# Patient Record
Sex: FEMALE | Race: WHITE | Employment: OTHER | URBAN - METROPOLITAN AREA
[De-identification: names, ages, dates, MRNs, and addresses within clinical notes are randomized per-mention and may not be internally consistent; named-entity substitution may affect disease eponyms.]

---

## 2017-05-09 ENCOUNTER — HOSPITAL ENCOUNTER (OUTPATIENT)
Dept: GENERAL RADIOLOGY | Age: 77
Discharge: HOME | End: 2017-05-09
Attending: INTERNAL MEDICINE | Admitting: INTERNAL MEDICINE

## 2017-05-09 LAB
ANION GAP SERPL CALCULATED.3IONS-SCNC: 10 MMOL/L (ref 4–16)
BUN BLDV-MCNC: 44 MG/DL (ref 6–23)
CALCIUM SERPL-MCNC: 9.5 MG/DL (ref 8.3–10.6)
CHLORIDE BLD-SCNC: 103 MMOL/L (ref 99–110)
CO2: 27 MMOL/L (ref 21–32)
CREAT SERPL-MCNC: 3 MG/DL (ref 0.6–1.1)
GFR AFRICAN AMERICAN: 18 ML/MIN/1.73M2
GFR NON-AFRICAN AMERICAN: 15 ML/MIN/1.73M2
GLUCOSE BLD-MCNC: 106 MG/DL (ref 70–140)
POTASSIUM SERPL-SCNC: 4.7 MMOL/L (ref 3.5–5.1)
SODIUM BLD-SCNC: 140 MMOL/L (ref 135–145)

## 2017-05-16 PROBLEM — N17.9 ACUTE RENAL FAILURE (ARF) (HCC): Status: ACTIVE | Noted: 2017-05-16

## 2017-05-16 PROBLEM — K21.9 GERD (GASTROESOPHAGEAL REFLUX DISEASE): Status: ACTIVE | Noted: 2017-05-16

## 2017-05-16 PROBLEM — N18.4 CHRONIC KIDNEY DISEASE (CKD) STAGE G4/A1, SEVERELY DECREASED GLOMERULAR FILTRATION RATE (GFR) BETWEEN 15-29 ML/MIN/1.73 SQUARE METER AND ALBUMINURIA CREATININE RATIO LESS THAN 30 MG/G (HCC): Status: ACTIVE | Noted: 2017-05-16

## 2017-10-10 ENCOUNTER — HOSPITAL ENCOUNTER (OUTPATIENT)
Dept: GENERAL RADIOLOGY | Age: 77
Discharge: HOME | End: 2017-10-10
Attending: INTERNAL MEDICINE | Admitting: INTERNAL MEDICINE

## 2017-10-10 LAB
ALBUMIN SERPL-MCNC: 3.9 GM/DL (ref 3.4–5)
ANION GAP SERPL CALCULATED.3IONS-SCNC: 15 MMOL/L (ref 4–16)
BUN BLDV-MCNC: 36 MG/DL (ref 6–23)
CALCIUM SERPL-MCNC: 9.8 MG/DL (ref 8.3–10.6)
CHLORIDE BLD-SCNC: 102 MMOL/L (ref 99–110)
CO2: 24 MMOL/L (ref 21–32)
CREAT SERPL-MCNC: 3.3 MG/DL (ref 0.6–1.1)
GFR AFRICAN AMERICAN: 16 ML/MIN/1.73M2
GFR NON-AFRICAN AMERICAN: 14 ML/MIN/1.73M2
GLUCOSE BLD-MCNC: 101 MG/DL (ref 70–140)
PHOSPHORUS: 3 MG/DL (ref 2.5–4.9)
POTASSIUM SERPL-SCNC: 4.6 MMOL/L (ref 3.5–5.1)
SODIUM BLD-SCNC: 141 MMOL/L (ref 135–145)

## 2017-10-12 LAB — PARATHYROID HORMONE INTACT: 76

## 2018-03-05 ENCOUNTER — HOSPITAL ENCOUNTER (OUTPATIENT)
Dept: GENERAL RADIOLOGY | Age: 78
Discharge: HOME | End: 2018-03-05
Attending: INTERNAL MEDICINE | Admitting: INTERNAL MEDICINE

## 2018-03-05 LAB
ANION GAP SERPL CALCULATED.3IONS-SCNC: 14 MMOL/L (ref 4–16)
BACTERIA: ABNORMAL /HPF
BILIRUBIN URINE: NEGATIVE MG/DL
BLOOD, URINE: NEGATIVE
BUN BLDV-MCNC: 41 MG/DL (ref 6–23)
CALCIUM SERPL-MCNC: 9.7 MG/DL (ref 8.3–10.6)
CHLORIDE BLD-SCNC: 103 MMOL/L (ref 99–110)
CLARITY: CLEAR
CO2: 26 MMOL/L (ref 21–32)
COLOR: ABNORMAL
CREAT SERPL-MCNC: 3 MG/DL (ref 0.6–1.1)
GFR AFRICAN AMERICAN: 18 ML/MIN/1.73M2
GFR NON-AFRICAN AMERICAN: 15 ML/MIN/1.73M2
GLUCOSE BLD-MCNC: 108 MG/DL (ref 70–99)
GLUCOSE, URINE: NEGATIVE MG/DL
HYALINE CASTS: 5 /LPF
KETONES, URINE: NEGATIVE MG/DL
LEUKOCYTE ESTERASE, URINE: ABNORMAL
MUCUS: ABNORMAL HPF
NITRITE URINE, QUANTITATIVE: NEGATIVE
PH, URINE: 5 (ref 5–8)
POTASSIUM SERPL-SCNC: 4.3 MMOL/L (ref 3.5–5.1)
PROTEIN UA: NEGATIVE MG/DL
RBC URINE: 1 /HPF (ref 0–6)
SODIUM BLD-SCNC: 143 MMOL/L (ref 135–145)
SPECIFIC GRAVITY UA: 1.01 (ref 1–1.03)
SQUAMOUS EPITHELIAL: <1 /HPF
TRANSITIONAL EPITHELIAL: <1 /HPF
TRICHOMONAS: ABNORMAL /HPF
UROBILINOGEN, URINE: NORMAL MG/DL (ref 0.2–1)
WBC UA: 7 /HPF (ref 0–5)

## 2018-05-26 PROBLEM — K56.609 SBO (SMALL BOWEL OBSTRUCTION) (HCC): Status: ACTIVE | Noted: 2018-05-26

## 2018-05-26 PROBLEM — K43.6 VENTRAL HERNIA WITH OBSTRUCTION AND WITHOUT GANGRENE: Status: ACTIVE | Noted: 2018-05-26

## 2018-06-06 ENCOUNTER — OFFICE VISIT (OUTPATIENT)
Dept: BARIATRICS/WEIGHT MGMT | Age: 78
End: 2018-06-06

## 2018-06-06 VITALS
WEIGHT: 222.3 LBS | HEIGHT: 62 IN | HEART RATE: 80 BPM | DIASTOLIC BLOOD PRESSURE: 72 MMHG | SYSTOLIC BLOOD PRESSURE: 134 MMHG | BODY MASS INDEX: 40.91 KG/M2

## 2018-06-06 DIAGNOSIS — Z87.19 H/O VENTRAL HERNIA: Primary | ICD-10-CM

## 2018-06-06 PROCEDURE — 99024 POSTOP FOLLOW-UP VISIT: CPT | Performed by: SURGERY

## 2018-06-13 ENCOUNTER — OFFICE VISIT (OUTPATIENT)
Dept: BARIATRICS/WEIGHT MGMT | Age: 78
End: 2018-06-13

## 2018-06-13 VITALS
SYSTOLIC BLOOD PRESSURE: 126 MMHG | WEIGHT: 220.9 LBS | HEIGHT: 62 IN | RESPIRATION RATE: 16 BRPM | DIASTOLIC BLOOD PRESSURE: 59 MMHG | BODY MASS INDEX: 40.65 KG/M2 | HEART RATE: 66 BPM

## 2018-06-13 DIAGNOSIS — Z87.19 S/P VENTRAL HERNIORRHAPHY: Primary | ICD-10-CM

## 2018-06-13 DIAGNOSIS — Z98.890 S/P VENTRAL HERNIORRHAPHY: Primary | ICD-10-CM

## 2018-06-13 PROCEDURE — 99024 POSTOP FOLLOW-UP VISIT: CPT | Performed by: SURGERY

## 2018-06-19 ENCOUNTER — OFFICE VISIT (OUTPATIENT)
Dept: BARIATRICS/WEIGHT MGMT | Age: 78
End: 2018-06-19

## 2018-06-19 VITALS
SYSTOLIC BLOOD PRESSURE: 132 MMHG | WEIGHT: 218.4 LBS | RESPIRATION RATE: 16 BRPM | DIASTOLIC BLOOD PRESSURE: 63 MMHG | HEART RATE: 60 BPM | BODY MASS INDEX: 39.95 KG/M2

## 2018-06-19 DIAGNOSIS — Z87.19 S/P VENTRAL HERNIORRHAPHY: Primary | ICD-10-CM

## 2018-06-19 DIAGNOSIS — Z51.89 VISIT FOR WOUND CHECK: ICD-10-CM

## 2018-06-19 DIAGNOSIS — Z98.890 S/P VENTRAL HERNIORRHAPHY: Primary | ICD-10-CM

## 2018-06-19 PROCEDURE — 99024 POSTOP FOLLOW-UP VISIT: CPT | Performed by: NURSE PRACTITIONER

## 2018-06-19 RX ORDER — AMOXICILLIN AND CLAVULANATE POTASSIUM 875; 125 MG/1; MG/1
1 TABLET, FILM COATED ORAL 2 TIMES DAILY
Qty: 28 TABLET | Refills: 0 | Status: SHIPPED | OUTPATIENT
Start: 2018-06-19 | End: 2018-07-03

## 2018-06-19 ASSESSMENT — ENCOUNTER SYMPTOMS
TROUBLE SWALLOWING: 0
CHEST TIGHTNESS: 0
WHEEZING: 0
SHORTNESS OF BREATH: 0
ABDOMINAL PAIN: 0
DIARRHEA: 0
NAUSEA: 0
RHINORRHEA: 0
ABDOMINAL DISTENTION: 0
EYE PAIN: 0
CONSTIPATION: 0

## 2018-06-21 ENCOUNTER — HOSPITAL ENCOUNTER (OUTPATIENT)
Dept: OTHER | Age: 78
Discharge: HOME | End: 2018-06-21
Attending: SURGERY | Admitting: SURGERY

## 2018-06-21 ENCOUNTER — OFFICE VISIT (OUTPATIENT)
Dept: SURGERY | Age: 78
End: 2018-06-21

## 2018-06-21 VITALS
BODY MASS INDEX: 40.2 KG/M2 | HEIGHT: 62 IN | WEIGHT: 218.48 LBS | SYSTOLIC BLOOD PRESSURE: 118 MMHG | HEART RATE: 70 BPM | DIASTOLIC BLOOD PRESSURE: 70 MMHG

## 2018-06-21 DIAGNOSIS — K43.2 VENTRAL INCISIONAL HERNIA: Primary | ICD-10-CM

## 2018-06-21 DIAGNOSIS — L24.A9 WOUND DRAINAGE: ICD-10-CM

## 2018-06-21 PROCEDURE — G8427 DOCREV CUR MEDS BY ELIG CLIN: HCPCS | Performed by: SURGERY

## 2018-06-21 PROCEDURE — 4040F PNEUMOC VAC/ADMIN/RCVD: CPT | Performed by: SURGERY

## 2018-06-21 PROCEDURE — G8417 CALC BMI ABV UP PARAM F/U: HCPCS | Performed by: SURGERY

## 2018-06-21 PROCEDURE — 99024 POSTOP FOLLOW-UP VISIT: CPT | Performed by: SURGERY

## 2018-06-21 PROCEDURE — G8400 PT W/DXA NO RESULTS DOC: HCPCS | Performed by: SURGERY

## 2018-06-21 PROCEDURE — 1036F TOBACCO NON-USER: CPT | Performed by: SURGERY

## 2018-06-21 PROCEDURE — 1123F ACP DISCUSS/DSCN MKR DOCD: CPT | Performed by: SURGERY

## 2018-06-21 PROCEDURE — 1090F PRES/ABSN URINE INCON ASSESS: CPT | Performed by: SURGERY

## 2018-06-21 PROCEDURE — 1111F DSCHRG MED/CURRENT MED MERGE: CPT | Performed by: SURGERY

## 2018-06-24 ASSESSMENT — ENCOUNTER SYMPTOMS
SORE THROAT: 0
RECTAL PAIN: 0
EYE REDNESS: 0
COLOR CHANGE: 0
BACK PAIN: 0
APNEA: 0
EYE ITCHING: 0
STRIDOR: 0
CHOKING: 0
ANAL BLEEDING: 0
PHOTOPHOBIA: 0
ABDOMINAL PAIN: 1
CONSTIPATION: 0

## 2018-06-25 RX ORDER — FLUCONAZOLE 100 MG/1
100 TABLET ORAL DAILY
Status: ACTIVE | OUTPATIENT
Start: 2018-06-26 | End: 2018-07-03

## 2018-06-26 ENCOUNTER — TELEPHONE (OUTPATIENT)
Dept: BARIATRICS/WEIGHT MGMT | Age: 78
End: 2018-06-26

## 2018-06-26 RX ORDER — FLUCONAZOLE 100 MG/1
100 TABLET ORAL DAILY
Qty: 1 TABLET | Refills: 0 | Status: SHIPPED | OUTPATIENT
Start: 2018-06-26 | End: 2018-07-03

## 2018-06-27 ENCOUNTER — OFFICE VISIT (OUTPATIENT)
Dept: BARIATRICS/WEIGHT MGMT | Age: 78
End: 2018-06-27

## 2018-06-27 VITALS
RESPIRATION RATE: 20 BRPM | WEIGHT: 217.5 LBS | BODY MASS INDEX: 39.78 KG/M2 | DIASTOLIC BLOOD PRESSURE: 78 MMHG | SYSTOLIC BLOOD PRESSURE: 112 MMHG

## 2018-06-27 DIAGNOSIS — Z87.19 S/P VENTRAL HERNIORRHAPHY: Primary | ICD-10-CM

## 2018-06-27 DIAGNOSIS — Z98.890 S/P VENTRAL HERNIORRHAPHY: Primary | ICD-10-CM

## 2018-06-27 DIAGNOSIS — Z87.19 H/O VENTRAL HERNIA: ICD-10-CM

## 2018-06-27 PROCEDURE — 99024 POSTOP FOLLOW-UP VISIT: CPT | Performed by: SURGERY

## 2018-07-01 LAB
CULTURE: NORMAL
ORGANISM: NORMAL
REPORT STATUS: NORMAL
REQUEST PROBLEM: NORMAL
SPECIMEN: NORMAL

## 2018-07-11 ENCOUNTER — OFFICE VISIT (OUTPATIENT)
Dept: BARIATRICS/WEIGHT MGMT | Age: 78
End: 2018-07-11

## 2018-07-11 VITALS
SYSTOLIC BLOOD PRESSURE: 136 MMHG | BODY MASS INDEX: 39.43 KG/M2 | RESPIRATION RATE: 16 BRPM | WEIGHT: 215.6 LBS | DIASTOLIC BLOOD PRESSURE: 64 MMHG | HEART RATE: 62 BPM

## 2018-07-11 DIAGNOSIS — Z98.890 S/P HERNIORRHAPHY: Primary | ICD-10-CM

## 2018-07-11 DIAGNOSIS — Z87.19 S/P HERNIORRHAPHY: Primary | ICD-10-CM

## 2018-07-11 DIAGNOSIS — T81.30XA WOUND DEHISCENCE: ICD-10-CM

## 2018-07-11 PROCEDURE — 99024 POSTOP FOLLOW-UP VISIT: CPT | Performed by: SURGERY

## 2018-07-11 RX ORDER — ZINC SULFATE 50(220)MG
220 CAPSULE ORAL DAILY
Qty: 30 CAPSULE | Refills: 3 | COMMUNITY
Start: 2018-07-11 | End: 2018-12-18

## 2018-07-11 RX ORDER — ASCORBIC ACID 250 MG
500 TABLET ORAL 3 TIMES DAILY
Qty: 90 TABLET | Refills: 3 | Status: SHIPPED | OUTPATIENT
Start: 2018-07-11 | End: 2018-12-18

## 2018-07-11 NOTE — PROGRESS NOTES
GENERAL SURGERY OFFICE NOTE    SUBJECTIVE:    Patient presenting today referred from Kirstie Palomo MD and No ref. provider found, for   Chief Complaint   Patient presents with    Post-Op Check     OR 5/27/18 exploratory laparoscopy, wound check and suture removal.         HPI: Carolina Christina is a 69 y.o. female presenting in fifth, follow up 6 weeks post op 5/27/18. Procedure:   Exploratory laparoscopy, extensive lysis of adhesions  which consumed more than an hour of the total operative time, laparoscopic  Ventral incisional herniorrhaphies x 2 with underlay 10 x 15 cm [employer] mesh  secured to the fascia with 2-0 Ethibond. Bilateral injections of the inguinal canals with the local anesthetic. Pathology:   Final Pathologic Diagnosis:  Soft tissue, ventral, excision:  -  Benign fibromembranous adipose tissue consistent with  hernia sac. -  Focal chronic inflammation and hemosiderin-laden  macrophases are noted. Recent wound cx 6/21/18:      Wounds very nicely healing, no erythema, no induration, no purulent discharge. No constipation, BMs back to normal.    Thoroughly reviewed the patient's medical history, family history, social history and review of systems with the patient today in the office. Please see medical record for pertinent positives.       Past Medical History:   Diagnosis Date    Acid reflux     Arthritis     \"Left Index Finger\"    Chronic kidney disease     Sees Dr. Stefanie Carpenter History of blood transfusion 2011 Or 2012    No Reaction To Blood Transfusion Received    Hypertension     Shortness of breath on exertion     Teeth missing     Upper And Lower    Wears glasses       Past Surgical History:   Procedure Laterality Date    ABDOMEN SURGERY      DENTAL SURGERY      Teeth Extracted In Past    ENDOSCOPY, COLON, DIAGNOSTIC  2011 Or 2012    HERNIA REPAIR  2011    Abdominal Hernia Repair     OTHER SURGICAL HISTORY  05/27/2018    exp lap . converted to exp

## 2018-07-30 ENCOUNTER — OFFICE VISIT (OUTPATIENT)
Dept: SURGERY | Age: 78
End: 2018-07-30

## 2018-07-30 VITALS
HEIGHT: 62 IN | DIASTOLIC BLOOD PRESSURE: 60 MMHG | BODY MASS INDEX: 37.16 KG/M2 | SYSTOLIC BLOOD PRESSURE: 112 MMHG | WEIGHT: 201.94 LBS | HEART RATE: 75 BPM

## 2018-07-30 DIAGNOSIS — K43.2 VENTRAL INCISIONAL HERNIA: Primary | ICD-10-CM

## 2018-07-30 PROCEDURE — G8400 PT W/DXA NO RESULTS DOC: HCPCS | Performed by: SURGERY

## 2018-07-30 PROCEDURE — 4040F PNEUMOC VAC/ADMIN/RCVD: CPT | Performed by: SURGERY

## 2018-07-30 PROCEDURE — 99212 OFFICE O/P EST SF 10 MIN: CPT | Performed by: SURGERY

## 2018-07-30 PROCEDURE — 1036F TOBACCO NON-USER: CPT | Performed by: SURGERY

## 2018-07-30 PROCEDURE — 1090F PRES/ABSN URINE INCON ASSESS: CPT | Performed by: SURGERY

## 2018-07-30 PROCEDURE — G8427 DOCREV CUR MEDS BY ELIG CLIN: HCPCS | Performed by: SURGERY

## 2018-07-30 PROCEDURE — 1123F ACP DISCUSS/DSCN MKR DOCD: CPT | Performed by: SURGERY

## 2018-07-30 PROCEDURE — 1101F PT FALLS ASSESS-DOCD LE1/YR: CPT | Performed by: SURGERY

## 2018-07-30 PROCEDURE — G8417 CALC BMI ABV UP PARAM F/U: HCPCS | Performed by: SURGERY

## 2018-07-30 RX ORDER — DOXYCYCLINE 100 MG/1
CAPSULE ORAL
COMMUNITY
Start: 2018-07-27 | End: 2018-08-22 | Stop reason: ALTCHOICE

## 2018-07-30 RX ORDER — SULFAMETHOXAZOLE AND TRIMETHOPRIM 800; 160 MG/1; MG/1
TABLET ORAL
COMMUNITY
Start: 2018-07-24 | End: 2018-08-22 | Stop reason: ALTCHOICE

## 2018-07-30 ASSESSMENT — ENCOUNTER SYMPTOMS
APNEA: 0
RECTAL PAIN: 0
ABDOMINAL PAIN: 1
CHOKING: 0
BACK PAIN: 0
STRIDOR: 0
PHOTOPHOBIA: 0
SORE THROAT: 0
EYE ITCHING: 0
COLOR CHANGE: 0
ANAL BLEEDING: 0
EYE REDNESS: 0
CONSTIPATION: 0

## 2018-07-31 NOTE — PROGRESS NOTES
Chief Complaint   Patient presents with    Consultation     2nd Opinion         SUBJECTIVE:  HPI: Patient is here with complaints of Drainage from her umbilicus. She recently underwent lap converted to open ventral hernia repair. Postoperatively she had infected seroma and was managed nonoperatively. She is doing well overall. She is somewhat frustrated with her drainage and wanted to see me for 2nd opinion. Patient denies any fever or chills. .    I have reviewed the patient's(pertinent information to this visit) medical history, family history(scanned in  the Media tab under \"patient questioner\"), social history and review of systems with the patient today in the office. Past Surgical History:   Procedure Laterality Date    ABDOMEN SURGERY      DENTAL SURGERY      Teeth Extracted In Past    ENDOSCOPY, COLON, DIAGNOSTIC  2011 Or 2012    HERNIA REPAIR  2011    Abdominal Hernia Repair     OTHER SURGICAL HISTORY  05/27/2018    exp lap . converted to exp laporotomy with ventral hernia repair with mesh    VENTRAL HERNIA REPAIR  09/16/2016    Robotic laparoscopic     Past Medical History:   Diagnosis Date    Acid reflux     Arthritis     \"Left Index Finger\"    Chronic kidney disease     Sees Dr. Rubén Myers History of blood transfusion 2011 Or 2012    No Reaction To Blood Transfusion Received    Hypertension     Shortness of breath on exertion     Teeth missing     Upper And Lower    Wears glasses      Family History   Problem Relation Age of Onset    Cancer Father         Lung Cancer    Arthritis Mother     Cancer Brother         Skin Cancer    High Cholesterol Brother      Social History     Social History    Marital status:      Spouse name: N/A    Number of children: N/A    Years of education: N/A     Occupational History    Not on file.      Social History Main Topics    Smoking status: Never Smoker    Smokeless tobacco: Never Used    Alcohol use No    Drug use: No    Sexual enuresis, flank pain and hematuria. Musculoskeletal: Negative for back pain, joint swelling and myalgias. Skin: Negative for color change and pallor. Allergic/Immunologic: Negative for environmental allergies. Neurological: Negative for syncope and speech difficulty. Psychiatric/Behavioral: Negative for confusion and hallucinations. OBJECTIVE:  Physical Exam:    /60 (Site: Right Arm, Position: Sitting, Cuff Size: Medium Adult)   Pulse 75   Ht 5' 2\" (1.575 m)   Wt 201 lb 15.1 oz (91.6 kg)   BMI 36.94 kg/m²      Physical Exam   Constitutional: She is oriented to person, place, and time. She appears well-developed and well-nourished. HENT:   Head: Normocephalic. Eyes: Pupils are equal, round, and reactive to light. Neck: Normal range of motion. Neck supple. Cardiovascular: Normal rate. Pulmonary/Chest: Effort normal.   Abdominal: Soft. She exhibits no distension and no mass. There is tenderness. There is no rebound and no guarding. Serous fluid drainage from her belly button. No hernia recurrence noted   Musculoskeletal: Normal range of motion. Neurological: She is alert and oriented to person, place, and time. Skin: Skin is warm. Psychiatric: She has a normal mood and affect. ASSESSMENT:  1. Ventral incisional hernia          PLAN:  Treatment:  Patient advised and encouraged her to follow with Dr. Kelly Alex.  Patient counseled on risks, benefits, and alternatives of treatment plan at length today. Patient states an understanding and willingness to proceed with plan. No orders of the defined types were placed in this encounter. No orders of the defined types were placed in this encounter. Follow Up:  No Follow-up on file.       Ratna Mast MD

## 2018-08-01 ENCOUNTER — OFFICE VISIT (OUTPATIENT)
Dept: BARIATRICS/WEIGHT MGMT | Age: 78
End: 2018-08-01

## 2018-08-01 VITALS
WEIGHT: 210.5 LBS | DIASTOLIC BLOOD PRESSURE: 59 MMHG | BODY MASS INDEX: 38.5 KG/M2 | HEART RATE: 63 BPM | RESPIRATION RATE: 20 BRPM | SYSTOLIC BLOOD PRESSURE: 126 MMHG

## 2018-08-01 DIAGNOSIS — Z87.19 HISTORY OF VENTRAL HERNIA: Primary | ICD-10-CM

## 2018-08-01 PROCEDURE — 99024 POSTOP FOLLOW-UP VISIT: CPT | Performed by: SURGERY

## 2018-08-01 NOTE — PROGRESS NOTES
Current Outpatient Prescriptions   Medication Sig Dispense Refill    doxycycline monohydrate (MONODOX) 100 MG capsule       sulfamethoxazole-trimethoprim (BACTRIM DS;SEPTRA DS) 800-160 MG per tablet       mupirocin (BACTROBAN) 2 % ointment       ascorbic acid (V-R VITAMIN C) 250 MG tablet Take 2 tablets by mouth 3 times daily 90 tablet 3    zinc sulfate (ORAZINC) 220 (50 Zn) MG capsule Take 1 capsule by mouth daily 30 capsule 3    omeprazole (PRILOSEC) 20 MG delayed release capsule Take 40 mg by mouth daily      furosemide (LASIX) 20 MG tablet Take 1 tablet by mouth 2 times daily 60 tablet 3    levothyroxine (SYNTHROID) 50 MCG tablet Take 1 tablet by mouth Daily 30 tablet 3    magnesium oxide (MAG-OX) 400 (241.3 MG) MG TABS tablet Take 1 tablet by mouth 2 times daily 30 tablet 2    clotrimazole-betamethasone (LOTRISONE) 1-0.05 % cream       nystatin (MYCOSTATIN) 364153 UNIT/GM powder Apply 3 times daily. (Patient taking differently: as needed ) 1 Bottle 0    metoprolol tartrate (LOPRESSOR) 25 MG tablet Take 1 tablet by mouth 2 times daily 60 tablet 3    docusate sodium (COLACE) 100 MG capsule Take 1 capsule by mouth daily as needed for Constipation (while taking pain medicine) 30 capsule 3     No current facility-administered medications for this visit. No Known Allergies        Review of Systems      OBJECTIVE:    BP (!) 126/59 (Site: Right Arm, Position: Sitting, Cuff Size: Large Adult)   Pulse 63   Resp 20   Wt 210 lb 8 oz (95.5 kg)   BMI 38.50 kg/m²      Physical Exam      ASSESSMENT & PLAN:    1. History of ventral hernia           Less discharge, yellow, NOT foul smelling, will wait 3 more wks, then excise the umbilicus if not healed. Patient counseled on the risks, benefits, and alternatives of treatment plan at length while in the office today. Patient states an understanding and willingness to proceed with the plan.       Follow Up:  Return in about 3 weeks (around 8/22/2018) for Surgery, Follow up Symptoms. Nazario Bartlett MD, FACS, FICS.    8/1/18       Patient was seen with total face to face time of 25 minutes. More than 50% of this visit was counseling and education as above in my assessment and plan.

## 2018-08-10 PROBLEM — I10 HTN (HYPERTENSION): Status: ACTIVE | Noted: 2018-08-10

## 2018-08-10 PROBLEM — L08.9 SKIN INFECTION: Status: ACTIVE | Noted: 2018-08-10

## 2018-08-22 ENCOUNTER — OFFICE VISIT (OUTPATIENT)
Dept: BARIATRICS/WEIGHT MGMT | Age: 78
End: 2018-08-22

## 2018-08-22 ENCOUNTER — TELEPHONE (OUTPATIENT)
Dept: BARIATRICS/WEIGHT MGMT | Age: 78
End: 2018-08-22

## 2018-08-22 VITALS
RESPIRATION RATE: 16 BRPM | SYSTOLIC BLOOD PRESSURE: 108 MMHG | HEART RATE: 68 BPM | BODY MASS INDEX: 37.42 KG/M2 | DIASTOLIC BLOOD PRESSURE: 62 MMHG | WEIGHT: 204.6 LBS

## 2018-08-22 DIAGNOSIS — Q89.9 UMBILICAL ABNORMALITY: Primary | ICD-10-CM

## 2018-08-22 PROCEDURE — 99024 POSTOP FOLLOW-UP VISIT: CPT | Performed by: SURGERY

## 2018-08-22 ASSESSMENT — ENCOUNTER SYMPTOMS
COUGH: 0
DIARRHEA: 0
PHOTOPHOBIA: 0
NAUSEA: 0
CONSTIPATION: 0
ANAL BLEEDING: 0
TROUBLE SWALLOWING: 0
VOICE CHANGE: 0
SHORTNESS OF BREATH: 0
WHEEZING: 0
COLOR CHANGE: 0
BLOOD IN STOOL: 0
VOMITING: 0
SORE THROAT: 0
ABDOMINAL PAIN: 0

## 2018-08-22 NOTE — PROGRESS NOTES
umbilicus), anal bleeding, blood in stool, constipation, diarrhea, nausea and vomiting. Endocrine: Negative for cold intolerance, heat intolerance, polydipsia and polyuria. Genitourinary: Negative for dysuria, frequency and hematuria. Musculoskeletal: Negative for joint swelling, myalgias and neck stiffness. Skin: Negative for color change and rash. Neurological: Negative for seizures, speech difficulty, light-headedness and numbness. Hematological: Negative for adenopathy. Does not bruise/bleed easily. OBJECTIVE:    /62 (Site: Right Arm, Position: Sitting, Cuff Size: Large Adult)   Pulse 68   Resp 16   Wt 204 lb 9.6 oz (92.8 kg)   BMI 37.42 kg/m²      Physical Exam   Constitutional: She is oriented to person, place, and time. She appears well-developed and well-nourished. No distress. HENT:   Head: Normocephalic and atraumatic. Eyes: Pupils are equal, round, and reactive to light. Conjunctivae and EOM are normal. No scleral icterus. Neck: Normal range of motion. No JVD present. No tracheal deviation present. No thyromegaly present. Cardiovascular: Normal rate, regular rhythm, normal heart sounds and intact distal pulses. Exam reveals no gallop and no friction rub. No murmur heard. Pulmonary/Chest: Effort normal. No stridor. No respiratory distress. She has no wheezes. She has no rales. She exhibits no tenderness. Abdominal: Soft. Bowel sounds are normal. She exhibits no distension and no mass (Seeping umbilicus). There is no tenderness. There is no rebound and no guarding. Musculoskeletal: Normal range of motion. She exhibits no edema or tenderness. Lymphadenopathy:     She has no cervical adenopathy. Neurological: She is alert and oriented to person, place, and time. No cranial nerve deficit. Coordination normal.   Skin: Skin is warm and dry. No rash noted. She is not diaphoretic. No erythema. No pallor.    Psychiatric: Her behavior is normal. Judgment and thought content normal.   Vitals reviewed. ASSESSMENT & PLAN:    1. Umbilical abnormality         Will excise her umbilicus. Patient counseled on the risks, benefits, and alternatives of treatment plan at length while in the office today. Patient states an understanding and willingness to proceed with the plan. Follow Up:  Return in about 2 weeks (around 9/5/2018) for Surgery. Telly Sagastume MD, FACS, FICS.    8/22/18       Patient was seen with total face to face time of 25 minutes. More than 50% of this visit was counseling and education as above in my assessment and plan.

## 2018-09-05 ENCOUNTER — OFFICE VISIT (OUTPATIENT)
Dept: BARIATRICS/WEIGHT MGMT | Age: 78
End: 2018-09-05

## 2018-09-05 VITALS
HEIGHT: 62 IN | RESPIRATION RATE: 15 BRPM | DIASTOLIC BLOOD PRESSURE: 61 MMHG | HEART RATE: 68 BPM | BODY MASS INDEX: 38.79 KG/M2 | WEIGHT: 210.8 LBS | SYSTOLIC BLOOD PRESSURE: 130 MMHG

## 2018-09-05 DIAGNOSIS — T81.30XA WOUND DEHISCENCE: Primary | ICD-10-CM

## 2018-09-05 DIAGNOSIS — Q89.9 UMBILICAL ABNORMALITY: ICD-10-CM

## 2018-09-05 PROCEDURE — 99024 POSTOP FOLLOW-UP VISIT: CPT | Performed by: SURGERY

## 2018-09-05 RX ORDER — AMOXICILLIN AND CLAVULANATE POTASSIUM 875; 125 MG/1; MG/1
1 TABLET, FILM COATED ORAL 2 TIMES DAILY
Qty: 20 TABLET | Refills: 0 | Status: SHIPPED | OUTPATIENT
Start: 2018-09-05 | End: 2018-09-15

## 2018-09-05 NOTE — PROGRESS NOTES
presenting in first, follow up 9 days post op 8/27/18. Procedure:  Excision of the umbilicus, and of the necrotic wound, partial mesh excision, profuse irrigation of the wound with 6 L of diluted betadine and hydrogen peroxide, and closure of the wound.     Pathology:   8/30/2018 12:33 PM - [place]     Component Results     Component Collected Lab   Specimen 08/27/2018  2:04 PM [address]   UMBILICUS    Special Requests 08/27/2018  2:04 PM [address]   NONE    Gram Smear 08/27/2018  2:04 PM [address] [place] [address] Dr [place] NECROTIC POLYS    Gram Smear 08/27/2018  2:04 PM [address]   RARE RED BLOOD CELLS    Gram Smear 08/27/2018  2:04 PM [address]   RARE GRAM POSITIVE COCCI    Culture 08/27/2018  2:04 PM [address]   ENTEROCOCCUS SP. MODERATE GROWTH    Culture 08/27/2018  2:04 PM [address]   PSEUDOMONAS AERUGINOSA SCANTY GROWTH    Culture 08/27/2018  2:04 PM [address]   NO ANAEROBIC GROWTH AT 72 HOURS    Report Status 08/27/2018  2:04 PM [address]   FINAL 08/30/2018    Organism 08/27/2018  2:04 PM [address]    Organism 08/27/2018  2:04 PM [address] [place] [address] Dr COREY

## 2018-09-06 LAB — TSH SERPL DL<=0.05 MIU/L-ACNC: 3.26 UIU/ML

## 2018-09-19 ENCOUNTER — OFFICE VISIT (OUTPATIENT)
Dept: BARIATRICS/WEIGHT MGMT | Age: 78
End: 2018-09-19

## 2018-09-19 VITALS
DIASTOLIC BLOOD PRESSURE: 78 MMHG | HEART RATE: 65 BPM | RESPIRATION RATE: 12 BRPM | WEIGHT: 211.9 LBS | HEIGHT: 62 IN | OXYGEN SATURATION: 96 % | BODY MASS INDEX: 39 KG/M2 | SYSTOLIC BLOOD PRESSURE: 116 MMHG

## 2018-09-19 DIAGNOSIS — Q89.9 UMBILICAL ABNORMALITY: ICD-10-CM

## 2018-09-19 DIAGNOSIS — Z87.19 H/O VENTRAL HERNIA: Primary | ICD-10-CM

## 2018-09-19 DIAGNOSIS — L08.9 SKIN INFECTION: ICD-10-CM

## 2018-09-19 PROCEDURE — 99213 OFFICE O/P EST LOW 20 MIN: CPT | Performed by: SURGERY

## 2018-09-19 NOTE — PROGRESS NOTES
GENERAL SURGERY OFFICE NOTE    SUBJECTIVE:    Patient presenting today referred from Jose Ritter MD and No ref. provider found, for   Chief Complaint   Patient presents with    Follow Up After Procedure     PO #2 8/27/18 Excision of the Umbilicus       HPI: Disha Farmer is a 69 y.o. female Presenting in second, follow up 3 weeks post op 8/27/18.     Procedure:   1.  Excision of the umbilicus. 2.  Incision and drainage and excision of the necrotic wound. 3.  Partial mesh excision. 4.  Profuse irrigation of the wound with 6 liters of diluted Betadine  and diluted hydrogen peroxide. 5.  Closure of the wound in three layers.     Pathology: None. NOT infected wound VERY nicely healing, no discharge. Wounds very nicely healing, no erythema, no induration, no purulent discharge. No constipation, BMs back to normal.    Thoroughly reviewed the patient's medical history, family history, social history and review of systems with the patient today in the office. Please see medical record for pertinent positives.       Past Medical History:   Diagnosis Date    Acid reflux     'no recent issue\"    Arthritis     \"Left Index Finger\"    CHF (congestive heart failure) ([address]     per old chart hx of CHF with admission 12/2016 with pulmonary edema    Chronic kidney disease     Sees Dr. Frank Scot have one kidney, dr Pura Pina told me that in 2011 I think\"    History of blood transfusion 2011 Or 2012    No Reaction To Blood Transfusion Received    Hypertension     ( pt states she is not on any medication for blood pressure)    Shortness of breath on exertion     SVT (supraventricular tachycardia) ([address]     per old chart hx of SVT with admission 2016 tx with Adenosine and per notes in 5/2018 hx of SVT- no cardiologist    Teeth missing     Upper And Lower    Thyroid disease     hypothyroid    Wears glasses       Past Surgical History:   Procedure Laterality Date    ABDOMEN SURGERY      DENTAL SURGERY      Teeth Extracted In Past    ENDOSCOPY, COLON, DIAGNOSTIC  2011 Or 2012    EYE SURGERY  ? when    clyde cataract ext   [address]  2011    Abdominal Hernia Repair     OTHER SURGICAL HISTORY  05/27/2018    exp lap . converted to exp laporotomy with ventral hernia repair with mesh    OTHER SURGICAL HISTORY  51/98/4294    umbilical scar excision    VENTRAL HERNIA REPAIR  09/16/2016    Robotic laparoscopic     Current Outpatient Prescriptions   Medication Sig Dispense Refill    ascorbic acid (V-R VITAMIN C) 250 MG tablet Take 2 tablets by mouth 3 times daily 90 tablet 3    zinc sulfate (ORAZINC) 220 (50 Zn) MG capsule Take 1 capsule by mouth daily 30 capsule 3    omeprazole (PRILOSEC) 20 MG delayed release capsule Take 40 mg by mouth daily      furosemide (LASIX) 20 MG tablet Take 1 tablet by mouth 2 times daily 60 tablet 3    levothyroxine (SYNTHROID) 50 MCG tablet Take 1 tablet by mouth Daily 30 tablet 3    magnesium oxide (MAG-OX) 400 (241.3 MG) MG TABS tablet Take 1 tablet by mouth 2 times daily 30 tablet 2    nystatin (MYCOSTATIN) 983723 UNIT/GM powder Apply 3 times daily. (Patient taking differently: as needed ) 1 Bottle 0    metoprolol tartrate (LOPRESSOR) 25 MG tablet Take 1 tablet by mouth 2 times daily 60 tablet 3    docusate sodium (COLACE) 100 MG capsule Take 1 capsule by mouth daily as needed for Constipation (while taking pain medicine) 30 capsule 3     No current facility-administered medications for this visit. No Known Allergies        Review of Systems      OBJECTIVE:    /78 (Site: Left Upper Arm, Position: Sitting, Cuff Size: Medium Adult)   Pulse 65   Resp 12   Ht 5' 2\" (1.575 m)   Wt 211 lb 14.4 oz (96.1 kg)   LMP 01/23/1995   SpO2 96%   BMI 38.76 kg/m²      Physical Exam      ASSESSMENT & PLAN:    1. H/O ventral hernia    2. Umbilical abnormality    3. Skin infection         NOT infected wound VERY nicely healing, no discharge.   Mesh partially removed, but no recurrent hernia. Patient counseled on the risks, benefits, and alternatives of treatment plan at length while in the office today. Patient states an understanding and willingness to proceed with the plan. Follow Up:  Return in about 4 weeks (around 10/17/2018) for Follow up Symptoms. Teetee Schwarz MD, FACS, FICS. 9/19/18       Patient was seen with total face to face time of 25 minutes. More than 50% of this visit was counseling and education as above in my assessment and plan.

## 2018-10-19 ENCOUNTER — OFFICE VISIT (OUTPATIENT)
Dept: BARIATRICS/WEIGHT MGMT | Age: 78
End: 2018-10-19

## 2018-10-19 VITALS
HEART RATE: 74 BPM | WEIGHT: 213 LBS | RESPIRATION RATE: 18 BRPM | SYSTOLIC BLOOD PRESSURE: 126 MMHG | BODY MASS INDEX: 38.96 KG/M2 | DIASTOLIC BLOOD PRESSURE: 74 MMHG

## 2018-10-19 DIAGNOSIS — Z87.19 HISTORY OF VENTRAL HERNIA REPAIR: Primary | ICD-10-CM

## 2018-10-19 DIAGNOSIS — Z98.890 HISTORY OF VENTRAL HERNIA REPAIR: Primary | ICD-10-CM

## 2018-10-19 PROCEDURE — 99024 POSTOP FOLLOW-UP VISIT: CPT | Performed by: SURGERY

## 2018-12-06 ENCOUNTER — HOSPITAL ENCOUNTER (OUTPATIENT)
Age: 78
Discharge: HOME | End: 2018-12-06
Payer: MEDICARE

## 2018-12-06 LAB
ANION GAP SERPL CALCULATED.3IONS-SCNC: 13 MMOL/L (ref 4–16)
BUN BLDV-MCNC: 38 MG/DL (ref 6–23)
CALCIUM SERPL-MCNC: 9.5 MG/DL (ref 8.3–10.6)
CHLORIDE BLD-SCNC: 102 MMOL/L (ref 99–110)
CO2: 23 MMOL/L (ref 21–32)
CREAT SERPL-MCNC: 2.6 MG/DL (ref 0.6–1.1)
GFR AFRICAN AMERICAN: 22 ML/MIN/1.73M2
GFR NON-AFRICAN AMERICAN: 18 ML/MIN/1.73M2
GLUCOSE BLD-MCNC: 103 MG/DL (ref 70–99)
PHOSPHORUS: 3.4 MG/DL (ref 2.5–4.9)
POTASSIUM SERPL-SCNC: 4.9 MMOL/L (ref 3.5–5.1)
SODIUM BLD-SCNC: 138 MMOL/L (ref 135–145)

## 2018-12-06 PROCEDURE — 84100 ASSAY OF PHOSPHORUS: CPT

## 2018-12-06 PROCEDURE — 80048 BASIC METABOLIC PNL TOTAL CA: CPT

## 2018-12-06 PROCEDURE — 83970 ASSAY OF PARATHORMONE: CPT

## 2018-12-06 PROCEDURE — 36415 COLL VENOUS BLD VENIPUNCTURE: CPT

## 2018-12-06 PROCEDURE — 82330 ASSAY OF CALCIUM: CPT

## 2018-12-08 LAB
CALCIUM IONIZED: 4.76 MG/DL (ref 4.48–5.28)
CALCIUM SERPL-MCNC: 9.7 MG/DL (ref 8.3–10.6)
IONIZED CA: 1.19 MMOL/L (ref 1.12–1.32)
PARATHYROID HORMONE INTACT: 148

## 2018-12-26 ENCOUNTER — TELEPHONE (OUTPATIENT)
Dept: SURGERY | Age: 78
End: 2018-12-26

## 2019-06-03 ENCOUNTER — HOSPITAL ENCOUNTER (OUTPATIENT)
Age: 79
Discharge: HOME | End: 2019-06-03
Payer: MEDICARE

## 2019-06-03 LAB
ANION GAP SERPL CALCULATED.3IONS-SCNC: 10 MMOL/L (ref 4–16)
BACTERIA: ABNORMAL /HPF
BILIRUBIN URINE: NEGATIVE MG/DL
BLOOD, URINE: NEGATIVE
BUN BLDV-MCNC: 37 MG/DL (ref 6–23)
CALCIUM SERPL-MCNC: 9.2 MG/DL (ref 8.3–10.6)
CHLORIDE BLD-SCNC: 103 MMOL/L (ref 99–110)
CLARITY: CLEAR
CO2: 26 MMOL/L (ref 21–32)
COLOR: YELLOW
CREAT SERPL-MCNC: 3 MG/DL (ref 0.6–1.1)
GFR AFRICAN AMERICAN: 18 ML/MIN/1.73M2
GFR NON-AFRICAN AMERICAN: 15 ML/MIN/1.73M2
GLUCOSE BLD-MCNC: 100 MG/DL (ref 70–99)
GLUCOSE, URINE: NEGATIVE MG/DL
KETONES, URINE: NEGATIVE MG/DL
LEUKOCYTE ESTERASE, URINE: ABNORMAL
NITRITE URINE, QUANTITATIVE: NEGATIVE
PH, URINE: 7 (ref 5–8)
PHOSPHORUS: 3.3 MG/DL (ref 2.5–4.9)
POTASSIUM SERPL-SCNC: 4.5 MMOL/L (ref 3.5–5.1)
PROTEIN UA: 30 MG/DL
RBC URINE: <1 /HPF (ref 0–6)
SODIUM BLD-SCNC: 139 MMOL/L (ref 135–145)
SPECIFIC GRAVITY UA: 1.01 (ref 1–1.03)
SQUAMOUS EPITHELIAL: <1 /HPF
TRICHOMONAS: ABNORMAL /HPF
UROBILINOGEN, URINE: NORMAL MG/DL (ref 0.2–1)
WBC UA: 8 /HPF (ref 0–5)

## 2019-06-03 PROCEDURE — 81001 URINALYSIS AUTO W/SCOPE: CPT

## 2019-06-03 PROCEDURE — 84100 ASSAY OF PHOSPHORUS: CPT

## 2019-06-03 PROCEDURE — 36415 COLL VENOUS BLD VENIPUNCTURE: CPT

## 2019-06-03 PROCEDURE — 80048 BASIC METABOLIC PNL TOTAL CA: CPT

## 2019-06-11 PROBLEM — Q89.9 UMBILICAL ABNORMALITY: Status: RESOLVED | Noted: 2018-08-22 | Resolved: 2019-06-11

## 2019-07-28 RX ORDER — CLOTRIMAZOLE AND BETAMETHASONE DIPROPIONATE 10; .64 MG/G; MG/G
CREAM TOPICAL
COMMUNITY
End: 2019-09-05

## 2019-09-05 ENCOUNTER — OFFICE VISIT (OUTPATIENT)
Dept: FAMILY MEDICINE CLINIC | Age: 79
End: 2019-09-05
Payer: MEDICARE

## 2019-09-05 VITALS
DIASTOLIC BLOOD PRESSURE: 72 MMHG | HEART RATE: 60 BPM | HEIGHT: 60 IN | BODY MASS INDEX: 48.1 KG/M2 | WEIGHT: 245 LBS | SYSTOLIC BLOOD PRESSURE: 115 MMHG

## 2019-09-05 DIAGNOSIS — Z78.0 POST-MENOPAUSAL: Primary | ICD-10-CM

## 2019-09-05 DIAGNOSIS — I10 HYPERTENSION, UNSPECIFIED TYPE: ICD-10-CM

## 2019-09-05 DIAGNOSIS — Z23 NEED FOR PROPHYLACTIC VACCINATION AND INOCULATION AGAINST VARICELLA: ICD-10-CM

## 2019-09-05 DIAGNOSIS — E03.9 HYPOTHYROIDISM, UNSPECIFIED TYPE: ICD-10-CM

## 2019-09-05 DIAGNOSIS — N18.4 CHRONIC KIDNEY DISEASE (CKD) STAGE G4/A1, SEVERELY DECREASED GLOMERULAR FILTRATION RATE (GFR) BETWEEN 15-29 ML/MIN/1.73 SQUARE METER AND ALBUMINURIA CREATININE RATIO LESS THAN 30 MG/G (HCC): ICD-10-CM

## 2019-09-05 PROCEDURE — 1090F PRES/ABSN URINE INCON ASSESS: CPT | Performed by: FAMILY MEDICINE

## 2019-09-05 PROCEDURE — 4040F PNEUMOC VAC/ADMIN/RCVD: CPT | Performed by: FAMILY MEDICINE

## 2019-09-05 PROCEDURE — G8417 CALC BMI ABV UP PARAM F/U: HCPCS | Performed by: FAMILY MEDICINE

## 2019-09-05 PROCEDURE — G8400 PT W/DXA NO RESULTS DOC: HCPCS | Performed by: FAMILY MEDICINE

## 2019-09-05 PROCEDURE — 99213 OFFICE O/P EST LOW 20 MIN: CPT | Performed by: FAMILY MEDICINE

## 2019-09-05 PROCEDURE — G8427 DOCREV CUR MEDS BY ELIG CLIN: HCPCS | Performed by: FAMILY MEDICINE

## 2019-09-05 PROCEDURE — 1036F TOBACCO NON-USER: CPT | Performed by: FAMILY MEDICINE

## 2019-09-05 PROCEDURE — 1123F ACP DISCUSS/DSCN MKR DOCD: CPT | Performed by: FAMILY MEDICINE

## 2019-09-05 RX ORDER — OMEPRAZOLE 20 MG/1
40 CAPSULE, DELAYED RELEASE ORAL DAILY
Qty: 90 CAPSULE | Refills: 1 | Status: SHIPPED | OUTPATIENT
Start: 2019-09-05 | End: 2019-12-17 | Stop reason: SDUPTHER

## 2019-09-05 RX ORDER — FUROSEMIDE 20 MG/1
20 TABLET ORAL 2 TIMES DAILY
Qty: 180 TABLET | Refills: 1 | Status: SHIPPED | OUTPATIENT
Start: 2019-09-05 | End: 2020-02-14 | Stop reason: SDUPTHER

## 2019-09-05 RX ORDER — LEVOTHYROXINE SODIUM 0.05 MG/1
50 TABLET ORAL DAILY
Qty: 90 TABLET | Refills: 1 | Status: SHIPPED | OUTPATIENT
Start: 2019-09-05 | End: 2020-02-13

## 2019-09-05 ASSESSMENT — ENCOUNTER SYMPTOMS
CHEST TIGHTNESS: 0
SHORTNESS OF BREATH: 0
RESPIRATORY NEGATIVE: 1
ABDOMINAL PAIN: 0
COUGH: 0
EYES NEGATIVE: 1
WHEEZING: 0

## 2019-09-05 ASSESSMENT — PATIENT HEALTH QUESTIONNAIRE - PHQ9
SUM OF ALL RESPONSES TO PHQ QUESTIONS 1-9: 0
1. LITTLE INTEREST OR PLEASURE IN DOING THINGS: 0
SUM OF ALL RESPONSES TO PHQ9 QUESTIONS 1 & 2: 0
SUM OF ALL RESPONSES TO PHQ QUESTIONS 1-9: 0
2. FEELING DOWN, DEPRESSED OR HOPELESS: 0

## 2019-12-17 RX ORDER — OMEPRAZOLE 20 MG/1
CAPSULE, DELAYED RELEASE ORAL
Qty: 90 CAPSULE | Refills: 0 | Status: SHIPPED | OUTPATIENT
Start: 2019-12-17 | End: 2020-02-13

## 2020-02-13 RX ORDER — OMEPRAZOLE 20 MG/1
CAPSULE, DELAYED RELEASE ORAL
Qty: 30 CAPSULE | Refills: 0 | Status: CANCELLED | OUTPATIENT
Start: 2020-02-13 | End: 2020-03-14

## 2020-02-13 RX ORDER — OMEPRAZOLE 20 MG/1
20 CAPSULE, DELAYED RELEASE ORAL DAILY
Qty: 30 CAPSULE | Refills: 0 | Status: SHIPPED | OUTPATIENT
Start: 2020-02-13 | End: 2020-07-27

## 2020-02-13 RX ORDER — LEVOTHYROXINE SODIUM 0.05 MG/1
TABLET ORAL
Qty: 30 TABLET | Refills: 0 | Status: SHIPPED | OUTPATIENT
Start: 2020-02-13 | End: 2020-02-13 | Stop reason: SDUPTHER

## 2020-02-13 RX ORDER — LEVOTHYROXINE SODIUM 0.05 MG/1
TABLET ORAL
Qty: 30 TABLET | Refills: 0 | Status: SHIPPED | OUTPATIENT
Start: 2020-02-13 | End: 2020-07-27

## 2020-02-13 RX ORDER — OMEPRAZOLE 20 MG/1
CAPSULE, DELAYED RELEASE ORAL
Qty: 30 CAPSULE | Refills: 0 | Status: SHIPPED | OUTPATIENT
Start: 2020-02-13 | End: 2020-02-13 | Stop reason: SDUPTHER

## 2020-02-14 RX ORDER — OMEPRAZOLE 20 MG/1
CAPSULE, DELAYED RELEASE ORAL
Qty: 90 CAPSULE | Refills: 0 | Status: SHIPPED | OUTPATIENT
Start: 2020-02-14 | End: 2021-10-07

## 2020-02-14 RX ORDER — FUROSEMIDE 20 MG/1
20 TABLET ORAL 2 TIMES DAILY
Qty: 180 TABLET | Refills: 1 | Status: SHIPPED | OUTPATIENT
Start: 2020-02-14 | End: 2020-05-04 | Stop reason: SDUPTHER

## 2020-02-14 RX ORDER — LEVOTHYROXINE SODIUM 0.05 MG/1
50 TABLET ORAL DAILY
Qty: 90 TABLET | Refills: 1 | Status: SHIPPED | OUTPATIENT
Start: 2020-02-14 | End: 2020-07-23 | Stop reason: SDUPTHER

## 2020-02-24 ENCOUNTER — HOSPITAL ENCOUNTER (OUTPATIENT)
Age: 80
Discharge: HOME | End: 2020-02-24
Payer: MEDICARE

## 2020-02-24 LAB
ANION GAP SERPL CALCULATED.3IONS-SCNC: 16 MMOL/L (ref 4–16)
BACTERIA: NEGATIVE /HPF
BILIRUBIN URINE: NEGATIVE MG/DL
BLOOD, URINE: NEGATIVE
BUN BLDV-MCNC: 35 MG/DL (ref 6–23)
CALCIUM SERPL-MCNC: 9.4 MG/DL (ref 8.3–10.6)
CHLORIDE BLD-SCNC: 103 MMOL/L (ref 99–110)
CLARITY: CLEAR
CO2: 21 MMOL/L (ref 21–32)
COLOR: YELLOW
CREAT SERPL-MCNC: 3.3 MG/DL (ref 0.6–1.1)
CREATININE URINE: 150.1 MG/DL (ref 28–217)
GFR AFRICAN AMERICAN: 16 ML/MIN/1.73M2
GFR NON-AFRICAN AMERICAN: 13 ML/MIN/1.73M2
GLUCOSE BLD-MCNC: 118 MG/DL (ref 70–99)
GLUCOSE, URINE: NEGATIVE MG/DL
KETONES, URINE: NEGATIVE MG/DL
LEUKOCYTE ESTERASE, URINE: ABNORMAL
MAGNESIUM: 2.5 MG/DL (ref 1.8–2.4)
MUCUS: ABNORMAL HPF
NITRITE URINE, QUANTITATIVE: NEGATIVE
PH, URINE: 7 (ref 5–8)
PHOSPHORUS: 3.5 MG/DL (ref 2.5–4.9)
POTASSIUM SERPL-SCNC: 4.7 MMOL/L (ref 3.5–5.1)
PROT/CREAT RATIO, UR: ABNORMAL
PROTEIN UA: NEGATIVE MG/DL
RBC URINE: <1 /HPF (ref 0–6)
SODIUM BLD-SCNC: 140 MMOL/L (ref 135–145)
SPECIFIC GRAVITY UA: 1.02 (ref 1–1.03)
SQUAMOUS EPITHELIAL: <1 /HPF
TRICHOMONAS: ABNORMAL /HPF
URINE TOTAL PROTEIN: 25.3 MG/DL
UROBILINOGEN, URINE: NORMAL MG/DL (ref 0.2–1)
WBC UA: 5 /HPF (ref 0–5)

## 2020-02-24 PROCEDURE — 84100 ASSAY OF PHOSPHORUS: CPT

## 2020-02-24 PROCEDURE — 80048 BASIC METABOLIC PNL TOTAL CA: CPT

## 2020-02-24 PROCEDURE — 81001 URINALYSIS AUTO W/SCOPE: CPT

## 2020-02-24 PROCEDURE — 84156 ASSAY OF PROTEIN URINE: CPT

## 2020-02-24 PROCEDURE — 36415 COLL VENOUS BLD VENIPUNCTURE: CPT

## 2020-02-24 PROCEDURE — 82570 ASSAY OF URINE CREATININE: CPT

## 2020-02-24 PROCEDURE — 83970 ASSAY OF PARATHORMONE: CPT

## 2020-02-24 PROCEDURE — 83735 ASSAY OF MAGNESIUM: CPT

## 2020-02-26 LAB
PARATHYROID HORMONE INTACT: 210 PG/ML (ref 15–65)
PARATHYROID HORMONE INTACT: ABNORMAL PG/ML (ref 15–65)

## 2020-05-04 RX ORDER — FUROSEMIDE 20 MG/1
20 TABLET ORAL 2 TIMES DAILY
Qty: 180 TABLET | Refills: 0 | Status: SHIPPED | OUTPATIENT
Start: 2020-05-04 | End: 2020-07-23 | Stop reason: SDUPTHER

## 2020-07-23 ENCOUNTER — VIRTUAL VISIT (OUTPATIENT)
Dept: FAMILY MEDICINE CLINIC | Age: 80
End: 2020-07-23
Payer: MEDICARE

## 2020-07-23 PROBLEM — E66.01 MORBIDLY OBESE (HCC): Status: ACTIVE | Noted: 2020-07-23

## 2020-07-23 PROBLEM — K43.6 VENTRAL HERNIA WITH OBSTRUCTION AND WITHOUT GANGRENE: Status: RESOLVED | Noted: 2018-05-26 | Resolved: 2020-07-23

## 2020-07-23 PROBLEM — K56.609 SBO (SMALL BOWEL OBSTRUCTION) (HCC): Status: RESOLVED | Noted: 2018-05-26 | Resolved: 2020-07-23

## 2020-07-23 PROBLEM — N17.9 ACUTE RENAL FAILURE (ARF) (HCC): Status: RESOLVED | Noted: 2017-05-16 | Resolved: 2020-07-23

## 2020-07-23 PROBLEM — L08.9 SKIN INFECTION: Status: RESOLVED | Noted: 2018-08-10 | Resolved: 2020-07-23

## 2020-07-23 PROBLEM — T81.30XA WOUND DEHISCENCE: Status: RESOLVED | Noted: 2018-07-11 | Resolved: 2020-07-23

## 2020-07-23 PROCEDURE — G2012 BRIEF CHECK IN BY MD/QHP: HCPCS | Performed by: FAMILY MEDICINE

## 2020-07-23 PROCEDURE — G8400 PT W/DXA NO RESULTS DOC: HCPCS | Performed by: FAMILY MEDICINE

## 2020-07-23 PROCEDURE — 1123F ACP DISCUSS/DSCN MKR DOCD: CPT | Performed by: FAMILY MEDICINE

## 2020-07-23 PROCEDURE — 1090F PRES/ABSN URINE INCON ASSESS: CPT | Performed by: FAMILY MEDICINE

## 2020-07-23 PROCEDURE — G8427 DOCREV CUR MEDS BY ELIG CLIN: HCPCS | Performed by: FAMILY MEDICINE

## 2020-07-23 PROCEDURE — 4040F PNEUMOC VAC/ADMIN/RCVD: CPT | Performed by: FAMILY MEDICINE

## 2020-07-23 RX ORDER — LEVOTHYROXINE SODIUM 0.05 MG/1
TABLET ORAL
Qty: 90 TABLET | Refills: 1 | Status: CANCELLED | OUTPATIENT
Start: 2020-07-23 | End: 2020-08-21

## 2020-07-23 RX ORDER — LEVOTHYROXINE SODIUM 0.05 MG/1
50 TABLET ORAL DAILY
Qty: 90 TABLET | Refills: 1 | Status: SHIPPED | OUTPATIENT
Start: 2020-07-23 | End: 2021-02-15 | Stop reason: SDUPTHER

## 2020-07-23 RX ORDER — FUROSEMIDE 20 MG/1
20 TABLET ORAL 2 TIMES DAILY
Qty: 180 TABLET | Refills: 1 | Status: SHIPPED | OUTPATIENT
Start: 2020-07-23 | End: 2021-02-15 | Stop reason: SDUPTHER

## 2020-07-23 ASSESSMENT — PATIENT HEALTH QUESTIONNAIRE - PHQ9
2. FEELING DOWN, DEPRESSED OR HOPELESS: 0
SUM OF ALL RESPONSES TO PHQ QUESTIONS 1-9: 0
SUM OF ALL RESPONSES TO PHQ QUESTIONS 1-9: 0
1. LITTLE INTEREST OR PLEASURE IN DOING THINGS: 0
SUM OF ALL RESPONSES TO PHQ9 QUESTIONS 1 & 2: 0

## 2020-07-23 NOTE — PROGRESS NOTES
Arthur Leong is a 79 y.o. female evaluated via telephone on 7/23/2020. Consent:  She and/or health care decision maker is aware that that she may receive a bill for this telephone service, depending on her insurance coverage, and has provided verbal consent to proceed: Yes      Documentation:  I communicated with the patient and/or health care decision maker about 6-month follow-up  Details of this discussion including any medical advice provided: She is about the same  She lives by herself she has little contact with other people  She is following the guidelines for Covid 19 prevention  She is had no exposure or symptoms  Ongoing problems include chronic kidney disease stage IV  She has an appoint with a nephrologist next week and needs to get labs updated I put in the orders for that  She has a history of hypertension which is under control she has had SVT in the past but that is controlled on a beta-blocker she has no symptoms  She is stable as far as her weight  Immunizations are current  A- stable  p-continue same meds and diet  Keep appointment with nephrology  Get labs prior to that office visit  See me in 6 months  Recommend flu vaccine when it becomes available    I affirm this is a Patient Initiated Episode with a Patient who has not had a related appointment within my department in the past 7 days or scheduled within the next 24 hours.     Patient identification was verified at the start of the visit: Yes    Total Time: minutes: 5-10 minutes    Note: not billable if this call serves to triage the patient into an appointment for the relevant concern      David Pulido

## 2021-02-15 RX ORDER — LEVOTHYROXINE SODIUM 0.05 MG/1
50 TABLET ORAL DAILY
Qty: 30 TABLET | Refills: 0 | Status: SHIPPED | OUTPATIENT
Start: 2021-02-15 | End: 2021-02-16 | Stop reason: SDUPTHER

## 2021-02-15 RX ORDER — FUROSEMIDE 20 MG/1
20 TABLET ORAL 2 TIMES DAILY
Qty: 60 TABLET | Refills: 0 | Status: SHIPPED | OUTPATIENT
Start: 2021-02-15 | End: 2021-02-16 | Stop reason: SDUPTHER

## 2021-02-16 ENCOUNTER — VIRTUAL VISIT (OUTPATIENT)
Dept: FAMILY MEDICINE CLINIC | Age: 81
End: 2021-02-16
Payer: MEDICARE

## 2021-02-16 DIAGNOSIS — I10 HYPERTENSION, UNSPECIFIED TYPE: ICD-10-CM

## 2021-02-16 DIAGNOSIS — N18.4 CHRONIC RENAL FAILURE, STAGE 4 (SEVERE) (HCC): ICD-10-CM

## 2021-02-16 DIAGNOSIS — I47.1 SVT (SUPRAVENTRICULAR TACHYCARDIA) (HCC): ICD-10-CM

## 2021-02-16 DIAGNOSIS — N18.4 CHRONIC KIDNEY DISEASE (CKD) STAGE G4/A2, SEVERELY DECREASED GLOMERULAR FILTRATION RATE (GFR) BETWEEN 15-29 ML/MIN/1.73 SQUARE METER AND ALBUMINURIA CREATININE RATIO BETWEEN 30-299 MG/G (HCC): Primary | ICD-10-CM

## 2021-02-16 DIAGNOSIS — E03.9 HYPOTHYROIDISM, UNSPECIFIED TYPE: ICD-10-CM

## 2021-02-16 PROCEDURE — 1090F PRES/ABSN URINE INCON ASSESS: CPT | Performed by: FAMILY MEDICINE

## 2021-02-16 PROCEDURE — 4040F PNEUMOC VAC/ADMIN/RCVD: CPT | Performed by: FAMILY MEDICINE

## 2021-02-16 PROCEDURE — G8427 DOCREV CUR MEDS BY ELIG CLIN: HCPCS | Performed by: FAMILY MEDICINE

## 2021-02-16 PROCEDURE — G8400 PT W/DXA NO RESULTS DOC: HCPCS | Performed by: FAMILY MEDICINE

## 2021-02-16 PROCEDURE — 1123F ACP DISCUSS/DSCN MKR DOCD: CPT | Performed by: FAMILY MEDICINE

## 2021-02-16 PROCEDURE — 99441 PR PHYS/QHP TELEPHONE EVALUATION 5-10 MIN: CPT | Performed by: FAMILY MEDICINE

## 2021-02-16 RX ORDER — LEVOTHYROXINE SODIUM 0.05 MG/1
50 TABLET ORAL DAILY
Qty: 90 TABLET | Refills: 1 | Status: SHIPPED | OUTPATIENT
Start: 2021-02-16 | End: 2021-07-27 | Stop reason: SDUPTHER

## 2021-02-16 RX ORDER — FUROSEMIDE 20 MG/1
20 TABLET ORAL 2 TIMES DAILY
Qty: 180 TABLET | Refills: 1 | Status: ON HOLD | OUTPATIENT
Start: 2021-02-16 | End: 2021-05-26 | Stop reason: HOSPADM

## 2021-02-16 ASSESSMENT — PATIENT HEALTH QUESTIONNAIRE - PHQ9
SUM OF ALL RESPONSES TO PHQ9 QUESTIONS 1 & 2: 0
SUM OF ALL RESPONSES TO PHQ QUESTIONS 1-9: 0
2. FEELING DOWN, DEPRESSED OR HOPELESS: 0
SUM OF ALL RESPONSES TO PHQ QUESTIONS 1-9: 0

## 2021-02-16 NOTE — TELEPHONE ENCOUNTER
Pt states that meds that was sent yesterday was only for 30 days and she needs 90 day supply with 1 refill. I pended to you to send these meds to pharmacy .

## 2021-02-16 NOTE — PROGRESS NOTES
Roberto Ortega is a [de-identified] y.o. female evaluated via telephone on 2/16/2021. Consent:  She and/or health care decision maker is aware that that she may receive a bill for this telephone service, depending on her insurance coverage, and has provided verbal consent to proceed: Yes      Documentation:  I communicated with the patient and/or health care decision maker about a phone visit for 6-month follow-up  Details of this discussion including any medical advice provided: Patient is stable based on her symptoms  She has stage IV kidney disease and has not had lab for about a year she said she will try to get some when the weather got better but was not going to come out now  She really does not have any symptoms to complain about at this time  She lives alone  She is has someone bring her groceries  She is otherwise independent  She is not interested in Covid vaccination at this time  A- stable   p-recommend a Covid vaccine  Recommended labs within the next 6 months  I will see her in the next 6 months     I affirm this is a Patient Initiated Episode with a Patient who has not had a related appointment within my department in the past 7 days or scheduled within the next 24 hours.     Patient identification was verified at the start of the visit: Yes    Total Time: minutes: 5-10 minutes    Note: not billable if this call serves to triage the patient into an appointment for the relevant concern      Francisca Cavazos

## 2021-02-16 NOTE — TELEPHONE ENCOUNTER
Patient called and wanted to speak with Polo Hogan, did not discuss the issue with me Please call patient

## 2021-05-21 ENCOUNTER — APPOINTMENT (OUTPATIENT)
Dept: GENERAL RADIOLOGY | Age: 81
DRG: 956 | End: 2021-05-21
Payer: MEDICARE

## 2021-05-21 ENCOUNTER — HOSPITAL ENCOUNTER (INPATIENT)
Age: 81
LOS: 5 days | Discharge: NURSING, REHAB OR LONG-TERM CARE | DRG: 956 | End: 2021-05-26
Attending: INTERNAL MEDICINE | Admitting: INTERNAL MEDICINE
Payer: MEDICARE

## 2021-05-21 DIAGNOSIS — S72.92XA CLOSED FRACTURE OF LEFT FEMUR, UNSPECIFIED FRACTURE MORPHOLOGY, UNSPECIFIED PORTION OF FEMUR, INITIAL ENCOUNTER (HCC): Primary | ICD-10-CM

## 2021-05-21 DIAGNOSIS — S72.032A: ICD-10-CM

## 2021-05-21 LAB
ALBUMIN SERPL-MCNC: 3.7 GM/DL (ref 3.4–5)
ALP BLD-CCNC: 93 IU/L (ref 40–129)
ALT SERPL-CCNC: 12 U/L (ref 10–40)
ANION GAP SERPL CALCULATED.3IONS-SCNC: 13 MMOL/L (ref 4–16)
APTT: 28.9 SECONDS (ref 25.1–37.1)
AST SERPL-CCNC: 49 IU/L (ref 15–37)
BASOPHILS ABSOLUTE: 0.1 K/CU MM
BASOPHILS RELATIVE PERCENT: 0.5 % (ref 0–1)
BILIRUB SERPL-MCNC: 0.8 MG/DL (ref 0–1)
BUN BLDV-MCNC: 41 MG/DL (ref 6–23)
CALCIUM SERPL-MCNC: 9.4 MG/DL (ref 8.3–10.6)
CHLORIDE BLD-SCNC: 108 MMOL/L (ref 99–110)
CO2: 19 MMOL/L (ref 21–32)
CREAT SERPL-MCNC: 2.7 MG/DL (ref 0.6–1.1)
DIFFERENTIAL TYPE: ABNORMAL
EOSINOPHILS ABSOLUTE: 0 K/CU MM
EOSINOPHILS RELATIVE PERCENT: 0.3 % (ref 0–3)
GFR AFRICAN AMERICAN: 21 ML/MIN/1.73M2
GFR NON-AFRICAN AMERICAN: 17 ML/MIN/1.73M2
GLUCOSE BLD-MCNC: 101 MG/DL (ref 70–99)
HCT VFR BLD CALC: 38.8 % (ref 37–47)
HEMOGLOBIN: 12.3 GM/DL (ref 12.5–16)
IMMATURE NEUTROPHIL %: 0.3 % (ref 0–0.43)
INR BLD: 1.06 INDEX
LYMPHOCYTES ABSOLUTE: 0.8 K/CU MM
LYMPHOCYTES RELATIVE PERCENT: 8.1 % (ref 24–44)
MAGNESIUM: 1.8 MG/DL (ref 1.8–2.4)
MCH RBC QN AUTO: 30.2 PG (ref 27–31)
MCHC RBC AUTO-ENTMCNC: 31.7 % (ref 32–36)
MCV RBC AUTO: 95.3 FL (ref 78–100)
MONOCYTES ABSOLUTE: 1.1 K/CU MM
MONOCYTES RELATIVE PERCENT: 11 % (ref 0–4)
NUCLEATED RBC %: 0 %
PDW BLD-RTO: 15.4 % (ref 11.7–14.9)
PLATELET # BLD: 160 K/CU MM (ref 140–440)
PMV BLD AUTO: 9.9 FL (ref 7.5–11.1)
POTASSIUM SERPL-SCNC: 4.2 MMOL/L (ref 3.5–5.1)
PROTHROMBIN TIME: 12.8 SECONDS (ref 11.7–14.5)
RBC # BLD: 4.07 M/CU MM (ref 4.2–5.4)
SEGMENTED NEUTROPHILS ABSOLUTE COUNT: 8.2 K/CU MM
SEGMENTED NEUTROPHILS RELATIVE PERCENT: 79.8 % (ref 36–66)
SODIUM BLD-SCNC: 140 MMOL/L (ref 135–145)
T4 FREE: 1.41 NG/DL (ref 0.9–1.8)
TOTAL CK: 1404 IU/L (ref 26–140)
TOTAL IMMATURE NEUTOROPHIL: 0.03 K/CU MM
TOTAL NUCLEATED RBC: 0 K/CU MM
TOTAL PROTEIN: 7.2 GM/DL (ref 6.4–8.2)
TSH HIGH SENSITIVITY: 1.21 UIU/ML (ref 0.27–4.2)
WBC # BLD: 10.3 K/CU MM (ref 4–10.5)

## 2021-05-21 PROCEDURE — 99284 EMERGENCY DEPT VISIT MOD MDM: CPT

## 2021-05-21 PROCEDURE — 73552 X-RAY EXAM OF FEMUR 2/>: CPT

## 2021-05-21 PROCEDURE — 6370000000 HC RX 637 (ALT 250 FOR IP): Performed by: NURSE PRACTITIONER

## 2021-05-21 PROCEDURE — 85610 PROTHROMBIN TIME: CPT

## 2021-05-21 PROCEDURE — 82550 ASSAY OF CK (CPK): CPT

## 2021-05-21 PROCEDURE — 84439 ASSAY OF FREE THYROXINE: CPT

## 2021-05-21 PROCEDURE — 1200000000 HC SEMI PRIVATE

## 2021-05-21 PROCEDURE — 84443 ASSAY THYROID STIM HORMONE: CPT

## 2021-05-21 PROCEDURE — 85025 COMPLETE CBC W/AUTO DIFF WBC: CPT

## 2021-05-21 PROCEDURE — 94761 N-INVAS EAR/PLS OXIMETRY MLT: CPT

## 2021-05-21 PROCEDURE — 85730 THROMBOPLASTIN TIME PARTIAL: CPT

## 2021-05-21 PROCEDURE — 93005 ELECTROCARDIOGRAM TRACING: CPT | Performed by: INTERNAL MEDICINE

## 2021-05-21 PROCEDURE — 2580000003 HC RX 258: Performed by: INTERNAL MEDICINE

## 2021-05-21 PROCEDURE — 80053 COMPREHEN METABOLIC PANEL: CPT

## 2021-05-21 PROCEDURE — 73590 X-RAY EXAM OF LOWER LEG: CPT

## 2021-05-21 PROCEDURE — 83735 ASSAY OF MAGNESIUM: CPT

## 2021-05-21 PROCEDURE — 2580000003 HC RX 258: Performed by: NURSE PRACTITIONER

## 2021-05-21 PROCEDURE — 72170 X-RAY EXAM OF PELVIS: CPT

## 2021-05-21 PROCEDURE — 71045 X-RAY EXAM CHEST 1 VIEW: CPT

## 2021-05-21 RX ORDER — SODIUM CHLORIDE 9 MG/ML
25 INJECTION, SOLUTION INTRAVENOUS PRN
Status: DISCONTINUED | OUTPATIENT
Start: 2021-05-21 | End: 2021-05-22

## 2021-05-21 RX ORDER — SODIUM CHLORIDE, SODIUM LACTATE, POTASSIUM CHLORIDE, CALCIUM CHLORIDE 600; 310; 30; 20 MG/100ML; MG/100ML; MG/100ML; MG/100ML
INJECTION, SOLUTION INTRAVENOUS CONTINUOUS
Status: DISCONTINUED | OUTPATIENT
Start: 2021-05-21 | End: 2021-05-22

## 2021-05-21 RX ORDER — MORPHINE SULFATE 4 MG/ML
2 INJECTION, SOLUTION INTRAMUSCULAR; INTRAVENOUS EVERY 4 HOURS PRN
Status: DISCONTINUED | OUTPATIENT
Start: 2021-05-21 | End: 2021-05-22

## 2021-05-21 RX ORDER — MORPHINE SULFATE 4 MG/ML
4 INJECTION, SOLUTION INTRAMUSCULAR; INTRAVENOUS EVERY 4 HOURS PRN
Status: DISCONTINUED | OUTPATIENT
Start: 2021-05-21 | End: 2021-05-22

## 2021-05-21 RX ORDER — MAGNESIUM OXIDE 400 MG/1
400 TABLET ORAL 2 TIMES DAILY
Status: DISCONTINUED | OUTPATIENT
Start: 2021-05-21 | End: 2021-05-26 | Stop reason: HOSPADM

## 2021-05-21 RX ORDER — SODIUM CHLORIDE 0.9 % (FLUSH) 0.9 %
5-40 SYRINGE (ML) INJECTION EVERY 12 HOURS SCHEDULED
Status: DISCONTINUED | OUTPATIENT
Start: 2021-05-21 | End: 2021-05-26 | Stop reason: HOSPADM

## 2021-05-21 RX ORDER — LEVOTHYROXINE SODIUM 0.05 MG/1
50 TABLET ORAL DAILY
Status: DISCONTINUED | OUTPATIENT
Start: 2021-05-22 | End: 2021-05-26 | Stop reason: HOSPADM

## 2021-05-21 RX ORDER — SODIUM CHLORIDE 0.9 % (FLUSH) 0.9 %
10 SYRINGE (ML) INJECTION PRN
Status: DISCONTINUED | OUTPATIENT
Start: 2021-05-21 | End: 2021-05-26 | Stop reason: HOSPADM

## 2021-05-21 RX ORDER — POLYETHYLENE GLYCOL 3350 17 G/17G
17 POWDER, FOR SOLUTION ORAL DAILY PRN
Status: DISCONTINUED | OUTPATIENT
Start: 2021-05-21 | End: 2021-05-26 | Stop reason: HOSPADM

## 2021-05-21 RX ORDER — ACETAMINOPHEN 650 MG/1
650 SUPPOSITORY RECTAL EVERY 6 HOURS PRN
Status: DISCONTINUED | OUTPATIENT
Start: 2021-05-21 | End: 2021-05-26 | Stop reason: HOSPADM

## 2021-05-21 RX ORDER — ACETAMINOPHEN 325 MG/1
650 TABLET ORAL EVERY 6 HOURS PRN
Status: DISCONTINUED | OUTPATIENT
Start: 2021-05-21 | End: 2021-05-26 | Stop reason: HOSPADM

## 2021-05-21 RX ADMIN — MAGNESIUM OXIDE 400 MG (241.3 MG MAGNESIUM) TABLET 400 MG: TABLET at 23:20

## 2021-05-21 RX ADMIN — METOPROLOL TARTRATE 25 MG: 25 TABLET, FILM COATED ORAL at 23:23

## 2021-05-21 RX ADMIN — SODIUM CHLORIDE, POTASSIUM CHLORIDE, SODIUM LACTATE AND CALCIUM CHLORIDE: 600; 310; 30; 20 INJECTION, SOLUTION INTRAVENOUS at 20:01

## 2021-05-21 RX ADMIN — SODIUM CHLORIDE, PRESERVATIVE FREE 10 ML: 5 INJECTION INTRAVENOUS at 23:23

## 2021-05-21 ASSESSMENT — PAIN DESCRIPTION - PAIN TYPE: TYPE: ACUTE PAIN

## 2021-05-21 ASSESSMENT — PAIN DESCRIPTION - ORIENTATION: ORIENTATION: LEFT

## 2021-05-21 NOTE — ED PROVIDER NOTES
EMERGENCY DEPARTMENT ENCOUNTER      PCP: Gretchen Amezcua MD    CHIEF COMPLAINT    Chief Complaint   Patient presents with    Fall     down for 24 hrs    Hip Pain     outward rotation, left side         This patient was not evaluated by the attending physician. I have independently evaluated this patient. HPI    Ellie Thomson is a [de-identified] y.o. female who presents with left eye pain following a fall yesterday at approximately 1530. Context is patient states she tripped while walking up steps causing her to fall forward onto bent bilateral knees. She notes pain since that time. He states she laid on the floor until today and was transported to ED via EMS. Denies any other injuries. Denies head, neck, torso trauma. Denies loss of consciousness. Denies numbness, tingling, weakness. The fall was mechanical in nature without preceding symptoms. REVIEW OF SYSTEMS    General: No Fever  ENT:  No visual changes. No headache. Cardiac: No Chest Pain, No syncope  Respiratory: No cough or difficulty breathing  GI: No vomiting. No Bloody Stool or Diarrhea  : No Dysuria or Hematuria  MSKTL:  See HPI. No neck or back pain.   Skin:  Denies rash  Neurologic:  Denies headache, focal weakness or sensory changes   Endocrine:  Denies polyuria or polydypsia   Lymphatic:  Denies swollen glands   See HPI and nursing notes for additional information       PAST MEDICAL & SURGICAL HISTORY    Past Medical History:   Diagnosis Date    Acid reflux     'no recent issue\"    Arthritis     \"Left Index Finger\"    CHF (congestive heart failure) ([facility] [address]     per old chart hx of CHF with admission 12/2016 with pulmonary edema    Chronic kidney disease     Sees Dr. Tal Baldwin have one kidney, dr Amber Guerrero told me that in 2011 I think\"    GERD (gastroesophageal reflux disease)     History of blood transfusion 2011 Or 2012    No Reaction To Blood Transfusion Received    Hypertension     ( pt states she is not on any medication for blood pressure)    Hypomagnesemia     Hypothyroidism     Shortness of breath on exertion     SVT (supraventricular tachycardia) ([facility] [address]     per old chart hx of SVT with admission 2016 tx with Adenosine and per notes in 5/2018 hx of SVT- no cardiologist    Teeth missing     Upper And Lower    Wears glasses      Past Surgical History:   Procedure Laterality Date    ABDOMEN SURGERY      DENTAL SURGERY      Teeth Extracted In Past    ENDOSCOPY, COLON, DIAGNOSTIC  2011 Or 2012    EYE SURGERY  ? when    clyde cataract ext   [address]  2011    Abdominal Hernia Repair     OTHER SURGICAL HISTORY  05/27/2018    exp lap . converted to exp laporotomy with ventral hernia repair with mesh    OTHER SURGICAL HISTORY  35/34/1350    umbilical scar excision    VENTRAL HERNIA REPAIR  09/16/2016    Robotic laparoscopic       CURRENT MEDICATIONS    Current Outpatient Rx   Medication Sig Dispense Refill    magnesium oxide (MAG-OX) 400 (241.3 Mg) MG TABS tablet TAKE ONE TABLET BY MOUTH TWICE A  tablet 1    furosemide (LASIX) 20 MG tablet Take 1 tablet by mouth 2 times daily 180 tablet 1    metoprolol tartrate (LOPRESSOR) 25 MG tablet Take 1 tablet by mouth 2 times daily 180 tablet 1    levothyroxine (SYNTHROID) 50 MCG tablet Take 1 tablet by mouth Daily 90 tablet 1    amLODIPine-benazepril (LOTREL) 2.5-10 MG per capsule Take 1 capsule by mouth daily 30 capsule 3    omeprazole (PRILOSEC) 20 MG delayed release capsule TAKE TWO CAPSULES BY MOUTH DAILY (Patient taking differently: as needed TAKE TWO CAPSULES BY MOUTH DAILY) 90 capsule 0       ALLERGIES    No Known Allergies    SOCIAL & FAMILY HISTORY    Social History     Socioeconomic History    Marital status:       Spouse name: None    Number of children: None    Years of education: None    Highest education level: None   Occupational History    None   Tobacco Use    Smoking status: Never Smoker    Smokeless tobacco: Never Used   Vaping Use    Vaping Use: Never used   Substance and Sexual Activity    Alcohol use: No    Drug use: No    Sexual activity: Never   Other Topics Concern    None   Social History Narrative    None     Social Determinants of Health     Financial Resource Strain:     Difficulty of Paying Living Expenses:    Food Insecurity:     Worried About Running Out of Food in the Last Year:     [address] in the Last Year:    Transportation Needs:     Lack of Transportation (Medical):  Lack of Transportation (Non-Medical):    Physical Activity:     Days of Exercise per Week:     Minutes of Exercise per Session:    Stress:     Feeling of Stress :    Social Connections:     Frequency of Communication with Friends and Family:     Frequency of Social Gatherings with Friends and Family:     Attends Anabaptist Services:     Active Member of Clubs or Organizations:     Attends Club or Organization Meetings:     Marital Status:    Intimate Partner Violence:     Fear of Current or Ex-Partner:     Emotionally Abused:     Physically Abused:     Sexually Abused:      Family History   Problem Relation Age of Onset    Cancer Father         Lung Cancer    Arthritis Mother     Cancer Brother         Skin Cancer    High Cholesterol Brother        PHYSICAL EXAM    VITAL SIGNS: BP (!) 149/63   Pulse 98   Temp 98.2 °F (36.8 °C) (Oral)   Resp 18   Ht 5' 2\" (1.575 m)   Wt 250 lb (113.4 kg)   LMP 01/23/1995   SpO2 98%   BMI 45.73 kg/m²    Constitutional: Obese, no acute distress   Eyes: EOMI. PERRL, sclera nonicteric. Anterior chambers clear. Funduscopic exam without any gross abnormality or hemorrhages. HENT:  Atraumatic, no trismus. Ears canals and TMs free of blood or clear fluid. Nasal passages and oropharynx free of blood or clear fluid. No circumferential periorbital ecchymosis or mastoid ecchymosis noted. Neck/Lymphatics: supple, no JVD, no swollen nodes. No posterior neck tenderness.   Range of motion without obvious pain or deficit. Respiratory:  Lungs Clear, no retractions   Cardiovascular:   normal rate, no murmurs  GI:  Soft, nontender, normal bowel sounds  Musculoskeletal:    Right lower extremity exam: No swelling or discoloration. There is tenderness along the distal femur region without palpable defect. Leg roll reproduces pain over the distal femur. Otherwise no hip pain. No focal knee pain or effusion. No focal ankle or foot pain. Distal pulses, sensation, motor and strength intact. Left lower extremity exam: Patient holds left lower extremity in a partially internal rotated posture. Due to obese habitus, difficult to discern if this is from hip or knee or femur region. Patient does have tenderness along the distal femur without palpable defect or crepitus. Range of motion of left hip and left knee limited due to pain. Passive range of motion elicits pain over the distal femur region. Leg roll positive for pain over the hip and distal femur region. No lower leg tenderness or palpable defect. Ankle and foot exam without abnormality. Distal sensation, pulses, capillary refill, motor and strength intact. Integument:  Well hydrated, no petechiae     Neurologic:    - Alert & oriented person, place, time, and situation, no speech difficulties or slurring.  - No obvious gross motor deficits  - Cranial nerves 2-12 grossly intact  - Negative meningeal signs.  - Sensation intact to light touch  - Strength 5/5 in upper and lower extremities bilaterally  - Normal finger to nose test bilaterally  - Rapid alternating movements intact  - No pronator drift. - Light touch sensation intact throughout. - Upper and lower extremity DTRs 2+ bilaterally.  -No truncal ataxia. Psych: Pleasant affect, no hallucinations        LABS:  Pending at time of admission      RADIOLOGY   XR TIBIA FIBULA RIGHT (2 VIEWS)   Final Result   No acute osseous abnormality.          XR TIBIA FIBULA LEFT (2 VIEWS) unspecified portion of femur, initial encounter [facility])              Comment: Please note this report has been produced using speech recognition software and may contain errors related to that system including errors in grammar, punctuation, and spelling, as well as words and phrases that may be inappropriate. If there are any questions or concerns please feel free to contact the dictating provider for clarification.        Hai Garduno [facility]  05/21/21 7581

## 2021-05-21 NOTE — ED NOTES
1728 paged hospitalist     Oz Ray  05/21/21 1721  1746 Misha Mcgarry mid level with [employer] returned call      Oz Ray  05/21/21 174

## 2021-05-21 NOTE — ED NOTES
498  18[facility] page Dr Jose Miguel Neves  05/21/21 1713  1726 Dr Elena Carrillo returned call      [facility]  05/21/21 172

## 2021-05-21 NOTE — H&P
History and Physical      Name:  Alcon Orona /Age/Sex: 1940  ([de-identified] y.o. female)   MRN & CSN:  [id number] & [id number] Admission Date/Time: 2021  2:37 PM   Location:  ED34/ED-34 PCP: Dax Lee MD       Discussed patient with Dr. Karishma Fermin and Plan:     Alcon Orona is a [de-identified] y.o.  female  who presents with fall, left hip pain    - LEFT prox femur fx  Displaced fx  2/2 mechanical fall  Ortho c/s by ED, Dr Sho Barr  NPO MN, pain control  CARD, Dr Larry Addison, c/s by ED for clearance  Neurovascular checks- neurovascularly intact during exam  Admission labs pending from ED    - Elevated CK  1400  Was down for ~20 hrs after fall  LR   Recheck CK in AM    DVT ppx: SCD only    Chronic  - CKD IV-   - HTN- Cont metoprolol, amlodipine-benazepril  - HFpEF- last echo 2016- 50%; not acute; cont lasix  - Hypothyroidism- Cont synthroid    Discussed patient with ER physician     Diet Low Na, NPO MN   DVT Prophylaxis [] Lovenox, []  Heparin, [x] SCDs, [] Ambulation   GI Prophylaxis [] PPI,  [] H2 Blocker,  [] Carafate,  [] Diet/Tube Feeds   Code Status Full   Disposition Patient requires continued admission due to surgier   MDM [] Low, [x] Moderate,[]  High  Patient's risk as above due to      [] One or more chronic illnesses with exacerbation progression      [x] Two or more stable chronic illnesses      [] Undiagnosed new problem with uncertain prognosis      [] Elective major surgery      []Prescription drug management       History of Present Illness:     Chief Complaint: fall, left hip pain    Alcon Orona is a [de-identified] y.o.  female  who presents with the above complaints, onset yesterday. Context is, patient tripped walking on the threshhold entering her home yesterday, falling forward and landing on her knees. Denies hitting her head or LOC. She lives alone and was unable to get up; she was down for ~ 20 hrs.  States this was a mechanical fall-- denies light-headedness, palpitations, dizziness. Denies hx of PCI, CABG, PPM, COPD. Denies chest pain/pressure, sob, abdominal pain, new back pain, n/v, diarrhea. Otherwise in baseline state of health. Confirms FULL code status. Ten point ROS reviewed negative, unless as noted above    Objective:     No intake or output data in the 24 hours ending 05/21/21 1734     Vitals:   Vitals:    05/21/21 1444   BP: (!) 149/63   Pulse: 98   Resp: 18   Temp: 98.2 °F (36.8 °C)   SpO2: 98%       Physical Exam:     GEN Awake female, sitting upright in bed in no apparent distress. Appears given age. EYES Pupils are equally round. No scleral erythema, discharge, or conjunctivitis. HENT Mucous membranes are moist. Oral pharynx without exudates, no evidence of thrush. NECK Supple, no apparent thyromegaly or masses. RESP Clear to auscultation, no wheezes, rales or rhonchi. Symmetric chest movement while on room air. CARDIO/VASC S1/S2 auscultated. Regular rate without appreciable murmurs, rubs, or gallops. No JVD or carotid bruits. Peripheral pulses equal bilaterally and palpable. No peripheral edema. GI Abdomen is soft without significant tenderness, masses, or guarding. Bowel sounds are normoactive. Rectal exam deferred.  No costovertebral angle tenderness. Simpson catheter is not present. HEME/LYMPH No palpable cervical lymphadenopathy and no hepatosplenomegaly. No petechiae or ecchymoses. MSK LLE internally rotated; + CMS distally to LLE. Otherwise, no gross joint deformities. SKIN Normal coloration, warm, dry. NEURO Cranial nerves appear grossly intact, normal speech, no lateralizing weakness. PSYCH Awake, alert, oriented x 3  Affect appropriate.     Past Medical History:        Past Medical History:   Diagnosis Date    Acid reflux     'no recent issue\"    Arthritis     \"Left Index Finger\"    CHF (congestive heart failure) ([facility] [address]     per old chart hx of CHF with admission 12/2016 with pulmonary edema    Chronic kidney disease     Sees  Roseannh\"only have one kidney, dr Rea Grimaldo told me that in 2011 I think\"    GERD (gastroesophageal reflux disease)     History of blood transfusion 2011 Or 2012    No Reaction To Blood Transfusion Received    Hypertension     ( pt states she is not on any medication for blood pressure)    Hypomagnesemia     Hypothyroidism     Shortness of breath on exertion     SVT (supraventricular tachycardia) ([facility])     per old chart hx of SVT with admission 2016 tx with Adenosine and per notes in 5/2018 hx of SVT- no cardiologist    Teeth missing     Upper And Lower    Wears glasses        PSHX:  has a past surgical history that includes Dental surgery; ventral hernia repair (09/16/2016); Abdomen surgery; other surgical history (05/27/2018); hernia repair (2011); Endoscopy, colon, diagnostic (2011 Or 2012); eye surgery (?when); and other surgical history (08/27/2018). Allergies: No Known Allergies    FAM HX: family history includes Arthritis in her mother; Cancer in her brother and father; High Cholesterol in her brother. Soc HX:   Social History     Socioeconomic History    Marital status:      Spouse name: None    Number of children: None    Years of education: None    Highest education level: None   Occupational History    None   Tobacco Use    Smoking status: Never Smoker    Smokeless tobacco: Never Used   Vaping Use    Vaping Use: Never used   Substance and Sexual Activity    Alcohol use: No    Drug use: No    Sexual activity: Never   Other Topics Concern    None   Social History Narrative    None     Social Determinants of Health     Financial Resource Strain:     Difficulty of Paying Living Expenses:    Food Insecurity:     Worried About Running Out of Food in the Last Year:     Ran Out of Food in the Last Year:    Transportation Needs:     Lack of Transportation (Medical):      Lack of Transportation (Non-Medical):    Physical Activity:     Days of Exercise per Week:     Minutes of Exercise per Session:    Stress:     Feeling of Stress :    Social Connections:     Frequency of Communication with Friends and Family:     Frequency of Social Gatherings with Friends and Family:     Attends Holiness Services:     Active Member of Clubs or Organizations:     Attends Club or Organization Meetings:     Marital Status:    Intimate Partner Violence:     Fear of Current or Ex-Partner:     Emotionally Abused:     Physically Abused:     Sexually Abused:        Medications:     Medications:       magnesium oxide (MAG-OX) 400 (241.3 Mg) MG TABS tablet TAKE ONE TABLET BY MOUTH TWICE A DAY Gretchen Amezcua MD Needs Review   furosemide (LASIX) 20 MG tablet Take 1 tablet by mouth 2 times daily Gretchen Amezcua MD Needs Review   metoprolol tartrate (LOPRESSOR) 25 MG tablet Take 1 tablet by mouth 2 times daily Gretchen Amezcua MD Needs Review   levothyroxine (SYNTHROID) 50 MCG tablet Take 1 tablet by mouth Daily Gretchen Amezcua MD Needs Review   amLODIPine-benazepril (LOTREL) 2.5-10 MG per capsule Take 1 capsule by mouth daily Elham Clark MD Needs Review   omeprazole (PRILOSEC) 20 MG delayed release capsule TAKE TWO CAPSULES BY MOUTH DAILY Karen Thomas MD Needs Review     magnesium oxide (MAG-OX) 400 (241.3 Mg) MG TABS tablet TAKE ONE TABLET BY MOUTH TWICE A DAY Gretchen Amezcua MD Needs Review   furosemide (LASIX) 20 MG tablet Take 1 tablet by mouth 2 times daily Gretchen Amezcua MD Needs Review   metoprolol tartrate (LOPRESSOR) 25 MG tablet Take 1 tablet by mouth 2 times daily Gretchen Amezcua MD Needs Review   levothyroxine (SYNTHROID) 50 MCG tablet Take 1 tablet by mouth Daily Gretchen Amezcua MD Needs Review   amLODIPine-benazepril (LOTREL) 2.5-10 MG per capsule Take 1 capsule by mouth daily Elham Clark MD Needs Review   omeprazole (PRILOSEC) 20 MG delayed release capsule TAKE TWO CAPSULES BY MOUTH DAILY Karen Thomas MD Needs Review     Data: XR CHEST PORTABLE [[id number]] Collected: 05/21/21 1631      Order Status: Completed Updated: 05/21/21 1651     Narrative:       EXAMINATION:   ONE XRAY VIEW OF THE CHEST     5/21/2021 4:23 pm     COMPARISON:   05/26/2018     HISTORY:   ORDERING SYSTEM PROVIDED HISTORY: fall   TECHNOLOGIST PROVIDED HISTORY:   Reason for exam:->fall   Reason for Exam: fall   Acuity: Acute   Type of Exam: Initial   Mechanism of Injury: fall   Relevant Medical/Surgical History: CHF, SVT     FINDINGS:   No focal consolidations.  No pleural effusion or pneumothorax.  Heart size is   borderline enlarged.      Impression:       Borderline cardiomegaly.  No acute process.      XR PELVIS (1-2 VIEWS) [[id number]] Collected: 05/21/21 1637     Order Status: Completed Updated: 05/21/21 1643     Narrative:       EXAMINATION:   4 XRAY VIEWS OF THE RIGHT FEMUR; 5 XRAY VIEWS OF THE LEFT FEMUR; 2 XRAY VIEWS   OF THE LEFT TIBIA AND FIBULA; ONE XRAY VIEW OF THE PELVIS     5/21/2021 4:25 pm     COMPARISON:   None. HISTORY:   ORDERING SYSTEM PROVIDED HISTORY: injury   TECHNOLOGIST PROVIDED HISTORY:   Reason for exam:->injury   Reason for Exam: pain   Acuity: Acute   Type of Exam: Initial   Mechanism of Injury: fall   Relevant Medical/Surgical History: none     FINDINGS:   Limited visualization of the left hemipelvis shows no evidence of fracture. There is an oblique fracture extending through the intertrochanteric portion   of the proximal left femur into the proximal diaphysis.  The fracture is   moderately displaced.  Femoral head remains located within the acetabulum   with degenerative change at the hip joint.  The distal shaft of the left   femur is intact. Mild degenerative change in the right hip joint.  The right femur is intact. There is chronic degenerative change in the bilateral knees. Tricompartmental joint space narrowing and osteophyte formation is noted. The left tibia and fibula are intact with no acute abnormality.   Impression:       1. Displaced fracture through the proximal left femur. 2. No additional fractures are identified.  Degenerative changes in the   bilateral hips and knees.      XR TIBIA FIBULA LEFT (2 VIEWS) [[id number]] Collected: 05/21/21 1637     Order Status: Completed Updated: 05/21/21 1643     Narrative:       EXAMINATION:   4 XRAY VIEWS OF THE RIGHT FEMUR; 5 XRAY VIEWS OF THE LEFT FEMUR; 2 XRAY VIEWS   OF THE LEFT TIBIA AND FIBULA; ONE XRAY VIEW OF THE PELVIS     5/21/2021 4:25 pm     COMPARISON:   None. HISTORY:   ORDERING SYSTEM PROVIDED HISTORY: injury   TECHNOLOGIST PROVIDED HISTORY:   Reason for exam:->injury   Reason for Exam: pain   Acuity: Acute   Type of Exam: Initial   Mechanism of Injury: fall   Relevant Medical/Surgical History: none     FINDINGS:   Limited visualization of the left hemipelvis shows no evidence of fracture. There is an oblique fracture extending through the intertrochanteric portion   of the proximal left femur into the proximal diaphysis.  The fracture is   moderately displaced.  Femoral head remains located within the acetabulum   with degenerative change at the hip joint.  The distal shaft of the left   femur is intact. Mild degenerative change in the right hip joint.  The right femur is intact. There is chronic degenerative change in the bilateral knees. Tricompartmental joint space narrowing and osteophyte formation is noted. The left tibia and fibula are intact with no acute abnormality.      Impression:       1. Displaced fracture through the proximal left femur.    2. No additional fractures are identified.  Degenerative changes in the   bilateral hips and knees.      XR FEMUR LEFT (MIN 2 VIEWS) [[id number]] Collected: 05/21/21 1637     Order Status: Completed Updated: 05/21/21 1643     Narrative:       EXAMINATION:   4 XRAY VIEWS OF THE RIGHT FEMUR; 5 XRAY VIEWS OF THE LEFT FEMUR; 2 XRAY VIEWS   OF THE LEFT TIBIA AND FIBULA; ONE XRAY VIEW OF THE PELVIS proximal left femur into the proximal diaphysis.  The fracture is   moderately displaced.  Femoral head remains located within the acetabulum   with degenerative change at the hip joint.  The distal shaft of the left   femur is intact. Mild degenerative change in the right hip joint.  The right femur is intact. There is chronic degenerative change in the bilateral knees. Tricompartmental joint space narrowing and osteophyte formation is noted. The left tibia and fibula are intact with no acute abnormality.      Impression:       1. Displaced fracture through the proximal left femur. 2. No additional fractures are identified.  Degenerative changes in the   bilateral hips and knees.      XR TIBIA FIBULA RIGHT (2 VIEWS) [[id number]] Collected: 05/21/21 1632     Order Status: Completed Updated: 05/21/21 1635     Narrative:       EXAMINATION:   3 XRAY VIEWS OF THE RIGHT TIBIA AND FIBULA     5/21/2021 4:26 pm     COMPARISON:   None. HISTORY:   ORDERING SYSTEM PROVIDED HISTORY: fall pain   TECHNOLOGIST PROVIDED HISTORY:   Reason for exam:->fall pain   Reason for Exam: pain   Acuity: Acute   Type of Exam: Initial   Mechanism of Injury: fall   Relevant Medical/Surgical History: none     FINDINGS:   There is no evidence of acute fracture.  There is normal alignment.  No acute   joint abnormality.  No focal osseous lesion.  No focal soft tissue   abnormality.  Tricompartmental osteoarthritic changes seen in the knee      Impression:       No acute osseous abnormality.      XR CHEST (2 VW) [[id number]]      Order Status: Canceled Specimen: Chest            Electronically signed by SHERYL Marie NP on 5/21/2021 at 5:34 PM

## 2021-05-21 NOTE — CONSULTS
Dictated --[id number]  Stable to proceed with surgery  No chest pain  Moderate risk for CV events  Chronic renal insuffiencey  Place on IVF    Thanks

## 2021-05-21 NOTE — ED PROVIDER NOTES
Twelve-lead EKG obtained at [facility] on 21 May 2021 and interpreted by me in the absence of a cardiologist.  There is no criteria ST elevation or reciprocal change. There are no hyperacute T wave changes. There is no sign of acute ischemia or infarction. This tracing shows a sinus rhythm with premature atrial complexes and low voltage QRS and nonspecific T wave and ST changes. Rate and intervals are 95 beats per minute, PA interval 186 milliseconds, QRS duration 54 milliseconds, QTc interval 459 milliseconds, and R axis normal at 25 degrees. There is no acute change compared with the most recent EKG dated May 26, 2018.         Tanika Aguilera MD  05/21/21 6110

## 2021-05-22 ENCOUNTER — ANESTHESIA EVENT (OUTPATIENT)
Dept: OPERATING ROOM | Age: 81
DRG: 956 | End: 2021-05-22
Payer: MEDICARE

## 2021-05-22 ENCOUNTER — APPOINTMENT (OUTPATIENT)
Dept: GENERAL RADIOLOGY | Age: 81
DRG: 956 | End: 2021-05-22
Payer: MEDICARE

## 2021-05-22 ENCOUNTER — ANESTHESIA (OUTPATIENT)
Dept: OPERATING ROOM | Age: 81
DRG: 956 | End: 2021-05-22
Payer: MEDICARE

## 2021-05-22 VITALS
RESPIRATION RATE: 12 BRPM | OXYGEN SATURATION: 100 % | TEMPERATURE: 97.7 F | DIASTOLIC BLOOD PRESSURE: 61 MMHG | SYSTOLIC BLOOD PRESSURE: 115 MMHG

## 2021-05-22 LAB
ANION GAP SERPL CALCULATED.3IONS-SCNC: 9 MMOL/L (ref 4–16)
BASOPHILS ABSOLUTE: 0 K/CU MM
BASOPHILS RELATIVE PERCENT: 0.5 % (ref 0–1)
BUN BLDV-MCNC: 37 MG/DL (ref 6–23)
CALCIUM SERPL-MCNC: 8.8 MG/DL (ref 8.3–10.6)
CHLORIDE BLD-SCNC: 113 MMOL/L (ref 99–110)
CO2: 19 MMOL/L (ref 21–32)
CREAT SERPL-MCNC: 2.5 MG/DL (ref 0.6–1.1)
DIFFERENTIAL TYPE: ABNORMAL
EKG ATRIAL RATE: 115 BPM
EKG ATRIAL RATE: 95 BPM
EKG DIAGNOSIS: NORMAL
EKG DIAGNOSIS: NORMAL
EKG P AXIS: 53 DEGREES
EKG P-R INTERVAL: 186 MS
EKG Q-T INTERVAL: 278 MS
EKG Q-T INTERVAL: 366 MS
EKG QRS DURATION: 54 MS
EKG QRS DURATION: 70 MS
EKG QTC CALCULATION (BAZETT): 399 MS
EKG QTC CALCULATION (BAZETT): 459 MS
EKG R AXIS: 25 DEGREES
EKG R AXIS: 36 DEGREES
EKG T AXIS: 107 DEGREES
EKG T AXIS: 59 DEGREES
EKG VENTRICULAR RATE: 124 BPM
EKG VENTRICULAR RATE: 95 BPM
EOSINOPHILS ABSOLUTE: 0.2 K/CU MM
EOSINOPHILS RELATIVE PERCENT: 2.1 % (ref 0–3)
GFR AFRICAN AMERICAN: 22 ML/MIN/1.73M2
GFR NON-AFRICAN AMERICAN: 19 ML/MIN/1.73M2
GLUCOSE BLD-MCNC: 88 MG/DL (ref 70–99)
HCT VFR BLD CALC: 33.7 % (ref 37–47)
HEMOGLOBIN: 10.6 GM/DL (ref 12.5–16)
IMMATURE NEUTROPHIL %: 0.4 % (ref 0–0.43)
LV EF: 53 %
LVEF MODALITY: NORMAL
LYMPHOCYTES ABSOLUTE: 1.6 K/CU MM
LYMPHOCYTES RELATIVE PERCENT: 20 % (ref 24–44)
MCH RBC QN AUTO: 30.9 PG (ref 27–31)
MCHC RBC AUTO-ENTMCNC: 31.5 % (ref 32–36)
MCV RBC AUTO: 98.3 FL (ref 78–100)
MONOCYTES ABSOLUTE: 0.9 K/CU MM
MONOCYTES RELATIVE PERCENT: 10.9 % (ref 0–4)
NUCLEATED RBC %: 0 %
PDW BLD-RTO: 15.8 % (ref 11.7–14.9)
PLATELET # BLD: 126 K/CU MM (ref 140–440)
PMV BLD AUTO: 9.5 FL (ref 7.5–11.1)
POTASSIUM SERPL-SCNC: 4.4 MMOL/L (ref 3.5–5.1)
RBC # BLD: 3.43 M/CU MM (ref 4.2–5.4)
SARS-COV-2, NAAT: NOT DETECTED
SEGMENTED NEUTROPHILS ABSOLUTE COUNT: 5.4 K/CU MM
SEGMENTED NEUTROPHILS RELATIVE PERCENT: 66.1 % (ref 36–66)
SODIUM BLD-SCNC: 141 MMOL/L (ref 135–145)
SOURCE: NORMAL
TOTAL CK: 1842 IU/L (ref 26–140)
TOTAL IMMATURE NEUTOROPHIL: 0.03 K/CU MM
TOTAL NUCLEATED RBC: 0 K/CU MM
WBC # BLD: 8.1 K/CU MM (ref 4–10.5)

## 2021-05-22 PROCEDURE — 3600000004 HC SURGERY LEVEL 4 BASE: Performed by: ORTHOPAEDIC SURGERY

## 2021-05-22 PROCEDURE — 6360000002 HC RX W HCPCS: Performed by: NURSE PRACTITIONER

## 2021-05-22 PROCEDURE — 6360000002 HC RX W HCPCS: Performed by: NURSE ANESTHETIST, CERTIFIED REGISTERED

## 2021-05-22 PROCEDURE — 6370000000 HC RX 637 (ALT 250 FOR IP): Performed by: ORTHOPAEDIC SURGERY

## 2021-05-22 PROCEDURE — 6360000002 HC RX W HCPCS: Performed by: ORTHOPAEDIC SURGERY

## 2021-05-22 PROCEDURE — P9045 ALBUMIN (HUMAN), 5%, 250 ML: HCPCS | Performed by: NURSE ANESTHETIST, CERTIFIED REGISTERED

## 2021-05-22 PROCEDURE — 3700000000 HC ANESTHESIA ATTENDED CARE: Performed by: ORTHOPAEDIC SURGERY

## 2021-05-22 PROCEDURE — 80048 BASIC METABOLIC PNL TOTAL CA: CPT

## 2021-05-22 PROCEDURE — 0QS706Z REPOSITION LEFT UPPER FEMUR WITH INTRAMEDULLARY INTERNAL FIXATION DEVICE, OPEN APPROACH: ICD-10-PCS | Performed by: UROLOGY

## 2021-05-22 PROCEDURE — 6360000002 HC RX W HCPCS: Performed by: ANESTHESIOLOGY

## 2021-05-22 PROCEDURE — 6370000000 HC RX 637 (ALT 250 FOR IP): Performed by: INTERNAL MEDICINE

## 2021-05-22 PROCEDURE — 7100000000 HC PACU RECOVERY - FIRST 15 MIN: Performed by: ORTHOPAEDIC SURGERY

## 2021-05-22 PROCEDURE — C1713 ANCHOR/SCREW BN/BN,TIS/BN: HCPCS | Performed by: ORTHOPAEDIC SURGERY

## 2021-05-22 PROCEDURE — P9045 ALBUMIN (HUMAN), 5%, 250 ML: HCPCS | Performed by: INTERNAL MEDICINE

## 2021-05-22 PROCEDURE — 93010 ELECTROCARDIOGRAM REPORT: CPT | Performed by: INTERNAL MEDICINE

## 2021-05-22 PROCEDURE — 2580000003 HC RX 258: Performed by: INTERNAL MEDICINE

## 2021-05-22 PROCEDURE — 6360000002 HC RX W HCPCS: Performed by: INTERNAL MEDICINE

## 2021-05-22 PROCEDURE — 3700000001 HC ADD 15 MINUTES (ANESTHESIA): Performed by: ORTHOPAEDIC SURGERY

## 2021-05-22 PROCEDURE — 2720000010 HC SURG SUPPLY STERILE: Performed by: ORTHOPAEDIC SURGERY

## 2021-05-22 PROCEDURE — 2709999900 HC NON-CHARGEABLE SUPPLY: Performed by: ORTHOPAEDIC SURGERY

## 2021-05-22 PROCEDURE — 93306 TTE W/DOPPLER COMPLETE: CPT

## 2021-05-22 PROCEDURE — 87635 SARS-COV-2 COVID-19 AMP PRB: CPT

## 2021-05-22 PROCEDURE — 36415 COLL VENOUS BLD VENIPUNCTURE: CPT

## 2021-05-22 PROCEDURE — 85025 COMPLETE CBC W/AUTO DIFF WBC: CPT

## 2021-05-22 PROCEDURE — 2580000003 HC RX 258: Performed by: NURSE ANESTHETIST, CERTIFIED REGISTERED

## 2021-05-22 PROCEDURE — 93005 ELECTROCARDIOGRAM TRACING: CPT | Performed by: ANESTHESIOLOGY

## 2021-05-22 PROCEDURE — C1776 JOINT DEVICE (IMPLANTABLE): HCPCS | Performed by: ORTHOPAEDIC SURGERY

## 2021-05-22 PROCEDURE — 6370000000 HC RX 637 (ALT 250 FOR IP): Performed by: NURSE PRACTITIONER

## 2021-05-22 PROCEDURE — 76000 FLUOROSCOPY <1 HR PHYS/QHP: CPT

## 2021-05-22 PROCEDURE — 82550 ASSAY OF CK (CPK): CPT

## 2021-05-22 PROCEDURE — 7100000001 HC PACU RECOVERY - ADDTL 15 MIN: Performed by: ORTHOPAEDIC SURGERY

## 2021-05-22 PROCEDURE — 2500000003 HC RX 250 WO HCPCS: Performed by: NURSE ANESTHETIST, CERTIFIED REGISTERED

## 2021-05-22 PROCEDURE — 2000000000 HC ICU R&B

## 2021-05-22 PROCEDURE — 3600000014 HC SURGERY LEVEL 4 ADDTL 15MIN: Performed by: ORTHOPAEDIC SURGERY

## 2021-05-22 PROCEDURE — 2580000003 HC RX 258: Performed by: ORTHOPAEDIC SURGERY

## 2021-05-22 DEVICE — IMPLANTABLE DEVICE: Type: IMPLANTABLE DEVICE | Site: HIP | Status: FUNCTIONAL

## 2021-05-22 DEVICE — SCREW BNE L44MM DIA5MM COR DIA4.3MM TIB LT GRN TI ALLY ST: Type: IMPLANTABLE DEVICE | Site: HIP | Status: FUNCTIONAL

## 2021-05-22 DEVICE — CABLE SURG L750MM DIA1MM S STL SMOOTH W/ CRMP: Type: IMPLANTABLE DEVICE | Site: HIP | Status: FUNCTIONAL

## 2021-05-22 DEVICE — CABLE ORTH L91CM DIA1.8MM CO CHROM SMOOTH CBL-READY 00223200228] ZIMMER BIOMET TRAUMA]: Type: IMPLANTABLE DEVICE | Site: HIP | Status: FUNCTIONAL

## 2021-05-22 RX ORDER — PROMETHAZINE HYDROCHLORIDE 25 MG/1
12.5 TABLET ORAL EVERY 6 HOURS PRN
Status: DISCONTINUED | OUTPATIENT
Start: 2021-05-22 | End: 2021-05-26 | Stop reason: HOSPADM

## 2021-05-22 RX ORDER — HYDRALAZINE HYDROCHLORIDE 20 MG/ML
5 INJECTION INTRAMUSCULAR; INTRAVENOUS EVERY 10 MIN PRN
Status: DISCONTINUED | OUTPATIENT
Start: 2021-05-22 | End: 2021-05-22 | Stop reason: HOSPADM

## 2021-05-22 RX ORDER — MIDODRINE HYDROCHLORIDE 5 MG/1
10 TABLET ORAL
Status: DISCONTINUED | OUTPATIENT
Start: 2021-05-22 | End: 2021-05-26 | Stop reason: HOSPADM

## 2021-05-22 RX ORDER — METOPROLOL TARTRATE 50 MG/1
50 TABLET, FILM COATED ORAL 2 TIMES DAILY
Status: DISCONTINUED | OUTPATIENT
Start: 2021-05-22 | End: 2021-05-26 | Stop reason: HOSPADM

## 2021-05-22 RX ORDER — SODIUM CHLORIDE 9 MG/ML
25 INJECTION, SOLUTION INTRAVENOUS PRN
Status: DISCONTINUED | OUTPATIENT
Start: 2021-05-22 | End: 2021-05-26 | Stop reason: HOSPADM

## 2021-05-22 RX ORDER — ONDANSETRON 2 MG/ML
4 INJECTION INTRAMUSCULAR; INTRAVENOUS EVERY 6 HOURS PRN
Status: DISCONTINUED | OUTPATIENT
Start: 2021-05-22 | End: 2021-05-26 | Stop reason: HOSPADM

## 2021-05-22 RX ORDER — METOCLOPRAMIDE HYDROCHLORIDE 5 MG/ML
10 INJECTION INTRAMUSCULAR; INTRAVENOUS
Status: DISCONTINUED | OUTPATIENT
Start: 2021-05-22 | End: 2021-05-22 | Stop reason: HOSPADM

## 2021-05-22 RX ORDER — MEPERIDINE HYDROCHLORIDE 25 MG/ML
12.5 INJECTION INTRAMUSCULAR; INTRAVENOUS; SUBCUTANEOUS EVERY 5 MIN PRN
Status: DISCONTINUED | OUTPATIENT
Start: 2021-05-22 | End: 2021-05-22 | Stop reason: HOSPADM

## 2021-05-22 RX ORDER — PROMETHAZINE HYDROCHLORIDE 25 MG/ML
6.25 INJECTION, SOLUTION INTRAMUSCULAR; INTRAVENOUS
Status: DISCONTINUED | OUTPATIENT
Start: 2021-05-22 | End: 2021-05-22 | Stop reason: HOSPADM

## 2021-05-22 RX ORDER — ONDANSETRON 2 MG/ML
INJECTION INTRAMUSCULAR; INTRAVENOUS PRN
Status: DISCONTINUED | OUTPATIENT
Start: 2021-05-22 | End: 2021-05-22 | Stop reason: SDUPTHER

## 2021-05-22 RX ORDER — ALBUMIN, HUMAN INJ 5% 5 %
25 SOLUTION INTRAVENOUS ONCE
Status: COMPLETED | OUTPATIENT
Start: 2021-05-22 | End: 2021-05-22

## 2021-05-22 RX ORDER — EPHEDRINE SULFATE 50 MG/ML
INJECTION INTRAVENOUS PRN
Status: DISCONTINUED | OUTPATIENT
Start: 2021-05-22 | End: 2021-05-22 | Stop reason: SDUPTHER

## 2021-05-22 RX ORDER — PROPOFOL 10 MG/ML
INJECTION, EMULSION INTRAVENOUS PRN
Status: DISCONTINUED | OUTPATIENT
Start: 2021-05-22 | End: 2021-05-22 | Stop reason: SDUPTHER

## 2021-05-22 RX ORDER — OXYCODONE HYDROCHLORIDE 10 MG/1
10 TABLET ORAL EVERY 4 HOURS PRN
Status: DISCONTINUED | OUTPATIENT
Start: 2021-05-22 | End: 2021-05-26 | Stop reason: HOSPADM

## 2021-05-22 RX ORDER — SODIUM CHLORIDE, SODIUM LACTATE, POTASSIUM CHLORIDE, CALCIUM CHLORIDE 600; 310; 30; 20 MG/100ML; MG/100ML; MG/100ML; MG/100ML
INJECTION, SOLUTION INTRAVENOUS CONTINUOUS
Status: DISCONTINUED | OUTPATIENT
Start: 2021-05-22 | End: 2021-05-22

## 2021-05-22 RX ORDER — PHENYLEPHRINE HCL IN 0.9% NACL 1 MG/10 ML
SYRINGE (ML) INTRAVENOUS PRN
Status: DISCONTINUED | OUTPATIENT
Start: 2021-05-22 | End: 2021-05-22 | Stop reason: SDUPTHER

## 2021-05-22 RX ORDER — CEFAZOLIN SODIUM 2 G/50ML
2000 SOLUTION INTRAVENOUS EVERY 8 HOURS
Status: DISCONTINUED | OUTPATIENT
Start: 2021-05-22 | End: 2021-05-22 | Stop reason: CLARIF

## 2021-05-22 RX ORDER — DEXAMETHASONE SODIUM PHOSPHATE 4 MG/ML
INJECTION, SOLUTION INTRA-ARTICULAR; INTRALESIONAL; INTRAMUSCULAR; INTRAVENOUS; SOFT TISSUE PRN
Status: DISCONTINUED | OUTPATIENT
Start: 2021-05-22 | End: 2021-05-22 | Stop reason: SDUPTHER

## 2021-05-22 RX ORDER — SENNA AND DOCUSATE SODIUM 50; 8.6 MG/1; MG/1
1 TABLET, FILM COATED ORAL 2 TIMES DAILY
Status: DISCONTINUED | OUTPATIENT
Start: 2021-05-22 | End: 2021-05-26 | Stop reason: HOSPADM

## 2021-05-22 RX ORDER — HYDROMORPHONE HCL 110MG/55ML
0.5 PATIENT CONTROLLED ANALGESIA SYRINGE INTRAVENOUS EVERY 5 MIN PRN
Status: DISCONTINUED | OUTPATIENT
Start: 2021-05-22 | End: 2021-05-22 | Stop reason: HOSPADM

## 2021-05-22 RX ORDER — ROCURONIUM BROMIDE 10 MG/ML
INJECTION, SOLUTION INTRAVENOUS PRN
Status: DISCONTINUED | OUTPATIENT
Start: 2021-05-22 | End: 2021-05-22 | Stop reason: SDUPTHER

## 2021-05-22 RX ORDER — LIDOCAINE HYDROCHLORIDE 20 MG/ML
INJECTION, SOLUTION INTRAVENOUS PRN
Status: DISCONTINUED | OUTPATIENT
Start: 2021-05-22 | End: 2021-05-22 | Stop reason: SDUPTHER

## 2021-05-22 RX ORDER — HYDROCODONE BITARTRATE AND ACETAMINOPHEN 5; 325 MG/1; MG/1
1 TABLET ORAL PRN
Status: DISCONTINUED | OUTPATIENT
Start: 2021-05-22 | End: 2021-05-22 | Stop reason: HOSPADM

## 2021-05-22 RX ORDER — SODIUM CHLORIDE 0.9 % (FLUSH) 0.9 %
10 SYRINGE (ML) INJECTION EVERY 12 HOURS SCHEDULED
Status: DISCONTINUED | OUTPATIENT
Start: 2021-05-22 | End: 2021-05-26 | Stop reason: HOSPADM

## 2021-05-22 RX ORDER — DIPHENHYDRAMINE HYDROCHLORIDE 50 MG/ML
12.5 INJECTION INTRAMUSCULAR; INTRAVENOUS
Status: DISCONTINUED | OUTPATIENT
Start: 2021-05-22 | End: 2021-05-22 | Stop reason: HOSPADM

## 2021-05-22 RX ORDER — SODIUM CHLORIDE 0.9 % (FLUSH) 0.9 %
10 SYRINGE (ML) INJECTION PRN
Status: DISCONTINUED | OUTPATIENT
Start: 2021-05-22 | End: 2021-05-26 | Stop reason: HOSPADM

## 2021-05-22 RX ORDER — FENTANYL CITRATE 50 UG/ML
INJECTION, SOLUTION INTRAMUSCULAR; INTRAVENOUS PRN
Status: DISCONTINUED | OUTPATIENT
Start: 2021-05-22 | End: 2021-05-22 | Stop reason: SDUPTHER

## 2021-05-22 RX ORDER — FENTANYL CITRATE 50 UG/ML
50 INJECTION, SOLUTION INTRAMUSCULAR; INTRAVENOUS EVERY 5 MIN PRN
Status: DISCONTINUED | OUTPATIENT
Start: 2021-05-22 | End: 2021-05-22 | Stop reason: HOSPADM

## 2021-05-22 RX ORDER — SODIUM CHLORIDE 9 MG/ML
INJECTION, SOLUTION INTRAVENOUS CONTINUOUS PRN
Status: DISCONTINUED | OUTPATIENT
Start: 2021-05-22 | End: 2021-05-22 | Stop reason: SDUPTHER

## 2021-05-22 RX ORDER — ALBUMIN, HUMAN INJ 5% 5 %
SOLUTION INTRAVENOUS PRN
Status: DISCONTINUED | OUTPATIENT
Start: 2021-05-22 | End: 2021-05-22 | Stop reason: SDUPTHER

## 2021-05-22 RX ORDER — LABETALOL HYDROCHLORIDE 5 MG/ML
5 INJECTION, SOLUTION INTRAVENOUS EVERY 10 MIN PRN
Status: DISCONTINUED | OUTPATIENT
Start: 2021-05-22 | End: 2021-05-22 | Stop reason: HOSPADM

## 2021-05-22 RX ORDER — HYDROCODONE BITARTRATE AND ACETAMINOPHEN 5; 325 MG/1; MG/1
2 TABLET ORAL PRN
Status: DISCONTINUED | OUTPATIENT
Start: 2021-05-22 | End: 2021-05-22 | Stop reason: HOSPADM

## 2021-05-22 RX ORDER — OXYCODONE HYDROCHLORIDE 5 MG/1
5 TABLET ORAL EVERY 4 HOURS PRN
Status: DISCONTINUED | OUTPATIENT
Start: 2021-05-22 | End: 2021-05-26 | Stop reason: HOSPADM

## 2021-05-22 RX ADMIN — Medication 200 MCG: at 11:06

## 2021-05-22 RX ADMIN — SODIUM CHLORIDE, PRESERVATIVE FREE 10 ML: 5 INJECTION INTRAVENOUS at 22:25

## 2021-05-22 RX ADMIN — FENTANYL CITRATE 25 MCG: 50 INJECTION, SOLUTION INTRAMUSCULAR; INTRAVENOUS at 14:14

## 2021-05-22 RX ADMIN — ROCURONIUM BROMIDE 5 MG: 10 INJECTION INTRAVENOUS at 13:17

## 2021-05-22 RX ADMIN — CEFAZOLIN SODIUM 2000 MG: 10 INJECTION, POWDER, FOR SOLUTION INTRAVENOUS at 19:00

## 2021-05-22 RX ADMIN — ALBUMIN (HUMAN) 25 G: 12.5 INJECTION, SOLUTION INTRAVENOUS at 18:55

## 2021-05-22 RX ADMIN — FENTANYL CITRATE 25 MCG: 50 INJECTION, SOLUTION INTRAMUSCULAR; INTRAVENOUS at 13:16

## 2021-05-22 RX ADMIN — HYDROMORPHONE HYDROCHLORIDE 0.25 MG: 1 INJECTION, SOLUTION INTRAMUSCULAR; INTRAVENOUS; SUBCUTANEOUS at 16:28

## 2021-05-22 RX ADMIN — Medication 100 MCG: at 13:28

## 2021-05-22 RX ADMIN — ROCURONIUM BROMIDE 10 MG: 10 INJECTION INTRAVENOUS at 12:14

## 2021-05-22 RX ADMIN — ROCURONIUM BROMIDE 10 MG: 10 INJECTION INTRAVENOUS at 11:17

## 2021-05-22 RX ADMIN — Medication 200 MCG: at 11:30

## 2021-05-22 RX ADMIN — CEFAZOLIN 2000 MG: 10 INJECTION, POWDER, FOR SOLUTION INTRAVENOUS at 11:00

## 2021-05-22 RX ADMIN — DEXAMETHASONE SODIUM PHOSPHATE 4 MG: 4 INJECTION, SOLUTION INTRAMUSCULAR; INTRAVENOUS at 11:55

## 2021-05-22 RX ADMIN — AMIODARONE HYDROCHLORIDE 1 MG/MIN: 50 INJECTION, SOLUTION INTRAVENOUS at 15:33

## 2021-05-22 RX ADMIN — FENTANYL CITRATE 50 MCG: 50 INJECTION, SOLUTION INTRAMUSCULAR; INTRAVENOUS at 10:51

## 2021-05-22 RX ADMIN — Medication 200 MCG: at 11:21

## 2021-05-22 RX ADMIN — PROPOFOL 100 MG: 10 INJECTION, EMULSION INTRAVENOUS at 10:53

## 2021-05-22 RX ADMIN — MORPHINE SULFATE 2 MG: 4 INJECTION, SOLUTION INTRAMUSCULAR; INTRAVENOUS at 10:00

## 2021-05-22 RX ADMIN — ALBUMIN (HUMAN) 250 ML: 12.5 INJECTION, SOLUTION INTRAVENOUS at 11:33

## 2021-05-22 RX ADMIN — Medication 200 MCG: at 12:20

## 2021-05-22 RX ADMIN — Medication 200 MCG: at 12:04

## 2021-05-22 RX ADMIN — EPHEDRINE SULFATE 10 MG: 50 INJECTION INTRAVENOUS at 13:10

## 2021-05-22 RX ADMIN — Medication 200 MCG: at 11:14

## 2021-05-22 RX ADMIN — METOPROLOL TARTRATE 25 MG: 25 TABLET, FILM COATED ORAL at 08:13

## 2021-05-22 RX ADMIN — SODIUM CHLORIDE: 900 INJECTION INTRAVENOUS at 10:49

## 2021-05-22 RX ADMIN — MAGNESIUM OXIDE 400 MG (241.3 MG MAGNESIUM) TABLET 400 MG: TABLET at 22:25

## 2021-05-22 RX ADMIN — MIDODRINE HYDROCHLORIDE 10 MG: 5 TABLET ORAL at 16:36

## 2021-05-22 RX ADMIN — ALBUMIN (HUMAN) 250 ML: 12.5 INJECTION, SOLUTION INTRAVENOUS at 13:26

## 2021-05-22 RX ADMIN — Medication 200 MCG: at 11:02

## 2021-05-22 RX ADMIN — AMIODARONE HYDROCHLORIDE 0.5 MG/MIN: 50 INJECTION, SOLUTION INTRAVENOUS at 18:54

## 2021-05-22 RX ADMIN — LIDOCAINE HYDROCHLORIDE 60 MG: 20 INJECTION, SOLUTION INTRAVENOUS at 10:53

## 2021-05-22 RX ADMIN — ROCURONIUM BROMIDE 50 MG: 10 INJECTION INTRAVENOUS at 10:53

## 2021-05-22 RX ADMIN — AMIODARONE HYDROCHLORIDE 150 MG: 50 INJECTION, SOLUTION INTRAVENOUS at 15:23

## 2021-05-22 RX ADMIN — Medication 200 MCG: at 10:53

## 2021-05-22 RX ADMIN — ONDANSETRON 4 MG: 2 INJECTION INTRAMUSCULAR; INTRAVENOUS at 11:55

## 2021-05-22 RX ADMIN — SUGAMMADEX 200 MG: 100 INJECTION, SOLUTION INTRAVENOUS at 14:14

## 2021-05-22 RX ADMIN — FENTANYL CITRATE 50 MCG: 50 INJECTION, SOLUTION INTRAMUSCULAR; INTRAVENOUS at 14:39

## 2021-05-22 RX ADMIN — MAGNESIUM OXIDE 400 MG (241.3 MG MAGNESIUM) TABLET 400 MG: TABLET at 08:13

## 2021-05-22 RX ADMIN — DOCUSATE SODIUM 50 MG AND SENNOSIDES 8.6 MG 1 TABLET: 8.6; 5 TABLET, FILM COATED ORAL at 22:25

## 2021-05-22 RX ADMIN — FENTANYL CITRATE 50 MCG: 50 INJECTION, SOLUTION INTRAMUSCULAR; INTRAVENOUS at 15:02

## 2021-05-22 RX ADMIN — METOPROLOL TARTRATE 25 MG: 25 TABLET, FILM COATED ORAL at 08:50

## 2021-05-22 ASSESSMENT — PULMONARY FUNCTION TESTS
PIF_VALUE: 24
PIF_VALUE: 25
PIF_VALUE: 25
PIF_VALUE: 23
PIF_VALUE: 25
PIF_VALUE: 26
PIF_VALUE: 24
PIF_VALUE: 25
PIF_VALUE: 24
PIF_VALUE: 25
PIF_VALUE: 25
PIF_VALUE: 24
PIF_VALUE: 25
PIF_VALUE: 24
PIF_VALUE: 24
PIF_VALUE: 0
PIF_VALUE: 25
PIF_VALUE: 1
PIF_VALUE: 25
PIF_VALUE: 25
PIF_VALUE: 24
PIF_VALUE: 25
PIF_VALUE: 27
PIF_VALUE: 25
PIF_VALUE: 24
PIF_VALUE: 25
PIF_VALUE: 24
PIF_VALUE: 24
PIF_VALUE: 26
PIF_VALUE: 24
PIF_VALUE: 25
PIF_VALUE: 24
PIF_VALUE: 25
PIF_VALUE: 24
PIF_VALUE: 22
PIF_VALUE: 25
PIF_VALUE: 25
PIF_VALUE: 11
PIF_VALUE: 14
PIF_VALUE: 25
PIF_VALUE: 24
PIF_VALUE: 23
PIF_VALUE: 25
PIF_VALUE: 24
PIF_VALUE: 25
PIF_VALUE: 25
PIF_VALUE: 24
PIF_VALUE: 24
PIF_VALUE: 25
PIF_VALUE: 25
PIF_VALUE: 24
PIF_VALUE: 25
PIF_VALUE: 26
PIF_VALUE: 24
PIF_VALUE: 25
PIF_VALUE: 26
PIF_VALUE: 2
PIF_VALUE: 24
PIF_VALUE: 25
PIF_VALUE: 25
PIF_VALUE: 26
PIF_VALUE: 24
PIF_VALUE: 24
PIF_VALUE: 25
PIF_VALUE: 25
PIF_VALUE: 24
PIF_VALUE: 27
PIF_VALUE: 26
PIF_VALUE: 25
PIF_VALUE: 24
PIF_VALUE: 25
PIF_VALUE: 26
PIF_VALUE: 24
PIF_VALUE: 25
PIF_VALUE: 24
PIF_VALUE: 24
PIF_VALUE: 26
PIF_VALUE: 25
PIF_VALUE: 35
PIF_VALUE: 26
PIF_VALUE: 25
PIF_VALUE: 24
PIF_VALUE: 1
PIF_VALUE: 25
PIF_VALUE: 24
PIF_VALUE: 24
PIF_VALUE: 25
PIF_VALUE: 25
PIF_VALUE: 24
PIF_VALUE: 27
PIF_VALUE: 25
PIF_VALUE: 25
PIF_VALUE: 26
PIF_VALUE: 26
PIF_VALUE: 1
PIF_VALUE: 25
PIF_VALUE: 28
PIF_VALUE: 24
PIF_VALUE: 25
PIF_VALUE: 27
PIF_VALUE: 25

## 2021-05-22 ASSESSMENT — PAIN DESCRIPTION - LOCATION: LOCATION: LEG

## 2021-05-22 ASSESSMENT — PAIN DESCRIPTION - ORIENTATION: ORIENTATION: LEFT

## 2021-05-22 ASSESSMENT — PAIN SCALES - GENERAL
PAINLEVEL_OUTOF10: 8
PAINLEVEL_OUTOF10: 6
PAINLEVEL_OUTOF10: 7

## 2021-05-22 ASSESSMENT — PAIN DESCRIPTION - DESCRIPTORS: DESCRIPTORS: ACHING;DISCOMFORT

## 2021-05-22 ASSESSMENT — PAIN DESCRIPTION - PROGRESSION: CLINICAL_PROGRESSION: GRADUALLY IMPROVING

## 2021-05-22 ASSESSMENT — PAIN DESCRIPTION - FREQUENCY: FREQUENCY: CONTINUOUS

## 2021-05-22 NOTE — CONSULTS
CONSULT    Patient Name: Berna Carter   (1940)  MRN      [id number]   Today's date:  5/22/2021    CHIEF COMPLAINT:  Left leg pain    History Obtained From:  patient    HISTORY OF PRESENT ILLNESS:      The patient is a [de-identified] y.o. female  who presents to the ER after a fall. She was at home and tripped over the threshold injuring her left hip and is unable to ambulate. She was down for approximately 48 hours. She was seen and evaluated in the emergency room where X-rays were done in the ER showing a displaced Left proximal femur fracture    The patient denied any chest pain, shortness of breath or syncopal episode prior to the fall. Past Medical History         Diagnosis Date    Acid reflux     'no recent issue\"    Arthritis     \"Left Index Finger\"    CHF (congestive heart failure) ([facility]     per old chart hx of CHF with admission 12/2016 with pulmonary edema    Chronic kidney disease     Sees Dr. Vazquez Prey have one kidney, dr Juan Espinoza told me that in 2011 I think\"    GERD (gastroesophageal reflux disease)     History of blood transfusion 2011 Or 2012    No Reaction To Blood Transfusion Received    Hypertension     ( pt states she is not on any medication for blood pressure)    Hypomagnesemia     Hypothyroidism     Shortness of breath on exertion     SVT (supraventricular tachycardia) ([facility]     per old chart hx of SVT with admission 2016 tx with Adenosine and per notes in 5/2018 hx of SVT- no cardiologist    Teeth missing     Upper And Lower    Wears glasses        Past Surgical History         Procedure Laterality Date    ABDOMEN SURGERY      DENTAL SURGERY      Teeth Extracted In Past    ENDOSCOPY, COLON, DIAGNOSTIC  2011 Or 2012    EYE SURGERY  ? when    clyde cataract ext    HERNIA REPAIR  2011    Abdominal Hernia Repair     OTHER SURGICAL HISTORY  05/27/2018    exp lap . converted to exp laporotomy with ventral hernia repair with mesh  OTHER SURGICAL HISTORY  92/50/2365    umbilical scar excision    VENTRAL HERNIA REPAIR  09/16/2016    Robotic laparoscopic       Medications Prior to Admission:     Prior to Admission medications    Medication Sig Start Date End Date Taking? Authorizing Provider   magnesium oxide (MAG-OX) 400 (241.3 Mg) MG TABS tablet TAKE ONE TABLET BY MOUTH TWICE A DAY 2/16/21 3/16/21  Dawson Medrano MD   furosemide (LASIX) 20 MG tablet Take 1 tablet by mouth 2 times daily 2/16/21 3/18/21  Dawson Medrano MD   metoprolol tartrate (LOPRESSOR) 25 MG tablet Take 1 tablet by mouth 2 times daily 2/16/21 3/18/21  Dawson Medrano MD   levothyroxine (SYNTHROID) 50 MCG tablet Take 1 tablet by mouth Daily 2/16/21 3/18/21  Dawson Medrano MD   amLODIPine-benazepril (LOTREL) 2.5-10 MG per capsule Take 1 capsule by mouth daily 7/27/20   Fernando Hernandez MD   omeprazole (PRILOSEC) 20 MG delayed release capsule TAKE TWO CAPSULES BY MOUTH DAILY  Patient taking differently: as needed TAKE TWO CAPSULES BY MOUTH DAILY 2/14/20   Jeanette Mendoza MD       Allergies     Patient has no known allergies. Social History   TOBACCO:   reports that she has never smoked. She has never used smokeless tobacco.  ETOH:   reports no history of alcohol use. Patient currently lives alone    Family History         Problem Relation Age of Onset    Cancer Father         Lung Cancer    Arthritis Mother     Cancer Brother         Skin Cancer    High Cholesterol Brother        Review of Systems   Constitutional:  No weight loss, no night sweats  Eyes:  No visual changes  ENT:  No nasal drainage or ear pain  CV:  No chest pain  PULM:  No cough, no shortness of breath  GI:  No nausea, vomiting, diarrhea  :   Hx chronic renal insufficiency no dysuria  Musc:   See HPI  Neuro: No numbness, tingling or parethesias  Skin:  No rashes  Psych: No depression symptoms    Physical Exam:    Vitals: BP 97/63   Pulse 126   Temp 97.9 °F (36.6 °C) (Temporal)   Resp 20   Ht 5' 2\" (1.575 m)   Wt 245 lb 12.8 oz (111.5 kg)   LMP 01/23/1995   SpO2 97%   BMI 44.96 kg/m² ,  Body mass index is 44.96 kg/m². General appearance: alert, appears stated age and cooperative  Skin: Skin color, texture, turgor normal. No rashes or lesions  HEENT: Head: Normocephalic, no lesions, without obvious abnormality. Neck: no adenopathy, no carotid bruit, no JVD, supple, symmetrical, trachea midline and thyroid not enlarged, symmetric, no tenderness/mass/nodules    Extremities:    Left knee is tender to palpation. Obvious deformity is present around the knee   Toes with some swelling, sensation intact to light touch   No ankle deformity or tenderness   Good capillary refill toes   Difficult to asses motor function due to pain and apprehension due to fracture   Can wiggle toes   No tenderness over bilateral wrist, elbow or shoulders. .  Neurologic: Mental status: Alert, oriented, thought content appropriate    Labs     CBC:   Recent Labs     05/21/21  1730 05/22/21  0735   WBC 10.3 8.1   HGB 12.3* 10.6*    126*     BMP:    Recent Labs     05/21/21  1730 05/22/21  0735    141   K 4.2 4.4    113*   CO2 19* 19*   BUN 41* 37*   CREATININE 2.7* 2.5*   GLUCOSE 101* 88     INR:   Lab Results   Component Value Date    INR 1.06 05/21/2021    INR 1.03 12/17/2016    INR 1.03 12/17/2016    PROTIME 12.8 05/21/2021    PROTIME 11.8 12/17/2016    PROTIME 11.8 12/17/2016         X-ray view   Imaging Review.   X-rays were personally reviewed by myself    Narrative   EXAMINATION:   4 XRAY VIEWS OF THE RIGHT FEMUR; 5 XRAY VIEWS OF THE LEFT FEMUR; 2 XRAY VIEWS   OF THE LEFT TIBIA AND FIBULA; ONE XRAY VIEW OF THE PELVIS       5/21/2021 4:25 pm       COMPARISON:   None.       HISTORY:   ORDERING SYSTEM PROVIDED HISTORY: injury   TECHNOLOGIST PROVIDED HISTORY:   Reason for exam:->injury   Reason for Exam: pain   Acuity: Acute   Type of Exam: Initial   Mechanism of Injury: fall Relevant Medical/Surgical History: none       FINDINGS:   Limited visualization of the left hemipelvis shows no evidence of fracture. There is an oblique fracture extending through the intertrochanteric portion   of the proximal left femur into the proximal diaphysis.  The fracture is   moderately displaced.  Femoral head remains located within the acetabulum   with degenerative change at the hip joint.  The distal shaft of the left   femur is intact.       Mild degenerative change in the right hip joint.  The right femur is intact. There is chronic degenerative change in the bilateral knees. Tricompartmental joint space narrowing and osteophyte formation is noted. The left tibia and fibula are intact with no acute abnormality.           Impression   1. Displaced fracture through the proximal left femur.    2. No additional fractures are identified.  Degenerative changes in the   bilateral hips and knees         Problem list  Patient Active Problem List   Diagnosis Code    Chronic kidney disease (CKD) stage G4/A2, severely decreased glomerular filtration rate (GFR) between 15-29 mL/min/1.73 square meter and albuminuria creatinine ratio between  mg/g ([facility]) N18.4    Ventral hernia without obstruction or gangrene K43.9    SVT (supraventricular tachycardia) ([facility]) I47.1    CRF (chronic renal failure) N18.9    Hypothyroid E03.9    CHF (congestive heart failure) ([facility]) I50.9    Chronic kidney disease (CKD) stage G4/A1, severely decreased glomerular filtration rate (GFR) between 15-29 mL/min/1.73 square meter and albuminuria creatinine ratio less than 30 mg/g ([facility]) N18.4    GERD (gastroesophageal reflux disease) K21.9    H/O ventral hernia Z87.19    S/P ventral herniorrhaphy Z98.890, Z87.19    S/P herniorrhaphy Z98.890, Z87.19    HTN (hypertension) I10    History of ventral hernia repair Z98.890, Z87.19    Morbidly obese ([facility] E66.01    Closed transcervical fracture of proximal femur, left, initial encounter [facility]) S72.032A       Assessment and Plan     1. Left Displaced Proximal Femur Fracture      We reviewed the X-rays with the patient and thoughtfully discussed operative vs nonoperative treatment for the femur fracture. The X-Rays demonstrate displacement and would benefit from ORIF. Planned procedure to be open reduction with placement of intramedullary nail    We thoughtfully considered closed management with the outcome possibly being deformity about the femur, loss of motion, continued pain and likely posttraumatic arthritis. We discussed non-surgical treatment which would include immobilization and non weightbearing. Based on the fracture pattern I am recommending operative fixation to improve alignment and fracture position to help restore anatomy and stability. The goal of surgical management is to promote and maintaining limb length and alignment, and preserving the soft-tissue envelope with a durable fixation that allows functional recovery during bone healing. Risks of the procedure include but not limited to nonunion, infection, residual instability, anesthetic risks, deep venous clot, pulmonary embolism, bleeding, persistent pain, persistent weakness, loss of reduction, residual stiffness, postoperative stoke and heart attack and possibly death. The patient will receive post-operative antibiotics will be given both chemo (Lovenox) and mechanical (CITLALLI hoes and SCD's) DVT prophylaxis post-operatively. The need for revision surgery is always a distinct possibility. The patient will be given detailed information of the postoperative recovery and restrictions. All questions were answered and the patient understands the risks and wished to proceed with surgery.       Caroline Han MD, MD

## 2021-05-22 NOTE — PROGRESS NOTES
afib -rate stable  Keep on lopressor and load with amiodarone  Added proamatine for BP  Going to ICU

## 2021-05-22 NOTE — BRIEF OP NOTE
OPERATIVE NOTE    Patient name  Lorenza Hennessy  MRN    [id number]    Date of Procedure  5/22/2021      Preoperative Diagnosis: Left Proximal Femur Fracture    Postoperative Diagnosis: Same    Procedure Performed:    Open Reduction Intramedullary Nail Left Femur                                   Surgeon:    Leslye Ramirez MD    Anesthesia:    General endotrachial anesthesia    Estimated blood loss:  300 ml    Specimens:   None    Complications:     None      Lety Truong.  Mick Ramirez MD

## 2021-05-22 NOTE — PROGRESS NOTES
Patient arrived to PACU for pre procedure. Patient A+O x4. Dr. Mick Ramirez at bedside, consent signed.

## 2021-05-22 NOTE — PROGRESS NOTES
Alert and oriented. Skin assessment completed with Jalen Alex. Skin is warm dry and intact. No skin issues noted at this time.  Will continue to monitor

## 2021-05-22 NOTE — ANESTHESIA POSTPROCEDURE EVALUATION
Department of Anesthesiology  Postprocedure Note    Patient: Riki Kuo  MRN: [id number]  YOB: 1940  Date of evaluation: 5/22/2021  Time:  2:33 PM     Procedure Summary     Date: 05/22/21 Room / Location: [address]    Anesthesia Start: 2506 Anesthesia Stop: 6404    Procedure: FEMUR IM NAIL REGINALDO INSERTION (Left Hip) Diagnosis: (Right Hip Fracture)    Surgeons: Gael Castillo MD Responsible Provider: Sharee Verdugo DO    Anesthesia Type: general ASA Status: 3 - Emergent          Anesthesia Type: general    Carolina Phase I:      Carolina Phase II:      Last vitals: Reviewed and per EMR flowsheets.        Anesthesia Post Evaluation    Patient location during evaluation: PACU  Patient participation: complete - patient participated  Level of consciousness: awake and alert  Pain score: 4  Airway patency: patent  Nausea & Vomiting: no vomiting and no nausea  Complications: no  Cardiovascular status: blood pressure returned to baseline and hemodynamically stable  Respiratory status: acceptable, spontaneous ventilation, nonlabored ventilation and nasal cannula  Hydration status: stable

## 2021-05-22 NOTE — ANESTHESIA PRE PROCEDURE
650 mg Rectal Q6H PRN Sherre Barbara, APRN - NP        morphine sulfate (PF) injection 2 mg  2 mg Intravenous Q4H PRN Sherre Barbaar, APRN - NP        Or   Anthony Legerer morphine sulfate (PF) injection 4 mg  4 mg Intravenous Q4H PRN Sherre Barbara, APRN - NP        levothyroxine (SYNTHROID) tablet 50 mcg  50 mcg Oral Daily Sherre Barbara, APRN - NP        metoprolol tartrate (LOPRESSOR) tablet 25 mg  25 mg Oral BID Sherre Barbara, APRN - NP   25 mg at 05/21/21 2323    lactated ringers infusion   Intravenous Continuous Joe Guerrero MD 75 mL/hr at 05/21/21 2001 New Bag at 05/21/21 2001    magnesium oxide (MAG-OX) tablet 400 mg  400 mg Oral BID Sherre Barbara, APRN - NP   400 mg at 05/21/21 2320       Allergies:  No Known Allergies    Problem List:    Patient Active Problem List   Diagnosis Code    Chronic kidney disease (CKD) stage G4/A2, severely decreased glomerular filtration rate (GFR) between 15-29 mL/min/1.73 square meter and albuminuria creatinine ratio between  mg/g ([facility]) N18.4    Ventral hernia without obstruction or gangrene K43.9    SVT (supraventricular tachycardia) ([facility]) I47.1    CRF (chronic renal failure) N18.9    Hypothyroid E03.9    CHF (congestive heart failure) ([facility]) I50.9    Chronic kidney disease (CKD) stage G4/A1, severely decreased glomerular filtration rate (GFR) between 15-29 mL/min/1.73 square meter and albuminuria creatinine ratio less than 30 mg/g ([facility]) N18.4    GERD (gastroesophageal reflux disease) K21.9    H/O ventral hernia Z87.19    S/P ventral herniorrhaphy Z98.890, Z87.19    S/P herniorrhaphy Z98.890, Z87.19    HTN (hypertension) I10    History of ventral hernia repair Z98.890, Z87.19    Morbid[facility] obese ([email or web address] E66.01    Closed transcervical fracture of proximal femur, left, initial encounter ([facility] I88.190R       Past Medical History:        Diagnosis Date    Acid reflux     'no recent issue\"    Arthritis     \"Left Index Finger\"    CHF (congestive heart failure) ([facility] [address]     per old chart hx of CHF with admission 12/2016 with pulmonary edema    Chronic kidney disease     Sees Dr. Jacky Suarez have one kidney, dr Siddharth Green told me that in 2011 I think\"    GERD (gastroesophageal reflux disease)     History of blood transfusion 2011 Or 2012    No Reaction To Blood Transfusion Received    Hypertension     ( pt states she is not on any medication for blood pressure)    Hypomagnesemia     Hypothyroidism     Shortness of breath on exertion     SVT (supraventricular tachycardia) ([facility]     per old chart hx of SVT with admission 2016 tx with Adenosine and per notes in 5/2018 hx of SVT- no cardiologist    Teeth missing     Upper And Lower    Wears glasses        Past Surgical History:        Procedure Laterality Date    ABDOMEN SURGERY      DENTAL SURGERY      Teeth Extracted In Past    ENDOSCOPY, COLON, DIAGNOSTIC  2011 Or 2012    EYE SURGERY  ? when    clyde cataract ext   [address]  2011    Abdominal Hernia Repair     OTHER SURGICAL HISTORY  05/27/2018    exp lap . converted to exp laporotomy with ventral hernia repair with mesh    OTHER SURGICAL HISTORY  57/72/4082    umbilical scar excision    VENTRAL HERNIA REPAIR  09/16/2016    Robotic laparoscopic       Social History:    Social History     Tobacco Use    Smoking status: Never Smoker    Smokeless tobacco: Never Used   Substance Use Topics    Alcohol use:  No                                Counseling given: Not Answered      Vital Signs (Current):   Vitals:    05/21/21 1900 05/21/21 2000 05/21/21 2100 05/21/21 2123   BP:  (!) 130/59 115/61    Pulse: 99 105 92    Resp: 16 17 20    Temp:   98.3 °F (36.8 °C)    TempSrc:   Oral    SpO2:  98% 99%    Weight:   250 lb (113.4 kg) 245 lb 12.8 oz (111.5 kg)   Height:   5' 2\" (1.575 m)                                               BP Readings from Last 3 Encounters:   05/21/21 115/61   07/27/20 122/70   09/05/19 115/72       NPO Status: BMI:   Wt Readings from Last 3 Encounters:   05/21/21 245 lb 12.8 oz (111.5 kg)   07/27/20 240 lb 9.6 oz (109.1 kg)   09/05/19 245 lb (111.1 kg)     Body mass index is 44.96 kg/m². CBC:   Lab Results   Component Value Date    WBC 10.3 05/21/2021    RBC 4.07 05/21/2021    HGB 12.3 05/21/2021    HCT 38.8 05/21/2021    MCV 95.3 05/21/2021    RDW 15.4 05/21/2021     05/21/2021       CMP:   Lab Results   Component Value Date     05/21/2021    K 4.2 05/21/2021     05/21/2021    CO2 19 05/21/2021    BUN 41 05/21/2021    CREATININE 2.7 05/21/2021    GFRAA 21 05/21/2021    LABGLOM 17 05/21/2021    GLUCOSE 101 05/21/2021    PROT 7.2 05/21/2021    PROT 5.5 03/24/2012    CALCIUM 9.4 05/21/2021    BILITOT 0.8 05/21/2021    ALKPHOS 93 05/21/2021    AST 49 05/21/2021    ALT 12 05/21/2021       POC Tests: No results for input(s): POCGLU, POCNA, POCK, POCCL, POCBUN, POCHEMO, POCHCT in the last 72 hours.     Coags:   Lab Results   Component Value Date    PROTIME 12.8 05/21/2021    PROTIME 14.5 03/24/2012    INR 1.06 05/21/2021    APTT 28.9 05/21/2021       HCG (If Applicable): No results found for: PREGTESTUR, PREGSERUM, HCG, HCGQUANT     ABGs:   Lab Results   Component Value Date    PO2ART 75 08/14/2011    BEART 1 08/14/2011        Type & Screen (If Applicable):  No results found for: LABABO, LABRH    Drug/Infectious Status (If Applicable):  No results found for: HIV, HEPCAB    COVID-19 Screening (If Applicable): No results found for: COVID19        Anesthesia Evaluation  Patient summary reviewed and Nursing notes reviewed no history of anesthetic complications:   Airway: Mallampati: III  TM distance: >3 FB   Neck ROM: full  Mouth opening: > = 3 FB Dental:      Comment: Poor dentition    Pulmonary:Negative Pulmonary ROS breath sounds clear to auscultation                             Cardiovascular:  Exercise tolerance: poor (<4 METS),   (+) hypertension:,

## 2021-05-22 NOTE — PROGRESS NOTES
Hospitalist Progress Note      Name:  Clay Guillaume /Age/Sex: 1940  ([de-identified] y.o. female)   MRN & CSN:  [id number] & [id number] Admission Date/Time: 2021  2:37 PM   [facility] PCP: Nena Ferrell [employer] [address] Day: 2    Assessment and Plan:   Clay Guillaume is a [de-identified] y.o.  female  who presents with fall, left hip pain     LEFT prox femur fx  - OR today, NPO  - Pain and nausea control  - Cardiology following for pre-op clearance     Rhabdomyolysis  - States was on ground for about 24 hours before being found  - Slight increase today, repeat CK in AM  - Continue IVF    Hx HFpEF  - Echo in  with EF 50%, grade I DD  - Is on Lasix at home  - Will monitor closely with IVF given hx     Atrial Fibrillation  - Lopressor  - Amiodarone drip  - Transferring to ICU post-operatively to monitor closely  - Cardiology following, appreciate recs     Chronic  - CKD IV- Cr stable at baseline  - HTN- Cont metoprolol, amlodipine-benazepril  - Hypothyroidism- Cont synthroid    Diet Diet NPO, After Midnight   DVT Prophylaxis [] Lovenox, []  Heparin, [] SCDs, [] Ambulation   GI Prophylaxis [] PPI,  [] H2 Blocker,  [] Carafate,  [] Diet/Tube Feeds   Code Status Full Code   Disposition Patient requires continued admission due to    MDM [] Low, [] Moderate,[]  High  Patient's risk as above due to      History of Present Illness:     Doing well this AM; pain in hip; no chest pain or fevers. Objective:   No intake or output data in the 24 hours ending 21 0959   Vitals:   Vitals:    21 0800   BP: (!) 132/51   Pulse: 141   Resp: 20   Temp: 98.3 °F (36.8 °C)   SpO2: 98%     Physical Exam:   GEN Awake female, sitting upright in bed in no apparent distress. Appears given age. EYES Pupils are equally round. No scleral erythema, discharge, or conjunctivitis. HENT Mucous membranes are moist.   NECK Supple, no apparent thyromegaly or masses.   RESP Clear to auscultation, no wheezes, rales or

## 2021-05-22 NOTE — PROGRESS NOTES
Daily Progress Note    Awake and alert this AM  S/p  Left femur fx  Will get echo   She is in SVT will keep her on betablockers and use cardizem to help control rate  Renal insuffiencey baseline   rhabdo-keep on fluids     Surgery today  Moderate risk for cardiovascular complication but stable for surgery  Denies CP or SOB  ST on monitor  Chronic renal insufficiency Cr. 1.8 today    Left Femur Fx    D/t Fall at home. No syncope with fall    On floor for 24 hours    Surgery today with Dr. Jonnie Hay    Will cont' to follow      PAST SURGICAL HISTORY:  Hernia repair.     SOCIAL HISTORY:  Does not smoke, does not drink.     ALLERGIES:  NKDA.     FAMILY HISTORY:  Significant for hypertension, skin cancer, lung cancer  present.     MEDICATIONS AT HOME:  She is on Lasix 20 mg b.i.d., Lopressor 25 b.i.d.,  Synthroid and Lotrel 2.5/10 once a day. Objective:   /61   Pulse 92   Temp 98.3 °F (36.8 °C) (Oral)   Resp 20   Ht 5' 2\" (1.575 m)   Wt 245 lb 12.8 oz (111.5 kg)   LMP 01/23/1995   SpO2 99%   BMI 44.96 kg/m²   No intake or output data in the 24 hours ending 05/22/21 0730    Medications:   Scheduled Meds:   sodium chloride flush  5-40 mL Intravenous 2 times per day    levothyroxine  50 mcg Oral Daily    metoprolol tartrate  25 mg Oral BID    magnesium oxide  400 mg Oral BID      Infusions:   sodium chloride      lactated ringers 75 mL/hr at 05/21/21 2001      PRN Meds:  sodium chloride flush, sodium chloride, polyethylene glycol, acetaminophen **OR** acetaminophen, morphine **OR** morphine       Physical Exam:  Vitals:    05/21/21 2100   BP: 115/61   Pulse: 92   Resp: 20   Temp: 98.3 °F (36.8 °C)   SpO2: 99%        General: AAO, NAD  Chest: Nontender  Cardiac: First and Second Heart Sounds are Normal, No Murmurs or Gallops noted  Lungs:Clear to auscultation and percussion. Abdomen: Soft, NT, ND, +BS  Extremities: No clubbing, no edema  Vascular:  Equal 2+ peripheral pulses.         Lab Data:  CBC:   Recent Labs     05/21/21  1730   WBC 10.3   HGB 12.3*   HCT 38.8   MCV 95.3        BMP:   Recent Labs     05/21/21  1730      K 4.2      CO2 19*   BUN 41*   CREATININE 2.7*     LIVER PROFILE:   Recent Labs     05/21/21  1730   AST 49*   ALT 12   BILITOT 0.8   ALKPHOS 93     PT/INR:   Recent Labs     05/21/21  1730   PROTIME 12.8   INR 1.06     APTT:   Recent Labs     05/21/21  1730   APTT 28.9     BNP:  No results for input(s): BNP in the last 72 hours.       Assessment:  Patient Active Problem List    Diagnosis Date Noted    Closed transcervical fracture of proximal femur, left, initial encounter ([facility] 05/21/2021    Morbidly obese ([facility] 07/23/2020    History of ventral hernia repair 10/19/2018    HTN (hypertension) 08/10/2018    S/P herniorrhaphy 07/11/2018    S/P ventral herniorrhaphy 06/13/2018    H/O ventral hernia 06/06/2018    Chronic kidney disease (CKD) stage G4/A1, severely decreased glomerular filtration rate (GFR) between 15-29 mL/min/1.73 square meter and albuminuria creatinine ratio less than 30 mg/g ([facility]) 05/16/2017    GERD (gastroesophageal reflux disease) 05/16/2017    CHF (congestive heart failure) ([facility] 12/17/2016    CRF (chronic renal failure) 09/20/2016    Hypothyroid 09/20/2016    Ventral hernia without obstruction or gangrene 09/18/2016    SVT (supraventricular tachycardia) ([facility] 09/18/2016    Chronic kidney disease (CKD) stage G4/A2, severely decreased glomerular filtration rate (GFR) between 15-29 mL/min/1.73 square meter and albuminuria creatinine ratio between  mg/g ([facility] 06/21/2016       Electronically signed by SHERYL Garibay CNP on 5/22/2021 at 7:30 AM    Electronically signed by Minerva Sánchez MD on 5/22/21 at 2:17 PM EDT

## 2021-05-22 NOTE — PROGRESS NOTES
Dr. Karyle Fuss ordered stat EKG for patient. Dr. Christopher Saucedo called about EKG findings, new orders for cardizem drip to be started. 1043: Cardizem drip started, verified.

## 2021-05-22 NOTE — PROGRESS NOTES
1430 - transferred from OR on bed, monitor applied, alarms on and verified, bedside handoff provided by Serafin RENTERIA and [address]  7121 - medicated for complaint of surgical pain/discomfort  1500 - Dr Ricardo Forbes at bedside orders received and processed  [phone] - Echocardiogram done bedside  1523 - patient started on amiodarone gtt  1530 - phase one care complete; report called to Brian 45  [phone] - transferred to room 2105 on telemetry and oxygen accompanied by RN

## 2021-05-22 NOTE — CONSULTS
[address] [place] [address], [address] [place] [address]                                  CONSULTATION    PATIENT NAME: Adalberto Navarrete                 :        1940  MED REC NO:   [id number]                          ROOM:       1143  ACCOUNT NO:   [de-identified]                           ADMIT DATE: 2021  PROVIDER:     Nohemy Henry MD    CONSULT DATE:  2021    INDICATIONS:  Cardiac clearance. HISTORY OF PRESENT ILLNESS:  This is an 61-year-old female patient I  have seen in the past.  She says she fell at home. She mechanically  fell. No syncope was noted at home. She was on the floor for almost 24  hours. Her laundry group came by today. They found her on the floor,  therefore called the EMS and brought her to the hospital.    She said she was walking her step, her slippers got caught into a door  and she fell to her knees. She had a displaced fracture to the proximal  left femur present. The patient was seen by Dr. Hoda Walls and planned  for surgery tomorrow. The patient has no chest pain, no shortness of  breath present. The patient was relatively active. I did an echo on her back in 2016. Her LV function was preserved at  that time. She had a Holter done in 2016, found her in sinus rhythm,  sinus tachycardia present. The patient has a history of having diastolic dysfunction present. The  patient has seen Dr. Noam Harris in the past for renal insufficiency and  hyperkalemia. History of SVT present. Sinus tachycardia present. History of GERD and hypothyroidism present. PAST SURGICAL HISTORY:  Hernia repair. SOCIAL HISTORY:  Does not smoke, does not drink. ALLERGIES:  NKDA. FAMILY HISTORY:  Significant for hypertension, skin cancer, lung cancer  present. MEDICATIONS AT HOME:  She is on Lasix 20 mg b.i.d., Lopressor 25 b.i.d.,  Synthroid and Lotrel 2.5/10 once a day. PRIMARY PHYSICIAN:   _____. PHYSICAL EXAMINATION:  GENERAL:  The patient is awake, alert, answering questions, not in acute  distress. VITAL SIGNS:  Temperature afebrile. Pulse is 90 to 100. Blood pressure  is 139/59. HEENT:  Head is normocephalic, atraumatic. Pupils are equal and  reactive to light. CHEST:  Equal expansion. LUNGS:  Clear to auscultation. No wheezing or rhonchi. HEART:  Regular rate and rhythm. ABDOMEN:  Soft, nontender. Bowel sounds are present. No  hepatosplenomegaly or guarding appreciated. EXTREMITIES:  No cyanosis or clubbing noted. NEUROLOGIC:  Cranial nerves II through XII are grossly intact. LABORATORY DATA:  Creatinine is 2.7. Her baseline creatinine is around  3. CPK is 1400. LFTs are normal.  CBC within normal range. Her chest  x-ray, there is no acute process noted. She has a displaced fracture to  the proximal left femur present. There is no EKG noted from today. IMPRESSION:  An 16-year-old female patient who comes to the hospital  after a fall. No syncope present. She has chronic renal insufficiency  present. Hypertension present. At this time, I think, from a cardiac  stand, she is stable to proceed with surgery. I will put her on some IV  fluids and will be placed on telemetry. We will obtain an EKG. Further  recommendation based on hospital course. The patient is at moderate  risk for cardiovascular complication, but stable to proceed.         Pb Johnson MD    D: 05/21/2021 18:48:29       T: 05/21/2021 18:57:42     [email or web address]  Job#: [id number]     Doc#: [id number]    CC:

## 2021-05-23 LAB
ANION GAP SERPL CALCULATED.3IONS-SCNC: 10 MMOL/L (ref 4–16)
BASOPHILS ABSOLUTE: 0 K/CU MM
BASOPHILS RELATIVE PERCENT: 0.1 % (ref 0–1)
BUN BLDV-MCNC: 37 MG/DL (ref 6–23)
CALCIUM SERPL-MCNC: 8.3 MG/DL (ref 8.3–10.6)
CHLORIDE BLD-SCNC: 111 MMOL/L (ref 99–110)
CO2: 19 MMOL/L (ref 21–32)
CREAT SERPL-MCNC: 2.5 MG/DL (ref 0.6–1.1)
DIFFERENTIAL TYPE: ABNORMAL
EOSINOPHILS ABSOLUTE: 0 K/CU MM
EOSINOPHILS RELATIVE PERCENT: 0 % (ref 0–3)
GFR AFRICAN AMERICAN: 22 ML/MIN/1.73M2
GFR NON-AFRICAN AMERICAN: 19 ML/MIN/1.73M2
GLUCOSE BLD-MCNC: 120 MG/DL (ref 70–99)
HCT VFR BLD CALC: 23.2 % (ref 37–47)
HEMOGLOBIN: 7.3 GM/DL (ref 12.5–16)
IMMATURE NEUTROPHIL %: 0.5 % (ref 0–0.43)
LYMPHOCYTES ABSOLUTE: 1.1 K/CU MM
LYMPHOCYTES RELATIVE PERCENT: 13.7 % (ref 24–44)
MCH RBC QN AUTO: 30.8 PG (ref 27–31)
MCHC RBC AUTO-ENTMCNC: 31.5 % (ref 32–36)
MCV RBC AUTO: 97.9 FL (ref 78–100)
MONOCYTES ABSOLUTE: 0.7 K/CU MM
MONOCYTES RELATIVE PERCENT: 9.1 % (ref 0–4)
NUCLEATED RBC %: 0 %
PDW BLD-RTO: 16 % (ref 11.7–14.9)
PLATELET # BLD: 104 K/CU MM (ref 140–440)
PMV BLD AUTO: 9.8 FL (ref 7.5–11.1)
POTASSIUM SERPL-SCNC: 4.9 MMOL/L (ref 3.5–5.1)
PRO-BNP: 4850 PG/ML
RBC # BLD: 2.37 M/CU MM (ref 4.2–5.4)
SEGMENTED NEUTROPHILS ABSOLUTE COUNT: 6.1 K/CU MM
SEGMENTED NEUTROPHILS RELATIVE PERCENT: 76.6 % (ref 36–66)
SODIUM BLD-SCNC: 140 MMOL/L (ref 135–145)
TOTAL CK: 2326 IU/L (ref 26–140)
TOTAL IMMATURE NEUTOROPHIL: 0.04 K/CU MM
TOTAL NUCLEATED RBC: 0 K/CU MM
WBC # BLD: 8 K/CU MM (ref 4–10.5)

## 2021-05-23 PROCEDURE — 97167 OT EVAL HIGH COMPLEX 60 MIN: CPT

## 2021-05-23 PROCEDURE — 80048 BASIC METABOLIC PNL TOTAL CA: CPT

## 2021-05-23 PROCEDURE — 97530 THERAPEUTIC ACTIVITIES: CPT

## 2021-05-23 PROCEDURE — 83880 ASSAY OF NATRIURETIC PEPTIDE: CPT

## 2021-05-23 PROCEDURE — 85025 COMPLETE CBC W/AUTO DIFF WBC: CPT

## 2021-05-23 PROCEDURE — 6370000000 HC RX 637 (ALT 250 FOR IP): Performed by: ORTHOPAEDIC SURGERY

## 2021-05-23 PROCEDURE — 82550 ASSAY OF CK (CPK): CPT

## 2021-05-23 PROCEDURE — 2580000003 HC RX 258: Performed by: INTERNAL MEDICINE

## 2021-05-23 PROCEDURE — 36415 COLL VENOUS BLD VENIPUNCTURE: CPT

## 2021-05-23 PROCEDURE — 6370000000 HC RX 637 (ALT 250 FOR IP): Performed by: NURSE PRACTITIONER

## 2021-05-23 PROCEDURE — 2000000000 HC ICU R&B

## 2021-05-23 PROCEDURE — 2580000003 HC RX 258: Performed by: ORTHOPAEDIC SURGERY

## 2021-05-23 PROCEDURE — 6370000000 HC RX 637 (ALT 250 FOR IP): Performed by: INTERNAL MEDICINE

## 2021-05-23 PROCEDURE — 6360000002 HC RX W HCPCS: Performed by: ORTHOPAEDIC SURGERY

## 2021-05-23 PROCEDURE — 6360000002 HC RX W HCPCS: Performed by: INTERNAL MEDICINE

## 2021-05-23 PROCEDURE — 97163 PT EVAL HIGH COMPLEX 45 MIN: CPT

## 2021-05-23 RX ORDER — SODIUM CHLORIDE, SODIUM LACTATE, POTASSIUM CHLORIDE, CALCIUM CHLORIDE 600; 310; 30; 20 MG/100ML; MG/100ML; MG/100ML; MG/100ML
INJECTION, SOLUTION INTRAVENOUS CONTINUOUS
Status: DISCONTINUED | OUTPATIENT
Start: 2021-05-23 | End: 2021-05-23

## 2021-05-23 RX ORDER — AMIODARONE HYDROCHLORIDE 200 MG/1
200 TABLET ORAL DAILY
Status: DISCONTINUED | OUTPATIENT
Start: 2021-05-23 | End: 2021-05-23

## 2021-05-23 RX ORDER — SODIUM CHLORIDE 9 MG/ML
INJECTION, SOLUTION INTRAVENOUS CONTINUOUS
Status: DISCONTINUED | OUTPATIENT
Start: 2021-05-23 | End: 2021-05-25

## 2021-05-23 RX ORDER — AMIODARONE HYDROCHLORIDE 200 MG/1
100 TABLET ORAL DAILY
Status: DISCONTINUED | OUTPATIENT
Start: 2021-05-24 | End: 2021-05-26 | Stop reason: HOSPADM

## 2021-05-23 RX ADMIN — OXYCODONE HYDROCHLORIDE 5 MG: 5 TABLET ORAL at 14:27

## 2021-05-23 RX ADMIN — SODIUM CHLORIDE, POTASSIUM CHLORIDE, SODIUM LACTATE AND CALCIUM CHLORIDE: 600; 310; 30; 20 INJECTION, SOLUTION INTRAVENOUS at 09:11

## 2021-05-23 RX ADMIN — OXYCODONE HYDROCHLORIDE 5 MG: 5 TABLET ORAL at 05:23

## 2021-05-23 RX ADMIN — LEVOTHYROXINE SODIUM 50 MCG: 50 TABLET ORAL at 05:23

## 2021-05-23 RX ADMIN — AMIODARONE HYDROCHLORIDE 200 MG: 200 TABLET ORAL at 09:11

## 2021-05-23 RX ADMIN — MIDODRINE HYDROCHLORIDE 10 MG: 5 TABLET ORAL at 09:11

## 2021-05-23 RX ADMIN — CEFAZOLIN SODIUM 2000 MG: 10 INJECTION, POWDER, FOR SOLUTION INTRAVENOUS at 04:30

## 2021-05-23 RX ADMIN — SODIUM CHLORIDE: 9 INJECTION, SOLUTION INTRAVENOUS at 11:38

## 2021-05-23 RX ADMIN — DOCUSATE SODIUM 50 MG AND SENNOSIDES 8.6 MG 1 TABLET: 8.6; 5 TABLET, FILM COATED ORAL at 21:41

## 2021-05-23 RX ADMIN — MIDODRINE HYDROCHLORIDE 10 MG: 5 TABLET ORAL at 13:54

## 2021-05-23 RX ADMIN — DOCUSATE SODIUM 50 MG AND SENNOSIDES 8.6 MG 1 TABLET: 8.6; 5 TABLET, FILM COATED ORAL at 09:11

## 2021-05-23 RX ADMIN — MIDODRINE HYDROCHLORIDE 10 MG: 5 TABLET ORAL at 18:20

## 2021-05-23 RX ADMIN — MAGNESIUM OXIDE 400 MG (241.3 MG MAGNESIUM) TABLET 400 MG: TABLET at 21:41

## 2021-05-23 RX ADMIN — IRON SUCROSE 300 MG: 20 INJECTION, SOLUTION INTRAVENOUS at 13:54

## 2021-05-23 RX ADMIN — SODIUM CHLORIDE, PRESERVATIVE FREE 10 ML: 5 INJECTION INTRAVENOUS at 09:12

## 2021-05-23 RX ADMIN — HYDROMORPHONE HYDROCHLORIDE 0.5 MG: 1 INJECTION, SOLUTION INTRAMUSCULAR; INTRAVENOUS; SUBCUTANEOUS at 12:37

## 2021-05-23 RX ADMIN — ENOXAPARIN SODIUM 30 MG: 30 INJECTION SUBCUTANEOUS at 09:11

## 2021-05-23 RX ADMIN — MAGNESIUM OXIDE 400 MG (241.3 MG MAGNESIUM) TABLET 400 MG: TABLET at 09:11

## 2021-05-23 RX ADMIN — SODIUM CHLORIDE, PRESERVATIVE FREE 10 ML: 5 INJECTION INTRAVENOUS at 21:41

## 2021-05-23 ASSESSMENT — PAIN SCALES - GENERAL
PAINLEVEL_OUTOF10: 4
PAINLEVEL_OUTOF10: 0
PAINLEVEL_OUTOF10: 0
PAINLEVEL_OUTOF10: 7
PAINLEVEL_OUTOF10: 6

## 2021-05-23 ASSESSMENT — PAIN DESCRIPTION - PAIN TYPE: TYPE: SURGICAL PAIN

## 2021-05-23 NOTE — CONSULTS
[address] [facility] ACUTE CARE OCCUPATIONAL THERAPY EVALUATION    Mike Vigil, 1940, 2105/2105-A, 5/23/2021    Discharge Recommendation: Thai Kelly      History:  [place]:  The encounter diagnosis was Closed fracture of left femur, unspecified fracture morphology, unspecified portion of femur, initial encounter ([facility] Subjective:  Patient states: Agreeable to therapy. Pain: Pt denied pain this date  Communication with other providers: PT, RN, LSW  Restrictions: General Precautions, Fall Risk, WBAT LLE    Home Setup/Prior level of function:  Social/Functional History  Lives With: Alone  Type of Home: House  Home Layout: Two level, Able to Live on Main level with bedroom/bathroom  Home Access: Stairs to enter with rails  Entrance Stairs - Number of Steps: 3  Bathroom Shower/Tub: Walk-in shower  Bathroom Toilet: Handicap height  Bathroom Equipment: Shower chair, Grab bars in [address] [place]: Cane, Rolling walker  ADL Assistance: Independent  Homemaking Assistance: Independent  Ambulation Assistance: Independent (mod I with spc as needed)  Transfer Assistance: Independent  Active : Yes    Examination:  · Observation: Supine in bed upon arrival  · Vision: Wears glasses. · [facility]  · Vitals: Stable vitals throughout session    Body Systems and functions:  · ROM: WFL   · Strength: WFL  · Sensation: WFL  · Tone: Normal  · Coordination: WFL  · Perception: WNL    Activities of Daily Living (ADLs):  · Feeding: [place]  setup  · Grooming: Kaila in seated with assist for dynamic sitting balance, setup, increased time, VC.   · UB bathing: Kaila in seated with assist for dynamic sitting balance, setup, increased time, VC.   · LB bathing: MaxA in seated with setup, increased time, VC, and assist for distal reaching.    · UB dressing: Kaila in seated with assist for dynamic sitting balance, setup, increased time, VC.   · LB dressing: DEP in seated with setup, increased time, VC, and assist for distal reaching. Socks were DEP donned for pt this date. At baseline pt utilizes a sock aid. · Toileting: DEP at this time pt is unable to complete SPT to [facility]. Anticipate pt is DEP for toileting in supine utilizing bed pan at this time. Cognitive and Psychosocial Functioning:  · Overall cognitive status: Oriented to time, date, person, place, city  · Affect: Normal     Balance:   · Sitting: ModA with intermittent episodes of SBA (pt sat EOB demo fair- to fair+ dynamic sitting balance with intermittent episodes of posterior and R lateral lean requiring assist to recover. Heavy reliance on UE for upright support). · Standing:  NT this date. Pt declined reporting significant pain. RN notified and recently administered pain medications. Functional Mobility:   · Bed Mobility: ModAx2 (pt performed supine to seated bed mobility with assist for BLE positioning, trunk support, and VC for sequencing throughout. Pt was DEP for repositioning toward HOB while in supine). · Transfers: NT this date. Pt declined reporting significant pain. RN notified and recently administered pain medications. · Ambulation: NT this date (see transfers). AM-PAC 6 click short form for inpatient daily activity:   How much help from another person does the patient currently need. .. Unable  Dep A Lot  Max A A Lot   Mod A A Little  Min A A Little   CGA  SBA None   Mod I  Indep  Sup   1. Putting on and taking off regular lower body clothing? [x] 1    [] 2   [] 2   [] 3   [] 3   [] 4      2. Bathing (including washing, rinsing, drying)? [] 1   [] 2   [x] 2 [] 3 [] 3 [] 4   3. Toileting, which includes using toilet, bedpan, or urinal? [x] 1    [] 2   [] 2   [] 3   [] 3   [] 4     4. Putting on and taking off regular upper body clothing? [] 1   [] 2   [] 2   [x] 3   [] 3    [] 4      5. Taking care of personal grooming such as brushing teeth? [] 1   [] 2    [] 2 [x] 3    [] 3   [] 4      6. Eating meals?    [] 1   [] 2   [] 2   [] 3   [] 3   [x] 4      Raw Score:  14     [24=0% impaired(CH), 23=1-19%(CI), 20-22=20-39%(CJ), 15-19=40-59%(CK), 10-14=60-79%(CL), 7-9=80-99%(CM), 6=100%(CN)]    Treatment:  Therapeutic Activity Training:   Therapeutic activity training was instructed today. Cues were given for safety, sequence, UE/LE placement, awareness, and balance. Activities performed today included bed mobility training, sup-sit, sit-sup. Safety Measures: Gait belt used, Left in bed, Alarm in place    Assessment:  Assessment  Performance deficits / Impairments: Decreased functional mobility , Decreased balance, Decreased high-level IADLs, Decreased ADL status, Decreased strength, Decreased posture  Treatment Diagnosis: closed transcervical fx of proximal femur (L). Prognosis: Good  Decision Making: High Complexity  REQUIRES OT FOLLOW UP: Yes  Discharge Recommendations: [address]    Pt is an [de-identified]year old F admitted for closed transcervical fx of proximal femur (L). Pt underwent surgery (ORIF) on 5/22/2021 and is WBAT. Pt reports that prior to admission she was IND in ADLs, IADLs, and functional mobility. This date, pt demo decreased strength, balance, and activity tolerance impacting ADL status. Pt is currently functioning below baseline and would benefit from skilled OT services at SNF. Plan:  Plan  Times per week: 2+  Times per day: Daily      Goals:  1. Pt will complete all aspects of bed mobility for EOB/OOB ADLs ModA. 2. Pt will complete LB bathing with Kaila and AE as needed. 3. Pt will complete all aspects of LB dressing with ModA and AE as needed. 4. Pt will complete all functional transfers to and from bed, chair, toilet, shower chair with Kaila and AD. 5. Pt will ambulate HH distance to bathroom for toileting with Kaila and AD. 6. Pt will complete all aspects of toileting task with ModA.   7. Pt will complete oral hygiene/grooming routine in standing at sink with [address] [facility] [place] demo good dynamic standing

## 2021-05-23 NOTE — PROGRESS NOTES
Brandonpaulina Santizo (1940)    Daily Progress Note-  Clarence Kang MD, MD                     Today's Date:     5/23/2021          Subjective: Went to ICU after surgery for cardiac concerns  Awake and alert this morning  Pain rated pain is perceived as moderate (4-6 pain scale)  Denies shortness of breath or chest pain. Objective:     Patient Vitals for the past 4 hrs:   BP Temp Temp src Pulse Resp   05/23/21 0900 (!) 104/42 98.1 °F (36.7 °C) Oral 74 15   05/23/21 0800 (!) 98/54 -- -- 68 13   05/23/21 0700 (!) 111/50 -- -- 65 15   05/23/21 0648 (!) 112/54 -- -- 71 20   05/23/21 0602 (!) 101/44 -- -- 67 13     I/O last 3 completed shifts: In: 0700 [I.V.:4149; IV Piggyback:200]  Out: 2445 [Urine:1600; Drains:545; Blood:300]  DRAIN/TUBE OUTPUT:  Closed/Suction Drain Left;Proximal;Anterior Thigh Accordion 10 Turkish-Output (ml): 80 ml    Physical Exam:   Orientation:  alert and oriented to person, place and time    Left Lower Extremity    Incision:  dressing in place, clean, dry and intact    Lower Extremity Motor :    Moving lower extremities without difficulty today. Able to dorsiflex and   plantar flex foot/ankle. Lower Extremity Sensory:   Neurovascularly intact to gross sensation and touch in lower extremities. Pulses:    present 2+ bilaterally lower extremities.       LABS   CBC:   Recent Labs     05/21/21  1730 05/22/21  0735 05/23/21  0633   WBC 10.3 8.1 8.0   HGB 12.3* 10.6* 7.3*    126* 104*     BMP:    Recent Labs     05/21/21  1730 05/22/21  0735 05/23/21  0633    141 140   K 4.2 4.4 4.9    113* 111*   CO2 19* 19* 19*   BUN 41* 37* 37*   CREATININE 2.7* 2.5* 2.5*   GLUCOSE 101* 88 120*         Medications   Meds:    amiodarone  200 mg Oral Daily    metoprolol tartrate  50 mg Oral BID    sodium chloride flush  10 mL Intravenous 2 times per day    sennosides-docusate sodium  1 tablet Oral BID    enoxaparin  30 mg Subcutaneous Daily    midodrine  10 mg Oral TID WC    sodium chloride flush  5-40 mL Intravenous 2 times per day    levothyroxine  50 mcg Oral Daily    magnesium oxide  400 mg Oral BID       Assessment and Plan     1. Post operative day # 1 Left Femur ORIF (5/23/21)  1:  Mobilize with Physical therapy   -WBAT  2. Acute blood loss anemia, not a complication   -Hb 7.3   -Repeat in am  3:  Continue Deep venous thrombosis prophylaxis    -Chemoprophylaxis Lovenox   -Mechanical-CITLALLI hose, -SCD's, ambulation  4:  Continue Pain Control   -wean to PO meds  5.   DC hemovac when output < 30cc  6:  D/C Planning:     -Irvin Mclaughlin MD, MD

## 2021-05-23 NOTE — PROGRESS NOTES
Daily Progress Note      Doing better  Remain in sinus   She had afib yesterday with RVR  She may need a pacer in future  Watch for now  May need transfusion   Will give iron, keep on proamatine and lopressor and amiodarone for now  S/p left femur fx]  Keep on low fluids for now ,due to elevated CPK and stage IV renal dx      Awake and alert today  Transferred to ICU yesterday  HR looking better this AM  NSR on monitor HR in 70's  BP still soft. Continue midodrine  Open reduction with intramedullary nail fixation of left femur completed yesterday  Denies CP or SOB    Afib     Went into Afib after surgery    Was started on amiodarone drip    On metoprolol at home    Episode of bradycardia last night. Amiodarone drip d/c    HR today NSR. Oral amiodarone. Continue BB    Monitor HR     Left Femur Fx    D/t Fall at home. No syncope with fall    On floor for 24 hours    Surgery yesterday with Dr. Mitchell Led' to follow    Echo 5/23/21 summary   Technically difficult study due to patient immobility s/p surgery. Left ventricular systolic function is normal.   Ejection fraction is visually estimated at 50-55%. Sclerotic, but non-stenotic aortic valve. Moderate mitral regurgitation. Moderate tricuspid regurgitation; RVSP: 40 mmHg consistent with mild PHTN. No evidence of pericardial effusion.       PAST SURGICAL HISTORY:  Hernia repair.     SOCIAL HISTORY:  Does not smoke, does not drink.     ALLERGIES:  NKDA.     FAMILY HISTORY:  Significant for hypertension, skin cancer, lung cancer  present.     MEDICATIONS AT HOME:  She is on Lasix 20 mg b.i.d., Lopressor 25 b.i.d.,  Synthroid and Lotrel 2.5/10 once a day.         Objective:   BP (!) 112/54   Pulse 71   Temp 98 °F (36.7 °C) (Oral)   Resp 20   Ht 5' 2\" (1.575 m)   Wt 247 lb 5.7 oz (112.2 kg)   LMP 01/23/1995   SpO2 96%   BMI 45.24 kg/m²       Intake/Output Summary (Last 24 hours) at 5/23/2021 0817  Last data filed at 5/23/2021 0647  Gross per 24 hour   Intake 4349 ml   Output 2445 ml   Net 1904 ml       Medications:   Scheduled Meds:   metoprolol tartrate  50 mg Oral BID    sodium chloride flush  10 mL Intravenous 2 times per day    sennosides-docusate sodium  1 tablet Oral BID    enoxaparin  30 mg Subcutaneous Daily    midodrine  10 mg Oral TID WC    sodium chloride flush  5-40 mL Intravenous 2 times per day    levothyroxine  50 mcg Oral Daily    magnesium oxide  400 mg Oral BID      Infusions:   lactated ringers      sodium chloride        PRN Meds:  sodium chloride flush, sodium chloride, HYDROmorphone **OR** HYDROmorphone, magnesium hydroxide, oxyCODONE **OR** oxyCODONE, promethazine **OR** ondansetron, sodium chloride flush, polyethylene glycol, acetaminophen **OR** acetaminophen       Physical Exam:  Vitals:    05/23/21 0648   BP: (!) 112/54   Pulse: 71   Resp: 20   Temp:    SpO2:         General: AAO, NAD  Chest: Nontender  Cardiac: First and Second Heart Sounds are Normal, No Murmurs or Gallops noted  Lungs:Clear to auscultation and percussion. Abdomen: Soft, NT, ND, +BS  Extremities: No clubbing, no edema  Vascular:  Equal 2+ peripheral pulses. Lab Data:  CBC:   Recent Labs     05/21/21  1730 05/22/21  0735 05/23/21  0633   WBC 10.3 8.1 8.0   HGB 12.3* 10.6* 7.3*   HCT 38.8 33.7* 23.2*   MCV 95.3 98.3 97.9    126* 104*     BMP:   Recent Labs     05/21/21  1730 05/22/21  0735 05/23/21  0633    141 140   K 4.2 4.4 4.9    113* 111*   CO2 19* 19* 19*   BUN 41* 37* 37*   CREATININE 2.7* 2.5* 2.5*     LIVER PROFILE:   Recent Labs     05/21/21 1730   AST 49*   ALT 12   BILITOT 0.8   ALKPHOS 93     PT/INR:   Recent Labs     05/21/21 1730   PROTIME 12.8   INR 1.06     APTT:   Recent Labs     05/21/21  1730   APTT 28.9     BNP:  No results for input(s): BNP in the last 72 hours.       Assessment:  Patient Active Problem List    Diagnosis Date Noted    Closed transcervical fracture of proximal femur, left, initial encounter ([facility] 05/21/2021    Morbidly obese ([facility] 07/23/2020    History of ventral hernia repair 10/19/2018    HTN (hypertension) 08/10/2018    S/P herniorrhaphy 07/11/2018    S/P ventral herniorrhaphy 06/13/2018    H/O ventral hernia 06/06/2018    Chronic kidney disease (CKD) stage G4/A1, severely decreased glomerular filtration rate (GFR) between 15-29 mL/min/1.73 square meter and albuminuria creatinine ratio less than 30 mg/g ([facility]) 05/16/2017    GERD (gastroesophageal reflux disease) 05/16/2017    CHF (congestive heart failure) ([facility] 12/17/2016    CRF (chronic renal failure) 09/20/2016    Hypothyroid 09/20/2016    Ventral hernia without obstruction or gangrene 09/18/2016    SVT (supraventricular tachycardia) ([facility] 09/18/2016    Chronic kidney disease (CKD) stage G4/A2, severely decreased glomerular filtration rate (GFR) between 15-29 mL/min/1.73 square meter and albuminuria creatinine ratio between  mg/g ([facility] 06/21/2016       Electronically signed by SHERYL Gibbs CNP on 5/23/2021 at 8:17 AM    Electronically signed by Nohemy Henry MD on 5/23/21 at 11:02 AM EDT

## 2021-05-23 NOTE — PROGRESS NOTES
Notified Dr. Moises Pizano via [employer] regarding urine output 100mL over 4 hours and accordion drainage 175mL out over 4 hours as well. Notified that cardiology ordered Albumin that is infusing. Asked if H&H wanted to be checked this evening.  Response to continue orders as placed with recheck planned in AM.

## 2021-05-23 NOTE — PROGRESS NOTES
Hospitalist Progress Note      Name:  Oksana Luque /Age/Sex: 1940  ([de-identified] y.o. female)   MRN & CSN:  [id number] & [id number] Admission Date/Time: 2021  2:37 PM   Location:  -A PCP: Alida Guadarrama MD         Hospital Day: 3    Assessment and Plan:   Oksana Luque is a [de-identified] y.o.  female  who presents with fall, left hip pain     LEFT prox femur fx  - s/p ORIF left femur   - Pain and nausea control  - On Lovenox for DVT prophyalxis  - Appreciate Ortho recommendations     Rhabdomyolysis  - CK continues to increase; ?also component from surgery yesterday  - Continue IVF today, increased rate; fluids off overnight  - Repeat CK in AM    Hyperchloremia  - IVF were off this AM  - Change fluids to LR  - AM BMP    Acute Anemia  - This Am Hgb 7.3  - Iron given this AM  - Iron studies in morning  - Monitor daily CBC    Hx HFpEF  - Echo in  with EF 50%, grade I DD  - Is on Lasix at home  - Will monitor closely with IVF given hx     Atrial Fibrillation  - Bradycardic overnight so Amio drip stopped  - Continue PO Metoprolol and Amiodarone     Chronic  - CKD IV- Cr stable at baseline  - HTN- Cont metoprolol, amlodipine-benazepril  - Hypothyroidism- Cont synthroid    Diet Dietary Nutrition Supplements: Standard High Calorie Oral Supplement  DIET GENERAL;   DVT Prophylaxis [] Lovenox, []  Heparin, [] SCDs, [] Ambulation   GI Prophylaxis [] PPI,  [] H2 Blocker,  [] Carafate,  [] Diet/Tube Feeds   Code Status Full Code   Disposition Patient requires continued admission due to    MDM [] Low, [] Moderate,[]  High  Patient's risk as above due to      History of Present Illness:     Improved this AM and doing well; no nausea or vomiting; no fevers or chills    Objective:        Intake/Output Summary (Last 24 hours) at 2021 1130  Last data filed at 2021 0647  Gross per 24 hour   Intake 4349 ml   Output 1495 ml   Net 2854 ml      Vitals:   Vitals:    21 0900   BP: (!) 104/42   Pulse: 74   Resp: 15   Temp: 98.1 °F (36.7 °C)   SpO2:      Physical Exam:   GEN Awake female, sitting upright in bed in no apparent distress. Appears given age. EYES Pupils are equally round. No scleral erythema, discharge, or conjunctivitis. HENT Mucous membranes are moist.   NECK Supple, no apparent thyromegaly or masses. RESP Clear to auscultation, no wheezes, rales or rhonchi. Symmetric chest movement while on room air. CARDIO/VASC S1/S2 auscultated. Regular rate without appreciable murmurs, rubs, or gallops. GI Abdomen is soft without significant tenderness, masses, or guarding   No costovertebral angle tenderness. HEME/LYMPH No petechiae or ecchymoses. MSK No gross joint deformities. SKIN Normal coloration, warm, dry; surgical site c/d/i  NEURO Cranial nerves appear grossly intact, normal speech  PSYCH Awake, alert, oriented x 4. Affect appropriate.     Medications:   Medications:    iron sucrose  300 mg Intravenous Once    [START ON 5/24/2021] amiodarone  100 mg Oral Daily    metoprolol tartrate  50 mg Oral BID    sodium chloride flush  10 mL Intravenous 2 times per day    sennosides-docusate sodium  1 tablet Oral BID    enoxaparin  30 mg Subcutaneous Daily    midodrine  10 mg Oral TID WC    sodium chloride flush  5-40 mL Intravenous 2 times per day    levothyroxine  50 mcg Oral Daily    magnesium oxide  400 mg Oral BID      Infusions:    sodium chloride      sodium chloride       PRN Meds: sodium chloride flush, 10 mL, PRN  sodium chloride, 25 mL, PRN  HYDROmorphone, 0.25 mg, Q3H PRN   Or  HYDROmorphone, 0.5 mg, Q3H PRN  magnesium hydroxide, 30 mL, Daily PRN  oxyCODONE, 5 mg, Q4H PRN   Or  oxyCODONE, 10 mg, Q4H PRN  promethazine, 12.5 mg, Q6H PRN   Or  ondansetron, 4 mg, Q6H PRN  sodium chloride flush, 10 mL, PRN  polyethylene glycol, 17 g, Daily PRN  acetaminophen, 650 mg, Q6H PRN   Or  acetaminophen, 650 mg, Q6H PRN          Electronically signed by Naheed Salcedo MD on 5/23/2021 at 11:30 AM

## 2021-05-23 NOTE — OP NOTE
[address] [place] [address], [address] [place] [address]                                OPERATIVE REPORT    PATIENT NAME: Dee Holt                 :        1940  MED REC NO:   [id number]                          ROOM:       2105  ACCOUNT NO:   [de-identified]                           ADMIT DATE: 2021  PROVIDER:     Magaly Muir MD    DATE OF PROCEDURE:  2021    PREOPERATIVE DIAGNOSIS:  Left displaced proximal femur fracture. POSTOPERATIVE DIAGNOSIS:  Left displaced proximal femur fracture. PROCEDURE PERFORMED:  Open reduction with intramedullary nail fixation,  left femur. SURGEON:  Magaly Muir MD    ASSISTANT:  None. ANESTHESIA:  General endotracheal.    ESTIMATED BLOOD LOSS:  300 mL. IMPLANTS USED:  Synthes TFNA nail, 125 degree x 340 mm x 10 mm nail with  a 95 mm helical blade and size 44 mm interlocking screw. DRAINS:  One medium Hemovac. BRIEF HISTORY AND INDICATIONS FOR PROCEDURE:  The patient is an  72-year-old female who slipped and fell suffering a displaced proximal  femur fracture. We discussed treatment options. Based on the amount of  displacement, I recommended operative fixation. She was explained the  risks and benefits of surgical intervention as outlined in the  consultation. OPERATIVE PROCEDURE:  The patient was brought back to the operating room  and placed supine on the operating room table. Once under general  endotracheal anesthesia, the patient was then placed in the fracture  table. Traction was then placed on the proximal femur and fluoroscopic  view showed displaced femur fracture, specifically with a significant  external rotation of the proximal fragment. Satisfied with this, we  then prepped and draped the left lower extremity.   Time-out was then  performed confirming the patient's name, operative site, proposed  procedure, known allergies, and administration of antibiotics. Once all  in agreement, the procedure was started. First, after failure of closed  reduction, we could not achieve adequate reduction. We then made a  lateral-based incision along the lateral femur. Sharp dissection was  carried down through the skin and subcutaneous tissue. We then  identified the iliotibial band. This was then excised exposing the deep  lateral muscle compartment. This was then carefully lifted exposing the  proximal femur fracture. With a combination of multiple clamps, the  fracture was then reduced. Once this was performed, we then in a  standard manner did identify the starting point on the proximal femur. A pin was then placed, proximally reamed and a guidepin was then placed  distally. We then measured this to be approximately 340 mm. We then  reamed up to a size 11.5. We ultimately placed a size 340 mm x 10 mm  125-degree TFNA nail. This was then delivered in a standard manner. Once it was in place, there was some displacement at the fracture site. This was then ultimately two cerclage wires were placed, however, one  did break. We left the remaining one in place and we then put a  12-AIFZDV helical blade in a standard fashion using guidepin with AP on  fluoro showing position of the tip. Once this was in place, we then  reamed up to 95. We placed a 95 mm helical blade. We then placed a  size 44 interlocking screw in the dynamic hole. Once this was  performed, we then obtained final fluoroscopic views, AP and lateral  views, of the hip and of the fracture site. We then copiously irrigated  with pulse lavage. We closed the deep IT band with 0 Vicryl stitch. There was a deep subcutaneous layer we closed with 0 Vicryl stitch due  to generous tissue envelope. A medium Hemovac drain was placed deep  prior to this closure, and we used 2-0 Vicryl for the subcuticular and  ultimately used staples.   As we mentioned, we did make an incision over  the proximal hip for delivering of the intramedullary nail. This was  also pulse lavaged. The deep layer was closed with 0 Vicryl stitch, and  the subcutaneous layer was closed with 2-0 stitch, and the skin was only  closed with staples. We then secured the Hemovac, and the Aquacel was  placed on the wounds, and the patient was then brought back to recovery  room in stable condition.         Morena Samuel MD    D: 05/22/2021 14:51:57       T: 05/22/2021 19:33:17     [id number]  Job#: [id number]     Doc#: [id number]    CC:

## 2021-05-23 NOTE — PROGRESS NOTES
Physical Therapy    Facility/Department: [facility] ICU  Initial Assessment    NAME: Jalyn Santizo  : 1940  MRN: [id number]    Date of Service: 2021    Discharge Recommendations:  Subacute/Skilled Nursing Facility        Assessment   Assessment: Pt is an [de-identified] y.o. female with medical history, surgical history, co-morbidities, and personal factors including Acid reflux, Arthritis, CHF (congestive heart failure) ([facility] [address] Chronic kidney disease, GERD (gastroesophageal reflux disease), History of blood transfusion, Hypertension, Hypomagnesemia, Hypothyroidism, Shortness of breath on exertion, SVT (supraventricular tachycardia) ([address] Dental surgery; ventral hernia repair (2016); Abdomen surgery; other surgical history (2018); hernia repair (); Endoscopy, colon, diagnostic ( Or ); eye surgery (?when); and other surgical history (2018) with admission for closed fracture of left femur and s/p Open reduction with intramedullary nail fixation. Prior to admission, pt was independent with functional mobility and ADLs. Examination of body systems reveals decreased strength, decreased balance, decreased aerobic capacity, and decreased independence with functional mobility.         Prognosis: Good  Decision Making: High Complexity  Clinical Presentation: unpredictable characteristics  PT Education: Goals;PT Role;General Safety;Weight-bearing Education;Gait Training;Plan of Care;Equipment;Transfer Training;Functional Mobility Training  REQUIRES PT FOLLOW UP: Yes  Activity Tolerance  Activity Tolerance: Patient Tolerated treatment well       Restrictions  Restrictions/Precautions  Restrictions/Precautions: General Precautions, Fall Risk, Weight Bearing  Lower Extremity Weight Bearing Restrictions  Left Lower Extremity Weight Bearing: Weight Bearing As Tolerated  Vision/Hearing  Vision: Within Functional Limits  Hearing: Within functional limits     Subjective  General  Chart Reviewed: Yes  Patient assessed for rehabilitation services?: Yes  Family / Caregiver Present: Yes  Follows Commands: Within Functional Limits  Pain Screening  Patient Currently in Pain: Yes  Pain Assessment  Pain Assessment: 0-10  Pain Level: 2  Pain Type: Surgical pain  Pain Location: Leg  Pain Orientation: Left  Vital Signs  Patient Currently in Pain: Yes       Orientation  Orientation  Overall Orientation Status: Within Normal Limits  Social/Functional History  Social/Functional History  Lives With: Alone  Type of Home: House  Home Layout: Two level, Able to Live on Main level with bedroom/bathroom  Home Access: Stairs to enter with rails  Entrance Stairs - Number of Steps: 3  Bathroom Shower/Tub: Walk-in shower  Bathroom Toilet: Handicap height  Bathroom Equipment: Shower chair, Grab bars in shower  Home Equipment: Cane, Rolling walker  ADL Assistance: Independent  Homemaking Assistance: Independent  Ambulation Assistance: Independent (mod I with spc as needed)  Transfer Assistance: Independent  Active : Yes  Cognition   Cognition  Overall Cognitive Status: Exceptions  Arousal/Alertness: Appropriate responses to stimuli  Following Commands:  Follows all commands without difficulty  Attention Span: Appears intact  Safety Judgement: Decreased awareness of need for assistance  Problem Solving: Decreased awareness of errors  Insights: Decreased awareness of deficits  Initiation: Requires cues for some  Sequencing: Requires cues for some    Objective             Strength RLE  Comment: knee extension: 4-/5  Strength LLE  Comment: knee extension: at least 3/5 observed functionally     Sensation  Overall Sensation Status: WNL  Bed mobility  Supine to Sit: Maximum assistance;2 Person assistance;Dependent/Total (with verbal and tactile cues for BUE and BLE placement throughout transfer; with HOB elevated; with increased time for task completion)  Sit to Supine: Dependent/Total;Maximum assistance;2 Person assistance Balance  Posture: Fair  Sitting - Static: Fair  Sitting - Dynamic: Poor;+  Comments: patient required assist varying from modAx1 to CGAx1 in order to maintain upright sitting balance; patient provided with verbal and tactile cues to maintain upright posture in order to avoid COM shifting outside of ASHLEY; pt provided with verbal and tactile cues for BUE placement in order to assist herself in maintaining upright sitting balance      Therapeutic Activity Training:   Therapeutic activity training was instructed today. Cues were given for safety, sequence, UE/LE placement, awareness, and balance. Activities performed today included bed mobility training, sup-sit. Increased time required for all task completion. Plan   Plan  Times per week: 6+  Current Treatment Recommendations: Strengthening, ROM, Balance Training, Functional Mobility Training, Transfer Training, ADL/Self-care Training, Gait Training, IADL Training, Stair training, Equipment Evaluation, Education, & procurement, Patient/Caregiver Education & Training, Neuromuscular Re-education, Endurance Training, Home Exercise Program, Positioning, Wheelchair Mobility Training, Safety Education & Training, Pain Management  Safety Devices  Type of devices: All fall risk precautions in place, Left in bed, Bed alarm in place, Call light within reach, Nurse notified, Gait belt, Patient at risk for falls      AM-PAC Score  AM-PAC Inpatient Mobility Raw Score : 8 (05/23/21 1640)  AM-PAC Inpatient T-Scale Score : 28.52 (05/23/21 1640)  Mobility Inpatient CMS 0-100% Score: 86.62 (05/23/21 1640)  Mobility Inpatient CMS G-Code Modifier : CM (05/23/21 1640)          Goals  Long term goals  Time Frame for Long term goals :  In one week:  Long term goal 1: Pt will complete all bed mobility with modAx2  Long term goal 2: Pt will complete sit <> stand transfers with modAx2  Long term goal 3: Pt will complete bed <> chair transfers with modAx2  Long term goal 4: Pt will ambulate 5 feet with modAx2 with LRAD  Long term goal 5: Pt will independently complete 3 sets of 10 reps of BLE AROM exercises in available and allowed ROM       Time In: 1220  Time Out: 1300  Total Treatment Time: 40  Timed Code Treatment Minutes: [address] Daria Chaney PT, DPT  License #: [id number]

## 2021-05-23 NOTE — PROGRESS NOTES
Dr. Fauzia Curry notified that heart rate continues to decrease to 30-50's w/ pauses noted. Order to stop Amiodarone gtt.

## 2021-05-24 LAB
ALBUMIN SERPL-MCNC: 3.2 GM/DL (ref 3.4–5)
ALP BLD-CCNC: 61 IU/L (ref 40–129)
ALT SERPL-CCNC: 8 U/L (ref 10–40)
ANION GAP SERPL CALCULATED.3IONS-SCNC: 10 MMOL/L (ref 4–16)
AST SERPL-CCNC: 70 IU/L (ref 15–37)
BASOPHILS ABSOLUTE: 0 K/CU MM
BASOPHILS RELATIVE PERCENT: 0.3 % (ref 0–1)
BILIRUB SERPL-MCNC: 0.5 MG/DL (ref 0–1)
BUN BLDV-MCNC: 43 MG/DL (ref 6–23)
CALCIUM SERPL-MCNC: 8.2 MG/DL (ref 8.3–10.6)
CHLORIDE BLD-SCNC: 110 MMOL/L (ref 99–110)
CO2: 18 MMOL/L (ref 21–32)
CREAT SERPL-MCNC: 2.6 MG/DL (ref 0.6–1.1)
DIFFERENTIAL TYPE: ABNORMAL
EOSINOPHILS ABSOLUTE: 0.2 K/CU MM
EOSINOPHILS RELATIVE PERCENT: 2.8 % (ref 0–3)
GFR AFRICAN AMERICAN: 21 ML/MIN/1.73M2
GFR NON-AFRICAN AMERICAN: 18 ML/MIN/1.73M2
GLUCOSE BLD-MCNC: 78 MG/DL (ref 70–99)
HCT VFR BLD CALC: 21.7 % (ref 37–47)
HCT VFR BLD CALC: 30.1 % (ref 37–47)
HEMOGLOBIN: 10 GM/DL (ref 12.5–16)
HEMOGLOBIN: 6.8 GM/DL (ref 12.5–16)
IMMATURE NEUTROPHIL %: 0.7 % (ref 0–0.43)
IRON: 204 UG/DL (ref 37–145)
LYMPHOCYTES ABSOLUTE: 1.7 K/CU MM
LYMPHOCYTES RELATIVE PERCENT: 20 % (ref 24–44)
MCH RBC QN AUTO: 31.1 PG (ref 27–31)
MCHC RBC AUTO-ENTMCNC: 31.3 % (ref 32–36)
MCV RBC AUTO: 99.1 FL (ref 78–100)
MONOCYTES ABSOLUTE: 1.1 K/CU MM
MONOCYTES RELATIVE PERCENT: 12.2 % (ref 0–4)
NUCLEATED RBC %: 0 %
PCT TRANSFERRIN: 99 % (ref 10–44)
PDW BLD-RTO: 16.5 % (ref 11.7–14.9)
PLATELET # BLD: 135 K/CU MM (ref 140–440)
PMV BLD AUTO: 10.3 FL (ref 7.5–11.1)
POTASSIUM SERPL-SCNC: 4.4 MMOL/L (ref 3.5–5.1)
RBC # BLD: 2.19 M/CU MM (ref 4.2–5.4)
SEGMENTED NEUTROPHILS ABSOLUTE COUNT: 5.5 K/CU MM
SEGMENTED NEUTROPHILS RELATIVE PERCENT: 64 % (ref 36–66)
SODIUM BLD-SCNC: 138 MMOL/L (ref 135–145)
TOTAL CK: 1186 IU/L (ref 26–140)
TOTAL IMMATURE NEUTOROPHIL: 0.06 K/CU MM
TOTAL IRON BINDING CAPACITY: 206 UG/DL (ref 250–450)
TOTAL NUCLEATED RBC: 0 K/CU MM
TOTAL PROTEIN: 4.8 GM/DL (ref 6.4–8.2)
UNSATURATED IRON BINDING CAPACITY: 2 UG/DL (ref 110–370)
WBC # BLD: 8.6 K/CU MM (ref 4–10.5)

## 2021-05-24 PROCEDURE — 82550 ASSAY OF CK (CPK): CPT

## 2021-05-24 PROCEDURE — 86901 BLOOD TYPING SEROLOGIC RH(D): CPT

## 2021-05-24 PROCEDURE — 83540 ASSAY OF IRON: CPT

## 2021-05-24 PROCEDURE — 2000000000 HC ICU R&B

## 2021-05-24 PROCEDURE — 36415 COLL VENOUS BLD VENIPUNCTURE: CPT

## 2021-05-24 PROCEDURE — 6360000002 HC RX W HCPCS: Performed by: ORTHOPAEDIC SURGERY

## 2021-05-24 PROCEDURE — 86900 BLOOD TYPING SEROLOGIC ABO: CPT

## 2021-05-24 PROCEDURE — 85014 HEMATOCRIT: CPT

## 2021-05-24 PROCEDURE — 6370000000 HC RX 637 (ALT 250 FOR IP): Performed by: ORTHOPAEDIC SURGERY

## 2021-05-24 PROCEDURE — 94761 N-INVAS EAR/PLS OXIMETRY MLT: CPT

## 2021-05-24 PROCEDURE — 2580000003 HC RX 258: Performed by: ORTHOPAEDIC SURGERY

## 2021-05-24 PROCEDURE — 6370000000 HC RX 637 (ALT 250 FOR IP): Performed by: INTERNAL MEDICINE

## 2021-05-24 PROCEDURE — 6360000002 HC RX W HCPCS: Performed by: INTERNAL MEDICINE

## 2021-05-24 PROCEDURE — 86922 COMPATIBILITY TEST ANTIGLOB: CPT

## 2021-05-24 PROCEDURE — 83550 IRON BINDING TEST: CPT

## 2021-05-24 PROCEDURE — 2580000003 HC RX 258: Performed by: INTERNAL MEDICINE

## 2021-05-24 PROCEDURE — 94150 VITAL CAPACITY TEST: CPT

## 2021-05-24 PROCEDURE — 86850 RBC ANTIBODY SCREEN: CPT

## 2021-05-24 PROCEDURE — 36430 TRANSFUSION BLD/BLD COMPNT: CPT

## 2021-05-24 PROCEDURE — P9016 RBC LEUKOCYTES REDUCED: HCPCS

## 2021-05-24 PROCEDURE — 85018 HEMOGLOBIN: CPT

## 2021-05-24 PROCEDURE — 80053 COMPREHEN METABOLIC PANEL: CPT

## 2021-05-24 PROCEDURE — 85025 COMPLETE CBC W/AUTO DIFF WBC: CPT

## 2021-05-24 RX ORDER — FUROSEMIDE 20 MG/1
20 TABLET ORAL 2 TIMES DAILY
Status: DISCONTINUED | OUTPATIENT
Start: 2021-05-24 | End: 2021-05-26

## 2021-05-24 RX ORDER — SODIUM CHLORIDE 9 MG/ML
INJECTION, SOLUTION INTRAVENOUS PRN
Status: DISCONTINUED | OUTPATIENT
Start: 2021-05-24 | End: 2021-05-26 | Stop reason: HOSPADM

## 2021-05-24 RX ORDER — FUROSEMIDE 10 MG/ML
40 INJECTION INTRAMUSCULAR; INTRAVENOUS ONCE
Status: COMPLETED | OUTPATIENT
Start: 2021-05-24 | End: 2021-05-24

## 2021-05-24 RX ADMIN — MIDODRINE HYDROCHLORIDE 10 MG: 5 TABLET ORAL at 13:19

## 2021-05-24 RX ADMIN — OXYCODONE HYDROCHLORIDE 5 MG: 5 TABLET ORAL at 06:50

## 2021-05-24 RX ADMIN — AMIODARONE HYDROCHLORIDE 100 MG: 200 TABLET ORAL at 08:14

## 2021-05-24 RX ADMIN — METOPROLOL TARTRATE 50 MG: 50 TABLET, FILM COATED ORAL at 09:15

## 2021-05-24 RX ADMIN — MAGNESIUM OXIDE 400 MG (241.3 MG MAGNESIUM) TABLET 400 MG: TABLET at 08:14

## 2021-05-24 RX ADMIN — MAGNESIUM OXIDE 400 MG (241.3 MG MAGNESIUM) TABLET 400 MG: TABLET at 21:18

## 2021-05-24 RX ADMIN — IRON SUCROSE 300 MG: 20 INJECTION, SOLUTION INTRAVENOUS at 11:49

## 2021-05-24 RX ADMIN — FUROSEMIDE 20 MG: 20 TABLET ORAL at 16:31

## 2021-05-24 RX ADMIN — SODIUM CHLORIDE, PRESERVATIVE FREE 10 ML: 5 INJECTION INTRAVENOUS at 08:15

## 2021-05-24 RX ADMIN — FUROSEMIDE 40 MG: 10 INJECTION, SOLUTION INTRAVENOUS at 17:30

## 2021-05-24 RX ADMIN — LEVOTHYROXINE SODIUM 50 MCG: 50 TABLET ORAL at 06:50

## 2021-05-24 RX ADMIN — SODIUM CHLORIDE, PRESERVATIVE FREE 10 ML: 5 INJECTION INTRAVENOUS at 21:00

## 2021-05-24 RX ADMIN — DOCUSATE SODIUM 50 MG AND SENNOSIDES 8.6 MG 1 TABLET: 8.6; 5 TABLET, FILM COATED ORAL at 21:18

## 2021-05-24 RX ADMIN — MIDODRINE HYDROCHLORIDE 10 MG: 5 TABLET ORAL at 08:14

## 2021-05-24 RX ADMIN — ENOXAPARIN SODIUM 30 MG: 30 INJECTION SUBCUTANEOUS at 08:15

## 2021-05-24 RX ADMIN — DOCUSATE SODIUM 50 MG AND SENNOSIDES 8.6 MG 1 TABLET: 8.6; 5 TABLET, FILM COATED ORAL at 08:14

## 2021-05-24 RX ADMIN — OXYCODONE HYDROCHLORIDE 5 MG: 5 TABLET ORAL at 22:40

## 2021-05-24 RX ADMIN — SODIUM CHLORIDE: 9 INJECTION, SOLUTION INTRAVENOUS at 08:14

## 2021-05-24 RX ADMIN — MIDODRINE HYDROCHLORIDE 10 MG: 5 TABLET ORAL at 16:31

## 2021-05-24 ASSESSMENT — PAIN DESCRIPTION - ORIENTATION
ORIENTATION: LEFT
ORIENTATION: LEFT

## 2021-05-24 ASSESSMENT — PAIN DESCRIPTION - LOCATION: LOCATION: LEG

## 2021-05-24 ASSESSMENT — PAIN SCALES - GENERAL
PAINLEVEL_OUTOF10: 0
PAINLEVEL_OUTOF10: 4
PAINLEVEL_OUTOF10: 2
PAINLEVEL_OUTOF10: 2

## 2021-05-24 ASSESSMENT — PAIN DESCRIPTION - DESCRIPTORS: DESCRIPTORS: ACHING;DISCOMFORT

## 2021-05-24 ASSESSMENT — PAIN DESCRIPTION - PROGRESSION: CLINICAL_PROGRESSION: GRADUALLY IMPROVING

## 2021-05-24 NOTE — PROGRESS NOTES
Called Lab and talked with a Phlebotomist and informed her that Pt has a type and screen ordered since she needs to receive blood today and she is a very hard stick and is a lab collect. The Phlebotomist stated they will be up to draw Pt's blood for the ordered type and screen.

## 2021-05-24 NOTE — PLAN OF CARE
Problem: Falls - Risk of:  Goal: Will remain free from falls  Description: Will remain free from falls  5/24/2021 0926 by Lexi Key RN  Outcome: Ongoing  5/24/2021 0435 by Danielle Hernandez RN  Outcome: Ongoing  Goal: Absence of physical injury  Description: Absence of physical injury  5/24/2021 0926 by Lexi Key RN  Outcome: Ongoing  5/24/2021 0435 by Danielle Hernandez RN  Outcome: Ongoing     Problem: Pain:  Description: Pain management should include both nonpharmacologic and pharmacologic interventions. Goal: Pain level will decrease  Description: Pain level will decrease  5/24/2021 0926 by Lexi Key RN  Outcome: Ongoing  5/24/2021 0435 by Danielle Hernandez RN  Outcome: Ongoing  Goal: Control of acute pain  Description: Control of acute pain  5/24/2021 0926 by Lexi Key RN  Outcome: Ongoing  5/24/2021 0435 by Danielle Hernandez RN  Outcome: Ongoing  Goal: Control of chronic pain  Description: Control of chronic pain  5/24/2021 0926 by Lexi Key RN  Outcome: Ongoing  5/24/2021 0435 by Danielle Hernandez RN  Outcome: Ongoing     Problem: Skin Integrity:  Goal: Will show no infection signs and symptoms  Description: Will show no infection signs and symptoms  5/24/2021 0926 by Lexi Key RN  Outcome: Ongoing  5/24/2021 0435 by Danielle Hernandez RN  Outcome: Ongoing  Goal: Absence of new skin breakdown  Description: Absence of new skin breakdown  5/24/2021 0926 by Lexi Key RN  Outcome: Ongoing  5/24/2021 0435 by Danielle Hernandez RN  Outcome: Ongoing     Problem: Discharge Planning:  Goal: Participates in care planning  Description: Participates in care planning  Outcome: Ongoing  Goal: Discharged to appropriate level of care  Description: Discharged to appropriate level of care  Outcome: Ongoing     Problem:  Bowel Function - Altered:  Goal: Bowel elimination is within specified parameters  Description: Bowel elimination is within specified parameters  Outcome: Ongoing     Problem: Cardiac Output - Decreased:  Goal: Hemodynamic stability will improve  Description: Hemodynamic stability will improve  Outcome: Ongoing     Problem: Tissue Perfusion, Altered:  Goal: Circulatory function within specified parameters  Description: Circulatory function within specified parameters  Outcome: Ongoing

## 2021-05-24 NOTE — PROGRESS NOTES
Hospitalist Progress Note      Name:  Johanne Gay /Age/Sex: 1940  ([de-identified] y.o. female)   MRN & CSN:  [id number] & [id number] Admission Date/Time: 2021  2:37 PM   Location:  -A PCP: Neha Suero, [address] Day: 4    Assessment and Plan:   Johanne Gay is a [de-identified] y.o.  female  who presents with fall, left hip pain     LEFT prox femur fx  - s/p ORIF left femur   - Pain and nausea control  - On Lovenox for DVT prophyalxis  - Appreciate Ortho recommendations     Rhabdomyolysis  - CK continues to increase; ?also component from surgery yesterday  - Continue IVF today, increased rate; fluids off overnight  - Repeat CK in AM    Hyperchloremia-resolved     Acute Anemia blood loss anemia secondary to surgery  - This Am Hgb 7.3>6.8  - Transfuse 2 units PRBC  -  Monitor H&H      Hx HFpEF  - Echo in  with EF 50%, grade I DD  - resume Lasix at home dose     Atrial Fibrillation  - Continue PO Metoprolol and Amiodarone    #Hypotension.  -Midodrine     Chronic  - CKD IV- Cr stable at baseline. History of nephrectomy.  - HTN-  Hold amlodipine-benazepril  - Hypothyroidism- Cont synthroid    Diet Dietary Nutrition Supplements: Standard High Calorie Oral Supplement  DIET GENERAL;   DVT Prophylaxis [] Lovenox, []  Heparin, [] SCDs, [] Ambulation   GI Prophylaxis [] PPI,  [] H2 Blocker,  [] Carafate,  [] Diet/Tube Feeds   Code Status Full Code   Disposition Patient requires continued admission due to    MDM [] Low, [] Moderate,[]  High  Patient's risk as above due to      History of Present Illness:     Improved this AM and doing well; no nausea or vomiting; no fevers or chills    Objective:        Intake/Output Summary (Last 24 hours) at 2021 1343  Last data filed at 2021 1343  Gross per 24 hour   Intake 1407.7 ml   Output 1540 ml   Net -132.3 ml      Vitals:   Vitals:    21 1300   BP: (!) 93/40   Pulse: 67   Resp: 20   Temp:    SpO2:      Physical Exam:   GEN Awake female, sitting upright in bed in no apparent distress. Appears given age. EYES Pupils are equally round. No scleral erythema, discharge, or conjunctivitis. HENT Mucous membranes are moist.   NECK Supple, no apparent thyromegaly or masses. RESP Clear to auscultation, no wheezes, rales or rhonchi. Symmetric chest movement while on room air. CARDIO/VASC S1/S2 auscultated. Regular rate without appreciable murmurs, rubs, or gallops. GI Abdomen is soft without significant tenderness, masses, or guarding   No costovertebral angle tenderness. HEME/LYMPH No petechiae or ecchymoses. MSK No gross joint deformities. Left hip in clean dressing. Drain attached with mild to moderate amount of blood. SKIN Normal coloration, warm, dry; surgical site c/d/i  NEURO Cranial nerves appear grossly intact, normal speech  PSYCH Awake, alert, oriented x 4. Affect appropriate.     Medications:   Medications:    furosemide  40 mg Intravenous Once    amiodarone  100 mg Oral Daily    metoprolol tartrate  50 mg Oral BID    sodium chloride flush  10 mL Intravenous 2 times per day    sennosides-docusate sodium  1 tablet Oral BID    enoxaparin  30 mg Subcutaneous Daily    midodrine  10 mg Oral TID WC    sodium chloride flush  5-40 mL Intravenous 2 times per day    levothyroxine  50 mcg Oral Daily    magnesium oxide  400 mg Oral BID      Infusions:    sodium chloride      sodium chloride 50 mL/hr at 05/24/21 0814    sodium chloride       PRN Meds: sodium chloride, , PRN  sodium chloride flush, 10 mL, PRN  sodium chloride, 25 mL, PRN  HYDROmorphone, 0.25 mg, Q3H PRN   Or  HYDROmorphone, 0.5 mg, Q3H PRN  magnesium hydroxide, 30 mL, Daily PRN  oxyCODONE, 5 mg, Q4H PRN   Or  oxyCODONE, 10 mg, Q4H PRN  promethazine, 12.5 mg, Q6H PRN   Or  ondansetron, 4 mg, Q6H PRN  sodium chloride flush, 10 mL, PRN  polyethylene glycol, 17 g, Daily PRN  acetaminophen, 650 mg, Q6H PRN   Or  acetaminophen, 650 mg, Q6H PRN          Electronically signed by Rayshawn Ahumada MD on 5/24/2021 at 1:43 PM

## 2021-05-24 NOTE — CARE COORDINATION
CMs, Shaq Tsang RN and MAURI SHAFERW, met with pt to discuss discharge planning. Pt is S/P ORIF L Femur on 5/22. PT eval 5/23 rec SNF. Pt lives alone in a 2 story home with bed/bath on 1st floor. Pt has a rolling walker and shower chair. Pt's goal is to return home. She has been to 3 SNFs in the past and does not want to return to a SNF. CM offered possible swing bed and pt does not want to go to [facility]. Pt has several people who she indicates will help her after discharge even with getting up and down off commode if needed. Pt given list of SNFs and SNF ratings to review and if she changes her mind re: SNF to please let CM know. If pt returns home at discharge will offer [facility] [place], [facility]. follow up.

## 2021-05-24 NOTE — PROGRESS NOTES
Daily Progress Note     patient is awake alert -doing ok  Remain in sinus rate is stable  Anemia going to give 2 units of blood today  PAFIB now sinus   Renal insuffiencey     Echo 5/23/21 summary   Technically difficult study due to patient immobility s/p surgery.   Left ventricular systolic function is normal.   Ejection fraction is visually estimated at 50-55%.   Sclerotic, but non-stenotic aortic valve.   Moderate mitral regurgitation.   Moderate tricuspid regurgitation; RVSP: 40 mmHg consistent with mild PHTN.   No evidence of pericardial effusion.      PAST SURGICAL HISTORY:  Hernia repair.     SOCIAL HISTORY:  Does not smoke, does not drink.     ALLERGIES:  NKDA.     FAMILY HISTORY:  Significant for hypertension, skin cancer, lung cancer  present.     MEDICATIONS AT HOME:  She is on Lasix 20 mg b.i.d., Lopressor 25 b.i.d.,  Synthroid and Lotrel 2.5/10 once a day.     Objective:   /83   Pulse 85   Temp 98.2 °F (36.8 °C) (Oral)   Resp 23   Ht 5' 2\" (1.575 m)   Wt 246 lb 0.5 oz (111.6 kg)   LMP 01/23/1995   SpO2 99%   BMI 45.00 kg/m²       Intake/Output Summary (Last 24 hours) at 5/24/2021 1046  Last data filed at 5/24/2021 0943  Gross per 24 hour   Intake 1365.7 ml   Output 1540 ml   Net -174.3 ml       Medications:   Scheduled Meds:   iron sucrose  300 mg Intravenous Once    amiodarone  100 mg Oral Daily    metoprolol tartrate  50 mg Oral BID    sodium chloride flush  10 mL Intravenous 2 times per day    sennosides-docusate sodium  1 tablet Oral BID    enoxaparin  30 mg Subcutaneous Daily    midodrine  10 mg Oral TID WC    sodium chloride flush  5-40 mL Intravenous 2 times per day    levothyroxine  50 mcg Oral Daily    magnesium oxide  400 mg Oral BID      Infusions:   sodium chloride      sodium chloride 50 mL/hr at 05/24/21 0814    sodium chloride        PRN Meds:  sodium chloride, sodium chloride flush, sodium chloride, HYDROmorphone **OR** HYDROmorphone, magnesium hydroxide, gangrene 09/18/2016    SVT (supraventricular tachycardia) (HCC) 09/18/2016    Chronic kidney disease (CKD) stage G4/A2, severely decreased glomerular filtration rate (GFR) between 15-29 mL/min/1.73 square meter and albuminuria creatinine ratio between  mg/g ([facility] 06/21/2016       Brandon Davila MD, MD 5/24/2021 10:46 AM

## 2021-05-24 NOTE — PROGRESS NOTES
Dr. Moise Coy is currently at the bedside seeing Pt. Informed him that right before shift change this morning, Dr. Ann Stevenson was in to see Pt and she stated she became a little emotional when she was talking with him and her heart rate went up into the 140's. Her heart rate now is currently in the 80's and she is back in sinus rhythm. Informed him that this RN held Pt's 0900 dose of Lopressor since her blood pressure at 0800 was 96/50 in her right lower arm. Pt is currently alert & oriented x 4 and she states she is pain free. Dr. Moise Coy stated to go ahead and give the ordered 0900 dose of Lopressor. Her current blood pressure is 108/83 in her right lower arm per automatic blood pressure cuff. Will administer the Lopressor next to Pt per Dr. Umm Chavis verbal order to do so.

## 2021-05-24 NOTE — CARE COORDINATION
[address] Transitions Interview     2021    Patient: Clarisse Rivero Patient : 1940   MRN: [id number]  Reason for Admission: LEFT prox femur fx  - s/p ORIF left femur 21  RARS: Readmission Risk Score: 17  .  NH PREDICT TOOL uploaded to chart. NH Predict Tool Recommendation:  SNF for greater functional gain w/ anticipated los of 16-17 days vs [address] Prior to Admission: Per notes-- Pt lives alone in a 2 story home with bed/bath on 1st floor. DME- rolling walker, shower chair. PT Recommendation: Subacute/SNF  OT Recommendation: SNF  Current Discharge Plans: Per Case Mgt note- Patient goal to return home. She has been to 3 SNFs in the past and does not want to return to SNF. Patient declined Swing Bed as not wanting transfer to [facility]. Reports she has several people who she indicates will help her after discharge even with getting up and down off commode if needed. Collaboration w/ Case Mgt: No as Patient has spoken to JS Staley Mgr and TESS SHAFERW re: d/c planning and continues to decline SNF. Per notes Patient was given list of SNF and ratings to review should she change her mind. CTN will follow if criteria met upon discharge; will reassess care/resource needs at that time and assist Patient as needed. Of note, Patient receiving blood product infusion today as Hgb 6.8 (down from Hgb 12.3 on 21).   Kate Zavala RN

## 2021-05-24 NOTE — PROGRESS NOTES
Clarisse Rivero (1940)    Daily Progress Note-  Ramona Leal MD, MD                     Today's Date:     5/24/2021          Subjective:      Awake and alert this morning  Tearful this morning  Pain rated pain is perceived as moderate (4-6 pain scale)  Denies shortness of breath or chest pain. Objective:     Patient Vitals for the past 4 hrs:   BP Temp Temp src Pulse Resp SpO2 Weight   05/24/21 0702 (!) 98/43 -- -- 76 15 -- --   05/24/21 0602 (!) 105/46 -- -- 65 17 -- --   05/24/21 0502 (!) 98/49 -- -- 66 18 -- --   05/24/21 0432 (!) 100/48 98.5 °F (36.9 °C) Oral 68 17 99 % 246 lb 0.5 oz (111.6 kg)   05/24/21 0402 (!) 114/43 -- -- 65 19 -- --     I/O last 3 completed shifts: In: 1115.7 [I.V.:1115.7]  Out: 1540 [Urine:1000; Drains:540]  DRAIN/TUBE OUTPUT:  Closed/Suction Drain Left;Proximal;Anterior Thigh Accordion 10 [place]-Output (ml): 110 ml    Physical Exam:   Orientation:  alert and oriented to person, place and time    Left Lower Extremity    Incision:  dressing in place, clean, dry and intact    Lower Extremity Motor :    Moving lower extremities without difficulty today. Able to dorsiflex and   plantar flex foot/ankle. Lower Extremity Sensory:   Neurovascularly intact to gross sensation and touch in lower extremities. Pulses:    present 2+ bilaterally lower extremities.       LABS   CBC:   Recent Labs     05/21/21 1730 05/22/21  0735 05/23/21  0633   WBC 10.3 8.1 8.0   HGB 12.3* 10.6* 7.3*    126* 104*     BMP:    Recent Labs     05/21/21 1730 05/22/21  0735 05/23/21  0633    141 140   K 4.2 4.4 4.9    113* 111*   CO2 19* 19* 19*   BUN 41* 37* 37*   CREATININE 2.7* 2.5* 2.5*   GLUCOSE 101* 88 120*         Medications   Meds:    amiodarone  100 mg Oral Daily    metoprolol tartrate  50 mg Oral BID    sodium chloride flush  10 mL Intravenous 2 times per day    sennosides-docusate sodium  1 tablet Oral BID    enoxaparin  30 mg Subcutaneous Daily    midodrine  10 mg Oral TID WC    sodium chloride flush  5-40 mL Intravenous 2 times per day    levothyroxine  50 mcg Oral Daily    magnesium oxide  400 mg Oral BID       Assessment and Plan     1. Post operative day # 2 Left Femur ORIF (5/23/21)  1:  Mobilize with Physical therapy   -WBAT  2. Acute blood loss anemia, not a complication   -Hb 7.3 pending this am   -Repeat in am  3:  Continue Deep venous thrombosis prophylaxis    -Chemoprophylaxis Lovenox   -Mechanical-CITLALLI hose, -SCD's, ambulation  4:  Continue Pain Control   -wean to PO meds  5.   DC hemovac when output < 30cc  6:  D/C Planning:     -Irvin Mclaughlin MD, MD

## 2021-05-25 LAB
ABO/RH: NORMAL
ALBUMIN SERPL-MCNC: 3 GM/DL (ref 3.4–5)
ALP BLD-CCNC: 72 IU/L (ref 40–128)
ALT SERPL-CCNC: 6 U/L (ref 10–40)
ANION GAP SERPL CALCULATED.3IONS-SCNC: 10 MMOL/L (ref 4–16)
ANTIBODY SCREEN: NEGATIVE
AST SERPL-CCNC: 63 IU/L (ref 15–37)
BASOPHILS ABSOLUTE: 0 K/CU MM
BASOPHILS RELATIVE PERCENT: 0.4 % (ref 0–1)
BILIRUB SERPL-MCNC: 0.9 MG/DL (ref 0–1)
BUN BLDV-MCNC: 43 MG/DL (ref 6–23)
CALCIUM SERPL-MCNC: 8.4 MG/DL (ref 8.3–10.6)
CHLORIDE BLD-SCNC: 107 MMOL/L (ref 99–110)
CO2: 21 MMOL/L (ref 21–32)
COMPONENT: NORMAL
COMPONENT: NORMAL
CREAT SERPL-MCNC: 2.6 MG/DL (ref 0.6–1.1)
CROSSMATCH RESULT: NORMAL
CROSSMATCH RESULT: NORMAL
DIFFERENTIAL TYPE: ABNORMAL
EOSINOPHILS ABSOLUTE: 0.3 K/CU MM
EOSINOPHILS RELATIVE PERCENT: 3.9 % (ref 0–3)
GFR AFRICAN AMERICAN: 21 ML/MIN/1.73M2
GFR NON-AFRICAN AMERICAN: 18 ML/MIN/1.73M2
GLUCOSE BLD-MCNC: 89 MG/DL (ref 70–99)
HCT VFR BLD CALC: 31.3 % (ref 37–47)
HEMOGLOBIN: 10 GM/DL (ref 12.5–16)
IMMATURE NEUTROPHIL %: 1.1 % (ref 0–0.43)
LYMPHOCYTES ABSOLUTE: 1.7 K/CU MM
LYMPHOCYTES RELATIVE PERCENT: 20.7 % (ref 24–44)
MCH RBC QN AUTO: 29.8 PG (ref 27–31)
MCHC RBC AUTO-ENTMCNC: 31.9 % (ref 32–36)
MCV RBC AUTO: 93.2 FL (ref 78–100)
MONOCYTES ABSOLUTE: 1 K/CU MM
MONOCYTES RELATIVE PERCENT: 12 % (ref 0–4)
NUCLEATED RBC %: 0.6 %
PDW BLD-RTO: 16.4 % (ref 11.7–14.9)
PLATELET # BLD: 143 K/CU MM (ref 140–440)
PMV BLD AUTO: 9.8 FL (ref 7.5–11.1)
POTASSIUM SERPL-SCNC: 4.4 MMOL/L (ref 3.5–5.1)
RBC # BLD: 3.36 M/CU MM (ref 4.2–5.4)
SEGMENTED NEUTROPHILS ABSOLUTE COUNT: 5.1 K/CU MM
SEGMENTED NEUTROPHILS RELATIVE PERCENT: 61.9 % (ref 36–66)
SODIUM BLD-SCNC: 138 MMOL/L (ref 135–145)
STATUS: NORMAL
STATUS: NORMAL
TOTAL CK: 836 IU/L (ref 26–140)
TOTAL IMMATURE NEUTOROPHIL: 0.09 K/CU MM
TOTAL NUCLEATED RBC: 0.1 K/CU MM
TOTAL PROTEIN: 5.2 GM/DL (ref 6.4–8.2)
TRANSFUSION STATUS: NORMAL
TRANSFUSION STATUS: NORMAL
UNIT DIVISION: 0
UNIT DIVISION: 0
UNIT NUMBER: NORMAL
UNIT NUMBER: NORMAL
WBC # BLD: 8.3 K/CU MM (ref 4–10.5)

## 2021-05-25 PROCEDURE — 2580000003 HC RX 258: Performed by: ORTHOPAEDIC SURGERY

## 2021-05-25 PROCEDURE — 82550 ASSAY OF CK (CPK): CPT

## 2021-05-25 PROCEDURE — 85025 COMPLETE CBC W/AUTO DIFF WBC: CPT

## 2021-05-25 PROCEDURE — 2000000000 HC ICU R&B

## 2021-05-25 PROCEDURE — 94761 N-INVAS EAR/PLS OXIMETRY MLT: CPT

## 2021-05-25 PROCEDURE — 80053 COMPREHEN METABOLIC PANEL: CPT

## 2021-05-25 PROCEDURE — 6360000002 HC RX W HCPCS: Performed by: ORTHOPAEDIC SURGERY

## 2021-05-25 PROCEDURE — 6370000000 HC RX 637 (ALT 250 FOR IP): Performed by: ORTHOPAEDIC SURGERY

## 2021-05-25 PROCEDURE — 94150 VITAL CAPACITY TEST: CPT

## 2021-05-25 PROCEDURE — 97530 THERAPEUTIC ACTIVITIES: CPT

## 2021-05-25 PROCEDURE — 6370000000 HC RX 637 (ALT 250 FOR IP): Performed by: INTERNAL MEDICINE

## 2021-05-25 PROCEDURE — 97116 GAIT TRAINING THERAPY: CPT

## 2021-05-25 RX ADMIN — SODIUM CHLORIDE, PRESERVATIVE FREE 10 ML: 5 INJECTION INTRAVENOUS at 08:39

## 2021-05-25 RX ADMIN — LEVOTHYROXINE SODIUM 50 MCG: 50 TABLET ORAL at 06:29

## 2021-05-25 RX ADMIN — SODIUM CHLORIDE, PRESERVATIVE FREE 10 ML: 5 INJECTION INTRAVENOUS at 08:40

## 2021-05-25 RX ADMIN — FUROSEMIDE 20 MG: 20 TABLET ORAL at 08:38

## 2021-05-25 RX ADMIN — METOPROLOL TARTRATE 50 MG: 50 TABLET, FILM COATED ORAL at 08:38

## 2021-05-25 RX ADMIN — POLYETHYLENE GLYCOL (3350) 17 G: 17 POWDER, FOR SOLUTION ORAL at 06:29

## 2021-05-25 RX ADMIN — AMIODARONE HYDROCHLORIDE 100 MG: 200 TABLET ORAL at 08:37

## 2021-05-25 RX ADMIN — MIDODRINE HYDROCHLORIDE 10 MG: 5 TABLET ORAL at 13:14

## 2021-05-25 RX ADMIN — ACETAMINOPHEN 650 MG: 325 TABLET ORAL at 18:36

## 2021-05-25 RX ADMIN — MIDODRINE HYDROCHLORIDE 10 MG: 5 TABLET ORAL at 06:29

## 2021-05-25 RX ADMIN — MAGNESIUM OXIDE 400 MG (241.3 MG MAGNESIUM) TABLET 400 MG: TABLET at 22:46

## 2021-05-25 RX ADMIN — FUROSEMIDE 20 MG: 20 TABLET ORAL at 18:27

## 2021-05-25 RX ADMIN — SODIUM CHLORIDE, PRESERVATIVE FREE 10 ML: 5 INJECTION INTRAVENOUS at 22:44

## 2021-05-25 RX ADMIN — PROMETHAZINE HYDROCHLORIDE 12.5 MG: 25 TABLET ORAL at 18:36

## 2021-05-25 RX ADMIN — ENOXAPARIN SODIUM 30 MG: 30 INJECTION SUBCUTANEOUS at 08:40

## 2021-05-25 RX ADMIN — DOCUSATE SODIUM 50 MG AND SENNOSIDES 8.6 MG 1 TABLET: 8.6; 5 TABLET, FILM COATED ORAL at 08:37

## 2021-05-25 RX ADMIN — OXYCODONE HYDROCHLORIDE 5 MG: 5 TABLET ORAL at 08:37

## 2021-05-25 RX ADMIN — OXYCODONE HYDROCHLORIDE 10 MG: 10 TABLET ORAL at 18:37

## 2021-05-25 RX ADMIN — OXYCODONE HYDROCHLORIDE 10 MG: 10 TABLET ORAL at 13:39

## 2021-05-25 RX ADMIN — SODIUM CHLORIDE, PRESERVATIVE FREE 10 ML: 5 INJECTION INTRAVENOUS at 22:45

## 2021-05-25 RX ADMIN — MAGNESIUM OXIDE 400 MG (241.3 MG MAGNESIUM) TABLET 400 MG: TABLET at 08:37

## 2021-05-25 RX ADMIN — MIDODRINE HYDROCHLORIDE 10 MG: 5 TABLET ORAL at 18:30

## 2021-05-25 RX ADMIN — DOCUSATE SODIUM 50 MG AND SENNOSIDES 8.6 MG 1 TABLET: 8.6; 5 TABLET, FILM COATED ORAL at 22:46

## 2021-05-25 ASSESSMENT — PAIN DESCRIPTION - LOCATION
LOCATION: LEG
LOCATION: HIP

## 2021-05-25 ASSESSMENT — PAIN DESCRIPTION - ONSET: ONSET: ON-GOING

## 2021-05-25 ASSESSMENT — PAIN DESCRIPTION - DESCRIPTORS
DESCRIPTORS: ACHING;DISCOMFORT
DESCRIPTORS: ACHING;DISCOMFORT

## 2021-05-25 ASSESSMENT — PAIN SCALES - GENERAL
PAINLEVEL_OUTOF10: 4
PAINLEVEL_OUTOF10: 1
PAINLEVEL_OUTOF10: 4

## 2021-05-25 ASSESSMENT — PAIN DESCRIPTION - PAIN TYPE: TYPE: CHRONIC PAIN

## 2021-05-25 ASSESSMENT — PAIN DESCRIPTION - ORIENTATION: ORIENTATION: LEFT

## 2021-05-25 NOTE — PROGRESS NOTES
Hospitalist Progress Note      Name:  Kaden Condon /Age/Sex: 1940  ([de-identified] y.o. female)   MRN & CSN:  [id number] & [id number] Admission Date/Time: 2021  2:37 PM   Location:  -A PCP: Kasi Booth, [address] Day: 5    Assessment and Plan:   Kaden Condon is a [de-identified] y.o.  female  who presents with <principal problem not specified>    > LEFT prox femur fx  - s/p ORIF left femur   - Pain and nausea control  - On Lovenox for DVT prophyalxis  - Appreciate Ortho recommendations  - PT/OT/DC planning, Home with HH     >Rhabdomyolysis  - CK continues to increase; ?also component from surgery yesterday  - Continue IVF today, increased rate; fluids off overnight  - Repeat CK in AM     >Hyperchloremia-resolved      >Acute Anemia blood loss anemia secondary to surgery  - This Am Hgb 7.3>6.8  - Transfuse 2 units PRBC  -  Monitor H&H  -  HGB 10.0      >Hx HFpEF  - Echo in 2016 with EF 50%, grade I DD  - resume Lasix at home dose     >Atrial Fibrillation  - Continue PO Metoprolol and Amiodarone     >Hypotension.  -Midodrine      >Chronic  - CKD IV- Cr stable at baseline. History of nephrectomy.  - HTN-  Hold amlodipine-benazepril  - Hypothyroidism- Cont synthroid    Diet Dietary Nutrition Supplements: Standard High Calorie Oral Supplement  DIET GENERAL;   DVT Prophylaxis [] Lovenox   Code Status Full Code   Disposition  Home with [facility] anticipate tomorrow     History of Present Illness:     Pt S&E. No chest pain, no dyspnea, no abd pain, no N/V, no F/C, D/W pt plan of care, refusing placement for rehab, state has plenty of support at home. 10-14 point ROS reviewed negative, unless as noted above    Objective:        Intake/Output Summary (Last 24 hours) at 2021 0919  Last data filed at 2021 0826  Gross per 24 hour   Intake 2030 ml   Output 4125 ml   Net -2095 ml      Vitals:   Vitals:    21 0837   BP: (!) 118/57   Pulse:    Resp:    Temp:    SpO2:      Physical Exam:      GEN Awake female, cooperative, no apparent distress. RESP Clear  Symmetric chest movement . CARDIO/VASC S1/S2 auscultated. Regular rate. GI Abdomen is soft without significant tenderness, Bowel sounds are normoactive. MSK Spontaneous movement of all extremities  SKIN warm, dry. NEURO normal speech, no lateralizing weakness. PSYCH Awake, alert, oriented x 4. Affect appropriate.     Medications:   Medications:    furosemide  20 mg Oral BID    amiodarone  100 mg Oral Daily    metoprolol tartrate  50 mg Oral BID    sodium chloride flush  10 mL Intravenous 2 times per day    sennosides-docusate sodium  1 tablet Oral BID    enoxaparin  30 mg Subcutaneous Daily    midodrine  10 mg Oral TID WC    sodium chloride flush  5-40 mL Intravenous 2 times per day    levothyroxine  50 mcg Oral Daily    magnesium oxide  400 mg Oral BID      Infusions:    sodium chloride      sodium chloride Stopped (05/24/21 1435)    sodium chloride       PRN Meds: sodium chloride, , PRN  sodium chloride flush, 10 mL, PRN  sodium chloride, 25 mL, PRN  HYDROmorphone, 0.25 mg, Q3H PRN   Or  HYDROmorphone, 0.5 mg, Q3H PRN  magnesium hydroxide, 30 mL, Daily PRN  oxyCODONE, 5 mg, Q4H PRN   Or  oxyCODONE, 10 mg, Q4H PRN  promethazine, 12.5 mg, Q6H PRN   Or  ondansetron, 4 mg, Q6H PRN  sodium chloride flush, 10 mL, PRN  polyethylene glycol, 17 g, Daily PRN  acetaminophen, 650 mg, Q6H PRN   Or  acetaminophen, 650 mg, Q6H PRN      Electronically signed by Deacon Ortiz MD on 5/25/2021 at 9:19 AM

## 2021-05-25 NOTE — PROGRESS NOTES
sennosides-docusate sodium  1 tablet Oral BID    enoxaparin  30 mg Subcutaneous Daily    midodrine  10 mg Oral TID WC    sodium chloride flush  5-40 mL Intravenous 2 times per day    levothyroxine  50 mcg Oral Daily    magnesium oxide  400 mg Oral BID      Infusions:   sodium chloride      sodium chloride Stopped (05/24/21 1435)    sodium chloride        PRN Meds:  sodium chloride, sodium chloride flush, sodium chloride, HYDROmorphone **OR** HYDROmorphone, magnesium hydroxide, oxyCODONE **OR** oxyCODONE, promethazine **OR** ondansetron, sodium chloride flush, polyethylene glycol, acetaminophen **OR** acetaminophen       Physical Exam:  Vitals:    05/25/21 0903   BP: (!) 100/90   Pulse: 66   Resp: 18   Temp:    SpO2:         General: AAO, NAD  Chest: Nontender  Cardiac: First and Second Heart Sounds are Normal, No Murmurs or Gallops noted  Lungs:Clear to auscultation and percussion. Abdomen: Soft, NT, ND, +BS  Extremities: No clubbing, no edema  Vascular:  Equal 2+ peripheral pulses. Lab Data:  CBC:   Recent Labs     05/23/21  0633 05/24/21  0640 05/24/21  2155 05/25/21  0545   WBC 8.0 8.6  --  8.3   HGB 7.3* 6.8* 10.0* 10.0*   HCT 23.2* 21.7* 30.1* 31.3*   MCV 97.9 99.1  --  93.2   * 135*  --  143     BMP:   Recent Labs     05/23/21  0633 05/24/21  0640 05/25/21  0545    138 138   K 4.9 4.4 4.4   * 110 107   CO2 19* 18* 21   BUN 37* 43* 43*   CREATININE 2.5* 2.6* 2.6*     LIVER PROFILE:   Recent Labs     05/24/21  0640 05/25/21  0545   AST 70* 63*   ALT 8* 6*   BILITOT 0.5 0.9   ALKPHOS 61 72     PT/INR: No results for input(s): PROTIME, INR in the last 72 hours. APTT: No results for input(s): APTT in the last 72 hours. BNP:  No results for input(s): BNP in the last 72 hours.       Assessment:  Patient Active Problem List    Diagnosis Date Noted    Closed transcervical fracture of proximal femur, left, initial encounter ([facility] 05/21/2021    Morbidly obese ([facility] 07/23/2020    History of ventral hernia repair 10/19/2018    HTN (hypertension) 08/10/2018    S/P herniorrhaphy 07/11/2018    S/P ventral herniorrhaphy 06/13/2018    H/O ventral hernia 06/06/2018    Chronic kidney disease (CKD) stage G4/A1, severely decreased glomerular filtration rate (GFR) between 15-29 mL/min/1.73 square meter and albuminuria creatinine ratio less than 30 mg/g ([facility]) 05/16/2017    GERD (gastroesophageal reflux disease) 05/16/2017    CHF (congestive heart failure) ([facility] 12/17/2016    CRF (chronic renal failure) 09/20/2016    Hypothyroid 09/20/2016    Ventral hernia without obstruction or gangrene 09/18/2016    SVT (supraventricular tachycardia) ([facility] 09/18/2016    Chronic kidney disease (CKD) stage G4/A2, severely decreased glomerular filtration rate (GFR) between 15-29 mL/min/1.73 square meter and albuminuria creatinine ratio between  mg/g ([facility] 06/21/2016       Electronically signed by Mike Jean-Baptiste PA-C on 5/25/2021 at 10:47 AM  Electronically signed by Carmencita Birmingham MD on 5/25/21 at 12:12 PM EDT

## 2021-05-25 NOTE — PROGRESS NOTES
Physical Therapy    Physical Therapy Treatment Note  Name: Ryan Aguilar MRN: [id number] :   1940   Date:  2021   Admission Date: 2021 Room:  [address] [place], [place] [address]A   Restrictions/Precautions:  Restrictions/Precautions  Restrictions/Precautions: General Precautions, Fall Risk, Weight Bearing Lower Extremity Weight Bearing Restrictions  Left Lower Extremity Weight Bearing: Weight Bearing As Tolerated    Communication with other providers:  Ector Pete RN states pt is ok to see for therapy  Subjective:  Patient states:  I can't do this, I'm afraid of falling and Pain  Pain:   Location, Type, Intensity (0/10 to 10/10):  L hip  Objective:    Observation:  Pt was sitting up in the chair  Treatment, including education/measures:  Transfers with line management of Hemovac, mobley, tele,  Nursing assisted for trans  Scooting :mod a of 1  Sit to stand :mod A of 2  Pt stood with the RW for 10 min with CGA pt was able to advance the L foot and place it in front of her but not able to take a step. Pt was very emotional stating \"she cannot do it\" and insisted on sitting back in the chair  Stand to sit :min A of 2  Safety  Patient left safely in the chair, with call light/phone in reach with alarm applied. Gait belt was used for transfers.   Assessment / Impression:     Patient's tolerance of treatment:  Good, pt is afraid of falling and going to a SNF  Adverse Reaction: none  Significant change in status and impact:  none  Barriers to improvement:  pain  Plan for Next Session:    Will cont to work towards pt's goals per her tolerance  Time in:  1440  Time out:  1530  Timed treatment minutes:  45  Total treatment time:  39  Previously filed items:  Social/Functional History  Lives With: Alone  Type of Home: House  Home Layout: Two level, Able to Live on Main level with bedroom/bathroom  Home Access: Stairs to enter with rails  Entrance Stairs - Number of Steps: 3  Bathroom Shower/Tub: Walk-in shower  Bathroom Toilet: Handicap height  Bathroom Equipment: Shower chair, Grab bars in shower  Home Equipment: Cane, Rolling walker  ADL Assistance: Independent  Homemaking Assistance: Independent  Ambulation Assistance: Independent (mod I with spc as needed)  Transfer Assistance: Independent  Active : Yes     Long term goals  Time Frame for Long term goals : In one week:  Long term goal 1: Pt will complete all bed mobility with modAx2  Long term goal 2: Pt will complete sit <> stand transfers with modAx2  Long term goal 3: Pt will complete bed <> chair transfers with modAx2  Long term goal 4: Pt will ambulate 5 feet with modAx2 with LRAD  Long term goal 5: Pt will independently complete 3 sets of 10 reps of BLE AROM exercises in available and allowed ROM    Electronically signed by:     Jeani Litten PTA  5/25/2021, 3:17 PM

## 2021-05-26 VITALS
OXYGEN SATURATION: 95 % | HEIGHT: 62 IN | WEIGHT: 250.44 LBS | BODY MASS INDEX: 46.09 KG/M2 | DIASTOLIC BLOOD PRESSURE: 105 MMHG | RESPIRATION RATE: 21 BRPM | SYSTOLIC BLOOD PRESSURE: 126 MMHG | TEMPERATURE: 97.8 F | HEART RATE: 88 BPM

## 2021-05-26 LAB
ALBUMIN SERPL-MCNC: 3 GM/DL (ref 3.4–5)
ALP BLD-CCNC: 72 IU/L (ref 40–129)
ALT SERPL-CCNC: <5 U/L (ref 10–40)
ANION GAP SERPL CALCULATED.3IONS-SCNC: 10 MMOL/L (ref 4–16)
AST SERPL-CCNC: 49 IU/L (ref 15–37)
BASOPHILS ABSOLUTE: 0 K/CU MM
BASOPHILS RELATIVE PERCENT: 0.3 % (ref 0–1)
BILIRUB SERPL-MCNC: 1 MG/DL (ref 0–1)
BUN BLDV-MCNC: 45 MG/DL (ref 6–23)
CALCIUM SERPL-MCNC: 8.4 MG/DL (ref 8.3–10.6)
CHLORIDE BLD-SCNC: 107 MMOL/L (ref 99–110)
CO2: 21 MMOL/L (ref 21–32)
CREAT SERPL-MCNC: 2.8 MG/DL (ref 0.6–1.1)
DIFFERENTIAL TYPE: ABNORMAL
EOSINOPHILS ABSOLUTE: 0.4 K/CU MM
EOSINOPHILS RELATIVE PERCENT: 4.4 % (ref 0–3)
GFR AFRICAN AMERICAN: 20 ML/MIN/1.73M2
GFR NON-AFRICAN AMERICAN: 16 ML/MIN/1.73M2
GLUCOSE BLD-MCNC: 91 MG/DL (ref 70–99)
HCT VFR BLD CALC: 32 % (ref 37–47)
HEMOGLOBIN: 10.2 GM/DL (ref 12.5–16)
IMMATURE NEUTROPHIL %: 1.8 % (ref 0–0.43)
LYMPHOCYTES ABSOLUTE: 1.7 K/CU MM
LYMPHOCYTES RELATIVE PERCENT: 19.2 % (ref 24–44)
MCH RBC QN AUTO: 29.8 PG (ref 27–31)
MCHC RBC AUTO-ENTMCNC: 31.9 % (ref 32–36)
MCV RBC AUTO: 93.6 FL (ref 78–100)
MONOCYTES ABSOLUTE: 1.1 K/CU MM
MONOCYTES RELATIVE PERCENT: 12.2 % (ref 0–4)
NUCLEATED RBC %: 0.6 %
PDW BLD-RTO: 16.6 % (ref 11.7–14.9)
PLATELET # BLD: 166 K/CU MM (ref 140–440)
PMV BLD AUTO: 9.7 FL (ref 7.5–11.1)
POTASSIUM SERPL-SCNC: 4.3 MMOL/L (ref 3.5–5.1)
RBC # BLD: 3.42 M/CU MM (ref 4.2–5.4)
SEGMENTED NEUTROPHILS ABSOLUTE COUNT: 5.6 K/CU MM
SEGMENTED NEUTROPHILS RELATIVE PERCENT: 62.1 % (ref 36–66)
SODIUM BLD-SCNC: 138 MMOL/L (ref 135–145)
TOTAL IMMATURE NEUTOROPHIL: 0.16 K/CU MM
TOTAL NUCLEATED RBC: 0.1 K/CU MM
TOTAL PROTEIN: 5.1 GM/DL (ref 6.4–8.2)
WBC # BLD: 8.9 K/CU MM (ref 4–10.5)

## 2021-05-26 PROCEDURE — 85025 COMPLETE CBC W/AUTO DIFF WBC: CPT

## 2021-05-26 PROCEDURE — 6370000000 HC RX 637 (ALT 250 FOR IP): Performed by: ORTHOPAEDIC SURGERY

## 2021-05-26 PROCEDURE — 97110 THERAPEUTIC EXERCISES: CPT

## 2021-05-26 PROCEDURE — 97116 GAIT TRAINING THERAPY: CPT

## 2021-05-26 PROCEDURE — 80053 COMPREHEN METABOLIC PANEL: CPT

## 2021-05-26 PROCEDURE — 2580000003 HC RX 258: Performed by: ORTHOPAEDIC SURGERY

## 2021-05-26 PROCEDURE — 97530 THERAPEUTIC ACTIVITIES: CPT

## 2021-05-26 PROCEDURE — 6360000002 HC RX W HCPCS: Performed by: ORTHOPAEDIC SURGERY

## 2021-05-26 PROCEDURE — 6370000000 HC RX 637 (ALT 250 FOR IP): Performed by: INTERNAL MEDICINE

## 2021-05-26 RX ORDER — SENNA AND DOCUSATE SODIUM 50; 8.6 MG/1; MG/1
1 TABLET, FILM COATED ORAL 2 TIMES DAILY
DISCHARGE
Start: 2021-05-26 | End: 2021-10-07

## 2021-05-26 RX ORDER — OXYCODONE HYDROCHLORIDE 5 MG/1
5 TABLET ORAL EVERY 6 HOURS PRN
Qty: 12 TABLET | Refills: 0 | Status: SHIPPED | OUTPATIENT
Start: 2021-05-26 | End: 2021-05-29

## 2021-05-26 RX ORDER — FUROSEMIDE 20 MG/1
20 TABLET ORAL DAILY
Status: DISCONTINUED | OUTPATIENT
Start: 2021-05-26 | End: 2021-05-26 | Stop reason: HOSPADM

## 2021-05-26 RX ORDER — AMIODARONE HYDROCHLORIDE 100 MG/1
100 TABLET ORAL DAILY
DISCHARGE
Start: 2021-05-27 | End: 2021-07-26 | Stop reason: SDUPTHER

## 2021-05-26 RX ORDER — POLYETHYLENE GLYCOL 3350 17 G/17G
17 POWDER, FOR SOLUTION ORAL DAILY PRN
Qty: 527 G | Refills: 1 | DISCHARGE
Start: 2021-05-26 | End: 2021-06-25

## 2021-05-26 RX ORDER — MIDODRINE HYDROCHLORIDE 10 MG/1
10 TABLET ORAL
Qty: 90 TABLET | Refills: 3 | DISCHARGE
Start: 2021-05-26 | End: 2021-09-21 | Stop reason: ALTCHOICE

## 2021-05-26 RX ADMIN — FUROSEMIDE 20 MG: 20 TABLET ORAL at 08:11

## 2021-05-26 RX ADMIN — OXYCODONE HYDROCHLORIDE 5 MG: 5 TABLET ORAL at 08:10

## 2021-05-26 RX ADMIN — MIDODRINE HYDROCHLORIDE 10 MG: 5 TABLET ORAL at 08:11

## 2021-05-26 RX ADMIN — OXYCODONE HYDROCHLORIDE 5 MG: 5 TABLET ORAL at 15:06

## 2021-05-26 RX ADMIN — AMIODARONE HYDROCHLORIDE 100 MG: 200 TABLET ORAL at 08:10

## 2021-05-26 RX ADMIN — MAGNESIUM OXIDE 400 MG (241.3 MG MAGNESIUM) TABLET 400 MG: TABLET at 08:11

## 2021-05-26 RX ADMIN — DOCUSATE SODIUM 50 MG AND SENNOSIDES 8.6 MG 1 TABLET: 8.6; 5 TABLET, FILM COATED ORAL at 08:11

## 2021-05-26 RX ADMIN — SODIUM CHLORIDE, PRESERVATIVE FREE 10 ML: 5 INJECTION INTRAVENOUS at 08:16

## 2021-05-26 RX ADMIN — MIDODRINE HYDROCHLORIDE 10 MG: 5 TABLET ORAL at 15:05

## 2021-05-26 RX ADMIN — ENOXAPARIN SODIUM 30 MG: 30 INJECTION SUBCUTANEOUS at 08:11

## 2021-05-26 RX ADMIN — SODIUM CHLORIDE, PRESERVATIVE FREE 10 ML: 5 INJECTION INTRAVENOUS at 08:15

## 2021-05-26 RX ADMIN — LEVOTHYROXINE SODIUM 50 MCG: 50 TABLET ORAL at 08:18

## 2021-05-26 RX ADMIN — POLYETHYLENE GLYCOL (3350) 17 G: 17 POWDER, FOR SOLUTION ORAL at 08:18

## 2021-05-26 ASSESSMENT — PAIN DESCRIPTION - FREQUENCY: FREQUENCY: INTERMITTENT

## 2021-05-26 ASSESSMENT — PAIN DESCRIPTION - DESCRIPTORS
DESCRIPTORS: ACHING;DISCOMFORT
DESCRIPTORS: SORE

## 2021-05-26 ASSESSMENT — PAIN SCALES - GENERAL
PAINLEVEL_OUTOF10: 0
PAINLEVEL_OUTOF10: 4

## 2021-05-26 ASSESSMENT — PAIN DESCRIPTION - LOCATION: LOCATION: HIP

## 2021-05-26 ASSESSMENT — PAIN - FUNCTIONAL ASSESSMENT
PAIN_FUNCTIONAL_ASSESSMENT: ACTIVITIES ARE NOT PREVENTED
PAIN_FUNCTIONAL_ASSESSMENT: ACTIVITIES ARE NOT PREVENTED

## 2021-05-26 ASSESSMENT — PAIN DESCRIPTION - ORIENTATION
ORIENTATION: LEFT
ORIENTATION: LEFT

## 2021-05-26 ASSESSMENT — PAIN DESCRIPTION - ONSET: ONSET: GRADUAL

## 2021-05-26 ASSESSMENT — PAIN DESCRIPTION - PROGRESSION: CLINICAL_PROGRESSION: GRADUALLY WORSENING

## 2021-05-26 NOTE — PROGRESS NOTES
Physician Progress Note      Cris Mcdowell  CSN #:                  [id number]  :                       1940  ADMIT DATE:       2021 2:37 PM  [address] [place] DATE:  RESPONDING  PROVIDER #:        Kathy Montana MD          QUERY TEXT:    Hospitalists,    Pt admitted with fall w/ femur fx. Pt noted to have new onset atrial   fibrillation post op**. If possible, please document in progress notes and   discharge summary:    The medical record reflects the following:  Risk Factors: post op  Clinical Indicators: Per documentation-new onset atrial fib, started post op  Treatment: labs, imaging, ECHO, amiodarone, Metoprolol, Lovenox    Thank you,  Malathi Knight RN CDS  Options provided:  -- Atrial fibrillation as a postoperative complication  -- Atrial fibrillation as an expected/inherent condition that occurred   postoperatively and not a complication  -- Atrial fibrillation related to other incidental risk factor (please   specify) occurring following surgery and not a complication, Please document   incidental risk factor. -- Other - I will add my own diagnosis  -- Disagree - Not applicable / Not valid  -- Disagree - Clinically unable to determine / Unknown  -- Refer to Clinical Documentation Reviewer    PROVIDER RESPONSE TEXT:    Atrial fibrillation is related to other incidental risk factor occurring   following surgery and not a complication.  pt's age , vavular and heart   pathology, pulm HTN, could put her at risk of A-fib that may have not been   diagnosed and just was found out now being in the hospital    Query created by: Shelley Fenton on 2021 1:33 PM      Electronically signed by:  Kathy Montana MD 2021 1:57 PM

## 2021-05-26 NOTE — DISCHARGE SUMMARY
Discharge Summary    Name:  Colt Cramer /Age/Sex: 1940  ([de-identified] y.o. female)   MRN & CSN:  [id number] & [id number] Admission Date/Time: 2021  2:37 PM   Attending:  Jazzy Hayes MD Discharging Physician: Veronica Epps MD     HPI:     Colt Cramer is a [de-identified] y.o.  female  who presents with the above complaints, onset yesterday. Context is, patient tripped walking on the threshhold entering her home yesterday, falling forward and landing on her knees. Denies hitting her head or LOC. She lives alone and was unable to get up; she was down for ~ 20 hrs. States this was a mechanical fall-- denies light-headedness, palpitations, dizziness. Denies hx of PCI, CABG, PPM, COPD. Denies chest pain/pressure, sob, abdominal pain, new back pain, n/v, diarrhea. Otherwise in baseline state of health. Confirms FULL code status.     Hospital Course:   Colt Cramer is a [de-identified] y.o.  female  who presents with Closed transcervical fracture of proximal femur, left, initial encounter ([facility]    > LEFT prox femur fx  - s/p ORIF left femur   - Pain and nausea control  - On Lovenox for DVT prophyalxis  - Appreciate Ortho recommendations  - PT/OT, agreed to SNF and pt was discharged to SNF in a stable condition.      >Rhabdomyolysis  - CK continues to increase; ?also component from surgery yesterday  - Continue IVF today, increased rate; fluids off overnight  - improved with IVF down to 836,      >Hyperchloremia-resolved      >Acute Anemia blood loss anemia secondary to surgery  - This Am Hgb 7.3>6.8  - Transfuse 2 units PRBC  -  Monitor H&H  -  HGB 10.0      >Hx HFpEF  - Echo in 2016 with EF 50%, grade I DD  - resume Lasix at home dose     >Atrial Fibrillation  - Continue PO Metoprolol and Amiodarone     >Hypotension.  -Midodrine, hold lasix, asymptomatic, D/W .  ok to DC , OP FU       >Chronic  - CKD IV- Cr stable at baseline.  History of nephrectomy.  - HTN-  Hold amlodipine-benazepril  - chest movement . CARDIO/VASC           S1/S2 auscultated. Regular rate. GI        Abdomen is soft without significant tenderness, Bowel sounds are normoactive. MSK    Spontaneous movement of all extremities  SKIN    warm, dry. NEURO           normal speech, no lateralizing weakness. PSYCH            Awake, alert, oriented x 4. Affect appropriate.     BMP/CBC  Recent Labs     05/24/21  0640 05/24/21  2155 05/25/21  0545 05/26/21  0440     --  138 138   K 4.4  --  4.4 4.3     --  107 107   CO2 18*  --  21 21   BUN 43*  --  43* 45*   CREATININE 2.6*  --  2.6* 2.8*   WBC 8.6  --  8.3 8.9   HCT 21.7* 30.1* 31.3* 32.0*   *  --  143 166       IMAGING:  Echocardiogram complete 2D with doppler with color    Result Date: 5/22/2021  Transthoracic Echocardiography Report (TTE)  Demographics   Patient Name      Fariha Barrera  Date of Study       05/22/2021   Date of Birth     1940          Gender              Female   Age               [de-identified] year(s)          Race                   Patient Number    [id number]          Room Number         [facility]   Visit Number      [id number]   Corporate ID      [id number]   Accession Number  [id number]          Sonographer         Vipul Montalvo RVT, RDMS   Ordering          Carol Tsang MD    Interpreting        Carol Tsang MD  Physician                             Physician  Procedure Type of Study   TTE procedure:ECHOCARDIOGRAM COMPLETE 2D W DOPPLER W COLOR. Procedure Date Date: 05/22/2021 Start: 03:19 PM Study Location: PACU Technical Quality: Technically difficult study Indications:Congestive heart failure. Patient Status: STAT Height: 62 inches Weight: 245 pounds BSA: 2.08 m2 BMI: 44.81 kg/m2 HR: 64 bpm BP: 94/50 mmHg  Conclusions   Summary  Technically difficult study due to patient immobility s/p surgery.   Left ventricular systolic function is normal.  Ejection fraction is visually estimated at 50-55%. Sclerotic, but non-stenotic aortic valve. Moderate mitral regurgitation. Moderate tricuspid regurgitation; RVSP: 40 mmHg consistent with mild PHTN. No evidence of pericardial effusion. Signature   ------------------------------------------------------------------  Electronically signed by Bebe Love MD (Interpreting  physician) on 05/22/2021 at 03:51 PM  ------------------------------------------------------------------   Findings   Left Ventricle  Left ventricular systolic function is normal.  Ejection fraction is visually estimated at 50-55%. Mild left ventricular hypertrophy. Left Atrium  Essentially normal left atrium. Right Atrium  Essentially normal right atrium. Right Ventricle  Essentially normal right ventricle. Aortic Valve  Sclerotic, but non-stenotic aortic valve. Mitral Valve  Moderate mitral regurgitation. Tricuspid Valve  Moderate tricuspid regurgitation; RVSP: 40 mmHg consistent with mild PHTN. Pulmonic Valve  Pulmonic valve was not well visualized. Pericardial Effusion  No evidence of pericardial effusion. Pleural Effusion  No evidence of pleural effusion.   M-Mode/2D Measurements & Calculations   LV Diastolic Dimension:  LV Systolic Dimension:  LA Dimension: 2.3 cmAO Root  2.63 cm                  1.67 cm                 Dimension: 2.8 cmLA Area:  LV FS:36.5 %             LV Volume Diastolic: 66 57.9 cm2  LV PW Diastolic: 1.62 cm ml  LV PW Systolic: 2.64 cm  LV Volume Systolic: 24  Septum Diastolic: 1.1 cm ml  Septum Systolic: 3.89 cm LV EDV/LV EDV Index: 66 RV Diastolic Dimension:  CO: 5.47 l/min           ml/32 m2LV ESV/LV ESV   2.52 cm  CI: 2 l/m*m2             Index: 24 ml/12 m2                           EF Calculated (A4C):    LA/Aorta: 0.75  LV Area Diastolic: 27.5  77.4 %  cm2                      EF Calculated (2D):     LA volume/Index: 44 ml  LV Area Systolic: 14 cm2 06.5 %                  /21m2                            LV Length: 7.65 cm is noted. The left tibia and fibula are intact with no acute abnormality. 1. Displaced fracture through the proximal left femur. 2. No additional fractures are identified. Degenerative changes in the bilateral hips and knees. XR FEMUR LEFT (MIN 2 VIEWS)    Result Date: 5/21/2021  EXAMINATION: 4 XRAY VIEWS OF THE RIGHT FEMUR; 5 XRAY VIEWS OF THE LEFT FEMUR; 2 XRAY VIEWS OF THE LEFT TIBIA AND FIBULA; ONE XRAY VIEW OF THE PELVIS 5/21/2021 4:25 pm COMPARISON: None. HISTORY: ORDERING SYSTEM PROVIDED HISTORY: injury TECHNOLOGIST PROVIDED HISTORY: Reason for exam:->injury Reason for Exam: pain Acuity: Acute Type of Exam: Initial Mechanism of Injury: fall Relevant Medical/Surgical History: none FINDINGS: Limited visualization of the left hemipelvis shows no evidence of fracture. There is an oblique fracture extending through the intertrochanteric portion of the proximal left femur into the proximal diaphysis. The fracture is moderately displaced. Femoral head remains located within the acetabulum with degenerative change at the hip joint. The distal shaft of the left femur is intact. Mild degenerative change in the right hip joint. The right femur is intact. There is chronic degenerative change in the bilateral knees. Tricompartmental joint space narrowing and osteophyte formation is noted. The left tibia and fibula are intact with no acute abnormality. 1. Displaced fracture through the proximal left femur. 2. No additional fractures are identified. Degenerative changes in the bilateral hips and knees. XR FEMUR RIGHT (MIN 2 VIEWS)    Result Date: 5/21/2021  EXAMINATION: 4 XRAY VIEWS OF THE RIGHT FEMUR; 5 XRAY VIEWS OF THE LEFT FEMUR; 2 XRAY VIEWS OF THE LEFT TIBIA AND FIBULA; ONE XRAY VIEW OF THE PELVIS 5/21/2021 4:25 pm COMPARISON: None.  HISTORY: ORDERING SYSTEM PROVIDED HISTORY: injury TECHNOLOGIST PROVIDED HISTORY: Reason for exam:->injury Reason for Exam: pain Acuity: Acute Type of Exam: Initial narrowing and osteophyte formation is noted. The left tibia and fibula are intact with no acute abnormality. 1. Displaced fracture through the proximal left femur. 2. No additional fractures are identified. Degenerative changes in the bilateral hips and knees. XR TIBIA FIBULA RIGHT (2 VIEWS)    Result Date: 5/21/2021  EXAMINATION: 3 XRAY VIEWS OF THE RIGHT TIBIA AND FIBULA 5/21/2021 4:26 pm COMPARISON: None. HISTORY: ORDERING SYSTEM PROVIDED HISTORY: fall pain TECHNOLOGIST PROVIDED HISTORY: Reason for exam:->fall pain Reason for Exam: pain Acuity: Acute Type of Exam: Initial Mechanism of Injury: fall Relevant Medical/Surgical History: none FINDINGS: There is no evidence of acute fracture. There is normal alignment. No acute joint abnormality. No focal osseous lesion. No focal soft tissue abnormality. Tricompartmental osteoarthritic changes seen in the knee     No acute osseous abnormality. FL LESS THAN 1 HOUR    Result Date: 5/22/2021  Radiology exam is complete. No Radiologist dictation. Please follow up with ordering provider. XR CHEST PORTABLE    Result Date: 5/21/2021  EXAMINATION: ONE XRAY VIEW OF THE CHEST 5/21/2021 4:23 pm COMPARISON: 05/26/2018 HISTORY: ORDERING SYSTEM PROVIDED HISTORY: fall TECHNOLOGIST PROVIDED HISTORY: Reason for exam:->fall Reason for Exam: fall Acuity: Acute Type of Exam: Initial Mechanism of Injury: fall Relevant Medical/Surgical History: CHF, SVT FINDINGS: No focal consolidations. No pleural effusion or pneumothorax. Heart size is borderline enlarged. Borderline cardiomegaly. No acute process.        Discharge Time of 37 minutes    Electronically signed by Keyur Coronel MD on 5/26/2021 at 12:04 PM

## 2021-05-26 NOTE — DISCHARGE INSTR - COC
 VENTRAL HERNIA REPAIR  09/16/2016    Robotic laparoscopic       Immunization History:   Immunization History   Administered Date(s) Administered    Influenza Vaccine, unspecified formulation 08/30/2017, 09/06/2018    Influenza Virus Vaccine 12/01/2015, 12/01/2016    Influenza, High Dose (Fluzone 65 yrs and older) 10/12/2018, 09/10/2019, 10/11/2020    Influenza, Quadv, adjuvanted, 65 yrs +, IM, PF (Fluad) 10/11/2020    Pneumococcal Conjugate 13-valent (Ecabjrc89) 12/21/2015    Pneumococcal Polysaccharide (Liuvysncp50) 08/01/2017    Tdap (Boostrix, Adacel) 12/21/2015    Zoster Recombinant (Shingrix) 09/18/2019, 11/25/2019       Active Problems:  Patient Active Problem List   Diagnosis Code    Chronic kidney disease (CKD) stage G4/A2, severely decreased glomerular filtration rate (GFR) between 15-29 mL/min/1.73 square meter and albuminuria creatinine ratio between  mg/g ([facility]) N18.4    Ventral hernia without obstruction or gangrene K43.9    SVT (supraventricular tachycardia) ([facility]) I47.1    CRF (chronic renal failure) N18.9    Hypothyroid E03.9    CHF (congestive heart failure) ([facility]) I50.9    Chronic kidney disease (CKD) stage G4/A1, severely decreased glomerular filtration rate (GFR) between 15-29 mL/min/1.73 square meter and albuminuria creatinine ratio less than 30 mg/g ([facility]) N18.4    GERD (gastroesophageal reflux disease) K21.9    H/O ventral hernia Z87.19    S/P ventral herniorrhaphy Z98.890, Z87.19    S/P herniorrhaphy Z98.890, Z87.19    HTN (hypertension) I10    History of ventral hernia repair Z98.890, Z87.19    Morbidly obese ([facility] E66.01    Cl[facility]erv[facility] proximal femur, left[facility] P46.513T       Isolation/Infection:   Isolation            No Isolation          Patient Infection Status       None to display            Nurse Assessment:  Last Vital Signs: BP 84/68   Pulse 82   Temp 97.8 °F (36.6 °C) (Oral)   Resp 18   Ht 5' 2\" (1.575 m) Ariadna Chong RN on 5/26/21 at 10:43 AM EDT    PHYSICIAN SECTION    Prognosis: {Prognosis:8141841037}    Condition at Discharge: 50[address] Gillespie Patient Condition:[id number]}    Rehab Potential (if transferring to Rehab): {Prognosis:5978141994}     Nutrition Therapy:  Current Nutrition Therapy:   - Oral Diet:  General  - Oral Nutrition Supplement:  Standard  twice a day    Routes of Feeding: Oral  Liquids: No Restrictions  Daily Fluid Restriction: no  Last Modified Barium Swallow with Video (Video Swallowing Test): not done    Treatments at the Time of Hospital Discharge:   Respiratory Treatments:   Oxygen Therapy:  is not on home oxygen therapy. Ventilator:    - No ventilator support    Rehab Therapies: Physical Therapy and Occupational Therapy  Weight Bearing Status/Restrictions: As per PT/OT/Ortho  Other Medical Equipment (for information only, NOT a DME order): As per PT/OT/Ortho  Other Treatments:     Recommended Labs or Other Treatments After Discharge: CBC, BMP in 3-5 days    Physician Certification: I certify the above information and transfer of Jalyn Santizo  is necessary for the continuing treatment of the diagnosis listed and that she requires [facility] for less 30 days.      Update Admission H&P: No change in H&P    PHYSICIAN SIGNATURE:  Electronically signed by Bola Bruno MD on 5/26/21 at 11:58 AM EDT

## 2021-05-26 NOTE — PROGRESS NOTES
Daily Progress Note    Pt. Awake and alert today-working with therapy  HR stable, BP low but stable  No CP, SOB  S/p femur fx  afib now sinus keep on meds  Going to rehab soon  Anemia improved post transfusion     New onset PAF     Went into Afib after surgery    On metoprolol and amio now    CHADS-VASc is 3- would need NOAC on D/C if cleared by ortho-fall risk is a concern    Currently on lovenox      Left Femur Fx    D/t Fall at home. No syncope with fall    On floor for 24 hours    S/P ORIF     Anemia- s/p transfusion-Hgb stable today  Will cont' to follow-increase activity     Echo 5/23/21 summary   Technically difficult study due to patient immobility s/p surgery.   Left ventricular systolic function is normal.   Ejection fraction is visually estimated at 50-55%.   Sclerotic, but non-stenotic aortic valve.   Moderate mitral regurgitation.   Moderate tricuspid regurgitation; RVSP: 40 mmHg consistent with mild PHTN.   No evidence of pericardial effusion.      PAST SURGICAL HISTORY:  Hernia repair.     SOCIAL HISTORY:  Does not smoke, does not drink.     ALLERGIES:  NKDA.     FAMILY HISTORY:  Significant for hypertension, skin cancer, lung cancer  present.     MEDICATIONS AT HOME:  She is on Lasix 20 mg b.i.d., Lopressor 25 b.i.d.,  Synthroid and Lotrel 2.5/10 once a day.       Objective:   BP (!) 73/60   Pulse 81   Temp 97.8 °F (36.6 °C) (Oral)   Resp 16   Ht 5' 2\" (1.575 m)   Wt 250 lb 7.1 oz (113.6 kg)   LMP 01/23/1995   SpO2 95%   BMI 45.81 kg/m²       Intake/Output Summary (Last 24 hours) at 5/26/2021 0959  Last data filed at 5/26/2021 0811  Gross per 24 hour   Intake 840 ml   Output 4050 ml   Net -3210 ml       Medications:   Scheduled Meds:   furosemide  20 mg Oral Daily    amiodarone  100 mg Oral Daily    metoprolol tartrate  50 mg Oral BID    sodium chloride flush  10 mL Intravenous 2 times per day    sennosides-docusate sodium  1 tablet Oral BID    enoxaparin  30 mg Subcutaneous Daily    midodrine  10 mg Oral TID     sodium chloride flush  5-40 mL Intravenous 2 times per day    levothyroxine  50 mcg Oral Daily    magnesium oxide  400 mg Oral BID      Infusions:   sodium chloride      sodium chloride        PRN Meds:  sodium chloride, sodium chloride flush, sodium chloride, HYDROmorphone **OR** HYDROmorphone, magnesium hydroxide, oxyCODONE **OR** oxyCODONE, promethazine **OR** ondansetron, sodium chloride flush, polyethylene glycol, acetaminophen **OR** acetaminophen       Physical Exam:  Vitals:    05/26/21 0811   BP: (!) 73/60   Pulse: 81   Resp:    Temp:    SpO2:         General: AAO, NAD  Chest: Nontender  Cardiac: First and Second Heart Sounds are Normal, No Murmurs or Gallops noted  Lungs:Clear to auscultation and percussion. Abdomen: Soft, NT, ND, +BS  Extremities: No clubbing, no edema  Vascular:  Equal 2+ peripheral pulses. Lab Data:  CBC:   Recent Labs     05/24/21  0640 05/24/21  2155 05/25/21  0545 05/26/21  0440   WBC 8.6  --  8.3 8.9   HGB 6.8* 10.0* 10.0* 10.2*   HCT 21.7* 30.1* 31.3* 32.0*   MCV 99.1  --  93.2 93.6   *  --  143 166     BMP:   Recent Labs     05/24/21  0640 05/25/21  0545 05/26/21  0440    138 138   K 4.4 4.4 4.3    107 107   CO2 18* 21 21   BUN 43* 43* 45*   CREATININE 2.6* 2.6* 2.8*     LIVER PROFILE:   Recent Labs     05/24/21  0640 05/25/21  0545 05/26/21  0440   AST 70* 63* 49*   ALT 8* 6* <5*   BILITOT 0.5 0.9 1.0   ALKPHOS 61 72 72     PT/INR: No results for input(s): PROTIME, INR in the last 72 hours. APTT: No results for input(s): APTT in the last 72 hours. BNP:  No results for input(s): BNP in the last 72 hours.       Assessment:  Patient Active Problem List    Diagnosis Date Noted    Closed transcervical fracture of proximal femur, left, initial encounter ([facility] 05/21/2021    Morbidly obese ([facility] 07/23/2020    History of ventral hernia repair 10/19/2018    HTN (hypertension) 08/10/2018    S/P herniorrhaphy 07/11/2018  S/P ventral herniorrhaphy 06/13/2018    H/O ventral hernia 06/06/2018    Chronic kidney disease (CKD) stage G4/A1, severely decreased glomerular filtration rate (GFR) between 15-29 mL/min/1.73 square meter and albuminuria creatinine ratio less than 30 mg/g ([facility]) 05/16/2017    GERD (gastroesophageal reflux disease) 05/16/2017    CHF (congestive heart failure) ([facility] 12/17/2016    CRF (chronic renal failure) 09/20/2016    Hypothyroid 09/20/2016    Ventral hernia without obstruction or gangrene 09/18/2016    SVT (supraventricular tachycardia) ([facility] 09/18/2016    Chronic kidney disease (CKD) stage G4/A2, severely decreased glomerular filtration rate (GFR) between 15-29 mL/min/1.73 square meter and albuminuria creatinine ratio between  mg/g ([facility] 06/21/2016       Electronically signed by Ruth Anton PA-C on 5/26/2021 at 9:59 AM   Electronically signed by Nichole Padilla MD on 5/26/21 at 10:58 AM EDT

## 2021-05-26 NOTE — PROGRESS NOTES
Physical Therapy    Physical Therapy Treatment Note  Name: Berna Carter MRN: [id number] :   1940   Date:  2021   Admission Date: 2021 Room:  [address] [place] [address]   Restrictions/Precautions:  Restrictions/Precautions  Restrictions/Precautions: General Precautions, Fall Risk, Weight Bearing Lower Extremity Weight Bearing Restrictions  Left Lower Extremity Weight Bearing: Weight Bearing As Tolerated   fear of falling  Communication with other providers:  Shiv January RN states pt is ok to see for therapy, co-treat with OT  Subjective:  Patient states:  She is afraid to do too much  Pain:   Location, Type, Intensity (0/10 to 10/10):  2/10 at rest and 4/10 with walking   Objective:    Observation:  Pt was resting in the bed  Treatment, including education/measures:  Supine Exercises: with assist  Ankle pumps x 10  Heel slides x 10  Hip abd x 10  Therapeutic Exercise:  Therapeutic exercises were instructed today. Cues were given for technique, safety, recruitment, and rationale. Cues were verbal and/or tactile. Transfers with line management of tele, Hemovac and mobley  Supine to sit :mod A of 2 with pt initiated trans, with increased time  Scooting :max A of 2  Sit to stand :min A of 2 and VC's  Stand to sit :mod a of 2  Gait:  Pt amb with RW for 5 ft with mod a of 2 and chair follow  Pt needed VC's for tech, sequence and encouragement  Gait Comments: with VC's' and TC's for B LE placement, walker placement and sequence throughout ambulation; with VC's and TC's to maintain upright posture in order to avoid COM shifting outside of ASHLEY; with VC's for PLB throughout ambulation  Safety  Patient left safely in the chair, with call light/phone in reach with alarm applied. Gait belt and mask were used for transfers and gait.   Assessment / Impression:     Patient's tolerance of treatment:  Fair to good, pt lacks confidence in her abilities   Adverse Reaction: none  Significant change in status and impact: none  Barriers to improvement:  Fear of falling and pain  Plan for Next Session:    Will cont to work towards pt's goals per her tolerance  Time in:  0915  Time out:  0950  Timed treatment minutes:  35  Total treatment time:  35  Previously filed items:  Social/Functional History  Lives With: Alone  Type of Home: House  Home Layout: Two level, Able to Live on Main level with bedroom/bathroom  Home Access: Stairs to enter with rails  Entrance Stairs - Number of Steps: 3  Bathroom Shower/Tub: Walk-in shower  Bathroom Toilet: Handicap height  Bathroom Equipment: Shower chair, Grab bars in [address] [place]: Cane, Rolling walker  ADL Assistance: Independent  Homemaking Assistance: Independent  Ambulation Assistance: Independent (mod I with spc as needed)  Transfer Assistance: Independent  Active : Yes     Long term goals  Time Frame for Long term goals : In one week:  Long term goal 1: Pt will complete all bed mobility with modAx2  Long term goal 2: Pt will complete sit <> stand transfers with modAx2  Long term goal 3: Pt will complete bed <> chair transfers with modAx2  Long term goal 4: Pt will ambulate 5 feet with modAx2 with LRAD  Long term goal 5: Pt will independently complete 3 sets of 10 reps of BLE AROM exercises in available and allowed ROM    Electronically signed by:     Sweta Johnston PTA  5/26/2021, 9:48 AM

## 2021-05-26 NOTE — CARE COORDINATION
Passar completed and submitted electronically in anticipation of pt transfer to [facility] at discharge.

## 2021-05-26 NOTE — PROGRESS NOTES
alarm.    Assessment / Impression:    Patient's tolerance of treatment: Well  Adverse Reaction: None  Significant change in status and impact:  Increased mobility this date, continues c pain and anxiety, appears more limited by fearfulness  Barriers to improvement: Anxiety for falling, pain, strength, endurance    Plan for Next Session:    Continue per OT POC per patient's tolerance c plan to address standing tolerance and functional transfers during ADL tasks. Time in:  0915  Time out:  0950  Timed treatment minutes:  35  Total treatment time:  35      Electronically signed by:    RAVINDER Bloom  5/26/2021, 8:43 AM    Goals:  1. Pt will complete all aspects of bed mobility for EOB/OOB ADLs ModA. 2. Pt will complete LB bathing with Kaila and AE as needed. 3. Pt will complete all aspects of LB dressing with ModA and AE as needed. 4. Pt will complete all functional transfers to and from bed, chair, toilet, shower chair with Kaila and AD. 5. Pt will ambulate HH distance to bathroom for toileting with Kaila and AD. 6. Pt will complete all aspects of toileting task with ModA. 7. Pt will complete oral hygiene/grooming routine in standing at sink with [address] good dynamic standing balance for approx 8 minutes. 8. Pt will complete ther ex/ther act with focus on UB strengthening.

## 2021-05-26 NOTE — PROGRESS NOTES
HemoVac to left upper thigh removed per Dr. Lg Pizarro orders. Pressure held to site. Small amt of serous drainage noted. Site cleansed with betadine, ABD pad and tegaderm applied over site.      Electronically signed by Deepa Gutierrez RN on 5/26/2021

## 2021-05-26 NOTE — CARE COORDINATION
Met with patient to discuss SNF need; she is medically ready for discharge and previous caregivers are not available. She is very reluctant to discuss any SNF. She did agree to [facility]. Contacted [facility]; [facility]. Contacted [facility]; spoke to Lacie Breaux. They have a bed and will accept patient today. All PHI sent to [facility]. Myriam Prescott RN     6703 Spoke to patient to relay that [facility] has a bed ans will accept bed today. Myriam Prescott RN     1200 Spoke to [facility]; need for OLESYA completion. Myriam Prescott RN     [address] [facility] called for transport; 6:45  time. Contacted [facility] ; spoke to Shawanda and confirmed  at 6:45.  Myriam Prescott RN

## 2021-05-28 ENCOUNTER — HOSPITAL ENCOUNTER (OUTPATIENT)
Age: 81
Setting detail: SPECIMEN
Discharge: HOME | End: 2021-05-28
Payer: MEDICARE

## 2021-05-28 LAB
ALBUMIN SERPL-MCNC: 2.6 GM/DL (ref 3.4–5)
ALP BLD-CCNC: 78 IU/L (ref 40–128)
ALT SERPL-CCNC: 6 U/L (ref 10–40)
ANION GAP SERPL CALCULATED.3IONS-SCNC: 10 MMOL/L (ref 4–16)
AST SERPL-CCNC: 31 IU/L (ref 15–37)
BASOPHILS ABSOLUTE: 0 K/CU MM
BASOPHILS RELATIVE PERCENT: 0.1 % (ref 0–1)
BILIRUB SERPL-MCNC: 1 MG/DL (ref 0–1)
BUN BLDV-MCNC: 43 MG/DL (ref 6–23)
CALCIUM SERPL-MCNC: 8.5 MG/DL (ref 8.3–10.6)
CHLORIDE BLD-SCNC: 105 MMOL/L (ref 99–110)
CO2: 23 MMOL/L (ref 21–32)
CREAT SERPL-MCNC: 2.4 MG/DL (ref 0.6–1.1)
DIFFERENTIAL TYPE: ABNORMAL
EOSINOPHILS ABSOLUTE: 0.3 K/CU MM
EOSINOPHILS RELATIVE PERCENT: 4.3 % (ref 0–3)
GFR AFRICAN AMERICAN: 24 ML/MIN/1.73M2
GFR NON-AFRICAN AMERICAN: 19 ML/MIN/1.73M2
GLUCOSE BLD-MCNC: 77 MG/DL (ref 70–99)
HCT VFR BLD CALC: 31.6 % (ref 37–47)
HEMOGLOBIN: 10 GM/DL (ref 12.5–16)
IMMATURE NEUTROPHIL %: 1.5 % (ref 0–0.43)
LYMPHOCYTES ABSOLUTE: 1.6 K/CU MM
LYMPHOCYTES RELATIVE PERCENT: 19.6 % (ref 24–44)
MCH RBC QN AUTO: 30.4 PG (ref 27–31)
MCHC RBC AUTO-ENTMCNC: 31.6 % (ref 32–36)
MCV RBC AUTO: 96 FL (ref 78–100)
MONOCYTES ABSOLUTE: 1.1 K/CU MM
MONOCYTES RELATIVE PERCENT: 13.9 % (ref 0–4)
NUCLEATED RBC %: 0 %
PDW BLD-RTO: 17.3 % (ref 11.7–14.9)
PLATELET # BLD: 192 K/CU MM (ref 140–440)
PMV BLD AUTO: 9.7 FL (ref 7.5–11.1)
POTASSIUM SERPL-SCNC: 4.2 MMOL/L (ref 3.5–5.1)
RBC # BLD: 3.29 M/CU MM (ref 4.2–5.4)
SEGMENTED NEUTROPHILS ABSOLUTE COUNT: 4.8 K/CU MM
SEGMENTED NEUTROPHILS RELATIVE PERCENT: 60.6 % (ref 36–66)
SODIUM BLD-SCNC: 138 MMOL/L (ref 135–145)
TOTAL IMMATURE NEUTOROPHIL: 0.12 K/CU MM
TOTAL NUCLEATED RBC: 0 K/CU MM
TOTAL PROTEIN: 4.5 GM/DL (ref 6.4–8.2)
WBC # BLD: 8 K/CU MM (ref 4–10.5)

## 2021-05-28 PROCEDURE — 80053 COMPREHEN METABOLIC PANEL: CPT

## 2021-05-28 PROCEDURE — 36415 COLL VENOUS BLD VENIPUNCTURE: CPT

## 2021-05-28 PROCEDURE — 85025 COMPLETE CBC W/AUTO DIFF WBC: CPT

## 2021-06-03 ENCOUNTER — HOSPITAL ENCOUNTER (OUTPATIENT)
Age: 81
Setting detail: SPECIMEN
Discharge: HOME | End: 2021-06-03
Payer: MEDICARE

## 2021-06-03 PROCEDURE — 87186 SC STD MICRODIL/AGAR DIL: CPT

## 2021-06-03 PROCEDURE — 87088 URINE BACTERIA CULTURE: CPT

## 2021-06-03 PROCEDURE — 87086 URINE CULTURE/COLONY COUNT: CPT

## 2021-06-03 PROCEDURE — 81001 URINALYSIS AUTO W/SCOPE: CPT

## 2021-06-04 LAB
BACTERIA: ABNORMAL /HPF
BILIRUBIN URINE: NEGATIVE MG/DL
BLOOD, URINE: ABNORMAL
CLARITY: ABNORMAL
COLOR: ABNORMAL
GLUCOSE, URINE: NEGATIVE MG/DL
KETONES, URINE: NEGATIVE MG/DL
LEUKOCYTE ESTERASE, URINE: ABNORMAL
NITRITE URINE, QUANTITATIVE: NEGATIVE
PH, URINE: 5 (ref 5–8)
PROTEIN UA: 30 MG/DL
RBC URINE: 2 /HPF (ref 0–6)
SPECIFIC GRAVITY UA: 1.02 (ref 1–1.03)
SQUAMOUS EPITHELIAL: 2 /HPF
TRICHOMONAS: ABNORMAL /HPF
UROBILINOGEN, URINE: NEGATIVE MG/DL (ref 0.2–1)
WBC CLUMP: ABNORMAL /HPF
WBC UA: 40 /HPF (ref 0–5)

## 2021-06-05 LAB
CULTURE: ABNORMAL
CULTURE: ABNORMAL
Lab: ABNORMAL
SPECIMEN: ABNORMAL

## 2021-06-28 ENCOUNTER — TELEPHONE (OUTPATIENT)
Dept: FAMILY MEDICINE CLINIC | Age: 81
End: 2021-06-28

## 2021-06-28 NOTE — TELEPHONE ENCOUNTER
Patient being D/C from [facility] 6-29-21 and [facility] sent home care request. Call Arnold Faria and give verbal order for Nursing OT & PT.  And that doctor will follow

## 2021-06-30 ENCOUNTER — CARE COORDINATION (OUTPATIENT)
Dept: CASE MANAGEMENT | Age: 81
End: 2021-06-30

## 2021-06-30 ENCOUNTER — TELEPHONE (OUTPATIENT)
Dept: FAMILY MEDICINE CLINIC | Age: 81
End: 2021-06-30

## 2021-06-30 DIAGNOSIS — S72.032A: Primary | ICD-10-CM

## 2021-06-30 PROCEDURE — 1111F DSCHRG MED/CURRENT MED MERGE: CPT | Performed by: FAMILY MEDICINE

## 2021-06-30 NOTE — TELEPHONE ENCOUNTER
NEED VERBAL FOR SN/PT/OT/    CAN PT TAKE TYLENOL FOR PAIN? WHEN PT WAS DC FROM [facility] THEIR MED LIST DOESN'T HAVE LASIX ON IT AND THEY JUST WANT TO KNOW IF SHE IS TO BE ON IT OR NOT.

## 2021-06-30 NOTE — CARE COORDINATION
Nurse (CTN) contacted the patient by telephone to perform post hospital discharge assessment. Verified name and  with patient as identifiers. Provided introduction to self, and explanation of the CTN role. CTN reviewed discharge instructions, medical action plan and red flags with patient who verbalized understanding. Patient given an opportunity to ask questions and does not have any further questions or concerns at this time. Were discharge instructions available to patient? Yes. Reviewed appropriate site of care based on symptoms and resources available to patient including: PCP. The patient agrees to contact the PCP office for questions related to their healthcare. Medication reconciliation was performed with patient, who verbalizes understanding of administration of home medications. Advised obtaining a 90-day supply of all daily and as-needed medications. CTN provided contact information. No further follow-up call identified based on severity of symptoms and risk factors. Care Transitions 24 Hour Call    Care Transitions Interventions         Follow Up  No future appointments.   MANNY RamirezN, RN   [address] [facility] Transition Nurse  [phone]

## 2021-07-01 NOTE — TELEPHONE ENCOUNTER
Please let them know that Dr. Pauly Tan is out of the office until Monday, but as far as I can tell from her chart, yes she should be on Lasix because the hospitalist said continue home dose of Lasix in his note. I cannot give verbal consent - that will have to be Dr. Pauly Tan because I've never seen the patient and will not be the provider following her. Tylenol 1000 mg every 6 hours prn pain is fine.

## 2021-07-01 NOTE — TELEPHONE ENCOUNTER
Returned Mels call per [employer] note. Viky voiced understanding.  Also gave verbal for requested services, FYNASRA

## 2021-07-01 NOTE — TELEPHONE ENCOUNTER
Viky called back today- is concerned about pt not taking lasix.  Can this get forwarded for someone to respond today please

## 2021-07-09 RX ORDER — OXYCODONE AND ACETAMINOPHEN 7.5; 325 MG/1; MG/1
1 TABLET ORAL EVERY 6 HOURS PRN
Qty: 60 TABLET | Refills: 0 | OUTPATIENT
Start: 2021-07-09 | End: 2021-08-08

## 2021-07-09 NOTE — TELEPHONE ENCOUNTER
She had surgery, in May, by Dr. Cotto on her leg. Patient has been doing Physical Therapy after being discharged from [facility] ---she is out of the Oxycodone 5 mg, but that was not helping with the pain, so now she is requesting a slightly higher dosage of 7.5 mg-----she will make an appt as soon as her ramp is built for the house which should be by the end of July.     LV  2/16/21    [place]  None    [facility] on [address] [facility]

## 2021-07-09 NOTE — TELEPHONE ENCOUNTER
Called and spoke with the pt. Pt stated she has contacted dr. Gamble Gene office and already got this medication filled. No further action needed at this time.

## 2021-07-12 ENCOUNTER — TELEPHONE (OUTPATIENT)
Dept: FAMILY MEDICINE CLINIC | Age: 81
End: 2021-07-12

## 2021-07-12 NOTE — TELEPHONE ENCOUNTER
Would like a verbal order for Physical Therapy to continue for some sessions.   (Did not give frequency)

## 2021-07-26 RX ORDER — AMIODARONE HYDROCHLORIDE 100 MG/1
100 TABLET ORAL DAILY
Qty: 90 TABLET | Refills: 1 | Status: SHIPPED | OUTPATIENT
Start: 2021-07-26 | End: 2021-09-21 | Stop reason: ALTCHOICE

## 2021-07-27 ENCOUNTER — VIRTUAL VISIT (OUTPATIENT)
Dept: FAMILY MEDICINE CLINIC | Age: 81
End: 2021-07-27
Payer: MEDICARE

## 2021-07-27 DIAGNOSIS — I48.91 ATRIAL FIBRILLATION, UNSPECIFIED TYPE (HCC): ICD-10-CM

## 2021-07-27 DIAGNOSIS — N18.4 CHRONIC RENAL FAILURE, STAGE 4 (SEVERE) (HCC): ICD-10-CM

## 2021-07-27 DIAGNOSIS — S72.032A: ICD-10-CM

## 2021-07-27 DIAGNOSIS — E66.01 MORBIDLY OBESE (HCC): ICD-10-CM

## 2021-07-27 DIAGNOSIS — N18.4 CHRONIC KIDNEY DISEASE (CKD) STAGE G4/A2, SEVERELY DECREASED GLOMERULAR FILTRATION RATE (GFR) BETWEEN 15-29 ML/MIN/1.73 SQUARE METER AND ALBUMINURIA CREATININE RATIO BETWEEN 30-299 MG/G (HCC): ICD-10-CM

## 2021-07-27 DIAGNOSIS — N18.4 CHRONIC KIDNEY DISEASE (CKD) STAGE G4/A1, SEVERELY DECREASED GLOMERULAR FILTRATION RATE (GFR) BETWEEN 15-29 ML/MIN/1.73 SQUARE METER AND ALBUMINURIA CREATININE RATIO LESS THAN 30 MG/G (HCC): Primary | ICD-10-CM

## 2021-07-27 PROCEDURE — 1123F ACP DISCUSS/DSCN MKR DOCD: CPT | Performed by: FAMILY MEDICINE

## 2021-07-27 PROCEDURE — 4040F PNEUMOC VAC/ADMIN/RCVD: CPT | Performed by: FAMILY MEDICINE

## 2021-07-27 PROCEDURE — G8400 PT W/DXA NO RESULTS DOC: HCPCS | Performed by: FAMILY MEDICINE

## 2021-07-27 PROCEDURE — 1090F PRES/ABSN URINE INCON ASSESS: CPT | Performed by: FAMILY MEDICINE

## 2021-07-27 PROCEDURE — G8427 DOCREV CUR MEDS BY ELIG CLIN: HCPCS | Performed by: FAMILY MEDICINE

## 2021-07-27 PROCEDURE — 99214 OFFICE O/P EST MOD 30 MIN: CPT | Performed by: FAMILY MEDICINE

## 2021-07-27 RX ORDER — LEVOTHYROXINE SODIUM 0.05 MG/1
50 TABLET ORAL DAILY
Qty: 90 TABLET | Refills: 1 | Status: SHIPPED | OUTPATIENT
Start: 2021-07-27 | End: 2021-09-21 | Stop reason: SDUPTHER

## 2021-07-27 NOTE — PROGRESS NOTES
2021    TELEHEALTH EVALUATION -- Audio/Visual (During UPTDA-83 public health emergency)    HPI:    Evan Ibrahim (:  1940) has requested an audio/video evaluation for the following concern(s):    Video visit with the patient who had a ORIF fracture left hip  She had atrial fibrillation complicating her hospital course she has been prescribed amiodarone  I told her she probably should go back to cardiology get advice on this she says she felt she was fine that home care told her heart rhythm was okay she is going to continue to take the medicine the course that is her choice  She needs home health care because she lives alone she is recovering from a hip fracture she has chronic kidney disease and obesity  I am authorizing continued PT outpatient and skilled nursing    Review of Systems    Prior to Visit Medications    Medication Sig Taking?  Authorizing Provider   magnesium oxide (MAG-OX) 400 (241.3 Mg) MG TABS tablet TAKE ONE TABLET BY MOUTH TWICE A DAY Yes Charley Chin MD   metoprolol tartrate (LOPRESSOR) 25 MG tablet Take 1 tablet by mouth 2 times daily Yes Charley Chin MD   levothyroxine (SYNTHROID) 50 MCG tablet Take 1 tablet by mouth Daily Yes Charley Chin MD   omeprazole (PRILOSEC) 20 MG delayed release capsule TAKE TWO CAPSULES BY MOUTH DAILY  Patient taking differently: as needed TAKE TWO CAPSULES BY MOUTH DAILY Yes Paul Palafox MD   amiodarone (PACERONE) 100 MG tablet Take 1 tablet by mouth daily  Patient not taking: Reported on 2021  Charley Chin MD   sennosides-docusate sodium (SENOKOT-S) 8.6-50 MG tablet Take 1 tablet by mouth 2 times daily  Patient not taking: Reported on 2021  Michael Bell MD   midodrine (PROAMATINE) 10 MG tablet Take 1 tablet by mouth 3 times daily (with meals)  Patient not taking: Reported on 2021  Michael Bell MD   enoxaparin (LOVENOX) 30 MG/0.3ML injection Inject 0.3 mLs into the skin daily  Patient not taking: Reported on 6/30/2021  Susan Aguilar MD       Social History     Tobacco Use    Smoking status: Never Smoker    Smokeless tobacco: Never Used   Vaping Use    Vaping Use: Never used   Substance Use Topics    Alcohol use: No    Drug use: No            PHYSICAL EXAMINATION:  [ INSTRUCTIONS:  \"[x]\" Indicates a positive item  \"[]\" Indicates a negative item  -- DELETE ALL ITEMS NOT EXAMINED]  Vital Signs: (As obtained by patient/caregiver or practitioner observation)    Blood pressure-  Heart rate-    Respiratory rate-    Temperature-  Pulse oximetry-     Constitutional: [x] Appears well-developed and well-nourished [] No apparent distress      [] Abnormal-   Mental status  [x] Alert and awake  [x] Oriented to person/place/time [x]Able to follow commands      Eyes:  EOM    []  Normal  [] Abnormal-  Sclera  []  Normal  [] Abnormal -         Discharge []  None visible  [] Abnormal -    HENT:   [] Normocephalic, atraumatic. [] Abnormal   [] Mouth/Throat: Mucous membranes are moist.     External Ears [] Normal  [] Abnormal-     Neck: [] No visualized mass     Pulmonary/Chest: [x] Respiratory effort normal.  [] No visualized signs of difficulty breathing or respiratory distress        [] Abnormal-      Musculoskeletal:   [] Normal gait with no signs of ataxia         [] Normal range of motion of neck        [] Abnormal-       Neurological:        [] No Facial Asymmetry (Cranial nerve 7 motor function) (limited exam to video visit)          [] No gaze palsy        [] Abnormal-         Skin:        [] No significant exanthematous lesions or discoloration noted on facial skin         [] Abnormal-            Psychiatric:       [] Normal Affect [] No Hallucinations        [] Abnormal-     Other pertinent observable physical exam findings-   Reviewed patient's hospital records  Reconciled medications    ASSESSMENT/PLAN:  1.  Chronic kidney disease (CKD) stage G4/A1, severely decreased glomerular filtration rate (GFR) between 15-29 mL/min/1.73 square meter and albuminuria creatinine ratio less than 30 mg/g (HCC)      2. Closed transcervical fracture of proximal femur, left, initial encounter ([facility]      3. Morbidly obese ([facility]    Post op a fib  Recommend continuing home physical therapy and skilled nursing  Recommend cardiology follow-up  I recommend orthopedic follow-up      No follow-ups on file. Howard Kaiser, was evaluated through a synchronous (real-time) audio-video encounter. The patient (or guardian if applicable) is aware that this is a billable service. Verbal consent to proceed has been obtained within the past 12 months. The visit was conducted pursuant to the emergency declaration under the [address] [place] [address] authority and the [employer] General Act. Patient identification was verified, and a caregiver was present when appropriate. The patient was located in a state where the provider was credentialed to provide care. Total time spent on this encounter: Not billed by time    --Delvin Richter MD on 7/27/2021 at 4:38 PM    An electronic signature was used to authenticate this note.

## 2021-07-30 ENCOUNTER — TELEPHONE (OUTPATIENT)
Dept: FAMILY MEDICINE CLINIC | Age: 81
End: 2021-07-30

## 2021-07-30 NOTE — TELEPHONE ENCOUNTER
7-27-21  NA Hs no ramp at home to get out of the house   Neville Snow   Nystatin cream   Given to patient at [facility] for a rash under breast and top of hips

## 2021-08-02 RX ORDER — CLOTRIMAZOLE AND BETAMETHASONE DIPROPIONATE 10; .64 MG/G; MG/G
CREAM TOPICAL
Qty: 1 TUBE | Refills: 2 | Status: SHIPPED | OUTPATIENT
Start: 2021-08-02 | End: 2021-10-07

## 2021-08-16 ENCOUNTER — OFFICE VISIT (OUTPATIENT)
Dept: FAMILY MEDICINE CLINIC | Age: 81
End: 2021-08-16
Payer: MEDICARE

## 2021-08-16 VITALS
WEIGHT: 222 LBS | HEIGHT: 62 IN | SYSTOLIC BLOOD PRESSURE: 130 MMHG | HEART RATE: 68 BPM | OXYGEN SATURATION: 95 % | TEMPERATURE: 97.8 F | BODY MASS INDEX: 40.85 KG/M2 | DIASTOLIC BLOOD PRESSURE: 92 MMHG

## 2021-08-16 DIAGNOSIS — M25.849 CYST OF JOINT OF HAND, UNSPECIFIED LATERALITY: ICD-10-CM

## 2021-08-16 DIAGNOSIS — B37.2 YEAST DERMATITIS: Primary | ICD-10-CM

## 2021-08-16 PROBLEM — K21.9 GERD (GASTROESOPHAGEAL REFLUX DISEASE): Status: RESOLVED | Noted: 2017-05-16 | Resolved: 2021-08-16

## 2021-08-16 PROCEDURE — G8427 DOCREV CUR MEDS BY ELIG CLIN: HCPCS | Performed by: NURSE PRACTITIONER

## 2021-08-16 PROCEDURE — 1090F PRES/ABSN URINE INCON ASSESS: CPT | Performed by: NURSE PRACTITIONER

## 2021-08-16 PROCEDURE — 4040F PNEUMOC VAC/ADMIN/RCVD: CPT | Performed by: NURSE PRACTITIONER

## 2021-08-16 PROCEDURE — G8400 PT W/DXA NO RESULTS DOC: HCPCS | Performed by: NURSE PRACTITIONER

## 2021-08-16 PROCEDURE — 1123F ACP DISCUSS/DSCN MKR DOCD: CPT | Performed by: NURSE PRACTITIONER

## 2021-08-16 PROCEDURE — 99213 OFFICE O/P EST LOW 20 MIN: CPT | Performed by: NURSE PRACTITIONER

## 2021-08-16 PROCEDURE — 1036F TOBACCO NON-USER: CPT | Performed by: NURSE PRACTITIONER

## 2021-08-16 PROCEDURE — G8417 CALC BMI ABV UP PARAM F/U: HCPCS | Performed by: NURSE PRACTITIONER

## 2021-08-16 RX ORDER — FUROSEMIDE 20 MG/1
20 TABLET ORAL 2 TIMES DAILY
COMMUNITY
End: 2021-09-21 | Stop reason: SDUPTHER

## 2021-08-16 RX ORDER — NYSTATIN 100000 [USP'U]/G
POWDER TOPICAL
Qty: 45 G | Refills: 0 | Status: SHIPPED | OUTPATIENT
Start: 2021-08-16 | End: 2021-09-23 | Stop reason: SDUPTHER

## 2021-08-16 ASSESSMENT — ENCOUNTER SYMPTOMS
CHEST TIGHTNESS: 0
SORE THROAT: 0
DIARRHEA: 0
NAUSEA: 0
SINUS PAIN: 0
VOMITING: 0
WHEEZING: 0
NAIL CHANGES: 0
RHINORRHEA: 0
SINUS PRESSURE: 0
COUGH: 0
EYE PAIN: 0
SHORTNESS OF BREATH: 0

## 2021-08-16 NOTE — PROGRESS NOTES
throat or vomiting. Treatments tried: gold bond powder. The treatment provided no relief. Review of Systems   Constitutional: Negative for appetite change, chills, fatigue and fever. HENT: Negative for congestion, ear pain, postnasal drip, rhinorrhea, sinus pressure, sinus pain, sneezing and sore throat. Eyes: Negative for pain. Respiratory: Negative for cough, chest tightness, shortness of breath and wheezing. Cardiovascular: Negative for chest pain and palpitations. Gastrointestinal: Negative for anorexia, diarrhea, nausea and vomiting. Musculoskeletal: Negative for joint pain. Skin: Positive for rash. Negative for nail changes. Neurological: Negative for dizziness, light-headedness and headaches. Current Outpatient Medications   Medication Sig Dispense Refill    furosemide (LASIX) 20 MG tablet Take 20 mg by mouth 2 times daily      nystatin (MYCOSTATIN) 271992 UNIT/GM powder Apply 3 times daily x 10 to 14 days 45 g 0    clotrimazole-betamethasone (LOTRISONE) 1-0.05 % cream Apply topically 2 times daily.  1 Tube 2    metoprolol tartrate (LOPRESSOR) 25 MG tablet Take 1 tablet by mouth 2 times daily 180 tablet 1    magnesium oxide (MAG-OX) 400 (241.3 Mg) MG TABS tablet TAKE ONE TABLET BY MOUTH TWICE A  tablet 1    levothyroxine (SYNTHROID) 50 MCG tablet Take 1 tablet by mouth Daily 90 tablet 1    amiodarone (PACERONE) 100 MG tablet Take 1 tablet by mouth daily (Patient not taking: Reported on 7/27/2021) 90 tablet 1    sennosides-docusate sodium (SENOKOT-S) 8.6-50 MG tablet Take 1 tablet by mouth 2 times daily (Patient not taking: Reported on 7/27/2021)      midodrine (PROAMATINE) 10 MG tablet Take 1 tablet by mouth 3 times daily (with meals) (Patient not taking: Reported on 7/27/2021) 90 tablet 3    enoxaparin (LOVENOX) 30 MG/0.3ML injection Inject 0.3 mLs into the skin daily (Patient not taking: Reported on 6/30/2021)  0    omeprazole (PRILOSEC) 20 MG delayed release capsule TAKE TWO CAPSULES BY MOUTH DAILY (Patient not taking: Reported on 8/16/2021) 90 capsule 0     No current facility-administered medications for this visit. Past medical, family,surgical history reviewed today. Objective:  BP (!) 130/92   Pulse 68   Temp 97.8 °F (36.6 °C)   Ht 5' 2\" (1.575 m)   Wt 222 lb (100.7 kg)   LMP 01/23/1995   SpO2 95%   Breastfeeding No   BMI 40.60 kg/m²   BP Readings from Last 3 Encounters:   08/16/21 (!) 130/92   05/26/21 (!) 126/105   05/22/21 115/61     Wt Readings from Last 3 Encounters:   08/16/21 222 lb (100.7 kg)   05/26/21 250 lb 7.1 oz (113.6 kg)   07/27/20 240 lb 9.6 oz (109.1 kg)         Physical Exam  Constitutional:       Appearance: Normal appearance. HENT:      Head: Normocephalic. Cardiovascular:      Rate and Rhythm: Normal rate and regular rhythm. Heart sounds: Normal heart sounds. Pulmonary:      Effort: Pulmonary effort is normal.      Breath sounds: Normal breath sounds. Musculoskeletal:      Cervical back: Neck supple. Skin:     General: Skin is warm and dry. Findings: Rash present. Comments: Cyst like lesion on bilateral fingers    Neurological:      Mental Status: She is alert and oriented to person, place, and time.    Psychiatric:         Mood and Affect: Mood normal.         Behavior: Behavior normal.         Lab Results   Component Value Date    WBC 8.0 05/28/2021    HGB 10.0 (L) 05/28/2021    HCT 31.6 (L) 05/28/2021    MCV 96.0 05/28/2021     05/28/2021     Lab Results   Component Value Date     05/28/2021    K 4.2 05/28/2021     05/28/2021    CO2 23 05/28/2021    BUN 43 (H) 05/28/2021    CREATININE 2.4 (H) 05/28/2021    GLUCOSE 77 05/28/2021    CALCIUM 8.5 05/28/2021    PROT 4.5 (L) 05/28/2021    LABALBU 2.6 (L) 05/28/2021    BILITOT 1.0 05/28/2021    ALKPHOS 78 05/28/2021    AST 31 05/28/2021    ALT 6 (L) 05/28/2021    LABGLOM 19 (L) 05/28/2021    GFRAA 24 (L) 05/28/2021     Lab Results

## 2021-08-23 ENCOUNTER — TELEPHONE (OUTPATIENT)
Dept: FAMILY MEDICINE CLINIC | Age: 81
End: 2021-08-23

## 2021-08-23 NOTE — TELEPHONE ENCOUNTER
Reporting:     Patient discharged from [facility] today.

## 2021-09-17 LAB — URIC ACID: 10

## 2021-09-20 ENCOUNTER — NURSE TRIAGE (OUTPATIENT)
Dept: OTHER | Facility: CLINIC | Age: 81
End: 2021-09-20

## 2021-09-20 NOTE — TELEPHONE ENCOUNTER
[facility] with [employer] Complaint. Brief description of triage: Finger pain and toe wound/pain. See below assessment. Triage indicates for patient to See in office, today. If no appointment, be seen in [facility]/ walk-in clinic. States understanding and agrees with plan. Care advice provided, patient verbalizes understanding; denies any other questions or concerns; instructed to call back for any new or worsening symptoms. Writer provided warm transfer to Paulie Peck at [facility] for appointment scheduling. Patient states that she would like to be established with a new provider. Will discuss this with ECC. Will not route information to current PCP, as she states no longer seeing. Attention Provider: Thank you for allowing me to participate in the care of your patient. The patient was connected to triage in response to information provided to the ECC. Please do not respond through this encounter as the response is not directed to a shared pool. Reason for Disposition   Yellow pus under skin around fingernail (cuticle) or pus under fingernail    Answer Assessment - Initial Assessment Questions  1. ONSET: \"When did the pain start? \"       Intermittent pain     2. LOCATION and RADIATION: \"Where is the pain located? \"  (e.g., fingertip, around nail, joint, entire finger)      Base of fingers     3. SEVERITY: \"How bad is the pain? \" \"What does it keep you from doing? \"   (Scale 1-10; or mild, moderate, severe)   - MILD - doesn't interfere with normal activities    - MODERATE - interferes with normal activities or awakens from sleep   - SEVERE - excruciating pain, unable to hold a glass of water or bend finger even a little      Moderate, one on toe is more painful     4. APPEARANCE: \"What does the finger look like? \" (e.g., redness, swelling, bruising, pallor)      Yellow spots on multiple fingers     5.  WORK OR EXERCISE: \"Has there been any recent work or exercise that involved this part of the body? \"      Has one on the toe and it popped up one day. Denies injury     6. CAUSE: \"What do you think is causing the pain? \"      Gout, denies diabetes     7. AGGRAVATING FACTORS: \"What makes the pain worse? \" (e.g., using computer)      Worsened when hit it against something     8. OTHER SYMPTOMS: \"Do you have any other symptoms? \" (e.g., fever, neck pain, numbness)      Numbness in fingers, intermittent     9. PREGNANCY: \"Is there any chance you are pregnant? \" \"When was your last menstrual period? \"      No    Protocols used:  FINGER PAIN-ADULT-[place]

## 2021-09-21 ENCOUNTER — TELEPHONE (OUTPATIENT)
Dept: FAMILY MEDICINE CLINIC | Age: 81
End: 2021-09-21

## 2021-09-21 ENCOUNTER — OFFICE VISIT (OUTPATIENT)
Dept: FAMILY MEDICINE CLINIC | Age: 81
End: 2021-09-21
Payer: MEDICARE

## 2021-09-21 VITALS
BODY MASS INDEX: 40.12 KG/M2 | HEIGHT: 62 IN | SYSTOLIC BLOOD PRESSURE: 122 MMHG | WEIGHT: 218 LBS | DIASTOLIC BLOOD PRESSURE: 74 MMHG | HEART RATE: 65 BPM | OXYGEN SATURATION: 98 %

## 2021-09-21 DIAGNOSIS — N18.4 CHRONIC RENAL FAILURE, STAGE 4 (SEVERE) (HCC): ICD-10-CM

## 2021-09-21 DIAGNOSIS — L03.031 CELLULITIS OF TOE OF RIGHT FOOT: ICD-10-CM

## 2021-09-21 DIAGNOSIS — M10.371 GOUT DUE TO RENAL IMPAIRMENT INVOLVING TOE OF RIGHT FOOT, UNSPECIFIED CHRONICITY: ICD-10-CM

## 2021-09-21 DIAGNOSIS — N18.4 CHRONIC KIDNEY DISEASE (CKD) STAGE G4/A1, SEVERELY DECREASED GLOMERULAR FILTRATION RATE (GFR) BETWEEN 15-29 ML/MIN/1.73 SQUARE METER AND ALBUMINURIA CREATININE RATIO LESS THAN 30 MG/G (HCC): Primary | ICD-10-CM

## 2021-09-21 DIAGNOSIS — M1A.9XX1 TOPHI: ICD-10-CM

## 2021-09-21 PROCEDURE — G8400 PT W/DXA NO RESULTS DOC: HCPCS | Performed by: FAMILY MEDICINE

## 2021-09-21 PROCEDURE — 1036F TOBACCO NON-USER: CPT | Performed by: FAMILY MEDICINE

## 2021-09-21 PROCEDURE — 4040F PNEUMOC VAC/ADMIN/RCVD: CPT | Performed by: FAMILY MEDICINE

## 2021-09-21 PROCEDURE — G0008 ADMIN INFLUENZA VIRUS VAC: HCPCS | Performed by: FAMILY MEDICINE

## 2021-09-21 PROCEDURE — G8427 DOCREV CUR MEDS BY ELIG CLIN: HCPCS | Performed by: FAMILY MEDICINE

## 2021-09-21 PROCEDURE — 1090F PRES/ABSN URINE INCON ASSESS: CPT | Performed by: FAMILY MEDICINE

## 2021-09-21 PROCEDURE — G8417 CALC BMI ABV UP PARAM F/U: HCPCS | Performed by: FAMILY MEDICINE

## 2021-09-21 PROCEDURE — 1123F ACP DISCUSS/DSCN MKR DOCD: CPT | Performed by: FAMILY MEDICINE

## 2021-09-21 PROCEDURE — 99214 OFFICE O/P EST MOD 30 MIN: CPT | Performed by: FAMILY MEDICINE

## 2021-09-21 PROCEDURE — 90694 VACC AIIV4 NO PRSRV 0.5ML IM: CPT | Performed by: FAMILY MEDICINE

## 2021-09-21 RX ORDER — SULFAMETHOXAZOLE AND TRIMETHOPRIM 400; 80 MG/1; MG/1
1 TABLET ORAL DAILY
Qty: 20 TABLET | Refills: 0 | Status: SHIPPED | OUTPATIENT
Start: 2021-09-21 | End: 2021-10-01

## 2021-09-21 RX ORDER — FUROSEMIDE 20 MG/1
20 TABLET ORAL 2 TIMES DAILY
Qty: 180 TABLET | Refills: 1 | Status: ON HOLD | OUTPATIENT
Start: 2021-09-21 | End: 2021-10-28 | Stop reason: SDUPTHER

## 2021-09-21 RX ORDER — LEVOTHYROXINE SODIUM 0.05 MG/1
50 TABLET ORAL DAILY
Qty: 90 TABLET | Refills: 1 | Status: SHIPPED | OUTPATIENT
Start: 2021-09-21 | End: 2021-11-05

## 2021-09-21 NOTE — LETTER
[address] [place] [address] Ángel Huggins  Phone: [phone]  Fax: [phone]    Steffen De La Fuente MD         September 21, 2021     Patient: Veronica Mora   YOB: 1940   Date of Visit: 9/21/2021       To Whom It May Concern: It is my medical opinion that Yobani Victoria requires a disability parking placard for the following reasons:    Duration of need: 5 years    If you have any questions or concerns, please don't hesitate to call.     Sincerely,        Steffen De La Fuente MD

## 2021-09-21 NOTE — PROGRESS NOTES
2021     Edmar Perez      Chief Complaint   Patient presents with    Hand Pain     joint pain in the fingers bialteral hands, x3 months    Blister     blister on right foot, big toe, getting bigger, noticed x2 days ago    Other     pt requesting handicap placard letter    Discuss Medications     lotrisone cream, pt not sure if she should be using this or what she should be taking this for       HPI      Natasha Bañuelos is a [de-identified] y.o. female who presents today with the followin. Chronic kidney disease (CKD) stage G4/A1, severely decreased glomerular filtration rate (GFR) between 15-29 mL/min/1.73 square meter and albuminuria creatinine ratio less than 30 mg/g ([facility])    2. Cellulitis of toe of right foot    3. Gout due to renal impairment involving toe of right foot, unspecified chronicity    4. Tophi    5.  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 she was considering surgery she had this done before but it recurred  She said her plastic surgeon ludwin a uric acid level we do not have access to the report    REVIEW OF SYMPTOMS    Review of Systems   Genitourinary:        Stage IV kidney disease GFR runs about 20   Skin:        Has a bullous lesion just behind the great toenail right foot  There is a halo of redness around that there is no purulence       PAST MEDICAL HISTORY  Past Medical History:   Diagnosis Date    Acid reflux     'no recent issue\"    Arthritis     \"Left Index Finger\"    CHF (congestive heart failure) ([facility] [address]     per old chart hx of CHF with admission 2016 with pulmonary edema    Chronic kidney disease     Sees Dr. Kamini Mitchell have one kidney, dr Juan J Nielsen told me that in  I think\"    GERD (gastroesophageal reflux disease)     History of blood transfusion  Or     No Reaction To Blood Transfusion Received    Hypertension     ( pt states she is not on any medication for blood pressure)    Hypomagnesemia     Hypothyroidism     Shortness of breath on exertion     SVT (supraventricular tachycardia) (HCC)     per old chart hx of SVT with admission 2016 tx with Adenosine and per notes in 5/2018 hx of SVT- no cardiologist    Teeth missing     Upper And Lower    Wears glasses        FAMILY HISTORY  Family History   Problem Relation Age of Onset    Cancer Father         Lung Cancer    Arthritis Mother     Cancer Brother         Skin Cancer    High Cholesterol Brother        SOCIAL HISTORY  Social History     Socioeconomic History    Marital status:      Spouse name: None    Number of children: None    Years of education: None    Highest education level: None   Occupational History    None   Tobacco Use    Smoking status: Never Smoker    Smokeless tobacco: Never Used   Vaping Use    Vaping Use: Never used   Substance and Sexual Activity    Alcohol use: No    Drug use: No    Sexual activity: Never   Other Topics Concern    None   Social History Narrative    None     Social Determinants of Health     Financial Resource Strain:     Difficulty of Paying Living Expenses:    Food Insecurity:     Worried About Running Out of Food in the Last Year:     Ran Out of Food in the Last Year:    Transportation Needs:     Lack of Transportation (Medical):      Lack of Transportation (Non-Medical):    Physical Activity:     Days of Exercise per Week:     Minutes of Exercise per Session:    Stress:     Feeling of Stress :    Social Connections:     Frequency of Communication with Friends and Family:     Frequency of Social Gatherings with Friends and Family:     Attends [employer]ist Services:     Active Member of Clubs or Organizations:     Attends Club or Organization Meetings:     Marital Status:    Intimate Partner Violence:     Fear of Current or Ex-Partner:     Emotionally Abused:     Physically Abused:     Sexually Abused:         SURGICAL appearance. Musculoskeletal:      Comments: Obvious tophus deposit left index finger   Skin:     Comments: Bullous lesion above mention toe with some surrounding cellulitis         ASSESSMENT and Mariluz Stanley was seen today for hand pain, blister, other and discuss medications. Diagnoses and all orders for this visit:    Chronic kidney disease (CKD) stage G4/A1, severely decreased glomerular filtration rate (GFR) between 15-29 mL/min/1.73 square meter and albuminuria creatinine ratio less than 30 mg/g (HCC)    Cellulitis of toe of right foot    Gout due to renal impairment involving toe of right foot, unspecified chronicity    Tophi    Chronic renal failure, stage 4 (severe) ([facility] [address]    I will try to get a copy of the uric acid then decide about any medication have to be careful with her kidney function  She is given a handicap parking permit  Bactrim regular strength 1 twice daily for the foot infection      No follow-ups on file. Electronically signed by Jerri Melissa MD on 9/21/2021    Please note that this chart was generated using dragon dictation software. Although every effort was made to ensure the accuracy of this automated transcription, some errors in transcription may have occurred.

## 2021-09-22 ENCOUNTER — TELEPHONE (OUTPATIENT)
Dept: FAMILY MEDICINE CLINIC | Age: 81
End: 2021-09-22

## 2021-09-22 NOTE — TELEPHONE ENCOUNTER
Called pt and informed her letter for handicap placard was at the office to be picked up. Pt requested if we could mail this to her.  Placed in bin to be picked up by Cherry Yoon today 9/22/21

## 2021-09-23 DIAGNOSIS — B37.2 YEAST DERMATITIS: ICD-10-CM

## 2021-09-23 RX ORDER — NYSTATIN 100000 [USP'U]/G
POWDER TOPICAL
Qty: 45 G | Refills: 0 | Status: ON HOLD
Start: 2021-09-23 | End: 2021-10-28 | Stop reason: HOSPADM

## 2021-09-27 RX ORDER — ALLOPURINOL 100 MG/1
50 TABLET ORAL DAILY
Qty: 90 TABLET | Refills: 1 | Status: SHIPPED | OUTPATIENT
Start: 2021-09-27

## 2021-09-27 NOTE — TELEPHONE ENCOUNTER
Spoke with pt per Dr. Keith Sylvester note. Pt agreed and voiced understanding. Medication pended.  [address]

## 2021-10-07 ENCOUNTER — OFFICE VISIT (OUTPATIENT)
Dept: FAMILY MEDICINE CLINIC | Age: 81
End: 2021-10-07
Payer: MEDICARE

## 2021-10-07 VITALS
BODY MASS INDEX: 42.34 KG/M2 | OXYGEN SATURATION: 98 % | HEART RATE: 106 BPM | WEIGHT: 230.1 LBS | DIASTOLIC BLOOD PRESSURE: 62 MMHG | SYSTOLIC BLOOD PRESSURE: 118 MMHG | HEIGHT: 62 IN

## 2021-10-07 DIAGNOSIS — L03.116 CELLULITIS OF LEFT LOWER EXTREMITY: ICD-10-CM

## 2021-10-07 DIAGNOSIS — L03.115 CELLULITIS OF RIGHT LOWER EXTREMITY: Primary | ICD-10-CM

## 2021-10-07 PROCEDURE — G8484 FLU IMMUNIZE NO ADMIN: HCPCS | Performed by: PHYSICIAN ASSISTANT

## 2021-10-07 PROCEDURE — 4040F PNEUMOC VAC/ADMIN/RCVD: CPT | Performed by: PHYSICIAN ASSISTANT

## 2021-10-07 PROCEDURE — G8427 DOCREV CUR MEDS BY ELIG CLIN: HCPCS | Performed by: PHYSICIAN ASSISTANT

## 2021-10-07 PROCEDURE — 1090F PRES/ABSN URINE INCON ASSESS: CPT | Performed by: PHYSICIAN ASSISTANT

## 2021-10-07 PROCEDURE — 1036F TOBACCO NON-USER: CPT | Performed by: PHYSICIAN ASSISTANT

## 2021-10-07 PROCEDURE — G8417 CALC BMI ABV UP PARAM F/U: HCPCS | Performed by: PHYSICIAN ASSISTANT

## 2021-10-07 PROCEDURE — 99214 OFFICE O/P EST MOD 30 MIN: CPT | Performed by: PHYSICIAN ASSISTANT

## 2021-10-07 PROCEDURE — G8400 PT W/DXA NO RESULTS DOC: HCPCS | Performed by: PHYSICIAN ASSISTANT

## 2021-10-07 PROCEDURE — 1123F ACP DISCUSS/DSCN MKR DOCD: CPT | Performed by: PHYSICIAN ASSISTANT

## 2021-10-07 RX ORDER — CEPHALEXIN 500 MG/1
500 CAPSULE ORAL 4 TIMES DAILY
Qty: 28 CAPSULE | Refills: 0 | Status: ON HOLD
Start: 2021-10-07 | End: 2021-10-11 | Stop reason: HOSPADM

## 2021-10-07 RX ORDER — DOXYCYCLINE HYCLATE 100 MG
100 TABLET ORAL 2 TIMES DAILY
Qty: 20 TABLET | Refills: 0 | Status: ON HOLD
Start: 2021-10-07 | End: 2021-10-11 | Stop reason: HOSPADM

## 2021-10-07 NOTE — PROGRESS NOTES
10/7/2021    [facility]    Chief Complaint   Patient presents with    Skin Problem     spots on legs worse pt reports       HPI  History obtained from the patient. Satish Dhillon is a [de-identified] y.o. female who presents today for worsening of cellulitis to both lower legs X few weeks. The patient has had redness and burning pain to both of her lower legs for several weeks, and within the last week, this is gotten worse. The patient was seen by Dr. David Foster on 9/21/2021, and he prescribed Bactrim twice daily x10 days, which the patient completely finished as directed. Unfortunately, though, the patient saw no improvement in her symptoms, but actually worsening. She notes that her legs have continued to seep. She takes Lasix 20 mg twice daily. She denies fever, chills. REVIEW OF SYMPTOMS  Review of Systems   Constitutional: Negative for chills and fever. Respiratory: Negative for cough and shortness of breath. Gastrointestinal: Negative for diarrhea and vomiting.      PAST MEDICAL HISTORY  Past Medical History:   Diagnosis Date    Acid reflux     'no recent issue\"    Arthritis     \"Left Index Finger\"    CHF (congestive heart failure) ([facility] [address]     per old chart hx of CHF with admission 12/2016 with pulmonary edema    Chronic kidney disease     Sees Dr. Rodriguez Ano have one kidney, dr Vidya Holland told me that in 2011 I think\"    GERD (gastroesophageal reflux disease)     History of blood transfusion 2011 Or 2012    No Reaction To Blood Transfusion Received    Hypertension     ( pt states she is not on any medication for blood pressure)    Hypomagnesemia     Hypothyroidism     Shortness of breath on exertion     SVT (supraventricular tachycardia) ([facility])     per old chart hx of SVT with admission 2016 tx with Adenosine and per notes in 5/2018 hx of SVT- no cardiologist    Teeth missing     Upper And Lower    Wears glasses        FAMILY HISTORY  Family History   Problem Relation Age of Onset    Cancer Father Lung Cancer    Arthritis Mother     Cancer Brother         Skin Cancer    High Cholesterol Brother        SOCIAL HISTORY  Social History     Socioeconomic History    Marital status:      Spouse name: Not on file    Number of children: Not on file    Years of education: Not on file    Highest education level: Not on file   Occupational History    Not on file   Tobacco Use    Smoking status: Never Smoker    Smokeless tobacco: Never Used   Vaping Use    Vaping Use: Never used   Substance and Sexual Activity    Alcohol use: No    Drug use: No    Sexual activity: Never   Other Topics Concern    Not on file   Social History Narrative    Not on file     Social Determinants of Health     Financial Resource Strain:     Difficulty of Paying Living Expenses:    Food Insecurity:     Worried About Running Out of Food in the Last Year:     [address] in the Last Year:    Transportation Needs:     Lack of Transportation (Medical):  Lack of Transportation (Non-Medical):    Physical Activity:     Days of Exercise per Week:     Minutes of Exercise per Session:    Stress:     Feeling of Stress :    Social Connections:     Frequency of Communication with Friends and Family:     Frequency of Social Gatherings with Friends and Family:     Attends Christianity Services:     Active Member of Clubs or Organizations:     Attends Club or Organization Meetings:     Marital Status:    Intimate Partner Violence:     Fear of Current or Ex-Partner:     Emotionally Abused:     Physically Abused:     Sexually Abused:         SURGICAL HISTORY  Past Surgical History:   Procedure Laterality Date    ABDOMEN SURGERY      DENTAL SURGERY      Teeth Extracted In Past    ENDOSCOPY, COLON, DIAGNOSTIC  2011 Or 2012    EYE SURGERY  ? when    clyde cataract ext    FEMUR FRACTURE SURGERY Left 5/22/2021    FEMUR IM NAIL REGINALDO INSERTION performed by Carley Ruiz MD at [address] [facility]  2011 Abdominal Hernia Repair     OTHER SURGICAL HISTORY  05/27/2018    exp lap . converted to exp laporotomy with ventral hernia repair with mesh    OTHER SURGICAL HISTORY  87/10/7848    umbilical scar excision    VENTRAL HERNIA REPAIR  09/16/2016    Robotic laparoscopic       CURRENT MEDICATIONS  Current Outpatient Medications   Medication Sig Dispense Refill    cephALEXin (KEFLEX) 500 MG capsule Take 1 capsule by mouth 4 times daily for 7 days 28 capsule 0    doxycycline hyclate (VIBRA-TABS) 100 MG tablet Take 1 tablet by mouth 2 times daily for 10 days Take with food. 20 tablet 0    allopurinol (ZYLOPRIM) 100 MG tablet Take 0.5 tablets by mouth daily 90 tablet 1    nystatin (MYCOSTATIN) 227881 UNIT/GM powder Apply 3 times daily x 10 to 14 days 45 g 0    metoprolol tartrate (LOPRESSOR) 25 MG tablet Take 1 tablet by mouth 2 times daily 180 tablet 1    magnesium oxide (MAG-OX) 400 (241.3 Mg) MG TABS tablet TAKE ONE TABLET BY MOUTH TWICE A  tablet 1    levothyroxine (SYNTHROID) 50 MCG tablet Take 1 tablet by mouth Daily 90 tablet 1    furosemide (LASIX) 20 MG tablet Take 1 tablet by mouth 2 times daily Take 20 mg by mouth 2 times daily 180 tablet 1     No current facility-administered medications for this visit. ALLERGIES  No Known Allergies    RECENT LABS    No results found for: LABA1C  No results found for: EAG    Lab Results   Component Value Date    CHOL 185 12/17/2016     [facility]    Lab Results   Component Value Date    WBC 8.0 05/28/2021    HGB 10.0 (L) 05/28/2021    HCT 31.6 (L) 05/28/2021    MCV 96.0 05/28/2021     05/28/2021       PHYSICAL EXAM  /62   Pulse 106   Ht 5' 2\" (1.575 m)   Wt 230 lb 1.6 oz (104.4 kg)   LMP 01/23/1995   SpO2 98%   BMI 42.09 kg/m²     Physical Exam  Constitutional:       Appearance: Normal appearance. HENT:      Head: Normocephalic and atraumatic. Eyes:      Comments: EOM grossly intact. Cardiovascular:      Rate and Rhythm: Normal rate and regular rhythm. Heart sounds: No murmur heard. No friction rub. No gallop. Pulmonary:      Effort: Pulmonary effort is normal.      Breath sounds: Normal breath sounds. No wheezing, rhonchi or rales. Musculoskeletal:      Comments: 1+ pitting edema to bilateral lower extremities. Skin:     General: Skin is warm and dry. Comments: Erythema with flaking of the skin to bilateral LE. Ulcer limited to skin breakdown present on the right anterior lower leg. (See photos below). Neurological:      Mental Status: She is alert and oriented to person, place, and time. Comments: Cranial nerves II-XII grossly intact   Psychiatric:         Mood and Affect: Mood normal.         Behavior: Behavior normal.         Left LE:       Right LE:           ASSESSMENT & PLAN  1. Cellulitis of right lower extremity  Carried out a long discussion with the patient. I emphasized that I recommend she go to the ED because she has not improved but actually even worsened on outpatient antibiotics (Bactrim) likely needs IV antibiotics. The patient refuses to go to the ED. I prescribed doxycycline and cephalexin and will recheck her in 4 days. However, I emphasized that if her cellulitis has not improved by that point, we will still be recommending that she go to the ED. Patient voiced understanding. I recommended that she take the doxycycline with food and take a probiotic for the next 3 weeks. I recommend the brand [employer]. - cephALEXin (KEFLEX) 500 MG capsule; Take 1 capsule by mouth 4 times daily for 7 days  Dispense: 28 capsule; Refill: 0  - doxycycline hyclate (VIBRA-TABS) 100 MG tablet; Take 1 tablet by mouth 2 times daily for 10 days Take with food. Dispense: 20 tablet; Refill: 0    2. Cellulitis of left lower extremity  See above (#1). - cephALEXin (KEFLEX) 500 MG capsule;  Take 1 capsule by mouth 4 times daily for 7 days  Dispense: 28 capsule; Refill: 0  - doxycycline hyclate (VIBRA-TABS) 100 MG tablet; Take 1 tablet by mouth 2 times daily for 10 days Take with food. Dispense: 20 tablet; Refill: 0        Return in about 4 days (around 10/11/2021).             Electronically signed by Isabell Carmona PA-C on 10/7/2021

## 2021-10-08 ENCOUNTER — APPOINTMENT (OUTPATIENT)
Dept: GENERAL RADIOLOGY | Age: 81
DRG: 521 | End: 2021-10-08
Payer: MEDICARE

## 2021-10-08 ENCOUNTER — HOSPITAL ENCOUNTER (INPATIENT)
Age: 81
LOS: 3 days | Discharge: NURSING, REHAB OR LONG-TERM CARE | DRG: 521 | End: 2021-10-11
Attending: EMERGENCY MEDICINE | Admitting: INTERNAL MEDICINE
Payer: MEDICARE

## 2021-10-08 DIAGNOSIS — N17.9 AKI (ACUTE KIDNEY INJURY) (HCC): ICD-10-CM

## 2021-10-08 DIAGNOSIS — S72.001A CLOSED DISPLACED FRACTURE OF RIGHT FEMORAL NECK (HCC): Primary | ICD-10-CM

## 2021-10-08 DIAGNOSIS — S72.001A DISPLACED FRACTURE OF RIGHT FEMORAL NECK (HCC): ICD-10-CM

## 2021-10-08 LAB
ABO/RH: NORMAL
ALBUMIN SERPL-MCNC: 3 GM/DL (ref 3.4–5)
ALP BLD-CCNC: 108 IU/L (ref 40–128)
ALT SERPL-CCNC: 9 U/L (ref 10–40)
ANION GAP SERPL CALCULATED.3IONS-SCNC: 13 MMOL/L (ref 4–16)
ANTIBODY SCREEN: NEGATIVE
APTT: 30.5 SECONDS (ref 25.1–37.1)
AST SERPL-CCNC: 23 IU/L (ref 15–37)
BACTERIA: NEGATIVE /HPF
BANDED NEUTROPHILS ABSOLUTE COUNT: 0.17 K/CU MM
BANDED NEUTROPHILS RELATIVE PERCENT: 3 % (ref 5–11)
BILIRUB SERPL-MCNC: 0.6 MG/DL (ref 0–1)
BILIRUBIN URINE: NEGATIVE MG/DL
BLOOD, URINE: NEGATIVE
BUN BLDV-MCNC: 46 MG/DL (ref 6–23)
CALCIUM SERPL-MCNC: 9 MG/DL (ref 8.3–10.6)
CHLORIDE BLD-SCNC: 104 MMOL/L (ref 99–110)
CLARITY: CLEAR
CO2: 19 MMOL/L (ref 21–32)
COLOR: YELLOW
COMMENT: NORMAL
CREAT SERPL-MCNC: 5.3 MG/DL (ref 0.6–1.1)
CREATININE URINE: 173.6 MG/DL
DIFFERENTIAL TYPE: ABNORMAL
EKG ATRIAL RATE: 71 BPM
EKG DIAGNOSIS: NORMAL
EKG P AXIS: 82 DEGREES
EKG P-R INTERVAL: 202 MS
EKG Q-T INTERVAL: 420 MS
EKG QRS DURATION: 68 MS
EKG QTC CALCULATION (BAZETT): 456 MS
EKG R AXIS: 36 DEGREES
EKG T AXIS: 23 DEGREES
EKG VENTRICULAR RATE: 71 BPM
EOSINOPHILS ABSOLUTE: 0.1 K/CU MM
EOSINOPHILS RELATIVE PERCENT: 2 % (ref 0–3)
GFR AFRICAN AMERICAN: 9 ML/MIN/1.73M2
GFR NON-AFRICAN AMERICAN: 8 ML/MIN/1.73M2
GLUCOSE BLD-MCNC: 113 MG/DL (ref 70–99)
GLUCOSE, URINE: NEGATIVE MG/DL
HCT VFR BLD CALC: 38.2 % (ref 37–47)
HEMOGLOBIN: 12 GM/DL (ref 12.5–16)
INR BLD: 0.94 INDEX
KETONES, URINE: NEGATIVE MG/DL
LEUKOCYTE ESTERASE, URINE: ABNORMAL
LYMPHOCYTES ABSOLUTE: 2.2 K/CU MM
LYMPHOCYTES RELATIVE PERCENT: 38 % (ref 24–44)
MCH RBC QN AUTO: 30.8 PG (ref 27–31)
MCHC RBC AUTO-ENTMCNC: 31.4 % (ref 32–36)
MCV RBC AUTO: 97.9 FL (ref 78–100)
MONOCYTES ABSOLUTE: 0.3 K/CU MM
MONOCYTES RELATIVE PERCENT: 6 % (ref 0–4)
MUCUS: ABNORMAL HPF
NITRITE URINE, QUANTITATIVE: NEGATIVE
OSMOLALITY URINE: 459 MOS/L
PDW BLD-RTO: 17 % (ref 11.7–14.9)
PH, URINE: 5 (ref 5–8)
PLATELET # BLD: 158 K/CU MM (ref 140–440)
PMV BLD AUTO: 9.8 FL (ref 7.5–11.1)
POTASSIUM SERPL-SCNC: 4.8 MMOL/L (ref 3.5–5.1)
PRO-BNP: 9004 PG/ML
PROTEIN UA: 30 MG/DL
PROTHROMBIN TIME: 12.1 SECONDS (ref 11.7–14.5)
RBC # BLD: 3.9 M/CU MM (ref 4.2–5.4)
RBC URINE: <1 /HPF (ref 0–6)
SARS-COV-2, NAAT: NOT DETECTED
SEGMENTED NEUTROPHILS ABSOLUTE COUNT: 3 K/CU MM
SEGMENTED NEUTROPHILS RELATIVE PERCENT: 51 % (ref 36–66)
SODIUM BLD-SCNC: 136 MMOL/L (ref 135–145)
SODIUM URINE: 44 MMOL/L (ref 35–167)
SOURCE: NORMAL
SPECIFIC GRAVITY UA: 1.02 (ref 1–1.03)
TOTAL CK: 73 IU/L (ref 26–140)
TOTAL PROTEIN: 7.7 GM/DL (ref 6.4–8.2)
TRICHOMONAS: ABNORMAL /HPF
URINE TOTAL PROTEIN: 53.7 MG/DL
UROBILINOGEN, URINE: NEGATIVE MG/DL (ref 0.2–1)
WBC # BLD: 5.8 K/CU MM (ref 4–10.5)
WBC UA: 19 /HPF (ref 0–5)

## 2021-10-08 PROCEDURE — 86850 RBC ANTIBODY SCREEN: CPT

## 2021-10-08 PROCEDURE — 85730 THROMBOPLASTIN TIME PARTIAL: CPT

## 2021-10-08 PROCEDURE — 93005 ELECTROCARDIOGRAM TRACING: CPT | Performed by: INTERNAL MEDICINE

## 2021-10-08 PROCEDURE — 73502 X-RAY EXAM HIP UNI 2-3 VIEWS: CPT

## 2021-10-08 PROCEDURE — 80053 COMPREHEN METABOLIC PANEL: CPT

## 2021-10-08 PROCEDURE — 6360000002 HC RX W HCPCS: Performed by: EMERGENCY MEDICINE

## 2021-10-08 PROCEDURE — 82570 ASSAY OF URINE CREATININE: CPT

## 2021-10-08 PROCEDURE — 85610 PROTHROMBIN TIME: CPT

## 2021-10-08 PROCEDURE — 93010 ELECTROCARDIOGRAM REPORT: CPT | Performed by: INTERNAL MEDICINE

## 2021-10-08 PROCEDURE — 6360000002 HC RX W HCPCS: Performed by: INTERNAL MEDICINE

## 2021-10-08 PROCEDURE — 94761 N-INVAS EAR/PLS OXIMETRY MLT: CPT

## 2021-10-08 PROCEDURE — 93308 TTE F-UP OR LMTD: CPT

## 2021-10-08 PROCEDURE — 6370000000 HC RX 637 (ALT 250 FOR IP): Performed by: INTERNAL MEDICINE

## 2021-10-08 PROCEDURE — 87076 CULTURE ANAEROBE IDENT EACH: CPT

## 2021-10-08 PROCEDURE — 86900 BLOOD TYPING SEROLOGIC ABO: CPT

## 2021-10-08 PROCEDURE — 2140000000 HC CCU INTERMEDIATE R&B

## 2021-10-08 PROCEDURE — 87086 URINE CULTURE/COLONY COUNT: CPT

## 2021-10-08 PROCEDURE — 82550 ASSAY OF CK (CPK): CPT

## 2021-10-08 PROCEDURE — 83935 ASSAY OF URINE OSMOLALITY: CPT

## 2021-10-08 PROCEDURE — 83880 ASSAY OF NATRIURETIC PEPTIDE: CPT

## 2021-10-08 PROCEDURE — 87077 CULTURE AEROBIC IDENTIFY: CPT

## 2021-10-08 PROCEDURE — 81001 URINALYSIS AUTO W/SCOPE: CPT

## 2021-10-08 PROCEDURE — 87070 CULTURE OTHR SPECIMN AEROBIC: CPT

## 2021-10-08 PROCEDURE — 2720000010 HC SURG SUPPLY STERILE: Performed by: ORTHOPAEDIC SURGERY

## 2021-10-08 PROCEDURE — 6370000000 HC RX 637 (ALT 250 FOR IP): Performed by: EMERGENCY MEDICINE

## 2021-10-08 PROCEDURE — 2580000003 HC RX 258: Performed by: INTERNAL MEDICINE

## 2021-10-08 PROCEDURE — 2580000003 HC RX 258: Performed by: EMERGENCY MEDICINE

## 2021-10-08 PROCEDURE — 87147 CULTURE TYPE IMMUNOLOGIC: CPT

## 2021-10-08 PROCEDURE — 6370000000 HC RX 637 (ALT 250 FOR IP): Performed by: NURSE PRACTITIONER

## 2021-10-08 PROCEDURE — 99284 EMERGENCY DEPT VISIT MOD MDM: CPT

## 2021-10-08 PROCEDURE — 99211 OFF/OP EST MAY X REQ PHY/QHP: CPT

## 2021-10-08 PROCEDURE — 86901 BLOOD TYPING SEROLOGIC RH(D): CPT

## 2021-10-08 PROCEDURE — 71045 X-RAY EXAM CHEST 1 VIEW: CPT

## 2021-10-08 PROCEDURE — 85007 BL SMEAR W/DIFF WBC COUNT: CPT

## 2021-10-08 PROCEDURE — 2709999900 HC NON-CHARGEABLE SUPPLY: Performed by: ORTHOPAEDIC SURGERY

## 2021-10-08 PROCEDURE — 84156 ASSAY OF PROTEIN URINE: CPT

## 2021-10-08 PROCEDURE — 85027 COMPLETE CBC AUTOMATED: CPT

## 2021-10-08 PROCEDURE — 87635 SARS-COV-2 COVID-19 AMP PRB: CPT

## 2021-10-08 PROCEDURE — 87186 SC STD MICRODIL/AGAR DIL: CPT

## 2021-10-08 PROCEDURE — 84300 ASSAY OF URINE SODIUM: CPT

## 2021-10-08 PROCEDURE — 87075 CULTR BACTERIA EXCEPT BLOOD: CPT

## 2021-10-08 RX ORDER — LEVOTHYROXINE SODIUM 0.05 MG/1
50 TABLET ORAL DAILY
Status: DISCONTINUED | OUTPATIENT
Start: 2021-10-08 | End: 2021-10-11 | Stop reason: HOSPADM

## 2021-10-08 RX ORDER — ONDANSETRON 4 MG/1
4 TABLET, ORALLY DISINTEGRATING ORAL EVERY 8 HOURS PRN
Status: DISCONTINUED | OUTPATIENT
Start: 2021-10-08 | End: 2021-10-10

## 2021-10-08 RX ORDER — SODIUM CHLORIDE 9 MG/ML
INJECTION, SOLUTION INTRAVENOUS CONTINUOUS
Status: DISCONTINUED | OUTPATIENT
Start: 2021-10-08 | End: 2021-10-09

## 2021-10-08 RX ORDER — FENTANYL CITRATE 50 UG/ML
50 INJECTION, SOLUTION INTRAMUSCULAR; INTRAVENOUS ONCE
Status: COMPLETED | OUTPATIENT
Start: 2021-10-08 | End: 2021-10-08

## 2021-10-08 RX ORDER — ONDANSETRON 2 MG/ML
4 INJECTION INTRAMUSCULAR; INTRAVENOUS EVERY 6 HOURS PRN
Status: DISCONTINUED | OUTPATIENT
Start: 2021-10-08 | End: 2021-10-10

## 2021-10-08 RX ORDER — HYDROCODONE BITARTRATE AND ACETAMINOPHEN 5; 325 MG/1; MG/1
1 TABLET ORAL EVERY 6 HOURS PRN
Status: DISCONTINUED | OUTPATIENT
Start: 2021-10-08 | End: 2021-10-09

## 2021-10-08 RX ORDER — POLYETHYLENE GLYCOL 3350 17 G/17G
17 POWDER, FOR SOLUTION ORAL DAILY PRN
Status: DISCONTINUED | OUTPATIENT
Start: 2021-10-08 | End: 2021-10-11 | Stop reason: HOSPADM

## 2021-10-08 RX ORDER — HYDROCODONE BITARTRATE AND ACETAMINOPHEN 5; 325 MG/1; MG/1
1 TABLET ORAL ONCE
Status: COMPLETED | OUTPATIENT
Start: 2021-10-08 | End: 2021-10-08

## 2021-10-08 RX ORDER — SODIUM CHLORIDE 9 MG/ML
25 INJECTION, SOLUTION INTRAVENOUS PRN
Status: DISCONTINUED | OUTPATIENT
Start: 2021-10-08 | End: 2021-10-10

## 2021-10-08 RX ORDER — FUROSEMIDE 10 MG/ML
40 INJECTION INTRAMUSCULAR; INTRAVENOUS ONCE
Status: COMPLETED | OUTPATIENT
Start: 2021-10-08 | End: 2021-10-08

## 2021-10-08 RX ORDER — ACETAMINOPHEN 325 MG/1
650 TABLET ORAL EVERY 6 HOURS PRN
Status: DISCONTINUED | OUTPATIENT
Start: 2021-10-08 | End: 2021-10-10

## 2021-10-08 RX ORDER — ALLOPURINOL 100 MG/1
50 TABLET ORAL DAILY
Status: DISCONTINUED | OUTPATIENT
Start: 2021-10-08 | End: 2021-10-11 | Stop reason: HOSPADM

## 2021-10-08 RX ORDER — HYDROMORPHONE HCL 110MG/55ML
0.12 PATIENT CONTROLLED ANALGESIA SYRINGE INTRAVENOUS ONCE
Status: COMPLETED | OUTPATIENT
Start: 2021-10-08 | End: 2021-10-08

## 2021-10-08 RX ORDER — ACETAMINOPHEN 650 MG/1
650 SUPPOSITORY RECTAL EVERY 6 HOURS PRN
Status: DISCONTINUED | OUTPATIENT
Start: 2021-10-08 | End: 2021-10-10

## 2021-10-08 RX ORDER — SODIUM CHLORIDE 0.9 % (FLUSH) 0.9 %
5-40 SYRINGE (ML) INJECTION PRN
Status: DISCONTINUED | OUTPATIENT
Start: 2021-10-08 | End: 2021-10-10

## 2021-10-08 RX ORDER — SODIUM CHLORIDE 0.9 % (FLUSH) 0.9 %
5-40 SYRINGE (ML) INJECTION EVERY 12 HOURS SCHEDULED
Status: DISCONTINUED | OUTPATIENT
Start: 2021-10-08 | End: 2021-10-10

## 2021-10-08 RX ORDER — DOXYCYCLINE HYCLATE 100 MG
100 TABLET ORAL EVERY 12 HOURS SCHEDULED
Status: DISCONTINUED | OUTPATIENT
Start: 2021-10-08 | End: 2021-10-09

## 2021-10-08 RX ORDER — HYDROMORPHONE HCL 110MG/55ML
0.25 PATIENT CONTROLLED ANALGESIA SYRINGE INTRAVENOUS EVERY 4 HOURS PRN
Status: COMPLETED | OUTPATIENT
Start: 2021-10-08 | End: 2021-10-08

## 2021-10-08 RX ORDER — MILRINONE LACTATE 0.2 MG/ML
0.25 INJECTION, SOLUTION INTRAVENOUS CONTINUOUS
Status: DISCONTINUED | OUTPATIENT
Start: 2021-10-08 | End: 2021-10-09

## 2021-10-08 RX ADMIN — HYDROCODONE BITARTRATE AND ACETAMINOPHEN 1 TABLET: 5; 325 TABLET ORAL at 03:23

## 2021-10-08 RX ADMIN — HYDROCODONE BITARTRATE AND ACETAMINOPHEN 1 TABLET: 5; 325 TABLET ORAL at 23:57

## 2021-10-08 RX ADMIN — HYDROMORPHONE HYDROCHLORIDE 0.25 MG: 2 INJECTION, SOLUTION INTRAMUSCULAR; INTRAVENOUS; SUBCUTANEOUS at 14:15

## 2021-10-08 RX ADMIN — HYDROMORPHONE HYDROCHLORIDE 0.12 MG: 2 INJECTION INTRAMUSCULAR; INTRAVENOUS; SUBCUTANEOUS at 05:57

## 2021-10-08 RX ADMIN — HYDROMORPHONE HYDROCHLORIDE 0.25 MG: 2 INJECTION, SOLUTION INTRAMUSCULAR; INTRAVENOUS; SUBCUTANEOUS at 10:09

## 2021-10-08 RX ADMIN — DOXYCYCLINE HYCLATE 100 MG: 100 TABLET, FILM COATED ORAL at 12:54

## 2021-10-08 RX ADMIN — CEFTRIAXONE SODIUM 1000 MG: 1 INJECTION, POWDER, FOR SOLUTION INTRAMUSCULAR; INTRAVENOUS at 08:34

## 2021-10-08 RX ADMIN — SODIUM CHLORIDE: 9 INJECTION, SOLUTION INTRAVENOUS at 06:37

## 2021-10-08 RX ADMIN — SODIUM CHLORIDE: 9 INJECTION, SOLUTION INTRAVENOUS at 18:48

## 2021-10-08 RX ADMIN — ACETAMINOPHEN 650 MG: 325 TABLET ORAL at 20:20

## 2021-10-08 RX ADMIN — DOXYCYCLINE HYCLATE 100 MG: 100 TABLET, FILM COATED ORAL at 23:58

## 2021-10-08 RX ADMIN — METOPROLOL TARTRATE 25 MG: 25 TABLET, FILM COATED ORAL at 12:54

## 2021-10-08 RX ADMIN — METOPROLOL TARTRATE 25 MG: 25 TABLET, FILM COATED ORAL at 23:58

## 2021-10-08 RX ADMIN — MILRINONE LACTATE IN DEXTROSE 0.25 MCG/KG/MIN: 200 INJECTION, SOLUTION INTRAVENOUS at 13:24

## 2021-10-08 RX ADMIN — HYDROMORPHONE HYDROCHLORIDE 0.25 MG: 2 INJECTION, SOLUTION INTRAMUSCULAR; INTRAVENOUS; SUBCUTANEOUS at 18:25

## 2021-10-08 RX ADMIN — FENTANYL CITRATE 50 MCG: 50 INJECTION INTRAMUSCULAR; INTRAVENOUS at 05:10

## 2021-10-08 RX ADMIN — FUROSEMIDE 40 MG: 10 INJECTION, SOLUTION INTRAVENOUS at 17:34

## 2021-10-08 RX ADMIN — SODIUM CHLORIDE, PRESERVATIVE FREE 10 ML: 5 INJECTION INTRAVENOUS at 23:58

## 2021-10-08 RX ADMIN — SODIUM CHLORIDE, PRESERVATIVE FREE 10 ML: 5 INJECTION INTRAVENOUS at 20:20

## 2021-10-08 ASSESSMENT — PAIN SCALES - GENERAL
PAINLEVEL_OUTOF10: 8
PAINLEVEL_OUTOF10: 0
PAINLEVEL_OUTOF10: 5
PAINLEVEL_OUTOF10: 6
PAINLEVEL_OUTOF10: 5
PAINLEVEL_OUTOF10: 9
PAINLEVEL_OUTOF10: 9
PAINLEVEL_OUTOF10: 6
PAINLEVEL_OUTOF10: 6
PAINLEVEL_OUTOF10: 9
PAINLEVEL_OUTOF10: 6

## 2021-10-08 ASSESSMENT — PAIN DESCRIPTION - PAIN TYPE: TYPE: ACUTE PAIN

## 2021-10-08 ASSESSMENT — PAIN DESCRIPTION - ORIENTATION: ORIENTATION: RIGHT

## 2021-10-08 ASSESSMENT — PAIN - FUNCTIONAL ASSESSMENT
PAIN_FUNCTIONAL_ASSESSMENT: 0-10
PAIN_FUNCTIONAL_ASSESSMENT: 0-10

## 2021-10-08 ASSESSMENT — PAIN DESCRIPTION - LOCATION: LOCATION: KNEE;LEG

## 2021-10-08 NOTE — CONSULTS
Patient:   Jennings Nyhan    Date:  10/08/21  :  1940, [de-identified] y.o. Nephrologist: Charlene Cárdenas MD  Provider: Dontrell Velarde MD    Reason for Consult: Acute kidney injury with underlying CKD stage IV    Consult requested by Jaylyn Disla MD    Chief Complaint:   Accidental fall at home injuring her right hip and knee    HISTORY OF PRESENT ILLNESS:   43-year-old female who lives alone at home accidentally fell at home injuring her right knee and hip-she pushed the [address] and EMT brought her to the emergency room. In the emergency department initially she was hypotensive-but subsequently blood pressure did go up. She underwent several diagnostic tests which concerning biochemical.    Imaging study which include x-ray of the hip showed acute fracture of the right femoral neck and mid displacement-she of course has left femur open reduction internal fixation in the past    Biochemical testing showed increased creatinine of 5.3, with concomitant BUN of 49 and bicarb of 19, other electrolytes are acceptable-she was given normal saline-her baseline creatinine is mid 2 juju    When I examined her in the emergency department-is alert awake and oriented and hemodynamically stable  I am very familiar with her. Been doing well for a while-she sustained end-stage 3 acute kidney injury back in 2011-she never recovered her kidney function fully-her creatinine was running mid to 2 early 3 range  She does not have other risk factor for kidney disease-creatinine stable since       PMH :   1. CKD stage 4 - as an after math of TOBY in -   2.  HTN - no med's now  3. 1 atrophic kidney ( Rt )   Stage 3 TOBY in 2011 and stage 2 TOBY in       Habits :     Never smoked, social etoh, no illicit drugs     Soc hx :     in 0   Was  for 29  yrs- no children  Used to work in a bank , retired      [facility] :       Lung ca ,   No CKD      PSH :     hernia repair x 3 Bowel resection   Left hip open reduction internal fixation            REVIEW OF SYSTEMS:     All pertinent ROS neg except   Fall, pain in right knee and hip    Current Facility-Administered Medications   Medication Dose Route Frequency Provider Last Rate Last Admin    0.9 % sodium chloride infusion   IntraVENous Continuous Bennett Mota  mL/hr at 10/08/21 1232 Restarted at 10/08/21 1232    sodium chloride flush 0.9 % injection 5-40 mL  5-40 mL IntraVENous 2 times per day Barbara Berger MD        sodium chloride flush 0.9 % injection 5-40 mL  5-40 mL IntraVENous PRN Barbara Berger MD        0.9 % sodium chloride infusion  25 mL IntraVENous PRN Barbara Berger MD        ondansetron (ZOFRAN-ODT) disintegrating tablet 4 mg  4 mg Oral Q8H PRN Barbara Berger MD        Or    ondansetron (ZOFRAN) injection 4 mg  4 mg IntraVENous Q6H PRN Barbara Berger MD        polyethylene glycol (GLYCOLAX) packet 17 g  17 g Oral Daily PRN Barbara Berger MD        acetaminophen (TYLENOL) tablet 650 mg  650 mg Oral Q6H PRN Barbara Berger MD        Or   Soledad Blank acetaminophen (TYLENOL) suppository 650 mg  650 mg Rectal Q6H PRN Barbara Berger MD        allopurinol (ZYLOPRIM) tablet 50 mg  50 mg Oral Daily Barbara Berger MD        levothyroxine (SYNTHROID) tablet 50 mcg  50 mcg Oral Daily Barbara Berger MD        metoprolol tartrate (LOPRESSOR) tablet 25 mg  25 mg Oral BID Barbara Berger MD   25 mg at 10/08/21 1254    cefTRIAXone (ROCEPHIN) 1000 mg IVPB in 50 mL D5W minibag  1,000 mg IntraVENous Q24H Barbara Beregr MD   Stopped at 10/08/21 0904    doxycycline hyclate (VIBRA-TABS) tablet 100 mg  100 mg Oral 2 times per day Barbara Berger MD   100 mg at 10/08/21 1254    HYDROmorphone (DILAUDID) injection 0.25 mg  0.25 mg IntraVENous Q4H PRN Barbara Berger MD   0.25 mg at 10/08/21 1415    milrinone (PRIMACOR) 40 mg in dextrose 5 % 200 mL infusion  0.25 mcg/kg/min IntraVENous Continuous Cookie Jones MD 7.8 mL/hr at 10/08/21 1324 0.25 mcg/kg/min at 10/08/21 1324       /67   Pulse 70   Temp 98 °F (36.7 °C) (Temporal)   Resp 18   Ht 5' 1\" (1.549 m)   Wt 230 lb (104.3 kg)   LMP 01/23/1995   SpO2 97%   BMI 43.46 kg/m²     PHYSICAL EXAM:  General appearance: Alert, awake and oriented without any distress  Head: No visible trauma, no gross conjunctival pallor  Neck: Supple  Heart: Seems regular rate and rhythm  LUNGS: Some adventitious breath sound  Abdomen: Soft, nontender  Extremities: 2+ edema with stasis dermatitis  Some abrasion right knee-    LABS:  CBC:   Lab Results   Component Value Date    WBC 5.8 10/08/2021    WBC 8.0 05/28/2021    WBC 8.9 05/26/2021    HGB 12.0 10/08/2021    HGB 10.0 05/28/2021    HGB 10.2 05/26/2021     10/08/2021     05/28/2021     05/26/2021     Renal Panel:   Lab Results   Component Value Date     10/08/2021     05/28/2021     05/26/2021    K 4.8 10/08/2021    K 4.2 05/28/2021    K 4.3 05/26/2021     10/08/2021     05/28/2021     05/26/2021    CO2 19 10/08/2021    CO2 23 05/28/2021    CO2 21 05/26/2021    BUN 46 10/08/2021    BUN 43 05/28/2021    BUN 45 05/26/2021    CREATININE 5.3 10/08/2021    CREATININE 2.4 05/28/2021    CREATININE 2.8 05/26/2021    GFRAA 9 10/08/2021    GFRAA 24 05/28/2021    GFRAA 20 05/26/2021    LABGLOM 8 10/08/2021    LABGLOM 19 05/28/2021    LABGLOM 16 05/26/2021    LABALBU 3.0 10/08/2021    LABALBU 2.6 05/28/2021    LABALBU 3.0 05/26/2021         Calcium:    Lab Results   Component Value Date    CALCIUM 9.0 10/08/2021     02/24/2020    PTH  02/24/2020     (NOTE)         +-------+-------+-------+-------+--------+  P   400 +  . . . . . . S              P. . . . . +  T       + . . . . . . .S             P. . . . . .+  H   300 +  . . . . . . S            P. . . . . .P+         + . . . . . . .S           P. . . . .  .P +  I 200 +. . . . . . . S          P. . . . .  P  +  N       + . . . . . . .S          P . . . . P    +  T   100 +. . . . . . . S         P . . . . P     +  A       + . . . . . . .S         P. . . .P       +  C    75 +. . . . . . Macrina Clemons SDDDDDDDDDDP . .P         +  T       +S S S S S N-X-V----------+P. P           +      50 +          +   NORMAL     +              +  p       +          +   Ca/PTH     +              +  g    30 +          +              +              +  /       +H H H H H H-------------M M M M M M M M +  m    10 +. . . . . .H H          M . . . . . . . +  L       + . . . . . Albertina Rodrigezor         M  . . . . . . .+         +. . . . . . . H         M . . . . . . . +       0 ++---/--+-------+-------+-------+---/---++          4      8       9      10      11      15                     Total Calcium (mg/dL)                                                                          Box = Reference Interval                           <*> = Patient Result                               P = Primary Hyperparathyroidism                    S = Secondary Hyperparathyroidism                  D = Renal Insufficiency or                             Vitamin D Deficiency                           H = Hypoparathyroidism                             M = Hypercalcemia of Malignancy  Performed by [place] [id number], [address] [facility] [phone]  www. Cassie Obrien MD, Lab.  Director       Phosphorus:    Lab Results   Component Value Date    PHOS 3.5 02/24/2020       U/A:    Lab Results   Component Value Date    PROTEINU 30 10/08/2021    NITRU NEGATIVE 10/08/2021    COLORU YELLOW 10/08/2021    WBCUA 19 10/08/2021    RBCUA <1 10/08/2021    MUCUS RARE 10/08/2021    TRICHOMONAS NONE SEEN 10/08/2021    BACTERIA NEGATIVE 10/08/2021    CLARITYU CLEAR 10/08/2021    SPECGRAV 1.016 10/08/2021    UROBILINOGEN NEGATIVE 10/08/2021    BILIRUBINUR NEGATIVE 10/08/2021    BLOODU NEGATIVE 10/08/2021    KETUA NEGATIVE 10/08/2021 IMPRESSION:  1. Acute kidney injury on top of CKD stage IV-likely from relative volume depletion as she was on therapy at home-probable transient hypotension-also make sure she does not have any rhabdomyolysis-or obstruction  2. She has risk for fluid overload  3. Looks like right hip fracture  4.   Multiple comorbid disease but no diabetes      PLAN:    Stop IV fluid after 1.5 L  Chest x-ray  proBNP level  If necessary and has pulmonary edema then add diuretics  She will go to the OR  Keep hemodynamic stability  CK level  Follow clinically and biochemically

## 2021-10-08 NOTE — CONSULTS
[address], [address] [facility]                                  CONSULTATION    PATIENT NAME: Jamesetta Babinski                 :        1940  MED REC NO:   [id number]                          ROOM:       [facility]  ACCOUNT NO:   [de-identified]                           ADMIT DATE: 10/08/2021  PROVIDER:     Jamaal Puckett MD    CONSULT DATE:  10/08/2021    INDICATIONS:  Cardiac clearance. HISTORY OF PRESENT ILLNESS:  This is an 81-year-old female patient of   _____. The patient had seen her primary care physician yesterday  for a wound infection of the lower extremities present. The patient had  venous ulcers present in bilateral lower extremities present. The  patient was started on Keflex. She was at home. She fell trying to  take off _____ sheets from her dryer. She accidentally fell, and she  has a fracture at this time present. The patient is seen by Dr. Ted Valdes also. She is found to have a right hip femoral neck fracture  present. The plan is for surgery after starting antibiotics. The  patient denies any chest pain. No shortness of breath. No syncope  present. No other  or GI complaints present. She does have a Simpson  in present. She has urine output present. Her echo was done back in  2021. Echo shows LV function preserved and moderate tricuspid  regurgitation noted. _____ pressure of about 40 mmHg present. PAST MEDICAL HISTORY:  The patient had anemia post surgery last time  also, and she had atrial fibrillation postsurgery also. She has a  history of having hypertension present. History of diastolic heart  failure present. History of renal insufficiency present. She has a  history of chronic venous ulcer present. Diastolic dysfunction, history  of SVTs in the past.  Moderate mitral regurgitation noted. Moderate  tricuspid regurgitation noted. Pulmonary hypertension present. The  patient is followed by Dr. Odin Macias for renal insufficiency present. PAST SURGICAL HISTORY:  Hernia repair. SOCIAL HISTORY:  She does not smoke, does not drink. ALLERGIES:  NKDA. MEDICATIONS:  At home, she is on Keflex that was started recently and  doxycycline and Lopressor 25 b.i.d. and Synthroid and Lasix twice a day. PHYSICAL EXAMINATION:  GENERAL:  She is awake, alert, answers questions. VITAL SIGNS:  Temperature afebrile, sinus rhythm, pulse is 82, blood  pressure 149/69. HEENT:  Head is normocephalic, atraumatic. Pupils are equal and  reactive to light. CHEST:  Equal expansion. LUNGS:  Clear to auscultation. Decreased breath sounds present. HEART:  Regular rhythm. ABDOMEN:  Soft and nontender. Bowel sounds are present. EXTREMITIES:  +3 edema present in bilateral lower extremities and  cellulitis of both lower extremities present. LABORATORY AND DIAGNOSTIC DATA:  Creatinine is 5.3. Her baseline  creatinine is around 2.6-2.4 range. Her LFTs are normal.  CBC is  normal.  EKG shows a sinus rhythm present. IMPRESSION:  This is an 81-year-old female patient with chronic venous  ulcers present, who came to the hospital after having a mechanical fall. She has a fracture of her right hip present. Plan for surgery. She  does have significant cellulitis present. I will again get a duplex of the lower extremities, arterial, and  possibility of venous ulcers, so we will continue diuresing also, but  she has acute renal injury on top of that. Further recommendation based  on hospital course.         Barabara Gaucher, MD    D: 10/08/2021 12:34:00       T: 10/08/2021 12:36:31     [email or web address]  Job#: [id number]     Doc#: [id number]    CC:

## 2021-10-08 NOTE — CONSULTS
[address] [address] Continence Nurse  Consult Note       Joana Akhtar  AGE: [de-identified] y.o. GENDER: female  : 1940  TODAY'S DATE:  10/8/2021    Subjective:     Reason for CWOCN Evaluation and Assessment: wound assessment      Joana Akhtar is a [de-identified] y.o. female referred by:   [x] Physician  [] Nursing  [] Other:     Wound Identification:  Wound Type: cellulitis possible venous  Contributing Factors: edema, chronic pressure, decreased mobility and obesity        PAST MEDICAL HISTORY        Diagnosis Date    Acid reflux     'no recent issue\"    Arthritis     \"Left Index Finger\"    CHF (congestive heart failure) [facility] [address] [facility]     per old chart hx of CHF with admission 2016 with pulmonary edema    Chronic kidney disease     Sees Dr. Aron Marin have one kidney, dr Corrales Liter told me that in  I think\"    GERD (gastroesophageal reflux disease)     History of blood transfusion  Or     No Reaction To Blood Transfusion Received    Hypertension     ( pt states she is not on any medication for blood pressure)    Hypomagnesemia     Hypothyroidism     Shortness of breath on exertion     SVT (supraventricular tachycardia) ([facility] [address]     per old chart hx of SVT with admission  tx with Adenosine and per notes in 2018 hx of SVT- no cardiologist    Teeth missing     Upper And Lower    Wears glasses        PAST SURGICAL HISTORY    Past Surgical History:   Procedure Laterality Date    ABDOMEN SURGERY      DENTAL SURGERY      Teeth Extracted In Past    ENDOSCOPY, COLON, DIAGNOSTIC   Or     EYE SURGERY  ? when    clyde cataract ext    FEMUR FRACTURE SURGERY Left 2021    FEMUR IM NAIL REGINALDO INSERTION performed by Aaron Shannon MD at [address] [facility]      Abdominal Hernia Repair     OTHER SURGICAL HISTORY  2018    exp lap . converted to exp laporotomy with ventral hernia repair with mesh    OTHER SURGICAL HISTORY      umbilical scar excision    VENTRAL HERNIA REPAIR  09/16/2016    Robotic laparoscopic       FAMILY HISTORY    Family History   Problem Relation Age of Onset    Cancer Father         Lung Cancer    Arthritis Mother     Cancer Brother         Skin Cancer    High Cholesterol Brother        SOCIAL HISTORY    Social History     Tobacco Use    Smoking status: Never Smoker    Smokeless tobacco: Never Used   Vaping Use    Vaping Use: Never used   Substance Use Topics    Alcohol use: No    Drug use: No       ALLERGIES    No Known Allergies    MEDICATIONS    No current facility-administered medications on file prior to encounter. Current Outpatient Medications on File Prior to Encounter   Medication Sig Dispense Refill    cephALEXin (KEFLEX) 500 MG capsule Take 1 capsule by mouth 4 times daily for 7 days 28 capsule 0    doxycycline hyclate (VIBRA-TABS) 100 MG tablet Take 1 tablet by mouth 2 times daily for 10 days Take with food.  20 tablet 0    allopurinol (ZYLOPRIM) 100 MG tablet Take 0.5 tablets by mouth daily 90 tablet 1    nystatin (MYCOSTATIN) 522336 UNIT/GM powder Apply 3 times daily x 10 to 14 days 45 g 0    metoprolol tartrate (LOPRESSOR) 25 MG tablet Take 1 tablet by mouth 2 times daily 180 tablet 1    magnesium oxide (MAG-OX) 400 (241.3 Mg) MG TABS tablet TAKE ONE TABLET BY MOUTH TWICE A  tablet 1    levothyroxine (SYNTHROID) 50 MCG tablet Take 1 tablet by mouth Daily 90 tablet 1    furosemide (LASIX) 20 MG tablet Take 1 tablet by mouth 2 times daily Take 20 mg by mouth 2 times daily 180 tablet 1         Objective:      /66   Pulse 70   Temp 98 °F (36.7 °C) (Temporal)   Resp 18   Ht 5' 1\" (1.549 m)   Wt 230 lb (104.3 kg)   LMP 01/23/1995   SpO2 96%   BMI 43.46 kg/m²   Polo Risk Score: Polo Scale Score: 14    LABS    CBC:   Lab Results   Component Value Date    WBC 5.8 10/08/2021    RBC 3.90 10/08/2021    HGB 12.0 10/08/2021    HCT 38.2 10/08/2021    MCV 97.9 10/08/2021    MCH 30.8 10/08/2021    MCHC 31.4 1030   Wound Etiology Other 10/08/21 1030   Dressing Status Other (Comment) 10/08/21 1030   Wound Cleansed Cleansed with saline 10/08/21 1030   Dressing/Treatment Open to air 10/08/21 1030   Wound Length (cm) 6 cm 10/08/21 1030   Wound Width (cm) 4 cm 10/08/21 1030   Wound Depth (cm) 0.1 cm 10/08/21 1030   Wound Surface Area (cm^2) 24 cm^2 10/08/21 1030   Wound Volume (cm^3) 2.4 cm^3 10/08/21 1030   Distance Tunneling (cm) 0 cm 10/08/21 1030   Tunneling Position ___ O'Clock 0 10/08/21 1030   Undermining Starts ___ O'Clock 0 10/08/21 1030   Undermining Ends___ O'Clock 00 10/08/21 1030   Undermining Maxium Distance (cm) 0 10/08/21 1030   Wound Assessment Eschar dry 10/08/21 1030   Drainage Amount None 10/08/21 1030   Odor None 10/08/21 1030   Deena-wound Assessment Blanchable erythema;Edematous;Dry/flaky 10/08/21 1030   Margins Attached edges 10/08/21 1030   Number of days: 0       Wound 10/08/21 Pretibial Left (Active)   Wound Image   10/08/21 1030   Wound Etiology Other 10/08/21 1030   Dressing Status Other (Comment) 10/08/21 1030   Wound Cleansed Cleansed with saline 10/08/21 1030   Dressing/Treatment Open to air 10/08/21 1030   Wound Length (cm) 2 cm 10/08/21 1030   Wound Width (cm) 0.5 cm 10/08/21 1030   Wound Depth (cm) 0.1 cm 10/08/21 1030   Wound Surface Area (cm^2) 1 cm^2 10/08/21 1030   Wound Volume (cm^3) 0.1 cm^3 10/08/21 1030   Distance Tunneling (cm) 0 cm 10/08/21 1030   Tunneling Position ___ O'Clock 0 10/08/21 1030   Undermining Starts ___ O'Clock 0 10/08/21 1030   Undermining Ends___ O'Clock 00 10/08/21 1030   Undermining Maxium Distance (cm) 0 10/08/21 1030   Wound Assessment Eschar dry 10/08/21 1030   Drainage Amount None 10/08/21 1030   Odor None 10/08/21 1030   Deena-wound Assessment Blanchable erythema;Dry/flaky;Edematous 10/08/21 1030   Margins Attached edges 10/08/21 1030   Number of days: 0       Response to treatment:  Well tolerated by patient.      Pain Assessment:  Severity:  none  Quality of

## 2021-10-08 NOTE — PROGRESS NOTES
Report called to [facility]. Made floor nurse aware of nursing communication fluid orders, 1.5L then continuous fluids to be shut up. Pt on 1L bag now, only need 500mL more.    200- this nurse and Kimberly Prior [facility]

## 2021-10-08 NOTE — ED NOTES
Patient reports she was going to get her slippers out of the dryer and fell. Denies head injury. Denies LOC. Reports Right upper leg pain and right hip pain. Patient talking with family during triage. Tearful.      Mena Sanchez RN  10/08/21 [id number]

## 2021-10-08 NOTE — H&P
HISTORY AND PHYSICAL  (Hospitalist, Internal Medicine)  IDENTIFYING INFORMATION   PATIENT:  Janice Engel  MRN:  [id number]  ADMIT DATE: 10/8/2021      CHIEF COMPLAINT   fall    HISTORY OF PRESENT ILLNESS   Janice Engel is a [de-identified] y.o. female with hypertension, heart failure with preserved ejection fraction, history of SVT, atrial fibrillation (onset 5/2021), chronic anemia, GERD, hypothyroidism, CKD stage IV, recent admission 5/2021 for left femur fracture status post repair was brought to ED by EMS after the patient had a fall at home. Patient reported that she was trying to get slippers from her laundry room when she fell down. Patient was not using her walker. Patient denied hitting her head or losing consciousness. Patient pressed her medical alert. Currently patient is in severe pain in her right hip. Patient is emotional, stated that since she turned 80, has been having problems one after another. Denied any chest pain, shortness of breath, denied any fever, chills, cough, denied any abdominal pain, denied any urinary complaints, denied any constipation or diarrhea. At presentation patient was noted to have BP 94/83, HR 81, RR 19, temperature 97.8, saturating 92% on room air. Lab work significant for CO2 19, BUN 46, creatinine 5.3, random glucose 113, hemoglobin 12, platelets 091. X-ray hip-acute fracture of the right femoral neck with mild displacement. Dr. Jose R Maldonado was consulted from ED. Patient received fentanyl 50 MCG, hydrocodone 5-325 mg in ED.     PAST MEDICAL HISTORY PAST SURGICAL HISTORY    hypertension, heart failure with preserved ejection fraction, history of SVT, GERD, hypothyroidism, CKD stage IV, recent admission 5/2021 for left femur fracture status post repair   Ventral incisional hernia repair, intramedullary nail for left femur fracture-5/22/2021   FAMILY HISTORY SOCIAL HISTORY    Reviewed and noncontributory  Denies smoking, alcohol, illicit drug use   MEDICATIONS ALLERGIES    Allopurinol 50 mg daily, metoprolol tartrate 25 mg twice daily, magnesium oxide, levothyroxine 50 MCG daily, Lasix 20 mg twice daily. No known drug allergies. Patient was recently prescribed cephalexin, doxycycline for the right lower extremity         PAST MEDICAL, SURGICAL, FAMILY, and SOCIAL HISTORY         Past Medical History:   Diagnosis Date    Acid reflux     'no recent issue\"    Arthritis     \"Left Index Finger\"    [facility]     per old chart hx of CHF with admission 12/2016 with pulmonary edema    Chronic kidney disease     Sees Dr. Naoma Mortimer have one kidney, dr Ailyn Coronel told me that in 2011 I think\"    GERD (gastroesophageal reflux disease)     History of blood transfusion 2011 Or 2012    No Reaction To Blood Transfusion Received    Hypertension     ( pt states she is not on any medication for blood pressure)    Hypomagnesemia     Hypothyroidism     Shortness of breath on exertion     SV[facility]ra[facility] tachy[facility]) [facility]     per old chart hx of SVT with admission 2016 tx with Adenosine and per notes in 5/2018 hx of SVT- no cardiologist    Teeth missing     Upper And Lower    Wears glasses      Past Surgical History:   Procedure Laterality Date    ABDOMEN SURGERY      DENTAL SURGERY      Teeth Extracted In Past    ENDOSCOPY, COLON, DIAGNOSTIC  2011 Or 2012    EYE SURGERY  ? when    clyde cataract ext    FEMUR FRACTURE SURGERY Left 5/22/2021    FEMUR IM NAIL REGINALDO INSERTION performed by Edwin Jasmine MD at [facility] [address]  2011    Abdominal Hernia Repair     OTHER SURGICAL HISTORY  05/27/2018    exp lap . converted to exp laporotomy with ventral hernia repair with mesh    OTHER SURGICAL HISTORY  40/98/8431    umbilical scar excision    VENTRAL HERNIA REPAIR  09/16/2016    Robotic laparoscopic     Family History   Problem Relation Age of Onset    Cancer Father         Lung Cancer    Arthritis Mother     Cancer Brother         Skin Cancer    High Cholesterol Brother      Family Hx of HTN  Family Hx as reviewed above, otherwise non-contributory  Social History     Socioeconomic History    Marital status:      Spouse name: None    Number of children: None    Years of education: None    Highest education level: None   Occupational History    None   Tobacco Use    Smoking status: Never Smoker    Smokeless tobacco: Never Used   Vaping Use    Vaping Use: Never used   Substance and Sexual Activity    Alcohol use: No    Drug use: No    Sexual activity: Never   Other Topics Concern    None   Social History Narrative    None     Social Determinants of Health     Financial Resource Strain:     Difficulty of Paying Living Expenses:    Food Insecurity:     Worried About Running Out of Food in the Last Year:     Ran Out of Food in the Last Year:    Transportation Needs:     Lack of Transportation (Medical):  Lack of Transportation (Non-Medical):    Physical Activity:     Days of Exercise per Week:     Minutes of Exercise per Session:    Stress:     Feeling of Stress :    Social Connections:     Frequency of Communication with Friends and Family:     Frequency of Social Gatherings with Friends and Family:     Attends Worship Services:     Active Member of Clubs or Organizations:     Attends Club or Organization Meetings:     Marital Status:    Intimate Partner Violence:     Fear of Current or Ex-Partner:     Emotionally Abused:     Physically Abused:     Sexually Abused:        MEDICATIONS   Medications Prior to Admission  Not in a hospital admission.     Current Medications  Current Facility-Administered Medications   Medication Dose Route Frequency Provider Last Rate Last Admin    0.9 % sodium chloride infusion   IntraVENous Continuous Linda Bassett MD        fentaNYL (SUBLIMAZE) injection 50 mcg  50 mcg IntraVENous Once Alice Roca MD         Current Outpatient Medications   Medication Sig Dispense Refill    cephALEXin (KEFLEX) 500 MG capsule Take 1 capsule by mouth 4 times daily for 7 days 28 capsule 0    doxycycline hyclate (VIBRA-TABS) 100 MG tablet Take 1 tablet by mouth 2 times daily for 10 days Take with food. 20 tablet 0    allopurinol (ZYLOPRIM) 100 MG tablet Take 0.5 tablets by mouth daily 90 tablet 1    nystatin (MYCOSTATIN) 938774 UNIT/GM powder Apply 3 times daily x 10 to 14 days 45 g 0    metoprolol tartrate (LOPRESSOR) 25 MG tablet Take 1 tablet by mouth 2 times daily 180 tablet 1    magnesium oxide (MAG-OX) 400 (241.3 Mg) MG TABS tablet TAKE ONE TABLET BY MOUTH TWICE A  tablet 1    levothyroxine (SYNTHROID) 50 MCG tablet Take 1 tablet by mouth Daily 90 tablet 1    furosemide (LASIX) 20 MG tablet Take 1 tablet by mouth 2 times daily Take 20 mg by mouth 2 times daily 180 tablet 1         Allergies  No Known Allergies    REVIEW OF SYSTEMS   10 point review of systems conducted and pertinent positives and negatives as per HPI. PHYSICAL EXAM     Wt Readings from Last 3 Encounters:   10/08/21 230 lb (104.3 kg)   10/07/21 230 lb 1.6 oz (104.4 kg)   09/21/21 218 lb (98.9 kg)       Blood pressure 94/83, pulse 81, temperature 97.8 °F (36.6 °C), temperature source Oral, resp. rate 19, height 5' 1\" (1.549 m), weight 230 lb (104.3 kg), last menstrual period 01/23/1995, SpO2 92 %, not currently breastfeeding. GEN  -Awake, alert, in moderate distress due to pain. EYES   -PERRL. HENT  -MM are moist.   RESP  -LS CTA equal bilat, no wheezes, rales or rhonchi. Symmetric chest movement. No respiratory distress noted. C/V  -S1/S2 auscultated. RRR without appreciable M/R/G. No JVD or carotid bruits. Peripheral pulses equal bilaterally and palpable. Bilateral lower extremity edema, erythema in bilateral lower extremities, open ulcer in the right lower extremity.    GI  -Abdomen is soft, non-distended, no significant tenderness. No masses or guarding. + BS in all quadrants. Rectal exam deferred.   -No CVA tenderness. Simpson catheter is not present. MS  -B/L extremities-  No gross joint deformities. + swelling, intact sensation symmetrical.   SKIN  -Normal coloration, warm, dry. NEURO  - Awake, alert, oriented x 3, no focal deficits. PSYC  - Appropriate affect. LABS AND IMAGING     Results for Shanelle Sheldon (MRN [id number]) as of 10/8/2021 06:32   Ref.  Range 10/8/2021 03:40   Sodium Latest Ref Range: 135 - 145 MMOL/L 136   Potassium Latest Ref Range: 3.5 - 5.1 MMOL/L 4.8   Chloride Latest Ref Range: 99 - 110 mMol/L 104   CO2 Latest Ref Range: 21 - 32 MMOL/L 19 (L)   BUN Latest Ref Range: 6 - 23 MG/DL 46 (H)   Creatinine Latest Ref Range: 0.6 - 1.1 MG/DL 5.3 (H)   Anion Gap Latest Ref Range: 4 - 16  13   GFR Non- Latest Ref Range: >60 mL/min/1.73m2 8 (L)   GFR  Latest Ref Range: >60 mL/min/1.73m2 9 (L)   Glucose Latest Ref Range: 70 - 99 MG/ (H)   Calcium Latest Ref Range: 8.3 - 10.6 MG/DL 9.0   Total Protein Latest Ref Range: 6.4 - 8.2 GM/DL 7.7   Albumin Latest Ref Range: 3.4 - 5.0 GM/DL 3.0 (L)   Alk Phos Latest Ref Range: 40 - 128 IU/L 108   ALT Latest Ref Range: 10 - 40 U/L 9 (L)   AST Latest Ref Range: 15 - 37 IU/L 23   Bilirubin Latest Ref Range: 0.0 - 1.0 MG/DL 0.6   WBC Latest Ref Range: 4.0 - 10.5 K/CU MM 5.8   RBC Latest Ref Range: 4.2 - 5.4 M/CU MM 3.90 (L)   Hemoglobin Quant Latest Ref Range: 12.5 - 16.0 GM/DL 12.0 (L)   Hematocrit Latest Ref Range: 37 - 47 % 38.2   MCV Latest Ref Range: 78 - 100 FL 97.9   MCH Latest Ref Range: 27 - 31 PG 30.8   MCHC Latest Ref Range: 32.0 - 36.0 % 31.4 (L)   MPV Latest Ref Range: 7.5 - 11.1 FL 9.8   RDW Latest Ref Range: 11.7 - 14.9 % 17.0 (H)   Platelet Count Latest Ref Range: 140 - 440 K/CU    Lymphocyte % Latest Ref Range: 24 - 44 % 38.0   Monocytes % Latest Ref Range: 0 - 4 % 6.0 (H)   Eosinophils % Latest Ref Range: 0 - 3 % 2.0   Lymphocytes Absolute Latest Units: K/CU MM 2.2   Monocytes Absolute Latest Units: K/CU MM 0.3   Eosinophils Absolute Latest Units: K/CU MM 0.1   Differential Type Unknown MANUAL DIFFERENTIAL   Segs Relative Latest Ref Range: 36 - 66 % 51.0   Segs Absolute Latest Units: K/CU MM 3.0   Bands Relative Latest Ref Range: 5 - 11 % 3 (L)   Bands Absolute Latest Units: K/CU MM 0.17     Recent Imaging    XR HIP 2-3 Basilia Melissa PELVIS RIGHT [[id number]] Collected: 10/08/21 0357      Order Status: Completed Updated: 10/08/21 0400     Impression:       1. Acute fracture of the right femoral neck with mild displacement. 2. Status post ORIF of the left femur.  Redemonstration of the previously   seen fracture of the proximal femur.  No significant callus formation is   present.          Relevant labs and imaging reviewed    ASSESSMENT AND PLAN     #. Acute fracture of the right femoral neck with mild displacement:  -Strict bedrest  -Dr. Misty Bella consulted from ED  -Patient to be n.p.o.  -Continue IV fluids, analgesics as needed    #. TOBY on CKD  -Creatinine was 2.4 (5/28/2021), 5.3 today  -Patient is on Lasix--hold  -UA, urine sodium, protein, creatinine, osmolality ordered  -Consult Dr. Hellen Benitez. #.  Non-anion gap metabolic acidosis probably secondary to TOBY on CKD    # Cellulitis of BLE:  -open ulcer on RLE  -pt was prescribed keflex, doxycycline outpatient 10/7/2020.  -pt had one dose of abx.  -IV ceftriaxone, doxycycline ordered.  -wound care consult placed.  -pt states she always had the redness in both legs but did not think it got any worse other than open ulcers. #. Hypertension-continue metoprolol    #. heart failure with preserved ejection fraction  -Does not appear to be in exacerbation  -Resume Lasix when appropriate    #. history of SVT, atrial fibrillation (onset 5/2021)  -Continue metoprolol  -Not on anticoagulation    #. chronic anemia  -Monitor H&H    #.   Protonix GERD-    #. Hypothyroidism-continue levothyroxine    #. CKD stage IV    #. recent admission 5/2021 for left femur fracture status post repair    DVT Prophylaxis: No chemical prophylaxis for anticipated procedure in a.m. GI Prophylaxis: Protonix  Code Status: FULL.       Case d/w ED physician    Barbara Berger MD  Hospitalist, Internal Medicine  10/8/2021 at 4:57 AM

## 2021-10-08 NOTE — PROGRESS NOTES
Patient seen and examined full consult to come  Her proBNP level little up  Add a chest x-ray-also add CPK level  Stop IV fluid after total of 1.5 L to avoid overloading her This is a telephone follow-up visit.  This visit is being performed via phone to discuss Follow-up (follow up)  Clinician Location: [facility]  , and  Patience is in [place] and her identity has been established.   She understands that we are limiting office visits due to the coronavirus pandemic and was informed that consent to treat includes permission to submit charges to her insurance. It was also shared that without being seen and evaluated in person, there is a risk that the information and/or assessment may be incomplete or inaccurate.  11-20 minutes were spent in this encounter.     Ms. Garcia is a 75-year-old retired lady, is for telephone follow-up of chronic right lumbosacral area pain that has been bothering her for the last about 4 years.  I saw her last on 10/02/2019 after she had right L4-5 transforaminal selective nerve root injection of cortisone and local anesthetic and fluoroscopic guidance on 09/18/2019 which helped moderately.  She tells me today that she fell on Diogo Day 2019 and broke her right hip and had surgery the next day at an outside facility.  Since that time onward she has been experiencing right hip, buttock and anterior thigh pain down to the knee.  She also tells me that she had right knee replacement about a year ago and some of her right knee pain could be related to that.  She lives in and spends somewhat time in [place].  She tells me that she cannot find her orthopedic knee surgeon get her knee surgery.  She is going to follow-up with her hip surgeon in the next few weeks.  She underwent physical therapy after her right hip surgery.  She was told by her therapist that her right gluteus medius is weak and she is doing home exercises.  As far as lower back pain is concerned, she has been experiencing lower back pain mostly across off and on for about 4 years now.  That has not changed much.  She also has persistent numbness over left foot that has  been going on for about 4 years.  More recently she has been experiencing milder numbness over toes of right foot for the last about 1 year.  She denies any other symptoms that could be related to her lumbar spine.    Over telephone, I asked her to touch her feet and legs and thighs and she could feel those except and numbness feeling over left foot that she reported back to me.  I asked her to test straight leg raise on each side while sitting and she could do that without increased symptoms.  She could walk on her tiptoes and on her heels with some difficulty with increased pain but she reported to me that she could do it.  She appears to have at least antigravity strength of all major muscle groups in the lower extremities on either side based on this limited examination in this telephone visit.    She tried physical therapy for her lower back in the fall of last year and at that time she did not think that therapy helped rather she felt that it made her back pain worse. She has no history of lumbar spinal surgery. She has not had chiropractic treatments, acupuncture, massage or any other treatment for this.      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 She lives with her . She denies smoking. She drinks 2-3 beers per week. She denies history of drug or alcohol abuse.    DATA:  She had an EMG/nerve condition study done by Dr. Mike Chowdary on 08/22/19 which reported electrodiagnostic evidence of bilateral L5 and S1 chronic radiculopathy though on the left side it showed some active changes as well. She is more bothered by right sided symptoms and she does not have left lower extremity radicular symptoms.  She could not get an MRI because  of stimulator placement for bladder incontinence. She also could not get CT myelogram for possible contrast allergy. She had a plain CT scan of lumbar spine done on 08/08/19. I reviewed the images. It showed some degenerative changes. Bilateral L4-5 foraminal narrowing of moderate degrees and left L5-S1 foraminal narrowing of moderate to severe degrees noted. No severe central canal stenosis noted. Mild retrolisthesis of L3 on L4 noted.     ASSESSMENT/PLAN      ASSESSMENT:  Chronic right lumbosacral area pain for about 4 years of uncertain etiology.  The lower back pain could be related to lumbar spondylosis.  She possibly had right L4 radicular symptoms which got better after right L4-5 transforaminal selective nerve root injection the 09/18/2019.  At this time right hip, thigh and knee pain are likely related to orthopedic issues.  She had right hip replacement on 12/26/2019 after right hip fracture the day before.  She gives me a history of right knee replacement about a year ago.  She has persistent left foot numbness for about 4 years and milder numbness over right foot for about 1 year which could be related to lumbar spine.  Based on limited examination in this telephone visit she does not appear to have significant focal weakness over major muscle groups in the lower extremities except possible left gluteus medius weakness related to left hip fracture and surgery which was not tested today in this telephone visit.    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      PLAN:  I discussed with Ms. Garcia about possible etiology of her symptoms and different treatment options.  Considering new symptoms which could be related to right hip and right  knee and persistent lower back pain and numbness over both feet, I advised her to follow up with me at clinic in a week or two for further evaluation and treatment.  She is agreeable with this plan.  All the questions were answered and I consider her well informed.    Program status: Continue in Back and Spine Program

## 2021-10-08 NOTE — CONSULTS
Will dictate full note  Echo pending  Right knee fracture -mechanical fall   Cr is  5.3-acute renal injury   ekg shows sinus rate is stable   She had post op afib last time    She had anemia needing transfusion   Hx of PAFIB and HTN    Dictated -[id number]    She has significant edema bilateral   Check duplex of legs arterial and venous      Echo-Summary--5/21   Technically difficult study due to patient immobility s/p surgery. Left ventricular systolic function is normal.   Ejection fraction is visually estimated at 50-55%. Sclerotic, but non-stenotic aortic valve. Moderate mitral regurgitation. Moderate tricuspid regurgitation; RVSP: 40 mmHg consistent with mild PHTN.    No evidence of pericardial effusion        Electronically signed by Hue Lopez MD on 10/8/21 at 12:18 PM EDT

## 2021-10-08 NOTE — CONSULTS
CONSULT    Patient Name: Susy Davison   (1940)  MRN   [id number]   Today's date:  10/8/2021    CHIEF COMPLAINT:   Right hip pain      HISTORY OF PRESENT ILLNESS:        The patient is a [de-identified] y.o. female  who presents to the ER after a fall. She states that she was getting her slippers from the laundry room when she lost her balance and fell onto her right side. She did recently have a a left proximal femur fracture that underwent operative fixation in May 2021. She was doing relatively well from that surgery however she did lose her balance injuring her right hip. She also was recently diagnosed with a cellulitis in both legs likely from venous insufficiency. She was placed on p.o. antibiotics and was told she may need IV antibiotics. X-rays were done in the ER showing a displaced right femoral neck fracture    After the fall the patient was unable to ambulate and the pain was sudden, sharp and rated 7/10. The patient denied any chest pain, shortness of breath or syncopal episode prior to the fall.       Past Medical History         Diagnosis Date    Acid reflux     'no recent issue\"    Arthritis     \"Left Index Finger\"    CHF (congestive heart failure) ([facility]     per old chart hx of CHF with admission 12/2016 with pulmonary edema    Chronic kidney disease     Sees Dr. [place] have one kidney, dr Alyce Mascorro told me that in 2011 I think\"    GERD (gastroesophageal reflux disease)     History of blood transfusion 2011 Or 2012    No Reaction To Blood Transfusion Received    Hypertension     ( pt states she is not on any medication for blood pressure)    Hypomagnesemia     Hypothyroidism     Shortness of breath on exertion     SVT (supraventricular tachycardia) ([facility]     per old chart hx of SVT with admission 2016 tx with Adenosine and per notes in 5/2018 hx of SVT- no cardiologist    Teeth missing     Upper And Lower    Wears glasses        Past Surgical History         Procedure Laterality Date    ABDOMEN SURGERY      DENTAL SURGERY      Teeth Extracted In Past    ENDOSCOPY, COLON, DIAGNOSTIC  2011 Or 2012    EYE SURGERY  ? when    clyde cataract ext    FEMUR FRACTURE SURGERY Left 5/22/2021    FEMUR IM NAIL REGINALDO INSERTION performed by Tanya Aguero MD at [address] [facility]  2011    Abdominal Hernia Repair     OTHER SURGICAL HISTORY  05/27/2018    exp lap . converted to exp laporotomy with ventral hernia repair with mesh    OTHER SURGICAL HISTORY  05/23/0920    umbilical scar excision    VENTRAL HERNIA REPAIR  09/16/2016    Robotic laparoscopic       Medications Prior to Admission:     Prior to Admission medications    Medication Sig Start Date End Date Taking? Authorizing Provider   cephALEXin (KEFLEX) 500 MG capsule Take 1 capsule by mouth 4 times daily for 7 days 10/7/21 10/14/21  Cassy Carmichael PA-C   doxycycline hyclate (VIBRA-TABS) 100 MG tablet Take 1 tablet by mouth 2 times daily for 10 days Take with food. 10/7/21 10/17/21  Cassy Carmichael PA-C   allopurinol (ZYLOPRIM) 100 MG tablet Take 0.5 tablets by mouth daily 9/27/21   Neela Coffey MD   nystatin (MYCOSTATIN) 324934 UNIT/GM powder Apply 3 times daily x 10 to 14 days 9/23/21   Cassy Carmichael PA-C   metoprolol tartrate (LOPRESSOR) 25 MG tablet Take 1 tablet by mouth 2 times daily 9/21/21 10/21/21  Neela Coffey MD   magnesium oxide (MAG-OX) 400 (241.3 Mg) MG TABS tablet TAKE ONE TABLET BY MOUTH TWICE A DAY 9/21/21 3/1/22  Neela Coffey MD   levothyroxine (SYNTHROID) 50 MCG tablet Take 1 tablet by mouth Daily 9/21/21 10/21/21  Neela Coffey MD   furosemide (LASIX) 20 MG tablet Take 1 tablet by mouth 2 times daily Take 20 mg by mouth 2 times daily 9/21/21   Neela Coffey MD       Allergies     Patient has no known allergies. Social History   TOBACCO:   reports that she has never smoked.  She has never used smokeless tobacco.  ETOH:   reports no history of alcohol use. Patient currently lives alone    Family History         Problem Relation Age of Onset    Cancer Father         Lung Cancer    Arthritis Mother     Cancer Brother         Skin Cancer    High Cholesterol Brother        Review of Systems   Constitutional:  No weight loss, no night sweats  Eyes:  No visual changes  ENT:  No nasal drainage or ear pain  CV:  No chest pain  PULM:  No cough, no shortness of breath  GI:  No nausea, vomiting, diarrhea  :  No dysuria  Musc:  See HPI  Neuro: No numbness, tingling or parethesias  Skin:  No rashes  Psych: No depression symptoms    Physical Exam:    Vitals: BP (!) 141/69   Pulse 82   Temp 98 °F (36.7 °C) (Temporal)   Resp 18   Ht 5' 1\" (1.549 m)   Wt 230 lb (104.3 kg)   LMP 01/23/1995   SpO2 96%   BMI 43.46 kg/m² ,  Body mass index is 43.46 kg/m². General appearance: alert, appears stated age and cooperative  Skin: Skin color, texture, turgor normal. No rashes or lesions  HEENT: Head: Normocephalic, no lesions, without obvious abnormality. Neck: no adenopathy, no carotid bruit, no JVD, supple, symmetrical, trachea midline and thyroid not enlarged, symmetric, no tenderness/mass/nodules  masses,  no organomegaly\"}  Extremities:    - Right hip tender to palpation.     -Pain with PROM to hip, full assessment not done due to known fracture   -No knee or ankle deformity or significant tenderness   -Good distal pulses with good capillary refill sensation intact to light touch   -Able to dorsiflex and plantar flex ankle and toes-bilaterally   -No tenderness over bilateral wrist, elbow or shoulders.   Neurologic: Mental status: Alert, oriented, thought content appropriate    Labs     CBC:   Recent Labs     10/08/21  0340   WBC 5.8   HGB 12.0*        BMP:    Recent Labs     10/08/21  0340      K 4.8      CO2 19*   BUN 46*   CREATININE 5.3*   GLUCOSE 113*     INR:    Lab Results   Component Value Date INR 1.06 05/21/2021    INR 1.03 12/17/2016    INR 1.03 12/17/2016    PROTIME 12.8 05/21/2021    PROTIME 11.8 12/17/2016    PROTIME 11.8 12/17/2016        X-ray view   Imaging Review-X-rays were personally reviewed and interpreted by myself     Impression   1. Acute fracture of the right femoral neck with mild displacement. 2. Status post ORIF of the left femur.  Redemonstration of the previously   seen fracture of the proximal femur.  No significant callus formation is   present.               Assessment and Plan       Impression:   1. Right Hip femoral neck fracture   2. Bilateral Lower Extremity Cellulitis   3. Stage IV renal Dx    Plan:   1. Right hip hemiarthroplasty   2. Plan 24-48 hours I have him biotics for the cellulitis prior to surgery   3. Neurology consult for increased creatinine    Based on the fracture pattern I recommend hemiarthroplasty. The goal of surgical intervention is pain control and mobility. Postoperatively the patient will remain in the hospital for pain control, physical therapy, as well as DVT prophylaxis. We were planning on surgical intervention today however she does have a significant cellulitis in bilateral lower extremities, also her kidney failure has significantly increased. Await nephrology consult. She also has a cardiac history and has been seen by cardiology    Surgical risks were explained to the patient as well as the family; these included but not limited to infection,dislocation, periprosthetic fracture, continued pain after surgical intervention. Risks also include post-operative Deep venous thrombosis, heart attack, stroke and death. There is also at risk for loss of ambulatory status or a decreased level of ambulatory status. Postoperatively the patient will be weightbearing as tolerated and physical therapy will work with the patient on a daily basis. We will order the use incentive spirometry to prevent pneumonia postoperatively.  The patient will receive 24 hours of antibiotics postoperatively and the Simpson will be planned to be discontinued on postoperative day #2. Patient will be given both chemo (Lovenox) and mechanical (CITLALLI hoes and SCD's) DVT prophylaxis post-operatively. Post-operatively, we will assess the patients discharge needs with the help of case management. The patient will likely require a stay at a skilled nursing unit or ARU. The patient and family understands the risks and benefits of surgery and wishes to proceed with operative intervention.      Sherlyn Andre MD, MD

## 2021-10-09 ENCOUNTER — APPOINTMENT (OUTPATIENT)
Dept: ULTRASOUND IMAGING | Age: 81
DRG: 521 | End: 2021-10-09
Payer: MEDICARE

## 2021-10-09 LAB
ALBUMIN SERPL-MCNC: 2.7 GM/DL (ref 3.4–5)
ALP BLD-CCNC: 81 IU/L (ref 40–128)
ALT SERPL-CCNC: 6 U/L (ref 10–40)
ANION GAP SERPL CALCULATED.3IONS-SCNC: 9 MMOL/L (ref 4–16)
AST SERPL-CCNC: 20 IU/L (ref 15–37)
BASOPHILS ABSOLUTE: 0.1 K/CU MM
BASOPHILS RELATIVE PERCENT: 1 % (ref 0–1)
BILIRUB SERPL-MCNC: 0.5 MG/DL (ref 0–1)
BUN BLDV-MCNC: 45 MG/DL (ref 6–23)
CALCIUM SERPL-MCNC: 8.5 MG/DL (ref 8.3–10.6)
CHLORIDE BLD-SCNC: 108 MMOL/L (ref 99–110)
CO2: 20 MMOL/L (ref 21–32)
CREAT SERPL-MCNC: 5 MG/DL (ref 0.6–1.1)
DIFFERENTIAL TYPE: ABNORMAL
EOSINOPHILS ABSOLUTE: 0.6 K/CU MM
EOSINOPHILS RELATIVE PERCENT: 8.4 % (ref 0–3)
GFR AFRICAN AMERICAN: 10 ML/MIN/1.73M2
GFR NON-AFRICAN AMERICAN: 8 ML/MIN/1.73M2
GLUCOSE BLD-MCNC: 110 MG/DL (ref 70–99)
HCT VFR BLD CALC: 33.8 % (ref 37–47)
HEMOGLOBIN: 10.4 GM/DL (ref 12.5–16)
IMMATURE NEUTROPHIL %: 0.4 % (ref 0–0.43)
LYMPHOCYTES ABSOLUTE: 2 K/CU MM
LYMPHOCYTES RELATIVE PERCENT: 27.8 % (ref 24–44)
MAGNESIUM: 2.1 MG/DL (ref 1.8–2.4)
MCH RBC QN AUTO: 30.1 PG (ref 27–31)
MCHC RBC AUTO-ENTMCNC: 30.8 % (ref 32–36)
MCV RBC AUTO: 98 FL (ref 78–100)
MONOCYTES ABSOLUTE: 0.6 K/CU MM
MONOCYTES RELATIVE PERCENT: 8.7 % (ref 0–4)
NUCLEATED RBC %: 0 %
PDW BLD-RTO: 17.1 % (ref 11.7–14.9)
PHOSPHORUS: 4 MG/DL (ref 2.5–4.9)
PLATELET # BLD: 136 K/CU MM (ref 140–440)
PMV BLD AUTO: 9.4 FL (ref 7.5–11.1)
POTASSIUM SERPL-SCNC: 4.9 MMOL/L (ref 3.5–5.1)
PRO-BNP: 9203 PG/ML
RBC # BLD: 3.45 M/CU MM (ref 4.2–5.4)
SEGMENTED NEUTROPHILS ABSOLUTE COUNT: 3.8 K/CU MM
SEGMENTED NEUTROPHILS RELATIVE PERCENT: 53.7 % (ref 36–66)
SODIUM BLD-SCNC: 137 MMOL/L (ref 135–145)
TOTAL CK: 83 IU/L (ref 26–140)
TOTAL IMMATURE NEUTOROPHIL: 0.03 K/CU MM
TOTAL NUCLEATED RBC: 0 K/CU MM
TOTAL PROTEIN: 6.6 GM/DL (ref 6.4–8.2)
WBC # BLD: 7 K/CU MM (ref 4–10.5)

## 2021-10-09 PROCEDURE — 2700000000 HC OXYGEN THERAPY PER DAY

## 2021-10-09 PROCEDURE — 6370000000 HC RX 637 (ALT 250 FOR IP): Performed by: INTERNAL MEDICINE

## 2021-10-09 PROCEDURE — 84100 ASSAY OF PHOSPHORUS: CPT

## 2021-10-09 PROCEDURE — 83735 ASSAY OF MAGNESIUM: CPT

## 2021-10-09 PROCEDURE — 6360000002 HC RX W HCPCS: Performed by: INTERNAL MEDICINE

## 2021-10-09 PROCEDURE — 2140000000 HC CCU INTERMEDIATE R&B

## 2021-10-09 PROCEDURE — 99223 1ST HOSP IP/OBS HIGH 75: CPT | Performed by: INTERNAL MEDICINE

## 2021-10-09 PROCEDURE — 6370000000 HC RX 637 (ALT 250 FOR IP): Performed by: NURSE PRACTITIONER

## 2021-10-09 PROCEDURE — 6360000002 HC RX W HCPCS: Performed by: ORTHOPAEDIC SURGERY

## 2021-10-09 PROCEDURE — 85025 COMPLETE CBC W/AUTO DIFF WBC: CPT

## 2021-10-09 PROCEDURE — 2580000003 HC RX 258: Performed by: INTERNAL MEDICINE

## 2021-10-09 PROCEDURE — 36415 COLL VENOUS BLD VENIPUNCTURE: CPT

## 2021-10-09 PROCEDURE — 94761 N-INVAS EAR/PLS OXIMETRY MLT: CPT

## 2021-10-09 PROCEDURE — 83880 ASSAY OF NATRIURETIC PEPTIDE: CPT

## 2021-10-09 PROCEDURE — 80053 COMPREHEN METABOLIC PANEL: CPT

## 2021-10-09 PROCEDURE — 6360000002 HC RX W HCPCS: Performed by: NURSE PRACTITIONER

## 2021-10-09 PROCEDURE — 93970 EXTREMITY STUDY: CPT

## 2021-10-09 PROCEDURE — 82550 ASSAY OF CK (CPK): CPT

## 2021-10-09 RX ORDER — VANCOMYCIN 1.5 G/300ML
1500 INJECTION, SOLUTION INTRAVENOUS ONCE
Status: DISCONTINUED | OUTPATIENT
Start: 2021-10-09 | End: 2021-10-09 | Stop reason: SDUPTHER

## 2021-10-09 RX ORDER — FUROSEMIDE 10 MG/ML
40 INJECTION INTRAMUSCULAR; INTRAVENOUS ONCE
Status: COMPLETED | OUTPATIENT
Start: 2021-10-09 | End: 2021-10-09

## 2021-10-09 RX ORDER — HYDROCODONE BITARTRATE AND ACETAMINOPHEN 5; 325 MG/1; MG/1
1 TABLET ORAL EVERY 4 HOURS PRN
Status: DISCONTINUED | OUTPATIENT
Start: 2021-10-09 | End: 2021-10-10

## 2021-10-09 RX ORDER — VANCOMYCIN 1.5 G/300ML
1500 INJECTION, SOLUTION INTRAVENOUS ONCE
Status: COMPLETED | OUTPATIENT
Start: 2021-10-09 | End: 2021-10-09

## 2021-10-09 RX ORDER — FUROSEMIDE 10 MG/ML
80 INJECTION INTRAMUSCULAR; INTRAVENOUS ONCE
Status: COMPLETED | OUTPATIENT
Start: 2021-10-09 | End: 2021-10-09

## 2021-10-09 RX ADMIN — DOXYCYCLINE HYCLATE 100 MG: 100 TABLET, FILM COATED ORAL at 08:39

## 2021-10-09 RX ADMIN — CEFTRIAXONE SODIUM 1000 MG: 1 INJECTION, POWDER, FOR SOLUTION INTRAMUSCULAR; INTRAVENOUS at 09:02

## 2021-10-09 RX ADMIN — HYDROCODONE BITARTRATE AND ACETAMINOPHEN 1 TABLET: 5; 325 TABLET ORAL at 06:09

## 2021-10-09 RX ADMIN — HYDROCODONE BITARTRATE AND ACETAMINOPHEN 1 TABLET: 5; 325 TABLET ORAL at 20:43

## 2021-10-09 RX ADMIN — HYDROMORPHONE HYDROCHLORIDE 0.25 MG: 1 INJECTION, SOLUTION INTRAMUSCULAR; INTRAVENOUS; SUBCUTANEOUS at 04:03

## 2021-10-09 RX ADMIN — ENOXAPARIN SODIUM 30 MG: 30 INJECTION SUBCUTANEOUS at 11:56

## 2021-10-09 RX ADMIN — HYDROMORPHONE HYDROCHLORIDE 0.5 MG: 1 INJECTION, SOLUTION INTRAMUSCULAR; INTRAVENOUS; SUBCUTANEOUS at 17:32

## 2021-10-09 RX ADMIN — SODIUM CHLORIDE, PRESERVATIVE FREE 10 ML: 5 INJECTION INTRAVENOUS at 08:41

## 2021-10-09 RX ADMIN — HYDROMORPHONE HYDROCHLORIDE 0.5 MG: 1 INJECTION, SOLUTION INTRAMUSCULAR; INTRAVENOUS; SUBCUTANEOUS at 09:53

## 2021-10-09 RX ADMIN — SODIUM CHLORIDE, PRESERVATIVE FREE 10 ML: 5 INJECTION INTRAVENOUS at 20:46

## 2021-10-09 RX ADMIN — LEVOTHYROXINE SODIUM 50 MCG: 0.05 TABLET ORAL at 06:09

## 2021-10-09 RX ADMIN — ACETAMINOPHEN 650 MG: 325 TABLET ORAL at 03:13

## 2021-10-09 RX ADMIN — VANCOMYCIN 1500 MG: 1.5 INJECTION, SOLUTION INTRAVENOUS at 14:15

## 2021-10-09 RX ADMIN — ALLOPURINOL 50 MG: 100 TABLET ORAL at 08:39

## 2021-10-09 RX ADMIN — FUROSEMIDE 80 MG: 10 INJECTION, SOLUTION INTRAMUSCULAR; INTRAVENOUS at 17:35

## 2021-10-09 RX ADMIN — ACETAMINOPHEN 650 MG: 325 TABLET ORAL at 08:39

## 2021-10-09 RX ADMIN — HYDROCODONE BITARTRATE AND ACETAMINOPHEN 1 TABLET: 5; 325 TABLET ORAL at 11:37

## 2021-10-09 RX ADMIN — FUROSEMIDE 40 MG: 10 INJECTION, SOLUTION INTRAMUSCULAR; INTRAVENOUS at 11:56

## 2021-10-09 RX ADMIN — ACETAMINOPHEN 650 MG: 325 TABLET ORAL at 14:35

## 2021-10-09 ASSESSMENT — PAIN SCALES - GENERAL
PAINLEVEL_OUTOF10: 9
PAINLEVEL_OUTOF10: 7
PAINLEVEL_OUTOF10: 10
PAINLEVEL_OUTOF10: 9
PAINLEVEL_OUTOF10: 10
PAINLEVEL_OUTOF10: 10
PAINLEVEL_OUTOF10: 7
PAINLEVEL_OUTOF10: 10

## 2021-10-09 ASSESSMENT — PAIN DESCRIPTION - LOCATION
LOCATION: GENERALIZED
LOCATION: HIP

## 2021-10-09 ASSESSMENT — PAIN DESCRIPTION - DESCRIPTORS: DESCRIPTORS: ACHING

## 2021-10-09 ASSESSMENT — PAIN DESCRIPTION - ORIENTATION: ORIENTATION: RIGHT

## 2021-10-09 ASSESSMENT — PAIN DESCRIPTION - FREQUENCY: FREQUENCY: INTERMITTENT

## 2021-10-09 ASSESSMENT — PAIN DESCRIPTION - PAIN TYPE: TYPE: ACUTE PAIN

## 2021-10-09 NOTE — PROGRESS NOTES
Nephrology Progress Note  10/9/2021 3:20 PM        Subjective:   Admit Date: 10/8/2021  PCP: Cecelia Quintero MD    Interval History: Patient seen in early morning, this is a late entry    Diet: Some    ROS: She was very emotional and tearful  October is a difficult month for her  Her mother  in October  Also she is stating that she has to go to nursing home  Last time she had a left hip fracture-she states 2 months at nursing home-she hated it  Urine output recorded 900 cc for the shift  Milrinone drip    Data:     Current meds:    vancomycin  1,500 mg IntraVENous Once    sodium chloride flush  5-40 mL IntraVENous 2 times per day    allopurinol  50 mg Oral Daily    levothyroxine  50 mcg Oral Daily    [Held by provider] metoprolol tartrate  25 mg Oral BID      sodium chloride           I/O last 3 completed shifts: In: 150 [P.O.:150]  Out: 900 [Urine:900]    CBC:   Recent Labs     10/08/21  0340 10/09/21  0255   WBC 5.8 7.0   HGB 12.0* 10.4*    136*          Recent Labs     10/08/21  0340 10/09/21  0255    137   K 4.8 4.9    108   CO2 19* 20*   BUN 46* 45*   CREATININE 5.3* 5.0*   GLUCOSE 113* 110*       Lab Results   Component Value Date    CALCIUM 8.5 10/09/2021    PHOS 4.0 10/09/2021       Objective:     Vitals: BP (!) 126/54   Pulse 84   Temp 97.5 °F (36.4 °C) (Oral)   Resp 16   Ht 5' 1\" (1.549 m)   Wt 236 lb 15.9 oz (107.5 kg)   LMP 1995   SpO2 96%   BMI 44.78 kg/m²     General appearance: As above-I spent little bit of time with her to console her  HEENT: No gross conjunctival pallor  Neck: Supple  Lungs: Few rhonchi  Heart: Seems regular rate and rhythm  Abdomen: Soft, nontender  Extremities: Chronic lower extremity edema-stasis dermatitis      Problem List :         Impression :     1.  Acute kidney injury-with underlying CKD stage IV-her urine has no active sediment-I expect her kidney function to improve-likely has toxic and ischemic acute tubular injury  2. Right hip fracture-she will need corrective surgery  3. Biventricular heart failure -right heart failure more than the left with chronic lower extremity edema  4. Underlying thyroid disease    Recommendation/Plan  :     1. She did receive 40 mg of IV Lasix  2. Will try 80 mg 1 dose today  3. I have discussed with Dr. Radha Davila  4. Will discontinue milrinone  5.  Follow clinically and biochemically      Moris Brown MD MD

## 2021-10-09 NOTE — PROGRESS NOTES
NPN, focus: MRSA  D: Patient's wound culture came back positive for MRSA  A: RN verbally notified Dr. Avila Rice in Nurse's station of result. Physician placed orders to admin Vancomycin. RN also informed him that RT stated that they don't have enough BiPaps to assist in pushing patient's fluid. R: RN continues to monitor patient and admin Vanc once Pharmacy approves med. Call light in reach and bed in low position.

## 2021-10-09 NOTE — PROGRESS NOTES
Patient calling out from room and crying. States that her pain has increased in back and legs. Patient repositioned. On-call messaged, requesting one time dose of IV pain medicine until day team can evaluate.

## 2021-10-09 NOTE — PROGRESS NOTES
Daily Progress Note     Pt. Awake, alert and feeling ok  HR stable, NSR, BP stable  No CP, SOB today per pt. Doing ok, has lot of pain in leg  DVT negative--seen by renal  Plan for surgery tomorrow  Try CPAP to help with cor-pulmonale and right heat failure  Significant lower ext edema to above  Continue diuretics   On antibiotics for cellulitis of legs'  Moderate to high risk but stable to proceed with surgery   Consider pulmonary consult   She is depressed -she lost her parents recently     Rt. Hip fracture secondary to mechanical fall    Ortho following    Planning for surgery tomorrow    Stable for surgery from cardiac standpoint at moderate risk      Hx of PAF    Not on [facility] OP-currently on lovenox    On lopressor but currently being held d/t HOTN    NSR on tele    Echo done and mod MR but preserved EF    Will monitor    Cellulitis- on ABx for this  Will follow    Echo-10/8/21  Summary   This is a limited echocardiogram.   Technically difficult study, due to body habitus. Left ventricular systolic function is normal.   Ejection fraction is visually estimated at 50-55%. Moderate mitral regurgitation. Moderate tricuspid regurgitation; RVSP: 45 mmHg. No evidence of any pericardial effusion. PAST MEDICAL HISTORY:  The patient had anemia post surgery last time  also, and she had atrial fibrillation postsurgery also. She has a  history of having hypertension present. History of diastolic heart  failure present. History of renal insufficiency present. She has a  history of chronic venous ulcer present. Diastolic dysfunction, history  of SVTs in the past.  Moderate mitral regurgitation noted. Moderate  tricuspid regurgitation noted. Pulmonary hypertension present.   The  patient is followed by Dr. Sebastien Rivero for renal insufficiency present.     PAST SURGICAL HISTORY:  Hernia repair.     SOCIAL HISTORY:  She does not smoke, does not drink.     ALLERGIES:  NKDA.     MEDICATIONS:  At home, she is on Keflex that was started recently and  doxycycline and Lopressor 25 b.i.d. and Synthroid and Lasix twice a day. Objective:   BP (!) 126/54   Pulse 84   Temp 97.5 °F (36.4 °C) (Oral)   Resp 16   Ht 5' 1\" (1.549 m)   Wt 236 lb 15.9 oz (107.5 kg)   LMP 01/23/1995   SpO2 96%   BMI 44.78 kg/m²     Intake/Output Summary (Last 24 hours) at 10/9/2021 1027  Last data filed at 10/9/2021 0458  Gross per 24 hour   Intake 150 ml   Output 900 ml   Net -750 ml       Medications:   Scheduled Meds:   furosemide  40 mg IntraVENous Once    HYDROmorphone        enoxaparin  30 mg SubCUTAneous Daily    sodium chloride flush  5-40 mL IntraVENous 2 times per day    allopurinol  50 mg Oral Daily    levothyroxine  50 mcg Oral Daily    [Held by provider] metoprolol tartrate  25 mg Oral BID    cefTRIAXone (ROCEPHIN) IV  1,000 mg IntraVENous Q24H    doxycycline hyclate  100 mg Oral 2 times per day      Infusions:   sodium chloride        PRN Meds:  HYDROcodone 5 mg - acetaminophen, HYDROmorphone, sodium chloride flush, sodium chloride, ondansetron **OR** ondansetron, polyethylene glycol, acetaminophen **OR** acetaminophen       Physical Exam:  Vitals:    10/09/21 0900   BP: (!) 126/54   Pulse: 84   Resp: 16   Temp:    SpO2:         General: AAO, NAD  Chest: Nontender  Cardiac: First and Second Heart Sounds are Normal, No Murmurs or Gallops noted  Lungs:Clear to auscultation and percussion. Abdomen: Soft, NT, ND, +BS  Extremities: No clubbing, no edema  Vascular:  Equal 2+ peripheral pulses.         Lab Data:  CBC:   Recent Labs     10/08/21  0340 10/09/21  0255   WBC 5.8 7.0   HGB 12.0* 10.4*   HCT 38.2 33.8*   MCV 97.9 98.0    136*     BMP:   Recent Labs     10/08/21  0340 10/09/21  0255    137   K 4.8 4.9    108   CO2 19* 20*   PHOS  --  4.0   BUN 46* 45*   CREATININE 5.3* 5.0*     LIVER PROFILE:   Recent Labs     10/08/21  0340 10/09/21  0255   AST 23 20   ALT 9* 6*   BILITOT 0.6 0.5   ALKPHOS 108 81 PT/INR:   Recent Labs     10/08/21  1240   PROTIME 12.1   INR 0.94     APTT:   Recent Labs     10/08/21  1240   APTT 30.5     BNP:  No results for input(s): BNP in the last 72 hours.       Assessment:  Patient Active Problem List    Diagnosis Date Noted    Displaced fracture of right femoral neck ([facility] 10/08/2021    Closed transcervical fracture of proximal femur, left, initial encounter ([facility] 05/21/2021    Morbidly obese ([facility] 07/23/2020    History of ventral hernia repair 10/19/2018    HTN (hypertension) 08/10/2018    S/P herniorrhaphy 07/11/2018    S/P ventral herniorrhaphy 06/13/2018    H/O ventral hernia 06/06/2018    Chronic kidney disease (CKD) stage G4/A1, severely decreased glomerular filtration rate (GFR) between 15-29 mL/min/1.73 square meter and albuminuria creatinine ratio less than 30 mg/g ([facility] 05/16/2017    CRF (chronic renal failure) 09/20/2016    Hypothyroid 09/20/2016    Ventral hernia without obstruction or gangrene 09/18/2016    SVT (supraventricular tachycardia) ([facility] 09/18/2016    Chronic kidney disease (CKD) stage G4/A2, severely decreased glomerular filtration rate (GFR) between 15-29 mL/min/1.73 square meter and albuminuria creatinine ratio between  mg/g ([facility] 06/21/2016       Electronically signed by Eric Pak PA-C on 10/9/2021 at 10:27 AM    Electronically signed by Lyndsay Rodrgiuez MD on 10/9/21 at 10:45 AM EDT

## 2021-10-09 NOTE — PROGRESS NOTES
CONSULT    Patient Name: Jennings Nyhan   (1940)  MRN   [id number]   Today's date:  10/9/2021    Patient awake and alert this morning, pain in the hip 7/10  Overall doing well but tearful and upset      Physical Exam:    Vitals: BP (!) 126/54   Pulse 84   Temp 97.5 °F (36.4 °C) (Oral)   Resp 16   Ht 5' 1\" (1.549 m)   Wt 236 lb 15.9 oz (107.5 kg)   LMP 01/23/1995   SpO2 96%   BMI 44.78 kg/m² ,  Body mass index is 44.78 kg/m². General appearance: alert, appears stated age and cooperative  Skin: Skin color, texture, turgor normal. No rashes or lesions  HEENT: Head: Normocephalic, no lesions, without obvious abnormality. Neck: no adenopathy, no carotid bruit, no JVD, supple, symmetrical, trachea midline and thyroid not enlarged, symmetric, no tenderness/mass/nodules  masses,  no organomegaly\"}  Extremities:    - Right hip tender to palpation.     -Pain with PROM to hip, full assessment not done due to known fracture   -No knee or ankle deformity or significant tenderness   -Good distal pulses with good capillary refill sensation intact to light touch   -Able to dorsiflex and plantar flex ankle and toes-bilaterally   -Erythema improved over legs with venous statis ulcers present       Neurologic: Mental status: Alert, oriented, thought content appropriate    Labs     CBC:   Recent Labs     10/08/21  0340 10/09/21  0255   WBC 5.8 7.0   HGB 12.0* 10.4*    136*     BMP:    Recent Labs     10/08/21  0340 10/09/21  0255    137   K 4.8 4.9    108   CO2 19* 20*   BUN 46* 45*   CREATININE 5.3* 5.0*   GLUCOSE 113* 110*     INR:    Lab Results   Component Value Date    INR 0.94 10/08/2021    INR 1.06 05/21/2021    INR 1.03 12/17/2016    PROTIME 12.1 10/08/2021    PROTIME 12.8 05/21/2021    PROTIME 11.8 12/17/2016        X-ray view   Imaging Review-X-rays were personally reviewed and interpreted by myself     Impression   1.  Acute fracture of the right femoral neck with mild displacement. 2. Status post ORIF of the left femur.  Redemonstration of the previously   seen fracture of the proximal femur.  No significant callus formation is   present.               Assessment and Plan       Impression:   1. Right Hip femoral neck fracture   2. Bilateral Lower Extremity Cellulitis   3. Stage IV renal Dx    Plan:   1. Right hip hemiarthroplasty (10/10/21)   2.   Continue IV Abx-cellulitis improving        Carley Ruiz MD, MD

## 2021-10-09 NOTE — PROGRESS NOTES
Hospitalist Progress Note      Name:  Michael Slaughter /Age/Sex: 1940  ([de-identified] y.o. female)   MRN & CSN:  [id number] & [id number] Admission Date/Time: 10/8/2021  2:35 AM   Location:  [facility] PCP: Zaid Ramirez, [employer] Day: 2    Assessment and Plan:   Michael Slaughter is a [de-identified] y.o. with PMH of hypertension, hypothyroidism, HFpEF, SVT, CKD stage IV, morbid obesity and GERD who is grieving for loss of her  and was admitted with mechanical fall complicated by right hip fracture.     #Mechanical fall complicated by right hip femoral neck fracture. -Ortho consulted and patient is awaiting right hip hemiarthroplasty after treatment of lower extremity cellulitis.  -Wound culture growing moderate amount of MRSA. -Change antibiotics to IV vancomycin and consult ID.  -Continue analgesics and DVT prophylaxis per Ortho. -Bowel regimen with laxatives as needed.  -Caution with IV fluid per nephrology's recommendation. -Appreciate cardiology input for preop evaluation. -CPAP per cardiology recommendation but according to RN, there is insufficient CPAP machine per RT.  -This patient appears to be high risk for perioperative cardiac complication     #Bilateral lower extremity cellulitis due to MRSA. -Stop ceftriaxone and start IV vancomycin. -ID consult in view of plan for right hip hemiarthroplasty. Patient needs to be adequately managed as MRSA is a huge risk factor in prostatic infection.  -We will continue to monitor for now.     #Acute kidney injury superimposed on CKD stage IV. -Thought to be due to relative volume depletion.  -Nephrology is consulted and input appreciated. -We will continue to monitor renal function and avoid nephrotoxins.     #Chronic HFpEF with right heart failure and cor pulmonale and some evidence of volume overload. -Received IV Lasix and monitor I's and O's and renal function.   -Monitor fluid status closely.  -ProBNP is elevated at 9000.  -Keep on auscultated. Regular rate without appreciable murmurs, rubs, or gallops. No JVD or carotid bruits. Peripheral pulses equal bilaterally and palpable. Bilateral peripheral edema. GI        Abdomen is soft without significant tenderness, masses, or guarding. Bowel sounds are normoactive.        No costovertebral angle tenderness. Simpson catheter is not present. HEME/LYMPH            No palpable cervical lymphadenopathy and no hepatosplenomegaly. No petechiae or ecchymoses. MSK    Bilateral shins cellulitic changes, shortened and laterally rotated right lower extremity. Tender on the right hip. SKIN    Normal coloration, warm, dry. NEURO           Cranial nerves appear grossly intact, normal speech, no lateralizing weakness. PSYCH            Awake, alert, oriented x 4. Affect appropriate. Medications:   Medications:    furosemide  40 mg IntraVENous Once    sodium chloride flush  5-40 mL IntraVENous 2 times per day    allopurinol  50 mg Oral Daily    levothyroxine  50 mcg Oral Daily    [Held by provider] metoprolol tartrate  25 mg Oral BID    cefTRIAXone (ROCEPHIN) IV  1,000 mg IntraVENous Q24H    doxycycline hyclate  100 mg Oral 2 times per day      Infusions:    sodium chloride       PRN Meds: HYDROcodone 5 mg - acetaminophen, 1 tablet, Q4H PRN  HYDROmorphone, 0.5 mg, Q4H PRN  sodium chloride flush, 5-40 mL, PRN  sodium chloride, 25 mL, PRN  ondansetron, 4 mg, Q8H PRN   Or  ondansetron, 4 mg, Q6H PRN  polyethylene glycol, 17 g, Daily PRN  acetaminophen, 650 mg, Q6H PRN   Or  acetaminophen, 650 mg, Q6H PRN          Patient is still admitted because because of reasons noted above. The anticipated discharge is in greater than 24 hours.      Electronically signed by Vlad Goins MD on 10/9/2021 at 9:35 AM

## 2021-10-09 NOTE — PROGRESS NOTES
Physician Progress Note      Amanda Rivero  CSN #:                  [id number]  :                       1940  ADMIT DATE:       10/8/2021 2:35 AM  DISCH DATE:  RESPONDING  PROVIDER #:        Sebastián Blanco          QUERY TEXT:    Patient admitted with BMI of 44.9. If possible, please document in progress   notes and discharge summary if you are evaluating and /or treating any of the   following: The medical record reflects the following:  Risk Factors: lifestyle choices  Clinical Indicators: Pt has BMI of 44.9  Treatment: Will require lifestyle changes and education    Thank you,  Moni Lopez, RN  [phone]  Options provided:  -- Morbid obesity  -- Other - I will add my own diagnosis  -- Disagree - Not applicable / Not valid  -- Disagree - Clinically unable to determine / Unknown  -- Refer to Clinical Documentation Reviewer    PROVIDER RESPONSE TEXT:    This patient has morbid obesity.     Query created by: Hilda Adorno on 10/9/2021 7:23 AM      Electronically signed by:  Sebastián Blanco 10/9/2021 10:37 AM

## 2021-10-10 ENCOUNTER — APPOINTMENT (OUTPATIENT)
Dept: GENERAL RADIOLOGY | Age: 81
DRG: 521 | End: 2021-10-10
Payer: MEDICARE

## 2021-10-10 ENCOUNTER — ANESTHESIA (OUTPATIENT)
Dept: OPERATING ROOM | Age: 81
DRG: 521 | End: 2021-10-10
Payer: MEDICARE

## 2021-10-10 ENCOUNTER — ANESTHESIA EVENT (OUTPATIENT)
Dept: OPERATING ROOM | Age: 81
DRG: 521 | End: 2021-10-10
Payer: MEDICARE

## 2021-10-10 VITALS
RESPIRATION RATE: 3 BRPM | DIASTOLIC BLOOD PRESSURE: 55 MMHG | SYSTOLIC BLOOD PRESSURE: 107 MMHG | OXYGEN SATURATION: 95 % | TEMPERATURE: 96.3 F

## 2021-10-10 PROBLEM — L03.90 CELLULITIS: Status: ACTIVE | Noted: 2021-10-10

## 2021-10-10 LAB
ALBUMIN SERPL-MCNC: 2.5 GM/DL (ref 3.4–5)
ALP BLD-CCNC: 78 IU/L (ref 40–128)
ALT SERPL-CCNC: 6 U/L (ref 10–40)
ANION GAP SERPL CALCULATED.3IONS-SCNC: 12 MMOL/L (ref 4–16)
AST SERPL-CCNC: 19 IU/L (ref 15–37)
BILIRUB SERPL-MCNC: 0.6 MG/DL (ref 0–1)
BUN BLDV-MCNC: 47 MG/DL (ref 6–23)
CALCIUM SERPL-MCNC: 8.6 MG/DL (ref 8.3–10.6)
CHLORIDE BLD-SCNC: 107 MMOL/L (ref 99–110)
CO2: 20 MMOL/L (ref 21–32)
CREAT SERPL-MCNC: 5 MG/DL (ref 0.6–1.1)
CULTURE: ABNORMAL
CULTURE: ABNORMAL
DOSE AMOUNT: NORMAL
DOSE TIME: NORMAL
GFR AFRICAN AMERICAN: 10 ML/MIN/1.73M2
GFR NON-AFRICAN AMERICAN: 8 ML/MIN/1.73M2
GLUCOSE BLD-MCNC: 71 MG/DL (ref 70–99)
Lab: ABNORMAL
MAGNESIUM: 1.9 MG/DL (ref 1.8–2.4)
PHOSPHORUS: 4 MG/DL (ref 2.5–4.9)
POTASSIUM SERPL-SCNC: 4.8 MMOL/L (ref 3.5–5.1)
SODIUM BLD-SCNC: 139 MMOL/L (ref 135–145)
SPECIMEN: ABNORMAL
TOTAL PROTEIN: 6.4 GM/DL (ref 6.4–8.2)
VANCOMYCIN RANDOM: 20 UG/ML

## 2021-10-10 PROCEDURE — 2580000003 HC RX 258: Performed by: ORTHOPAEDIC SURGERY

## 2021-10-10 PROCEDURE — 2500000003 HC RX 250 WO HCPCS: Performed by: NURSE ANESTHETIST, CERTIFIED REGISTERED

## 2021-10-10 PROCEDURE — 6370000000 HC RX 637 (ALT 250 FOR IP): Performed by: ORTHOPAEDIC SURGERY

## 2021-10-10 PROCEDURE — 7100000000 HC PACU RECOVERY - FIRST 15 MIN: Performed by: ORTHOPAEDIC SURGERY

## 2021-10-10 PROCEDURE — P9045 ALBUMIN (HUMAN), 5%, 250 ML: HCPCS | Performed by: NURSE ANESTHETIST, CERTIFIED REGISTERED

## 2021-10-10 PROCEDURE — C1713 ANCHOR/SCREW BN/BN,TIS/BN: HCPCS | Performed by: ORTHOPAEDIC SURGERY

## 2021-10-10 PROCEDURE — 84100 ASSAY OF PHOSPHORUS: CPT

## 2021-10-10 PROCEDURE — 6360000002 HC RX W HCPCS: Performed by: ORTHOPAEDIC SURGERY

## 2021-10-10 PROCEDURE — 0SRR0J9 REPLACEMENT OF RIGHT HIP JOINT, FEMORAL SURFACE WITH SYNTHETIC SUBSTITUTE, CEMENTED, OPEN APPROACH: ICD-10-PCS | Performed by: ORTHOPAEDIC SURGERY

## 2021-10-10 PROCEDURE — 2580000003 HC RX 258: Performed by: NURSE ANESTHETIST, CERTIFIED REGISTERED

## 2021-10-10 PROCEDURE — 80202 ASSAY OF VANCOMYCIN: CPT

## 2021-10-10 PROCEDURE — 7100000001 HC PACU RECOVERY - ADDTL 15 MIN: Performed by: ORTHOPAEDIC SURGERY

## 2021-10-10 PROCEDURE — 2580000003 HC RX 258: Performed by: INTERNAL MEDICINE

## 2021-10-10 PROCEDURE — 2720000010 HC SURG SUPPLY STERILE: Performed by: ORTHOPAEDIC SURGERY

## 2021-10-10 PROCEDURE — 6370000000 HC RX 637 (ALT 250 FOR IP): Performed by: INTERNAL MEDICINE

## 2021-10-10 PROCEDURE — 3700000001 HC ADD 15 MINUTES (ANESTHESIA): Performed by: ORTHOPAEDIC SURGERY

## 2021-10-10 PROCEDURE — 3600000014 HC SURGERY LEVEL 4 ADDTL 15MIN: Performed by: ORTHOPAEDIC SURGERY

## 2021-10-10 PROCEDURE — 3700000000 HC ANESTHESIA ATTENDED CARE: Performed by: ORTHOPAEDIC SURGERY

## 2021-10-10 PROCEDURE — 6360000002 HC RX W HCPCS: Performed by: INTERNAL MEDICINE

## 2021-10-10 PROCEDURE — 6360000002 HC RX W HCPCS: Performed by: NURSE ANESTHETIST, CERTIFIED REGISTERED

## 2021-10-10 PROCEDURE — 2140000000 HC CCU INTERMEDIATE R&B

## 2021-10-10 PROCEDURE — 36415 COLL VENOUS BLD VENIPUNCTURE: CPT

## 2021-10-10 PROCEDURE — 73502 X-RAY EXAM HIP UNI 2-3 VIEWS: CPT

## 2021-10-10 PROCEDURE — 6360000002 HC RX W HCPCS: Performed by: ANESTHESIOLOGY

## 2021-10-10 PROCEDURE — 80053 COMPREHEN METABOLIC PANEL: CPT

## 2021-10-10 PROCEDURE — 3600000004 HC SURGERY LEVEL 4 BASE: Performed by: ORTHOPAEDIC SURGERY

## 2021-10-10 PROCEDURE — 94761 N-INVAS EAR/PLS OXIMETRY MLT: CPT

## 2021-10-10 PROCEDURE — C1776 JOINT DEVICE (IMPLANTABLE): HCPCS | Performed by: ORTHOPAEDIC SURGERY

## 2021-10-10 PROCEDURE — 2709999900 HC NON-CHARGEABLE SUPPLY: Performed by: ORTHOPAEDIC SURGERY

## 2021-10-10 PROCEDURE — 83735 ASSAY OF MAGNESIUM: CPT

## 2021-10-10 DEVICE — CEMENT BNE 40GM HI VISC RADPQ FOR REV SURG: Type: IMPLANTABLE DEVICE | Site: HIP | Status: FUNCTIONAL

## 2021-10-10 RX ORDER — OXYCODONE HYDROCHLORIDE 5 MG/1
5 TABLET ORAL EVERY 4 HOURS PRN
Status: DISCONTINUED | OUTPATIENT
Start: 2021-10-10 | End: 2021-10-11 | Stop reason: HOSPADM

## 2021-10-10 RX ORDER — MEPERIDINE HYDROCHLORIDE 25 MG/ML
12.5 INJECTION INTRAMUSCULAR; INTRAVENOUS; SUBCUTANEOUS EVERY 5 MIN PRN
Status: DISCONTINUED | OUTPATIENT
Start: 2021-10-10 | End: 2021-10-10 | Stop reason: HOSPADM

## 2021-10-10 RX ORDER — DIPHENHYDRAMINE HYDROCHLORIDE 50 MG/ML
12.5 INJECTION INTRAMUSCULAR; INTRAVENOUS
Status: COMPLETED | OUTPATIENT
Start: 2021-10-10 | End: 2021-10-10

## 2021-10-10 RX ORDER — ACETAMINOPHEN 325 MG/1
650 TABLET ORAL EVERY 6 HOURS
Status: DISCONTINUED | OUTPATIENT
Start: 2021-10-10 | End: 2021-10-11 | Stop reason: HOSPADM

## 2021-10-10 RX ORDER — SODIUM CHLORIDE, SODIUM LACTATE, POTASSIUM CHLORIDE, CALCIUM CHLORIDE 600; 310; 30; 20 MG/100ML; MG/100ML; MG/100ML; MG/100ML
INJECTION, SOLUTION INTRAVENOUS CONTINUOUS
Status: DISCONTINUED | OUTPATIENT
Start: 2021-10-10 | End: 2021-10-10

## 2021-10-10 RX ORDER — CEFAZOLIN SODIUM 2 G/50ML
SOLUTION INTRAVENOUS PRN
Status: DISCONTINUED | OUTPATIENT
Start: 2021-10-10 | End: 2021-10-10 | Stop reason: SDUPTHER

## 2021-10-10 RX ORDER — ONDANSETRON 2 MG/ML
INJECTION INTRAMUSCULAR; INTRAVENOUS PRN
Status: DISCONTINUED | OUTPATIENT
Start: 2021-10-10 | End: 2021-10-10 | Stop reason: SDUPTHER

## 2021-10-10 RX ORDER — HYDROMORPHONE HCL 110MG/55ML
0.5 PATIENT CONTROLLED ANALGESIA SYRINGE INTRAVENOUS EVERY 5 MIN PRN
Status: COMPLETED | OUTPATIENT
Start: 2021-10-10 | End: 2021-10-10

## 2021-10-10 RX ORDER — SODIUM CHLORIDE 0.9 % (FLUSH) 0.9 %
10 SYRINGE (ML) INJECTION PRN
Status: DISCONTINUED | OUTPATIENT
Start: 2021-10-10 | End: 2021-10-11 | Stop reason: HOSPADM

## 2021-10-10 RX ORDER — ALBUMIN, HUMAN INJ 5% 5 %
SOLUTION INTRAVENOUS PRN
Status: DISCONTINUED | OUTPATIENT
Start: 2021-10-10 | End: 2021-10-10 | Stop reason: SDUPTHER

## 2021-10-10 RX ORDER — VANCOMYCIN HYDROCHLORIDE 500 MG/10ML
INJECTION, POWDER, LYOPHILIZED, FOR SOLUTION INTRAVENOUS
Status: COMPLETED | OUTPATIENT
Start: 2021-10-10 | End: 2021-10-10

## 2021-10-10 RX ORDER — HYDROCODONE BITARTRATE AND ACETAMINOPHEN 5; 325 MG/1; MG/1
1 TABLET ORAL PRN
Status: DISCONTINUED | OUTPATIENT
Start: 2021-10-10 | End: 2021-10-10 | Stop reason: HOSPADM

## 2021-10-10 RX ORDER — SENNA AND DOCUSATE SODIUM 50; 8.6 MG/1; MG/1
1 TABLET, FILM COATED ORAL 2 TIMES DAILY
Status: DISCONTINUED | OUTPATIENT
Start: 2021-10-10 | End: 2021-10-11 | Stop reason: HOSPADM

## 2021-10-10 RX ORDER — SODIUM CHLORIDE 0.9 % (FLUSH) 0.9 %
10 SYRINGE (ML) INJECTION EVERY 12 HOURS SCHEDULED
Status: DISCONTINUED | OUTPATIENT
Start: 2021-10-10 | End: 2021-10-11 | Stop reason: HOSPADM

## 2021-10-10 RX ORDER — ONDANSETRON 2 MG/ML
4 INJECTION INTRAMUSCULAR; INTRAVENOUS
Status: DISCONTINUED | OUTPATIENT
Start: 2021-10-10 | End: 2021-10-10 | Stop reason: HOSPADM

## 2021-10-10 RX ORDER — HYDRALAZINE HYDROCHLORIDE 20 MG/ML
5 INJECTION INTRAMUSCULAR; INTRAVENOUS EVERY 10 MIN PRN
Status: DISCONTINUED | OUTPATIENT
Start: 2021-10-10 | End: 2021-10-10 | Stop reason: HOSPADM

## 2021-10-10 RX ORDER — CEFAZOLIN SODIUM 2 G/100ML
2000 INJECTION, SOLUTION INTRAVENOUS EVERY 8 HOURS
Status: COMPLETED | OUTPATIENT
Start: 2021-10-10 | End: 2021-10-11

## 2021-10-10 RX ORDER — 0.9 % SODIUM CHLORIDE 0.9 %
500 INTRAVENOUS SOLUTION INTRAVENOUS
Status: DISCONTINUED | OUTPATIENT
Start: 2021-10-10 | End: 2021-10-10 | Stop reason: HOSPADM

## 2021-10-10 RX ORDER — FENTANYL CITRATE 50 UG/ML
50 INJECTION, SOLUTION INTRAMUSCULAR; INTRAVENOUS EVERY 5 MIN PRN
Status: DISCONTINUED | OUTPATIENT
Start: 2021-10-10 | End: 2021-10-10 | Stop reason: HOSPADM

## 2021-10-10 RX ORDER — HYDROCODONE BITARTRATE AND ACETAMINOPHEN 5; 325 MG/1; MG/1
2 TABLET ORAL EVERY 6 HOURS PRN
Status: DISCONTINUED | OUTPATIENT
Start: 2021-10-10 | End: 2021-10-10 | Stop reason: HOSPADM

## 2021-10-10 RX ORDER — LABETALOL HYDROCHLORIDE 5 MG/ML
5 INJECTION, SOLUTION INTRAVENOUS EVERY 10 MIN PRN
Status: DISCONTINUED | OUTPATIENT
Start: 2021-10-10 | End: 2021-10-10 | Stop reason: HOSPADM

## 2021-10-10 RX ORDER — SODIUM CHLORIDE, SODIUM LACTATE, POTASSIUM CHLORIDE, CALCIUM CHLORIDE 600; 310; 30; 20 MG/100ML; MG/100ML; MG/100ML; MG/100ML
INJECTION, SOLUTION INTRAVENOUS CONTINUOUS PRN
Status: DISCONTINUED | OUTPATIENT
Start: 2021-10-10 | End: 2021-10-10 | Stop reason: SDUPTHER

## 2021-10-10 RX ORDER — PROPOFOL 10 MG/ML
INJECTION, EMULSION INTRAVENOUS PRN
Status: DISCONTINUED | OUTPATIENT
Start: 2021-10-10 | End: 2021-10-10 | Stop reason: SDUPTHER

## 2021-10-10 RX ORDER — ROCURONIUM BROMIDE 10 MG/ML
INJECTION, SOLUTION INTRAVENOUS PRN
Status: DISCONTINUED | OUTPATIENT
Start: 2021-10-10 | End: 2021-10-10 | Stop reason: SDUPTHER

## 2021-10-10 RX ORDER — FENTANYL CITRATE 50 UG/ML
INJECTION, SOLUTION INTRAMUSCULAR; INTRAVENOUS PRN
Status: DISCONTINUED | OUTPATIENT
Start: 2021-10-10 | End: 2021-10-10 | Stop reason: SDUPTHER

## 2021-10-10 RX ORDER — SODIUM CHLORIDE 9 MG/ML
25 INJECTION, SOLUTION INTRAVENOUS PRN
Status: DISCONTINUED | OUTPATIENT
Start: 2021-10-10 | End: 2021-10-11 | Stop reason: HOSPADM

## 2021-10-10 RX ORDER — ETOMIDATE 2 MG/ML
INJECTION INTRAVENOUS PRN
Status: DISCONTINUED | OUTPATIENT
Start: 2021-10-10 | End: 2021-10-10 | Stop reason: SDUPTHER

## 2021-10-10 RX ORDER — FUROSEMIDE 10 MG/ML
40 INJECTION INTRAMUSCULAR; INTRAVENOUS ONCE
Status: COMPLETED | OUTPATIENT
Start: 2021-10-10 | End: 2021-10-10

## 2021-10-10 RX ORDER — PROMETHAZINE HYDROCHLORIDE 25 MG/ML
6.25 INJECTION, SOLUTION INTRAMUSCULAR; INTRAVENOUS
Status: DISCONTINUED | OUTPATIENT
Start: 2021-10-10 | End: 2021-10-10 | Stop reason: HOSPADM

## 2021-10-10 RX ORDER — DEXAMETHASONE SODIUM PHOSPHATE 4 MG/ML
INJECTION, SOLUTION INTRA-ARTICULAR; INTRALESIONAL; INTRAMUSCULAR; INTRAVENOUS; SOFT TISSUE PRN
Status: DISCONTINUED | OUTPATIENT
Start: 2021-10-10 | End: 2021-10-10 | Stop reason: SDUPTHER

## 2021-10-10 RX ORDER — PHENYLEPHRINE HCL IN 0.9% NACL 1 MG/10 ML
SYRINGE (ML) INTRAVENOUS PRN
Status: DISCONTINUED | OUTPATIENT
Start: 2021-10-10 | End: 2021-10-10 | Stop reason: SDUPTHER

## 2021-10-10 RX ORDER — LIDOCAINE HYDROCHLORIDE 20 MG/ML
INJECTION, SOLUTION INTRAVENOUS PRN
Status: DISCONTINUED | OUTPATIENT
Start: 2021-10-10 | End: 2021-10-10 | Stop reason: SDUPTHER

## 2021-10-10 RX ADMIN — HYDROMORPHONE HYDROCHLORIDE 0.5 MG: 2 INJECTION, SOLUTION INTRAMUSCULAR; INTRAVENOUS; SUBCUTANEOUS at 11:05

## 2021-10-10 RX ADMIN — FENTANYL CITRATE 50 MCG: 50 INJECTION, SOLUTION INTRAMUSCULAR; INTRAVENOUS at 10:30

## 2021-10-10 RX ADMIN — FENTANYL CITRATE 50 MCG: 50 INJECTION INTRAMUSCULAR; INTRAVENOUS at 10:50

## 2021-10-10 RX ADMIN — CEFAZOLIN SODIUM 2000 MG: 2 INJECTION, SOLUTION INTRAVENOUS at 20:56

## 2021-10-10 RX ADMIN — LIDOCAINE HYDROCHLORIDE 80 MG: 20 INJECTION, SOLUTION INTRAVENOUS at 08:21

## 2021-10-10 RX ADMIN — HYDROMORPHONE HYDROCHLORIDE 0.5 MG: 1 INJECTION, SOLUTION INTRAMUSCULAR; INTRAVENOUS; SUBCUTANEOUS at 04:00

## 2021-10-10 RX ADMIN — DIPHENHYDRAMINE HYDROCHLORIDE 12.5 MG: 50 INJECTION, SOLUTION INTRAMUSCULAR; INTRAVENOUS at 10:55

## 2021-10-10 RX ADMIN — SODIUM CHLORIDE, POTASSIUM CHLORIDE, SODIUM LACTATE AND CALCIUM CHLORIDE: 600; 310; 30; 20 INJECTION, SOLUTION INTRAVENOUS at 11:20

## 2021-10-10 RX ADMIN — HYDROMORPHONE HYDROCHLORIDE 0.5 MG: 2 INJECTION, SOLUTION INTRAMUSCULAR; INTRAVENOUS; SUBCUTANEOUS at 11:11

## 2021-10-10 RX ADMIN — HYDROMORPHONE HYDROCHLORIDE 0.5 MG: 2 INJECTION, SOLUTION INTRAMUSCULAR; INTRAVENOUS; SUBCUTANEOUS at 11:23

## 2021-10-10 RX ADMIN — ACETAMINOPHEN 650 MG: 325 TABLET ORAL at 20:40

## 2021-10-10 RX ADMIN — SODIUM CHLORIDE, PRESERVATIVE FREE 10 ML: 5 INJECTION INTRAVENOUS at 12:42

## 2021-10-10 RX ADMIN — ROCURONIUM BROMIDE 10 MG: 10 INJECTION INTRAVENOUS at 09:17

## 2021-10-10 RX ADMIN — HYDROMORPHONE HYDROCHLORIDE 0.5 MG: 1 INJECTION, SOLUTION INTRAMUSCULAR; INTRAVENOUS; SUBCUTANEOUS at 19:26

## 2021-10-10 RX ADMIN — Medication 100 MCG: at 08:49

## 2021-10-10 RX ADMIN — SODIUM CHLORIDE, PRESERVATIVE FREE 10 ML: 5 INJECTION INTRAVENOUS at 20:40

## 2021-10-10 RX ADMIN — FENTANYL CITRATE 50 MCG: 50 INJECTION INTRAMUSCULAR; INTRAVENOUS at 10:58

## 2021-10-10 RX ADMIN — SENNOSIDES AND DOCUSATE SODIUM 1 TABLET: 50; 8.6 TABLET ORAL at 20:40

## 2021-10-10 RX ADMIN — HYDROMORPHONE HYDROCHLORIDE 0.5 MG: 2 INJECTION, SOLUTION INTRAMUSCULAR; INTRAVENOUS; SUBCUTANEOUS at 11:15

## 2021-10-10 RX ADMIN — PROPOFOL 20 MG: 10 INJECTION, EMULSION INTRAVENOUS at 08:21

## 2021-10-10 RX ADMIN — CEFAZOLIN SODIUM 2000 MG: 2 SOLUTION INTRAVENOUS at 08:46

## 2021-10-10 RX ADMIN — ROCURONIUM BROMIDE 40 MG: 10 INJECTION INTRAVENOUS at 08:21

## 2021-10-10 RX ADMIN — SODIUM CHLORIDE, PRESERVATIVE FREE 10 ML: 5 INJECTION INTRAVENOUS at 19:26

## 2021-10-10 RX ADMIN — ONDANSETRON 4 MG: 2 INJECTION INTRAMUSCULAR; INTRAVENOUS at 08:45

## 2021-10-10 RX ADMIN — ALLOPURINOL 50 MG: 100 TABLET ORAL at 12:28

## 2021-10-10 RX ADMIN — FENTANYL CITRATE 25 MCG: 50 INJECTION, SOLUTION INTRAMUSCULAR; INTRAVENOUS at 09:42

## 2021-10-10 RX ADMIN — SODIUM CHLORIDE, POTASSIUM CHLORIDE, SODIUM LACTATE AND CALCIUM CHLORIDE: 600; 310; 30; 20 INJECTION, SOLUTION INTRAVENOUS at 08:15

## 2021-10-10 RX ADMIN — ETOMIDATE 12 MG: 2 INJECTION, SOLUTION INTRAVENOUS at 08:21

## 2021-10-10 RX ADMIN — FENTANYL CITRATE 25 MCG: 50 INJECTION, SOLUTION INTRAMUSCULAR; INTRAVENOUS at 08:21

## 2021-10-10 RX ADMIN — Medication 100 MCG: at 09:56

## 2021-10-10 RX ADMIN — Medication 100 MCG: at 09:26

## 2021-10-10 RX ADMIN — DEXAMETHASONE SODIUM PHOSPHATE 4 MG: 4 INJECTION, SOLUTION INTRAMUSCULAR; INTRAVENOUS at 08:45

## 2021-10-10 RX ADMIN — ACETAMINOPHEN 650 MG: 325 TABLET ORAL at 12:29

## 2021-10-10 RX ADMIN — METOPROLOL TARTRATE 25 MG: 25 TABLET, FILM COATED ORAL at 20:39

## 2021-10-10 RX ADMIN — ALBUMIN (HUMAN) 250 ML: 12.5 INJECTION, SOLUTION INTRAVENOUS at 08:43

## 2021-10-10 RX ADMIN — OXYCODONE HYDROCHLORIDE 5 MG: 5 TABLET ORAL at 20:39

## 2021-10-10 RX ADMIN — Medication 100 MCG: at 09:00

## 2021-10-10 RX ADMIN — Medication 100 MCG: at 09:40

## 2021-10-10 RX ADMIN — HYDROCODONE BITARTRATE AND ACETAMINOPHEN 1 TABLET: 5; 325 TABLET ORAL at 15:18

## 2021-10-10 RX ADMIN — SUGAMMADEX 200 MG: 100 INJECTION, SOLUTION INTRAVENOUS at 10:24

## 2021-10-10 RX ADMIN — HYDROMORPHONE HYDROCHLORIDE 0.5 MG: 1 INJECTION, SOLUTION INTRAMUSCULAR; INTRAVENOUS; SUBCUTANEOUS at 12:29

## 2021-10-10 RX ADMIN — FUROSEMIDE 40 MG: 10 INJECTION, SOLUTION INTRAVENOUS at 11:33

## 2021-10-10 RX ADMIN — Medication 100 MCG: at 08:46

## 2021-10-10 ASSESSMENT — PULMONARY FUNCTION TESTS
PIF_VALUE: 0
PIF_VALUE: 27
PIF_VALUE: 27
PIF_VALUE: 9
PIF_VALUE: 24
PIF_VALUE: 24
PIF_VALUE: 25
PIF_VALUE: 30
PIF_VALUE: 32
PIF_VALUE: 23
PIF_VALUE: 25
PIF_VALUE: 0
PIF_VALUE: 23
PIF_VALUE: 4
PIF_VALUE: 22
PIF_VALUE: 22
PIF_VALUE: 20
PIF_VALUE: 25
PIF_VALUE: 27
PIF_VALUE: 26
PIF_VALUE: 29
PIF_VALUE: 25
PIF_VALUE: 0
PIF_VALUE: 25
PIF_VALUE: 24
PIF_VALUE: 26
PIF_VALUE: 27
PIF_VALUE: 1
PIF_VALUE: 2
PIF_VALUE: 26
PIF_VALUE: 33
PIF_VALUE: 23
PIF_VALUE: 26
PIF_VALUE: 20
PIF_VALUE: 23
PIF_VALUE: 29
PIF_VALUE: 26
PIF_VALUE: 26
PIF_VALUE: 0
PIF_VALUE: 24
PIF_VALUE: 7
PIF_VALUE: 26
PIF_VALUE: 23
PIF_VALUE: 19
PIF_VALUE: 23
PIF_VALUE: 20
PIF_VALUE: 24
PIF_VALUE: 25
PIF_VALUE: 20
PIF_VALUE: 11
PIF_VALUE: 26
PIF_VALUE: 25
PIF_VALUE: 2
PIF_VALUE: 24
PIF_VALUE: 21
PIF_VALUE: 23
PIF_VALUE: 23
PIF_VALUE: 28
PIF_VALUE: 22
PIF_VALUE: 23
PIF_VALUE: 27
PIF_VALUE: 8
PIF_VALUE: 26
PIF_VALUE: 25
PIF_VALUE: 26
PIF_VALUE: 26
PIF_VALUE: 30
PIF_VALUE: 23
PIF_VALUE: 23
PIF_VALUE: 16
PIF_VALUE: 21
PIF_VALUE: 25
PIF_VALUE: 26
PIF_VALUE: 0
PIF_VALUE: 26
PIF_VALUE: 28
PIF_VALUE: 20
PIF_VALUE: 26
PIF_VALUE: 17
PIF_VALUE: 20
PIF_VALUE: 25
PIF_VALUE: 23
PIF_VALUE: 26
PIF_VALUE: 27
PIF_VALUE: 25
PIF_VALUE: 1
PIF_VALUE: 26
PIF_VALUE: 26
PIF_VALUE: 24
PIF_VALUE: 24
PIF_VALUE: 27
PIF_VALUE: 21
PIF_VALUE: 27
PIF_VALUE: 23
PIF_VALUE: 23
PIF_VALUE: 24
PIF_VALUE: 1
PIF_VALUE: 28
PIF_VALUE: 27
PIF_VALUE: 25
PIF_VALUE: 21
PIF_VALUE: 26
PIF_VALUE: 24
PIF_VALUE: 26
PIF_VALUE: 23
PIF_VALUE: 26
PIF_VALUE: 26
PIF_VALUE: 23
PIF_VALUE: 24
PIF_VALUE: 23
PIF_VALUE: 30
PIF_VALUE: 25
PIF_VALUE: 0
PIF_VALUE: 2
PIF_VALUE: 26
PIF_VALUE: 23
PIF_VALUE: 24
PIF_VALUE: 27
PIF_VALUE: 21
PIF_VALUE: 23
PIF_VALUE: 19
PIF_VALUE: 26
PIF_VALUE: 28
PIF_VALUE: 23
PIF_VALUE: 21
PIF_VALUE: 24
PIF_VALUE: 23
PIF_VALUE: 4
PIF_VALUE: 25
PIF_VALUE: 26
PIF_VALUE: 28
PIF_VALUE: 21
PIF_VALUE: 21

## 2021-10-10 ASSESSMENT — PAIN DESCRIPTION - PAIN TYPE
TYPE: SURGICAL PAIN

## 2021-10-10 ASSESSMENT — PAIN DESCRIPTION - ORIENTATION
ORIENTATION: RIGHT

## 2021-10-10 ASSESSMENT — PAIN DESCRIPTION - PROGRESSION
CLINICAL_PROGRESSION: NOT CHANGED
CLINICAL_PROGRESSION: GRADUALLY IMPROVING
CLINICAL_PROGRESSION: NOT CHANGED

## 2021-10-10 ASSESSMENT — PAIN DESCRIPTION - FREQUENCY
FREQUENCY: CONTINUOUS

## 2021-10-10 ASSESSMENT — PAIN SCALES - GENERAL
PAINLEVEL_OUTOF10: 10
PAINLEVEL_OUTOF10: 3
PAINLEVEL_OUTOF10: 10
PAINLEVEL_OUTOF10: 7
PAINLEVEL_OUTOF10: 4
PAINLEVEL_OUTOF10: 7
PAINLEVEL_OUTOF10: 8
PAINLEVEL_OUTOF10: 7
PAINLEVEL_OUTOF10: 5
PAINLEVEL_OUTOF10: 7

## 2021-10-10 ASSESSMENT — PAIN DESCRIPTION - DESCRIPTORS
DESCRIPTORS: ACHING;DISCOMFORT
DESCRIPTORS: ACHING
DESCRIPTORS: ACHING;DISCOMFORT

## 2021-10-10 ASSESSMENT — PAIN DESCRIPTION - LOCATION
LOCATION: HIP

## 2021-10-10 ASSESSMENT — ENCOUNTER SYMPTOMS: SHORTNESS OF BREATH: 1

## 2021-10-10 NOTE — OP NOTE
OPERATIVE NOTE      Bossman Quevedo    (1940)    Date of procedure  10/10/2021    Preoperative Diagnosis:  Right Femoral Neck Fracture    Postoperative Diagnosis:  Right Femoral Neck Fracture    Procedure:    Right Hip Hemiarthroplasty             Surgeon:    Damian Buerger. Vince Gamble MD    Assistant:    Junius Sandifer PA-C who assisted in exposure of the hip protection of neurovascular structures, final implantation of the components, closure of the wound, application of sterile dressing and transfer of patient. Anesthesia:    General endotrachial anesthesia    Estimated blood loss:   300 ml    Drains:    1 Medium Hemovac    Specimens:   None    Complications:    None    Implants:   Aaliyah (Avenir) Femur size-4, head 44mm, neck-Neutral    Implant Name Inv. Item Lot No. LRB Action   STEM FEM MILA 4 STD HIP AVENIR STEM FEM MILA 4 STD HIP AVENIR [id number] Right Implanted   CEMENT BNE 40GM HI VISC RADPQ FOR REV SURGx2 CEMENT BNE 40GM HI VISC RADPQ FOR REV SURG [id number] Right Implanted   CEMENT BNE 40GM HI VISC RADPQ FOR REV SURG CEMENT BNE 40GM HI VISC RADPQ FOR REV SURG [id number] Right Implanted   VERSYS HIP SYSTEM, ENDO FEMORAL HEAD 12/14 TAPER 44 MM   [id number] Right Implanted       Surgical Indications  The patient presented to the emergency room and was diagnosed with a femoral neck fracture. The patient was admitted to the hospital and received a medical conssult and anesthesia evaluation. The patient chose to proceed with hip hemiarthroplasty. Risks, benefits, expected outcomes and potential complications were discussed as outline in the H&P. At no time were any guarantees implied or stated. Informed consent was obtained. Patient Positioning and Surgical Prep  The patient was seen in the holding area and the appropriate extremity marked with an indelible pen. The patient was taken to the operative suite, identified and while on her hospital bed, anesthesia was performed.   The patient was transferred to noted to be stable. The hip was dislocated and the final femoral  head size 44 with a neutral neck length impacted onto the stem. The hip was again reduced and taken through a full range of motion. It was stable in all planes and leg length was grossly equivalent. Closure and Disposition  The hip capsule and external rotators were re-approximated to the greater trochanter using two #2 Fiberwire sutures placed through drill hole. .  The gluteal fascia and IT band were re approximated with #2 fiber wire. The subcutaneus layer was closed with 2-0 vicryl. The skin layer was closed with staples. Sterile dressings and an abduction pillow were applied. A medium hemovac was placed prior to closure. All sponge and needle counts were correct. The patient was taken to the postoperative area in stable condition.   I spoke with the patients family in the consultation room postoperatively    Amadou Arenas MD, MD

## 2021-10-10 NOTE — BRIEF OP NOTE
BRIEF OPERATIVE NOTE    Patient name  Bossman Quevedo  MRN    [id number]    Date of Procedure  10/10/2021        Preoperative Diagnosis: Right Femoral Neck Fracture    Postoperative Diagnosis:  Right Femoral Neck Fracture    Procedure:    Right Hip Hemiarthroplasty             Surgeon:    Lisa Gamble MD    Assistant:    Junius Sandifer PA-C    Anesthesia:    General endotrachial anesthesia    Estimated blood loss:  300 ml    Drains:    1 Medium Hemovac    Specimens:   None    Complications:    None      Damian Buerger.  Vince Gamble MD Pneumonia

## 2021-10-10 NOTE — PROGRESS NOTES
Nephrology Progress Note  10/10/2021 2:40 PM        Subjective:   Admit Date: 10/8/2021  PCP: Augustina Tsai MD    Interval History: Back from the [address] [place] [address] right hip hemiarthroplasty        Diet: She will start eating    ROS: No shortness of breath  Alert awake and oriented  She had 1350 cc of urine yesterday      Data:     Current meds:    vancomycin (VANCOCIN) intermittent dosing (placeholder)   Other See Admin Instructions    sodium chloride flush  5-40 mL IntraVENous 2 times per day    allopurinol  50 mg Oral Daily    levothyroxine  50 mcg Oral Daily    [Held by provider] metoprolol tartrate  25 mg Oral BID      sodium chloride           I/O last 3 completed shifts: In: 240 [P.O.:240]  Out: 1350 [NLKGT:1449]    CBC:   Recent Labs     10/08/21  0340 10/09/21  0255   WBC 5.8 7.0   HGB 12.0* 10.4*    136*          Recent Labs     10/08/21  0340 10/09/21  0255 10/10/21  0315    137 139   K 4.8 4.9 4.8    108 107   CO2 19* 20* 20*   BUN 46* 45* 47*   CREATININE 5.3* 5.0* 5.0*   GLUCOSE 113* 110* 71       Lab Results   Component Value Date    CALCIUM 8.6 10/10/2021    PHOS 4.0 10/10/2021       Objective:     Vitals: /68   Pulse 98   Temp 98.1 °F (36.7 °C) (Oral)   Resp 16   Ht 5' 1\" (1.549 m)   Wt 238 lb 5.1 oz (108.1 kg)   LMP 01/23/1995   SpO2 96%   BMI 45.03 kg/m²     General appearance: No acute distress, her head of the bed elevated about 45 degrees  HEENT: No gross conjunctival pallor  Neck: Supple  Lungs: Limited anterior exam, few rhonchi  Heart: Seems regular rate and rhythm  Abdomen: Soft, nontender  Extremities: Chronic edema  She has drain in right hip area  Has Simpson      Problem List :         Impression :     1. Acute kidney injury with underlying CKD stage IV-I expect her kidney function to slowly improve  2. Right hip fracture after fall needing right hip hemiarthroplasty  3. Acute decompensated heart failure-better clinically  4.  Chronic thyroid disease    Recommendation/Plan  :     1. Stop lactated Ringer  2. She will need some diuretics again  3. I will keep her neutral today-  4. She does have a Simpson catheter so easy to measure urine output  5.  Follow clinically and biochemically      Hellen Simpson MD MD

## 2021-10-10 NOTE — PROGRESS NOTES
1037 - transferred from OR on bed, monitor applied alarms on and verified bedside handoff provided by Kiersten Key and Ancelmo CRNA  2172 - Dr Venu Beck at bedside to evaluate patient  1114 - X-rays obtained bedside  1130 - repositioned, and linens changed; patient tolerated well

## 2021-10-10 NOTE — ANESTHESIA PRE PROCEDURE
Department of Anesthesiology  Preprocedure Note       Name:  Susy Davison   Age:  [de-identified] y.o.  :  1940                                          MRN:  [id number]         Date:  10/10/2021      Surgeon: Rosalia Richard):  Milagro Brown MD    Procedure: Procedure(s):  HIP HEMIARTHROPLASTY    Medications prior to admission:   Prior to Admission medications    Medication Sig Start Date End Date Taking? Authorizing Provider   cephALEXin (KEFLEX) 500 MG capsule Take 1 capsule by mouth 4 times daily for 7 days 10/7/21 10/14/21  Jhonatan Veras PA-C   doxycycline hyclate (VIBRA-TABS) 100 MG tablet Take 1 tablet by mouth 2 times daily for 10 days Take with food.  10/7/21 10/17/21  Jhonatan Veras PA-C   allopurinol (ZYLOPRIM) 100 MG tablet Take 0.5 tablets by mouth daily 21   Alessio Munguia MD   nystatin (MYCOSTATIN) 444363 UNIT/GM powder Apply 3 times daily x 10 to 14 days 21   Jhonatan Veras PA-C   metoprolol tartrate (LOPRESSOR) 25 MG tablet Take 1 tablet by mouth 2 times daily 9/21/21 10/21/21  Alessio Munguia MD   magnesium oxide (MAG-OX) 400 (241.3 Mg) MG TABS tablet TAKE ONE TABLET BY MOUTH TWICE A DAY 9/21/21 3/1/22  Alessio Munguia MD   levothyroxine (SYNTHROID) 50 MCG tablet Take 1 tablet by mouth Daily 9/21/21 10/21/21  Alessio Munguia MD   furosemide (LASIX) 20 MG tablet Take 1 tablet by mouth 2 times daily Take 20 mg by mouth 2 times daily 21   Alessio Munguia MD       Current medications:    Current Facility-Administered Medications   Medication Dose Route Frequency Provider Last Rate Last Admin    vancomycin (VANCOCIN) intermittent dosing (placeholder)   Other See Admin Instructions Fernanda Kong MD        HYDROcodone-acetaminophen (NORCO) 5-325 MG per tablet 1 tablet  1 tablet Oral Q4H PRN Fernanda Kong MD   1 tablet at 10/09/21 2043    HYDROmorphone (DILAUDID) injection 0.5 mg  0.5 mg IntraVENous Q4H PRN Kayla Swann MD   0.5 mg at 10/10/21 1682    sodium chloride flush 0.9 % injection 5-40 mL  5-40 mL IntraVENous 2 times per day Gopi Gambino MD   10 mL at 10/09/21 2046    sodium chloride flush 0.9 % injection 5-40 mL  5-40 mL IntraVENous PRN Gopi Gambino MD   10 mL at 10/08/21 2020    0.9 % sodium chloride infusion  25 mL IntraVENous PRN Gopi Gambino MD        ondansetron (ZOFRAN-ODT) disintegrating tablet 4 mg  4 mg Oral Q8H PRN Gopi Gambino MD        Or    ondansetron (ZOFRAN) injection 4 mg  4 mg IntraVENous Q6H PRN Gopi Gambino MD        polyethylene glycol (GLYCOLAX) packet 17 g  17 g Oral Daily PRN Gopi Gambino MD        acetaminophen (TYLENOL) tablet 650 mg  650 mg Oral Q6H PRN Gopi Gambino MD   650 mg at 10/09/21 1435    Or    acetaminophen (TYLENOL) suppository 650 mg  650 mg Rectal Q6H PRN Gopi Gambino MD        allopurinol (ZYLOPRIM) tablet 50 mg  50 mg Oral Daily Gopi Gambino MD   50 mg at 10/09/21 1075    levothyroxine (SYNTHROID) tablet 50 mcg  50 mcg Oral Daily Gopi Gambino MD   50 mcg at 10/09/21 0609    [Held by provider] metoprolol tartrate (LOPRESSOR) tablet 25 mg  25 mg Oral BID Gopi Gambino MD   25 mg at 10/08/21 4785       Allergies:  No Known Allergies    Problem List:    Patient Active Problem List   Diagnosis Code    Chronic kidney disease (CKD) stage G4/A2, severely decreased glomerular filtration rate (GFR) between 15-29 mL/min/1.73 square meter and albuminuria creatinine ratio between  mg/g ([facility]) N18.4    Ventral hernia without obstruction or gangrene K43.9    SVT (supraventricular tachycardia) ([facility]) I47.1    CRF (chronic renal failure) N18.9    Hypothyroid E03.9    Chronic kidney disease (CKD) stage G4/A1, severely decreased glomerular filtration rate (GFR) between 15-29 mL/min/1.73 square meter and albuminuria creatinine ratio less than 30 mg/g ([facility]) N18.4    H/O ventral hernia Z87.19    S/P ventral herniorrhaphy Z98.890, Z87.19    S/P herniorrhaphy Z98.890, Z87.19    HTN (hypertension) I10    History of ventral hernia repair Z98.890, Z87.19    Morbidly obese ([facility] E66.01    Closed transcervical fracture of proximal femur, left, initial encounter ([facility] S72.032A    Displaced fracture of right femoral neck ([facility] S72.001A       Past Medical History:        Diagnosis Date    Acid reflux     'no recent issue\"    Arthritis     \"Left Index Finger\"    CHF (congestive heart failure) ([facility]     per old chart hx of CHF with admission 12/2016 with pulmonary edema    Chronic kidney disease     Sees Dr. Jan Heard have one kidney, dr Jay Warren told me that in 2011 I think\"    GERD (gastroesophageal reflux disease)     History of blood transfusion 2011 Or 2012    No Reaction To Blood Transfusion Received    Hypertension     ( pt states she is not on any medication for blood pressure)    Hypomagnesemia     Hypothyroidism     Shortness of breath on exertion     SVT (supraventricular tachycardia) ([facility]     per old chart hx of SVT with admission 2016 tx with Adenosine and per notes in 5/2018 hx of SVT- no cardiologist    Teeth missing     Upper And Lower    Wears glasses        Past Surgical History:        Procedure Laterality Date    ABDOMEN SURGERY      DENTAL SURGERY      Teeth Extracted In Past    ENDOSCOPY, COLON, DIAGNOSTIC  2011 Or 2012    EYE SURGERY  ? when    clyde cataract ext    FEMUR FRACTURE SURGERY Left 5/22/2021    FEMUR IM NAIL REGINALDO INSERTION performed by Ashvin Brown MD at [facility]  2011    Abdominal Hernia Repair     OTHER SURGICAL HISTORY  05/27/2018    exp lap . converted to exp laporotomy with ventral hernia repair with mesh    OTHER SURGICAL HISTORY  73/15/9991    umbilical scar excision    VENTRAL HERNIA REPAIR  09/16/2016    Robotic laparoscopic       Social History:    Social History     Tobacco Use    Smoking status: Never Smoker    Smokeless tobacco: Never Used Substance Use Topics    Alcohol use: No                                Counseling given: Not Answered      Vital Signs (Current):   Vitals:    10/09/21 2000 10/10/21 0000 10/10/21 0400 10/10/21 0500   BP: (!) 131/43 (!) 103/47 (!) 126/49    Pulse: 86 80 87    Resp: 17 15 14    Temp: 99.4 °F (37.4 °C) 98.5 °F (36.9 °C) 99 °F (37.2 °C)    TempSrc: Oral Oral Oral    SpO2: 95% 95% 93%    Weight:    238 lb 5.1 oz (108.1 kg)   Height:                                                  BP Readings from Last 3 Encounters:   10/10/21 (!) 126/49   10/07/21 118/62   09/21/21 122/74       NPO Status: Time of last liquid consumption: 2300                        Time of last solid consumption: 1700                        Date of last liquid consumption: 10/09/21                        Date of last solid food consumption: 10/09/21    BMI:   Wt Readings from Last 3 Encounters:   10/10/21 238 lb 5.1 oz (108.1 kg)   10/07/21 230 lb 1.6 oz (104.4 kg)   09/21/21 218 lb (98.9 kg)     Body mass index is 45.03 kg/m². CBC:   Lab Results   Component Value Date    WBC 7.0 10/09/2021    RBC 3.45 10/09/2021    HGB 10.4 10/09/2021    HCT 33.8 10/09/2021    MCV 98.0 10/09/2021    RDW 17.1 10/09/2021     10/09/2021       CMP:   Lab Results   Component Value Date     10/10/2021    K 4.8 10/10/2021     10/10/2021    CO2 20 10/10/2021    BUN 47 10/10/2021    CREATININE 5.0 10/10/2021    GFRAA 10 10/10/2021    LABGLOM 8 10/10/2021    GLUCOSE 71 10/10/2021    PROT 6.4 10/10/2021    PROT 5.5 03/24/2012    CALCIUM 8.6 10/10/2021    BILITOT 0.6 10/10/2021    ALKPHOS 78 10/10/2021    AST 19 10/10/2021    ALT 6 10/10/2021       POC Tests: No results for input(s): POCGLU, POCNA, POCK, POCCL, POCBUN, POCHEMO, POCHCT in the last 72 hours.     Coags:   Lab Results   Component Value Date    PROTIME 12.1 10/08/2021    PROTIME 14.5 03/24/2012    INR 0.94 10/08/2021    APTT 30.5 10/08/2021       HCG (If Applicable): No results found for: PREGTESTUR, PREGSERUM, HCG, HCGQUANT     ABGs:   Lab Results   Component Value Date    PO2ART 75 08/14/2011    BEART 1 08/14/2011        Type & Screen (If Applicable):  No results found for: LABABO, LABRH    Drug/Infectious Status (If Applicable):  No results found for: HIV, HEPCAB    COVID-19 Screening (If Applicable):   Lab Results   Component Value Date    COVID19 NOT DETECTED 10/08/2021           Anesthesia Evaluation  Patient summary reviewed no history of anesthetic complications:   Airway: Mallampati: II  TM distance: >3 FB   Neck ROM: full  Mouth opening: > = 3 FB Dental:          Pulmonary:normal exam    (+) shortness of breath:                             Cardiovascular:    (+) hypertension:, CHF:, RUBY:,                   Neuro/Psych:               GI/Hepatic/Renal:   (+) GERD:,           Endo/Other:    (+) hypothyroidism::., .                 Abdominal:             Vascular: Other Findings:           Anesthesia Plan      general     ASA 4     (Pt on lovenox  q day however, she is in acute kidney failure, solitary kidney. Decision to go with GA)  Induction: intravenous. MIPS: Postoperative opioids intended. Anesthetic plan and risks discussed with patient. Use of blood products discussed with patient whom. Plan discussed with CRNA.     Attending anesthesiologist reviewed and agrees with Pre Eval content            Yamileth Negron MD   10/10/2021

## 2021-10-10 NOTE — PROGRESS NOTES
BMI 45.03 kg/m²     Intake/Output Summary (Last 24 hours) at 10/10/2021 0941  Last data filed at 10/10/2021 0549  Gross per 24 hour   Intake 240 ml   Output 1350 ml   Net -1110 ml       Medications:   Scheduled Meds:   vancomycin (VANCOCIN) intermittent dosing (placeholder)   Other See Admin Instructions    sodium chloride flush  5-40 mL IntraVENous 2 times per day    allopurinol  50 mg Oral Daily    levothyroxine  50 mcg Oral Daily    [Held by provider] metoprolol tartrate  25 mg Oral BID      Infusions:   sodium chloride        PRN Meds:  meperidine, fentanNYL, HYDROmorphone, HYDROcodone 5 mg - acetaminophen **OR** HYDROcodone 5 mg - acetaminophen, ondansetron, promethazine, sodium chloride, diphenhydrAMINE, labetalol, hydrALAZINE, HYDROcodone 5 mg - acetaminophen, HYDROmorphone, sodium chloride flush, sodium chloride, ondansetron **OR** ondansetron, polyethylene glycol, acetaminophen **OR** acetaminophen       Physical Exam:  Vitals:    10/10/21 0400   BP: (!) 126/49   Pulse: 87   Resp: 14   Temp: 99 °F (37.2 °C)   SpO2: 93%        General: AAO, NAD  Chest: Nontender  Cardiac: First and Second Heart Sounds are Normal, No Murmurs or Gallops noted  Lungs:Clear to auscultation and percussion. Abdomen: Soft, NT, ND, +BS  Extremities: No clubbing, no edema  Vascular:  Equal 2+ peripheral pulses.         Lab Data:  CBC:   Recent Labs     10/08/21  0340 10/09/21  0255   WBC 5.8 7.0   HGB 12.0* 10.4*   HCT 38.2 33.8*   MCV 97.9 98.0    136*     BMP:   Recent Labs     10/08/21  0340 10/09/21  0255 10/10/21  0315    137 139   K 4.8 4.9 4.8    108 107   CO2 19* 20* 20*   PHOS  --  4.0 4.0   BUN 46* 45* 47*   CREATININE 5.3* 5.0* 5.0*     LIVER PROFILE:   Recent Labs     10/08/21  0340 10/09/21  0255 10/10/21  0315   AST 23 20 19   ALT 9* 6* 6*   BILITOT 0.6 0.5 0.6   ALKPHOS 108 81 78     PT/INR:   Recent Labs     10/08/21  1240   PROTIME 12.1   INR 0.94     APTT:   Recent Labs     10/08/21  1240 APTT 30.5     BNP:  No results for input(s): BNP in the last 72 hours.       Assessment:  Patient Active Problem List    Diagnosis Date Noted    Displaced fracture of right femoral neck ([facility] 10/08/2021    Closed transcervical fracture of proximal femur, left, initial encounter ([facility] 05/21/2021    Morbidly obese ([facility] 07/23/2020    History of ventral hernia repair 10/19/2018    HTN (hypertension) 08/10/2018    S/P herniorrhaphy 07/11/2018    S/P ventral herniorrhaphy 06/13/2018    H/O ventral hernia 06/06/2018    Chronic kidney disease (CKD) stage G4/A1, severely decreased glomerular filtration rate (GFR) between 15-29 mL/min/1.73 square meter and albuminuria creatinine ratio less than 30 mg/g ([facility] 05/16/2017    CRF (chronic renal failure) 09/20/2016    Hypothyroid 09/20/2016    Ventral hernia without obstruction or gangrene 09/18/2016    SVT (supraventricular tachycardia) ([facility] 09/18/2016    Chronic kidney disease (CKD) stage G4/A2, severely decreased glomerular filtration rate (GFR) between 15-29 mL/min/1.73 square meter and albuminuria creatinine ratio between  mg/g ([facility] 06/21/2016       Electronically signed by Daniella Castelan PA-C on 10/10/2021 at 9:41 AM    Electronically signed by Yecenia Enriquez MD on 10/10/21 at 11:58 AM EDT

## 2021-10-10 NOTE — PROGRESS NOTES
NPN, focus: status  D: patient arrived back to room from PACU s/p R hip fx surgery  A: RN assessed patient. Patient VSS, but patient is tearful but consolable. Site dressing is c/d/i and accordian drain has sanguinous drainage. R: RN continues to monitor patient and pain status. Call light in reach and bed in low position.

## 2021-10-10 NOTE — PROGRESS NOTES
[address] [facility]  consulted by Dr. Billie Ruiz for monitoring and adjustment. Indication for treatment: BLE cellulitis  Goal trough: 15 mcg/mL    Pertinent Laboratory Values:   Temp Readings from Last 3 Encounters:   10/10/21 99 °F (37.2 °C) (Oral)   08/16/21 97.8 °F (36.6 °C)   05/26/21 97.8 °F (36.6 °C) (Oral)     Recent Labs     10/08/21  0340 10/09/21  0255   WBC 5.8 7.0     Recent Labs     10/08/21  0340 10/09/21  0255 10/10/21  0315   BUN 46* 45* 47*   CREATININE 5.3* 5.0* 5.0*     Estimated Creatinine Clearance: 10 mL/min (A) (based on SCr of 5 mg/dL (H)). Intake/Output Summary (Last 24 hours) at 10/10/2021 0626  Last data filed at 10/10/2021 0549  Gross per 24 hour   Intake 240 ml   Output 1350 ml   Net -1110 ml     Pertinent Cultures:  Date    Source    Results  10/08   Urine    Enterococcus faecalis  10/08   Wound    MRSA (Moderate growth)    Assessment:  WBC WNL at 7; Afebrile  SCr = 5, BUN = 47, and urine output (24 hr) = 1,350 mL  TOBY on CKD IV  Day(s) of therapy: 1  Vancomycin concentration:   10/10 - To result    Plan:  Vancomycin 1,500 mg IV initial dose; Plan for intermittent dosing based on levels  Pharmacy will continue to monitor patient and adjust therapy as indicated    Diego 3 10/10/2021  @ 03:30 am    Thank you for the consult.   Jd Mclaughlin, [facility]   10/10/2021 6:26 AM

## 2021-10-10 NOTE — PROGRESS NOTES
[address] [facility]  consulted by Dr. Miki Acevedo for monitoring and adjustment. Indication for treatment: BLE cellulitis  Goal trough: 15 mcg/mL    Pertinent Laboratory Values:   Temp Readings from Last 3 Encounters:   10/10/21 98.1 °F (36.7 °C) (Oral)   10/10/21 96.3 °F (35.7 °C)   08/16/21 97.8 °F (36.6 °C)     Recent Labs     10/08/21  0340 10/09/21  0255   WBC 5.8 7.0     Recent Labs     10/08/21  0340 10/09/21  0255 10/10/21  0315   BUN 46* 45* 47*   CREATININE 5.3* 5.0* 5.0*     Estimated Creatinine Clearance: 10 mL/min (A) (based on SCr of 5 mg/dL (H)). Intake/Output Summary (Last 24 hours) at 10/10/2021 1628  Last data filed at 10/10/2021 1215  Gross per 24 hour   Intake 1070 ml   Output 2050 ml   Net -980 ml     Pertinent Cultures:  Date    Source    Results  10/08   Urine    Enterococcus faecalis  10/08   Wound    MRSA (Moderate growth)      VANCOMYCIN RANDOM:  Recent Labs     10/10/21  0315   VANCORANDOM 20.0     Assessment:  · WBC WNL at 7;  Afebrile  · SCr = 5, BUN = 47, TOBY on CKD  · Day(s) of therapy: 2  · Vancomycin concentration:   · 10/10 - 20, supra-therapeutic    Plan:  · Continue intermittent vancomycin dosing based on levels  · Based on level result, no supplemental vancomycin needed  · Repeat next level tomorrow AM  · Pharmacy will continue to monitor patient and adjust therapy as indicated    VANCOMYCIN CONCENTRATION SCHEDULED FOR 10/12/2021  @ 0600    Thank you for the consult,  Jenny Jorge, [facility], PharmD  10/10/2021 4:28 PM

## 2021-10-10 NOTE — PROGRESS NOTES
Hospitalist Progress Note      Name:  Jennings Nyhan /Age/Sex: 1940  ([de-identified] y.o. female)   MRN & CSN:  [id number] & [id number] Admission Date/Time: 10/8/2021  2:35 AM   Location:  310/3106-A PCP: Dontrell Velarde MD         Hospital Day: 3    Assessment and Plan:   Ranjana Olimpia ornelas [de-identified] y.o. with PMH of hypertension, hypothyroidism, HFpEF, SVT, CKD stage IV, morbid obesity and GERD who is grieving for loss of her  and was admitted with mechanical fall complicated by right hip fracture.     #Mechanical fall complicated by right hip femoral neck fracture. -Ortho consulted and patient is status post right hip hemiarthroplasty  -Wound culture grew moderate amount of MRSA. -Changed antibiotics to IV vancomycin and consulted ID.  -Continue analgesics and DVT prophylaxis per Ortho. -Bowel regimen with laxatives as needed.  -Caution with IV fluid per nephrology's recommendation. -Appreciate cardiology input for preop evaluation. -CPAP if available per cardiology recommendation. None available yesterday.     #Bilateral lower extremity cellulitis due to MRSA. -Stopped ceftriaxone and started IV vancomycin. -ID consult in view right hip hemiarthroplasty and lower extremities cellulitis due to MRSA. -We will continue to monitor for now.     #Acute kidney injury superimposed on CKD stage IV. -Thought to be due to relative volume depletion versus cardiorenal syndrome. -Nephrology is consulted and input appreciated. -We will continue to monitor renal function and avoid nephrotoxins.     #Chronic HFpEF with right heart failure and cor pulmonale and some evidence of volume overload. -Received IV Lasix with -1.1 L yesterday.  -Continue to monitor I's and O's and renal function.   -Monitor fluid status closely.  -ProBNP is elevated at 9000.  -Keep on telemetry.  -Limited echocardiogram showed LVEF of 50 to 55% with moderate MR and TR  -Cardiology on board and assisting in management.  -Continue metoprolol as BP tolerates.     #History of SVT. -Keep on telemetry.  -Continue metoprolol.     #GERD on pantoprazole. #Hypothyroidism on levothyroxine. #Morbid obesity. Counseled on weight loss and to follow-up with PCP for weight reduction management strategies. #Situational depression active due to her grieving. Patient lost her  recently. Diet Diet NPO   DVT Prophylaxis [x] Lovenox, []  Heparin, [] SCDs, [] Ambulation   GI Prophylaxis [x] PPI,  [] H2 Blocker,  [] Carafate,  [] Diet/Tube Feeds   Code Status Full Code   Disposition Patient requires continued admission due to immediate postop for right hip hemiarthroplasty   MDM [] Low, [] Moderate,[x]  High  Patient's risk as above due to mechanical fall, right hip fracture, bilateral lower extremity cellulitis, acute kidney injury superimposed on CKD stage IV, history of SVT and HFpEF. History of Present Illness:     Chief Complaint:  Patient was seen and examined today after surgery.  Chart reviewed and case discussed with the RN. No adverse event overnight.     Patient  has no complaint today. States that her right hip pain is improved. Ten point ROS reviewed negative, unless as noted above    Objective: Intake/Output Summary (Last 24 hours) at 10/10/2021 1625  Last data filed at 10/10/2021 1215  Gross per 24 hour   Intake 1070 ml   Output 2050 ml   Net -980 ml      Vitals:   Vitals:    10/10/21 1518   BP: 105/76   Pulse: 93   Resp: 20   Temp:    SpO2: 97%     Physical Exam:   GEN    Awake female, lying in bed in no apparent distress. Appears given age. EYES   No scleral erythema, discharge, or conjunctivitis. HENT  Mucous membranes are moist. No evidence of thrush. NECK  Supple, no apparent thyromegaly or masses. RESP  Clear to auscultation, no wheezes, rales or rhonchi.  Symmetric chest movement while on oxygen by nasal cannula. CARDIO/VASC           S1/S2 auscultated.  Regular rate without appreciable murmurs, rubs, or gallops. No JVD or carotid bruits. Peripheral pulses equal bilaterally and palpable. Bilateral peripheral edema. Malissa Jennifer is soft without significant tenderness, masses, or guarding. Bowel sounds are normoactive.        No costovertebral angle tenderness. HEME/LYMPH            No palpable cervical lymphadenopathy and no hepatosplenomegaly. No petechiae or ecchymoses. MSK    Bilateral shins cellulitic changes improving, right hip dressing with a VAC  SKIN    Normal coloration, warm, dry. NEURO           Cranial nerves appear grossly intact, normal speech, no lateralizing weakness. PSYCH            Awake, alert, oriented x 4.  Affect appropriate. Medications:   Medications:    vancomycin (VANCOCIN) intermittent dosing (placeholder)   Other See Admin Instructions    sodium chloride flush  5-40 mL IntraVENous 2 times per day    allopurinol  50 mg Oral Daily    levothyroxine  50 mcg Oral Daily    [Held by provider] metoprolol tartrate  25 mg Oral BID      Infusions:    sodium chloride       PRN Meds: HYDROcodone 5 mg - acetaminophen, 1 tablet, Q4H PRN  HYDROmorphone, 0.5 mg, Q4H PRN  sodium chloride flush, 5-40 mL, PRN  sodium chloride, 25 mL, PRN  ondansetron, 4 mg, Q8H PRN   Or  ondansetron, 4 mg, Q6H PRN  polyethylene glycol, 17 g, Daily PRN  acetaminophen, 650 mg, Q6H PRN   Or  acetaminophen, 650 mg, Q6H PRN          Patient is still admitted because because of reasons noted above. The anticipated discharge is in greater than 24 hours.        Electronically signed by Mary Castillo MD on 10/10/2021 at 4:25 PM

## 2021-10-11 ENCOUNTER — HOSPITAL ENCOUNTER (INPATIENT)
Age: 81
LOS: 11 days | Discharge: ANOTHER HOSPITAL | DRG: 559 | End: 2021-10-22
Attending: PHYSICAL MEDICINE & REHABILITATION | Admitting: PHYSICAL MEDICINE & REHABILITATION
Payer: MEDICARE

## 2021-10-11 VITALS
SYSTOLIC BLOOD PRESSURE: 103 MMHG | BODY MASS INDEX: 44.45 KG/M2 | RESPIRATION RATE: 17 BRPM | WEIGHT: 235.45 LBS | DIASTOLIC BLOOD PRESSURE: 64 MMHG | HEIGHT: 61 IN | HEART RATE: 78 BPM | TEMPERATURE: 97.6 F | OXYGEN SATURATION: 94 %

## 2021-10-11 PROBLEM — S72.001A TRAUMATIC CLOSED FRACTURE OF NECK OF FEMUR WITH MINIMAL DISPLACEMENT, RIGHT, INITIAL ENCOUNTER (HCC): Status: ACTIVE | Noted: 2021-10-11

## 2021-10-11 LAB
ALBUMIN SERPL-MCNC: 2.7 GM/DL (ref 3.4–5)
ALP BLD-CCNC: 74 IU/L (ref 40–128)
ALT SERPL-CCNC: 7 U/L (ref 10–40)
ANION GAP SERPL CALCULATED.3IONS-SCNC: 12 MMOL/L (ref 4–16)
AST SERPL-CCNC: 35 IU/L (ref 15–37)
BASOPHILS ABSOLUTE: 0 K/CU MM
BASOPHILS RELATIVE PERCENT: 0.2 % (ref 0–1)
BILIRUB SERPL-MCNC: 0.4 MG/DL (ref 0–1)
BUN BLDV-MCNC: 50 MG/DL (ref 6–23)
CALCIUM SERPL-MCNC: 8.6 MG/DL (ref 8.3–10.6)
CHLORIDE BLD-SCNC: 106 MMOL/L (ref 99–110)
CO2: 21 MMOL/L (ref 21–32)
CREAT SERPL-MCNC: 4.8 MG/DL (ref 0.6–1.1)
CULTURE: ABNORMAL
DIFFERENTIAL TYPE: ABNORMAL
DOSE AMOUNT: NORMAL
DOSE TIME: NORMAL
EOSINOPHILS ABSOLUTE: 0.1 K/CU MM
EOSINOPHILS RELATIVE PERCENT: 0.8 % (ref 0–3)
GFR AFRICAN AMERICAN: 11 ML/MIN/1.73M2
GFR NON-AFRICAN AMERICAN: 9 ML/MIN/1.73M2
GLUCOSE BLD-MCNC: 105 MG/DL (ref 70–99)
HCT VFR BLD CALC: 31.4 % (ref 37–47)
HEMOGLOBIN: 9.8 GM/DL (ref 12.5–16)
IMMATURE NEUTROPHIL %: 0.5 % (ref 0–0.43)
LYMPHOCYTES ABSOLUTE: 1.7 K/CU MM
LYMPHOCYTES RELATIVE PERCENT: 19.6 % (ref 24–44)
Lab: ABNORMAL
MAGNESIUM: 2 MG/DL (ref 1.8–2.4)
MCH RBC QN AUTO: 30.6 PG (ref 27–31)
MCHC RBC AUTO-ENTMCNC: 31.2 % (ref 32–36)
MCV RBC AUTO: 98.1 FL (ref 78–100)
MONOCYTES ABSOLUTE: 0.7 K/CU MM
MONOCYTES RELATIVE PERCENT: 7.9 % (ref 0–4)
NUCLEATED RBC %: 0 %
PDW BLD-RTO: 17.2 % (ref 11.7–14.9)
PHOSPHORUS: 4.4 MG/DL (ref 2.5–4.9)
PLATELET # BLD: 157 K/CU MM (ref 140–440)
PMV BLD AUTO: 9.9 FL (ref 7.5–11.1)
POTASSIUM SERPL-SCNC: 5 MMOL/L (ref 3.5–5.1)
PRO-BNP: 7560 PG/ML
RBC # BLD: 3.2 M/CU MM (ref 4.2–5.4)
SEGMENTED NEUTROPHILS ABSOLUTE COUNT: 6.2 K/CU MM
SEGMENTED NEUTROPHILS RELATIVE PERCENT: 71 % (ref 36–66)
SODIUM BLD-SCNC: 139 MMOL/L (ref 135–145)
SPECIMEN: ABNORMAL
TOTAL IMMATURE NEUTOROPHIL: 0.04 K/CU MM
TOTAL NUCLEATED RBC: 0 K/CU MM
TOTAL PROTEIN: 6.5 GM/DL (ref 6.4–8.2)
VANCOMYCIN RANDOM: 15.5 UG/ML
WBC # BLD: 8.7 K/CU MM (ref 4–10.5)

## 2021-10-11 PROCEDURE — 80202 ASSAY OF VANCOMYCIN: CPT

## 2021-10-11 PROCEDURE — 6370000000 HC RX 637 (ALT 250 FOR IP): Performed by: NURSE PRACTITIONER

## 2021-10-11 PROCEDURE — 94761 N-INVAS EAR/PLS OXIMETRY MLT: CPT

## 2021-10-11 PROCEDURE — 6370000000 HC RX 637 (ALT 250 FOR IP): Performed by: INTERNAL MEDICINE

## 2021-10-11 PROCEDURE — 6370000000 HC RX 637 (ALT 250 FOR IP): Performed by: ORTHOPAEDIC SURGERY

## 2021-10-11 PROCEDURE — 97166 OT EVAL MOD COMPLEX 45 MIN: CPT

## 2021-10-11 PROCEDURE — 83880 ASSAY OF NATRIURETIC PEPTIDE: CPT

## 2021-10-11 PROCEDURE — 6360000002 HC RX W HCPCS: Performed by: ORTHOPAEDIC SURGERY

## 2021-10-11 PROCEDURE — 99223 1ST HOSP IP/OBS HIGH 75: CPT | Performed by: PHYSICAL MEDICINE & REHABILITATION

## 2021-10-11 PROCEDURE — 80053 COMPREHEN METABOLIC PANEL: CPT

## 2021-10-11 PROCEDURE — APPSS60 APP SPLIT SHARED TIME 46-60 MINUTES: Performed by: NURSE PRACTITIONER

## 2021-10-11 PROCEDURE — 83735 ASSAY OF MAGNESIUM: CPT

## 2021-10-11 PROCEDURE — 97530 THERAPEUTIC ACTIVITIES: CPT

## 2021-10-11 PROCEDURE — 80048 BASIC METABOLIC PNL TOTAL CA: CPT

## 2021-10-11 PROCEDURE — 2580000003 HC RX 258: Performed by: ORTHOPAEDIC SURGERY

## 2021-10-11 PROCEDURE — 36415 COLL VENOUS BLD VENIPUNCTURE: CPT

## 2021-10-11 PROCEDURE — 6360000002 HC RX W HCPCS: Performed by: INTERNAL MEDICINE

## 2021-10-11 PROCEDURE — 84100 ASSAY OF PHOSPHORUS: CPT

## 2021-10-11 PROCEDURE — 1280000000 HC REHAB R&B

## 2021-10-11 PROCEDURE — 97162 PT EVAL MOD COMPLEX 30 MIN: CPT

## 2021-10-11 PROCEDURE — 85025 COMPLETE CBC W/AUTO DIFF WBC: CPT

## 2021-10-11 PROCEDURE — 97535 SELF CARE MNGMENT TRAINING: CPT

## 2021-10-11 RX ORDER — HYDROMORPHONE HCL 110MG/55ML
0.5 PATIENT CONTROLLED ANALGESIA SYRINGE INTRAVENOUS
Status: DISCONTINUED | OUTPATIENT
Start: 2021-10-11 | End: 2021-10-14

## 2021-10-11 RX ORDER — ACETAMINOPHEN 325 MG/1
650 TABLET ORAL EVERY 6 HOURS
Status: DISCONTINUED | OUTPATIENT
Start: 2021-10-12 | End: 2021-10-15

## 2021-10-11 RX ORDER — LINEZOLID 600 MG/1
600 TABLET, FILM COATED ORAL EVERY 12 HOURS SCHEDULED
Status: COMPLETED | OUTPATIENT
Start: 2021-10-12 | End: 2021-10-19

## 2021-10-11 RX ORDER — LINEZOLID 600 MG/1
600 TABLET, FILM COATED ORAL EVERY 12 HOURS SCHEDULED
Qty: 16 TABLET | Refills: 0 | Status: SHIPPED | OUTPATIENT
Start: 2021-10-11 | End: 2021-10-19

## 2021-10-11 RX ORDER — SENNA AND DOCUSATE SODIUM 50; 8.6 MG/1; MG/1
1 TABLET, FILM COATED ORAL 2 TIMES DAILY
Status: DISCONTINUED | OUTPATIENT
Start: 2021-10-12 | End: 2021-10-15

## 2021-10-11 RX ORDER — OXYCODONE HYDROCHLORIDE 5 MG/1
5 TABLET ORAL EVERY 4 HOURS PRN
Qty: 10 TABLET | Refills: 0 | Status: SHIPPED | OUTPATIENT
Start: 2021-10-11 | End: 2021-10-14

## 2021-10-11 RX ORDER — HYDROMORPHONE HCL 110MG/55ML
0.25 PATIENT CONTROLLED ANALGESIA SYRINGE INTRAVENOUS
Status: DISCONTINUED | OUTPATIENT
Start: 2021-10-11 | End: 2021-10-14

## 2021-10-11 RX ORDER — LINEZOLID 600 MG/1
600 TABLET, FILM COATED ORAL EVERY 12 HOURS SCHEDULED
Status: DISCONTINUED | OUTPATIENT
Start: 2021-10-11 | End: 2021-10-11 | Stop reason: HOSPADM

## 2021-10-11 RX ORDER — SENNA AND DOCUSATE SODIUM 50; 8.6 MG/1; MG/1
1 TABLET, FILM COATED ORAL 2 TIMES DAILY
Status: CANCELLED | OUTPATIENT
Start: 2021-10-12

## 2021-10-11 RX ORDER — SODIUM CHLORIDE 0.9 % (FLUSH) 0.9 %
10 SYRINGE (ML) INJECTION EVERY 12 HOURS SCHEDULED
Status: CANCELLED | OUTPATIENT
Start: 2021-10-12

## 2021-10-11 RX ORDER — LINEZOLID 600 MG/1
600 TABLET, FILM COATED ORAL EVERY 12 HOURS SCHEDULED
Status: CANCELLED | OUTPATIENT
Start: 2021-10-12 | End: 2021-10-19

## 2021-10-11 RX ORDER — ALLOPURINOL 100 MG/1
50 TABLET ORAL DAILY
Status: CANCELLED | OUTPATIENT
Start: 2021-10-12

## 2021-10-11 RX ORDER — OXYCODONE HYDROCHLORIDE 5 MG/1
5 TABLET ORAL EVERY 4 HOURS PRN
Status: DISCONTINUED | OUTPATIENT
Start: 2021-10-11 | End: 2021-10-15

## 2021-10-11 RX ORDER — SODIUM CHLORIDE 9 MG/ML
25 INJECTION, SOLUTION INTRAVENOUS PRN
Status: CANCELLED | OUTPATIENT
Start: 2021-10-11

## 2021-10-11 RX ORDER — POLYETHYLENE GLYCOL 3350 17 G/17G
17 POWDER, FOR SOLUTION ORAL DAILY PRN
Qty: 527 G | Refills: 1 | Status: SHIPPED | OUTPATIENT
Start: 2021-10-11 | End: 2021-11-10

## 2021-10-11 RX ORDER — SODIUM CHLORIDE 0.9 % (FLUSH) 0.9 %
10 SYRINGE (ML) INJECTION PRN
Status: DISCONTINUED | OUTPATIENT
Start: 2021-10-11 | End: 2021-10-13

## 2021-10-11 RX ORDER — LEVOTHYROXINE SODIUM 0.05 MG/1
50 TABLET ORAL DAILY
Status: DISCONTINUED | OUTPATIENT
Start: 2021-10-12 | End: 2021-10-22 | Stop reason: HOSPADM

## 2021-10-11 RX ORDER — VANCOMYCIN 1.75 G/350ML
1250 INJECTION, SOLUTION INTRAVENOUS ONCE
Status: COMPLETED | OUTPATIENT
Start: 2021-10-11 | End: 2021-10-11

## 2021-10-11 RX ORDER — SODIUM CHLORIDE 0.9 % (FLUSH) 0.9 %
10 SYRINGE (ML) INJECTION EVERY 12 HOURS SCHEDULED
Status: DISCONTINUED | OUTPATIENT
Start: 2021-10-12 | End: 2021-10-13

## 2021-10-11 RX ORDER — ALLOPURINOL 100 MG/1
50 TABLET ORAL DAILY
Status: DISCONTINUED | OUTPATIENT
Start: 2021-10-12 | End: 2021-10-22 | Stop reason: HOSPADM

## 2021-10-11 RX ORDER — ACETAMINOPHEN 325 MG/1
650 TABLET ORAL EVERY 6 HOURS
Status: CANCELLED | OUTPATIENT
Start: 2021-10-12

## 2021-10-11 RX ORDER — OXYCODONE HYDROCHLORIDE 5 MG/1
5 TABLET ORAL EVERY 4 HOURS PRN
Status: CANCELLED | OUTPATIENT
Start: 2021-10-11

## 2021-10-11 RX ORDER — ACETAMINOPHEN 325 MG/1
650 TABLET ORAL EVERY 4 HOURS PRN
Status: DISCONTINUED | OUTPATIENT
Start: 2021-10-11 | End: 2021-10-18

## 2021-10-11 RX ORDER — POLYETHYLENE GLYCOL 3350 17 G/17G
17 POWDER, FOR SOLUTION ORAL DAILY PRN
Status: DISCONTINUED | OUTPATIENT
Start: 2021-10-11 | End: 2021-10-11

## 2021-10-11 RX ORDER — POLYETHYLENE GLYCOL 3350 17 G/17G
17 POWDER, FOR SOLUTION ORAL DAILY PRN
Status: DISCONTINUED | OUTPATIENT
Start: 2021-10-11 | End: 2021-10-22 | Stop reason: HOSPADM

## 2021-10-11 RX ORDER — SODIUM CHLORIDE 0.9 % (FLUSH) 0.9 %
10 SYRINGE (ML) INJECTION PRN
Status: CANCELLED | OUTPATIENT
Start: 2021-10-11

## 2021-10-11 RX ORDER — SENNA AND DOCUSATE SODIUM 50; 8.6 MG/1; MG/1
1 TABLET, FILM COATED ORAL 2 TIMES DAILY
Qty: 14 TABLET | Refills: 0 | Status: SHIPPED | OUTPATIENT
Start: 2021-10-11

## 2021-10-11 RX ORDER — LEVOTHYROXINE SODIUM 0.05 MG/1
50 TABLET ORAL DAILY
Status: CANCELLED | OUTPATIENT
Start: 2021-10-12

## 2021-10-11 RX ORDER — SODIUM CHLORIDE 9 MG/ML
25 INJECTION, SOLUTION INTRAVENOUS PRN
Status: DISCONTINUED | OUTPATIENT
Start: 2021-10-11 | End: 2021-10-22 | Stop reason: HOSPADM

## 2021-10-11 RX ORDER — BISACODYL 10 MG
10 SUPPOSITORY, RECTAL RECTAL DAILY PRN
Status: DISCONTINUED | OUTPATIENT
Start: 2021-10-11 | End: 2021-10-22 | Stop reason: HOSPADM

## 2021-10-11 RX ORDER — POLYETHYLENE GLYCOL 3350 17 G/17G
17 POWDER, FOR SOLUTION ORAL DAILY PRN
Status: CANCELLED | OUTPATIENT
Start: 2021-10-11

## 2021-10-11 RX ADMIN — VANCOMYCIN 1250 MG: 1.75 INJECTION, SOLUTION INTRAVENOUS at 13:54

## 2021-10-11 RX ADMIN — OXYCODONE HYDROCHLORIDE 5 MG: 5 TABLET ORAL at 20:56

## 2021-10-11 RX ADMIN — ALLOPURINOL 50 MG: 100 TABLET ORAL at 10:39

## 2021-10-11 RX ADMIN — SENNOSIDES AND DOCUSATE SODIUM 1 TABLET: 50; 8.6 TABLET ORAL at 10:39

## 2021-10-11 RX ADMIN — CEFAZOLIN SODIUM 2000 MG: 2 INJECTION, SOLUTION INTRAVENOUS at 05:21

## 2021-10-11 RX ADMIN — ACETAMINOPHEN 650 MG: 325 TABLET ORAL at 20:56

## 2021-10-11 RX ADMIN — ACETAMINOPHEN 650 MG: 325 TABLET ORAL at 17:50

## 2021-10-11 RX ADMIN — LEVOTHYROXINE SODIUM 50 MCG: 0.05 TABLET ORAL at 05:26

## 2021-10-11 RX ADMIN — HYDROMORPHONE HYDROCHLORIDE 0.5 MG: 1 INJECTION, SOLUTION INTRAMUSCULAR; INTRAVENOUS; SUBCUTANEOUS at 05:25

## 2021-10-11 RX ADMIN — ACETAMINOPHEN 650 MG: 325 TABLET ORAL at 03:40

## 2021-10-11 RX ADMIN — LINEZOLID 600 MG: 600 TABLET, FILM COATED ORAL at 20:58

## 2021-10-11 RX ADMIN — ACETAMINOPHEN 650 MG: 325 TABLET ORAL at 10:38

## 2021-10-11 RX ADMIN — ENOXAPARIN SODIUM 30 MG: 30 INJECTION SUBCUTANEOUS at 10:39

## 2021-10-11 RX ADMIN — OXYCODONE HYDROCHLORIDE 5 MG: 5 TABLET ORAL at 03:40

## 2021-10-11 RX ADMIN — METOPROLOL TARTRATE 25 MG: 25 TABLET, FILM COATED ORAL at 10:39

## 2021-10-11 RX ADMIN — OXYCODONE HYDROCHLORIDE 5 MG: 5 TABLET ORAL at 13:50

## 2021-10-11 RX ADMIN — SODIUM CHLORIDE, PRESERVATIVE FREE 10 ML: 5 INJECTION INTRAVENOUS at 10:40

## 2021-10-11 RX ADMIN — SENNOSIDES AND DOCUSATE SODIUM 1 TABLET: 50; 8.6 TABLET ORAL at 20:56

## 2021-10-11 RX ADMIN — METOPROLOL TARTRATE 25 MG: 25 TABLET, FILM COATED ORAL at 20:57

## 2021-10-11 ASSESSMENT — PAIN DESCRIPTION - LOCATION
LOCATION: FOOT
LOCATION: LEG
LOCATION: FOOT
LOCATION: LEG

## 2021-10-11 ASSESSMENT — PAIN DESCRIPTION - ORIENTATION
ORIENTATION: LEFT
ORIENTATION: LEFT
ORIENTATION: RIGHT
ORIENTATION: LEFT

## 2021-10-11 ASSESSMENT — PAIN SCALES - GENERAL
PAINLEVEL_OUTOF10: 6
PAINLEVEL_OUTOF10: 0
PAINLEVEL_OUTOF10: 6
PAINLEVEL_OUTOF10: 3
PAINLEVEL_OUTOF10: 3
PAINLEVEL_OUTOF10: 0
PAINLEVEL_OUTOF10: 4
PAINLEVEL_OUTOF10: 6
PAINLEVEL_OUTOF10: 6

## 2021-10-11 ASSESSMENT — PAIN DESCRIPTION - PAIN TYPE
TYPE: SURGICAL PAIN
TYPE: ACUTE PAIN

## 2021-10-11 ASSESSMENT — PAIN DESCRIPTION - FREQUENCY
FREQUENCY: INTERMITTENT

## 2021-10-11 ASSESSMENT — PAIN DESCRIPTION - DESCRIPTORS
DESCRIPTORS: BURNING

## 2021-10-11 NOTE — CARE COORDINATION
Spoke with patient and discussed ARU. Explained to patient the required 3 hours of therapy a day. Also explained the average length of stay is 11 days, could be longer or shorter depending on recommendations of therapy and Dr. Jayne Patel. Patient expresses her understanding and states she's agreeable to admit to ARU. Per patient she will call her niece and ask her to bring clothes in for her. Patient meets criteria and is approved to come to ARU. Patient able to admit once medically stable and after ARU Medical Director and  sign the pre-admission screen (PAS). COVID negative test noted. Patient will NOT need a new test PTA. Notified Faith ALFARO of approval. Per Chloe Suarez MD states patient is medically ready for ARU today.

## 2021-10-11 NOTE — PROGRESS NOTES
Physician Progress Note      Jayne Chaney  [facility] #:                  [id number]  :                       1940  ADMIT DATE:       10/8/2021 2:35 AM  DISCH DATE:  RESPONDING  PROVIDER #:        Gates Burkitt          QUERY TEXT:    Pt admitted with right fracture of femur  and has CHF documented. If possible,   please document in progress notes and discharge summary further specificity   regarding the type and acuity of CHF:    The medical record reflects the following:  Risk Factors: Chronic diastolic heart failure  Clinical Indicators: \"#Chronic HFpEF with right heart failure and cor   pulmonale and some evidence of volume overload. \" Documented in the 10/9 PN ,   ProBNP is elevated at 9000. Limited echocardiogram showed LVEF of 50 to 55%   with moderate MR and TR , \"Biventricular heart failure -right heart failure   more than the left with chronic lower extremity edema \"\"Acute decompensated   heart failure-\" documented in Nephrology PN 10/10  Treatment: IV Lasix , monitor I's and O's and renal function. -Monitor fluid   status closely. Cardiology consult , ECHO    Thank you Kasey Lopez RN, CDS ([phone])  Options provided:  -- Acute on Chronic Diastolic CHF/HFpEF  -- Acute on Chronic Systolic and Diastolic CHF  -- Chronic Diastolic CHF/HFpEF  -- Other - I will add my own diagnosis  -- Disagree - Not applicable / Not valid  -- Disagree - Clinically unable to determine / Unknown  -- Refer to Clinical Documentation Reviewer    PROVIDER RESPONSE TEXT:    This patient is in acute on chronic diastolic CHF/HFpEF. Query created by: Hilario Jc on 10/11/2021 7:24 AM      QUERY TEXT:    Pt admitted with Right hip fracture and has anemia documented.  If possible,   please document in progress notes and discharge summary further specificity   regarding the acuity and type of anemia:    The medical record reflects the following:  Risk Factors: Post right hip Hemiarthroplasty  Clinical Indicators: H&H on admission 12.0/38.2-9/8/31.4 post surgery  Treatment: Monitoring, labs  Options provided:  -- Anemia due to postoperative blood loss  -- Anemia due to acute on chronic blood loss  -- Other - I will add my own diagnosis  -- Disagree - Not applicable / Not valid  -- Disagree - Clinically unable to determine / Unknown  -- Refer to Clinical Documentation Reviewer    PROVIDER RESPONSE TEXT:    This patient has anemia due to postoperative blood loss.     Query created by: Heraclio Booth on 10/11/2021 7:30 AM      Electronically signed by:  Ashley Duffy 10/11/2021 11:31 AM

## 2021-10-11 NOTE — CARE COORDINATION
Pt is approved for ARU and can go today if medically ready per Fara/ARU. Notified Dr Mary Jo Red via PS.   TE

## 2021-10-11 NOTE — CONSULTS
[facility] PHYSICAL THERAPY EVALUATION  Michael Slaughter, 1940, 3106/3106-A, 10/11/2021    History  [place]:  The primary encounter diagnosis was Closed displaced fracture of right femoral neck ([address] A diagnosis of TOBY (acute kidney injury) ([address] was also pertinent to this visit. Patient  has a past medical history of Acid reflux, Arthritis, CHF (congestive heart failure) ([address] Chronic kidney disease, GERD (gastroesophageal reflux disease), History of blood transfusion, Hypertension, Hypomagnesemia, Hypothyroidism, MRSA (methicillin resistant staph aureus) culture positive, Shortness of breath on exertion, SVT (supraventricular tachycardia) ([address] Teeth missing, and Wears glasses. Patient  has a past surgical history that includes Dental surgery; ventral hernia repair (09/16/2016); Abdomen surgery; other surgical history (05/27/2018); hernia repair (2011); Endoscopy, colon, diagnostic (2011 Or 2012); eye surgery (?when); other surgical history (08/27/2018); and Femur fracture surgery (Left, 5/22/2021). Subjective:  Patient states: \"This pillow is pushing on my legs\", \"I wish I could do more\"  Pain:  Minimal pain at rest to 10/10 with movement R hip distally to lower leg.     Communication with other providers:  Handoff to RN, co-eval with OTMerlyn, discussed d/c with CM  Restrictions: WBAT RLE with posterior hip precautions    Home Setup/Prior level of function  Social/Functional History  Lives With: Alone  Type of Home: House  Home Layout: Two level, Able to Live on Main level with bedroom/bathroom, Performs ADL's on one level  Home Access: Ramped entrance  Bathroom Shower/Tub: Walk-in shower  Bathroom Toilet: Handicap height  Bathroom Equipment: Built-in shower seat, Grab bars in shower, Grab bars around toilet  Home Equipment: Rolling walker, [employer], [employer]  ADL Assistance: Independent (niece supervises with showering)  Homemaking Assistance: Needs assistance (friend does grocery shopping and household IADLs PRN)  Ambulation Assistance: Independent (mod I with RW)  Transfer Assistance: Independent  Active : No (Niece primarily)  Occupation: Retired  Type of occupation: Banking    Examination of body systems (includes body structures/functions, activity/participation limitations):  · Observation:  Pt is awake in supine with ABD wedge in place upon arrival  · Vision:  Glasses  · Hearing:   Innometrics  · Cardiopulmonary:  No 02 needs- 94% on room air, BP taken 122/75 mmHg seated EOB  · Cognition: WFL, oriented x3, see OT/SLP note for further evaluation. Musculoskeletal  · ROM R/L: Generally decreased GEOVANI r/t body habitus, WFL. · Strength R/L:  LLE tested 3+/5, decreased in function and endurance. · Neuro: WFL    · Gait pattern: Pt unable to perform gait this session d/t high pain levels    Mobility:  · Rolling L/R:  Mod  · Supine to sit:  Max x2  · Transfers: Min A  · Sitting balance:  CGA-SBA. · Standing balance:  CGA-Min. · Gait: N/t    AMPA 6 Clicks Inpatient Mobility:  AM-PAC Inpatient Mobility Raw Score : 12    Treatment:  Bed mobility: Increased time and effort required and Max A x2 for sup>sit. Max A for sequential advancement of BLE toward EOB as pt with increased pain and decreased strength and mobility pt unable to advance the LE without assist. Pt cries out in pain with RLE movement, v/c for relaxation and deep breathing. Max A x2 for return to supine end of session, management of BLE and trunk to bed. X1 rolling to L side with Mod A for WS to facilitate adjustment of chucks pad under pt. Max A x2 for scooting to [facility] via chucks pad. HOB raised to ~60* for pt comfort pillow placed between lower LE for comfort and tactile reminder for hip precaution. PT re-fills ice packs and places x2 layer barrier between ice pack and lateral R hip.     Sitting balance: Pt demonstrates fair- sitting balance at EOB ~15 minutes total throughout session, pt with x1 posterior LOB which pt self-identifies and corrects without assist, pt maintains strong UE support on bed surface throughout. Pt with slight L trunk lean d/t avoidance of WB and pain on the R surgical hip. In seated PT assessed pt LLE MMT/ROM. STS: With increased encouragement and reassurance, pt participates in x2 STS from EOB to RW with Min A and Mod v/c for sequencing, UE placement, and maintenance of R hip precautions. Pt demonstrates adequate LE ROM and power to complete transfer. Increased time to upright noted with pt maintaining flexed trunk posture, requires v/c and t/c for improved upright and hip extension each trial fair carryover. Return to seated with fair control, increased pain with transition. Standing balance: Pt tolerates x10 seconds standing and x20 seconds standing with increased UE support on RW throughout. Noted increased L trunk lean and pt favoring the LLE d/t RLE pain. Pt is aware of her L trunk lean, high pain levels prevent further WS to RLE. Pt requests return to seated d/t pain. Gait: PT explains approach to attempting gait including lateral WS and stepping along bed. Pt fearful and increased pain, deferred gait this session. Education: PT education on hip precaution, healing from surgery, use of pain Rx and ice to manage symptoms, approach to stepping and gait, dicussed d/c planning. Safety: patient left in supine, call light within reach, RN notified, gait belt used. Assessment:    Pt is an [de-identified] y/o female admitted to ED 10/8 s/p trip&fall at home. Pt fell directly onto R hip, found to have displaced fracture of R hip. Pt underwent R hemiarthroplasty 10/10 and is to be WBAT RLE with Posterior precautions. Patient with significant h/o BLE cellulitis (current), CKD, wound R great toe, CHF, see chart. Per pt report pt has been performing ADLs/IADLs independently with niece providing supervision for showers, AMB with RW.  At this time pt appears to be functioning below baseline. Pt is now presenting with impairments in LE strength, functional endurance, safety awareness, balance, new surgical pain and R hip precautions, overall decreased mobility. Pt would benefit from skilled PT services in order to address impairments and promote return to PLOF. PT to recommend d/c to ARU v.s SNF. Pt is very determined and motivated to participate. Complexity: Moderate  Prognosis: Good, no significant barriers to participation at this time. Plan Times per week: 6+/week, 1 week,   Discharge Recommendations: Continue to assess pending progress, Subacute/Skilled Nursing Facility, IP Rehab  Equipment: Defer    Goals:  Short term goals  Time Frame for Short term goals: 1 week  Short term goal 1: Pt will perform sup>sit with Mod A x2 for support  Short term goal 2: Pt will perform x2 STS from EOB to RW with CGA and v/c for safety only. Short term goal 3: Pt will tolerate x3 min standing at RW with increased RLE WB and CGA. Short term goal 4: Pt will AMB x10 ft with RW, chair follow, Min A.        Treatment plan:  Bed mobility, transfers, balance, gait, TA, TX,     Recommendations for NURSING mobility: Max A x2 for bed mobility    Time:   Time in: 09:23  Time out: 10:10  Timed treatment minutes: 30  Total time: 52    Electronically signed by:    Janell Lazo, PT  34/99/4006, 95:50 PM  PT Lic #: [id number]

## 2021-10-11 NOTE — DISCHARGE SUMMARY
Discharge Summary    Name:  Ashkan Medellin /Age/Sex: 1940  ([de-identified] y.o. female)   MRN & CSN:  [id number] & [id number] Admission Date/Time: No admission date for patient encounter. Attending:  Lew Najera MD Discharging Physician: Liz Diaz MD     Hospital Course:   Jeannett Felty a [de-identified] y.o. with PMH of hypertension, hypothyroidism, HFpEF, SVT, CKD stage IV, morbid obesity and GERD who is grieving for loss of her  and was admitted with mechanical fall complicated by right hip fracture. Orthopedics was consulted and patient was scheduled for right hip hemiarthroplasty which was slightly delayed due to bilateral lower extremity cellulitis caused by MRSA. Antibiotics was initiated for the lower extremity cellulitis and patient was also evaluated preoperatively by the cardiology team and the nephrologist.  She was taken to the OR and had right hemiarthroplasty on 10/10/2021. Post op, patient was evaluated by the PT and OT and was recommended to discharge to SNF but she declined. Case management was involved and assisted in safe discharge of this patient to ARU. Of note is that she did have acute on chronic diastolic CHF for which she was briefly diuresed with improvement in her volume status. She will follow up with orthopedics, cardiology, nephrology and PCP as outpatient. The patient expressed appropriate understanding of and agreement with the discharge recommendations, medications, and plan. Consults this admission:  None    Discharge Instruction:   Follow up appointments:   Primary care physician:  within 2 weeks  Orthopedics: Follow-up as soon as possible. Cardiology: Follow-up as soon as possible. Nephrology: Follow-up in 2 weeks.     Diet:  cardiac diet   Activity: Per PT and OT recommendation  Disposition: Discharged to:   []Home, []HHC, []SNF, [x]Acute Rehab, []Hospice   Condition on discharge: Stable    Discharge Medications:      Juanito Gottron, [place] Medication Instructions MARIA ELENA:    Printed on:10/11/21 1923   Medication Information                      allopurinol (ZYLOPRIM) 100 MG tablet  Take 0.5 tablets by mouth daily             furosemide (LASIX) 20 MG tablet  Take 1 tablet by mouth 2 times daily Take 20 mg by mouth 2 times daily             levothyroxine (SYNTHROID) 50 MCG tablet  Take 1 tablet by mouth Daily             linezolid (ZYVOX) 600 MG tablet  Take 1 tablet by mouth every 12 hours for 16 doses             magnesium oxide (MAG-OX) 400 (241.3 Mg) MG TABS tablet  TAKE ONE TABLET BY MOUTH TWICE A DAY             metoprolol tartrate (LOPRESSOR) 25 MG tablet  Take 1 tablet by mouth 2 times daily             nystatin (MYCOSTATIN) 162302 UNIT/GM powder  Apply 3 times daily x 10 to 14 days             oxyCODONE (ROXICODONE) 5 MG immediate release tablet  Take 1 tablet by mouth every 4 hours as needed for Pain for up to 3 days. polyethylene glycol (GLYCOLAX) 17 g packet  Take 17 g by mouth daily as needed for Constipation             sennosides-docusate sodium (SENOKOT-S) 8.6-50 MG tablet  Take 1 tablet by mouth 2 times daily                 Objective Findings at Discharge:   LMP 01/23/1995            PHYSICAL EXAM   GEN    Awake female, lying in bed in no apparent distress. Appears given age. EYES   No scleral erythema, discharge, or conjunctivitis. HENT  Mucous membranes are moist. No evidence of thrush. NECK  Supple, no apparent thyromegaly or masses. RESP  Clear to auscultation, no wheezes, rales or rhonchi.  Symmetric chest movement while on oxygen by nasal cannula. CARDIO/VASC           S1/S2 auscultated. Regular rate without appreciable murmurs, rubs, or gallops. No JVD or carotid bruits. Peripheral pulses equal bilaterally and palpable.  Bilateral peripheral edema. [place]her is soft without significant tenderness, masses, or guarding. Bowel sounds are normoactive.        No costovertebral angle tenderness. HEME/LYMPH            No palpable cervical lymphadenopathy and no hepatosplenomegaly. No petechiae or ecchymoses. MSK    Bilateral shins cellulitic changes improving, right hip dressing with a VAC  SKIN    Normal coloration, warm, dry. NEURO           Cranial nerves appear grossly intact, normal speech, no lateralizing weakness. PSYCH            Awake, alert, oriented x 4.  Affect appropriate. BMP/CBC  Recent Labs     10/09/21  0255 10/10/21  0315 10/11/21  0348    139 139   K 4.9 4.8 5.0    107 106   CO2 20* 20* 21   BUN 45* 47* 50*   CREATININE 5.0* 5.0* 4.8*   WBC 7.0  --  8.7   HCT 33.8*  --  31.4*   *  --  157       IMAGING:  EXAMINATION:   TWO XRAY VIEWS OF THE RIGHT HIP       10/10/2021 10:58 am       COMPARISON:   10/08/2021       HISTORY:   ORDERING SYSTEM PROVIDED HISTORY: Post-op evaluation   TECHNOLOGIST PROVIDED HISTORY:   Of operative side while in recovery room. Reason for exam:->Post-op evaluation   Reason for Exam: Post-op evaluation       FINDINGS:   A right hip prosthesis projects in normal alignment.  There is no acute   fracture or dislocation.  Multiple cutaneous staples are present, along with   a drain and adjacent soft tissue air.       An intramedullary nail in the left femur is partially visualized.  Alignment   of the proximal left femur is anatomical.           Impression   Right hip replacement in normal alignment.       No acute interval fracture.       Immediate postoperative changes.        EXAMINATION:   ONE XRAY VIEW OF THE CHEST       10/8/2021 9:47 am       COMPARISON:   May 21, 2021       HISTORY:   ORDERING SYSTEM PROVIDED HISTORY: sob-high BNP   TECHNOLOGIST PROVIDED HISTORY:   Reason for exam:->sob-high BNP   Reason for Exam: sob-high BNP       FINDINGS:   Mild cardiomegaly.  Mild-to-moderate diffuse interstitial thickening noted in   the lungs suspicious for infiltrates versus congestion.  No pneumothorax.       Significant osteopenic changes and degenerative changes noted in the bony   structures. .           Impression   Mild cardiomegaly.  Mild-to-moderate congestion and/or infiltrates identified   in the lungs.          Discharge Time of 37 minutes    Electronically signed by Pati Lincoln MD on 10/11/2021 at 7:23 PM

## 2021-10-11 NOTE — PROGRESS NOTES
Nephrology Progress Note  10/11/2021 6:57 AM        Subjective:   Admit Date: 10/8/2021  PCP: Karen Langford MD    Interval History: Did not sleep well last night    Diet: Not very great appetite    ROS: No shortness of breath  Urine output more than 1500 cc/day      Data:     Current meds:    vancomycin (VANCOCIN) intermittent dosing (placeholder)   Other See Admin Instructions    sodium chloride flush  10 mL IntraVENous 2 times per day    sennosides-docusate sodium  1 tablet Oral BID    acetaminophen  650 mg Oral Q6H    enoxaparin  30 mg SubCUTAneous Daily    allopurinol  50 mg Oral Daily    levothyroxine  50 mcg Oral Daily    metoprolol tartrate  25 mg Oral BID      sodium chloride           I/O last 3 completed shifts: In: [place] [address] [P.O.:130; I.V.:700]  Out: 2050 [Urine:1700; Drains:250; Blood:100]    CBC:   Recent Labs     10/09/21  0255 10/11/21  0348   WBC 7.0 8.7   HGB 10.4* 9.8*   * 157          Recent Labs     10/09/21  0255 10/10/21  0315 10/11/21  0348    139 139   K 4.9 4.8 5.0    107 106   CO2 20* 20* 21   BUN 45* 47* 50*   CREATININE 5.0* 5.0* 4.8*   GLUCOSE 110* 71 105*       Lab Results   Component Value Date    CALCIUM 8.6 10/11/2021    PHOS 4.4 10/11/2021       Objective:     Vitals: /64   Pulse 73   Temp 98.8 °F (37.1 °C) (Oral)   Resp 13   Ht 5' 1\" (1.549 m)   Wt 235 lb 7.2 oz (106.8 kg)   LMP 01/23/1995   SpO2 94%   BMI 44.49 kg/m²     General appearance: Alert awake and oriented no acute distress  HEENT: No gross conjunctival pallor  Neck: Supple  Lungs: Coarse breath sound will be limited exam-as I could not auscultate posteriorly because of her hip pain  Heart: Seems regular rate and rhythm  Abdomen: Soft, nontender  Extremities: Chronic 1+ edema-pulses stasis dermatitis  Is a drain in surgical area of the hip  Also has Simpson      Problem List :         Impression :     1.  Acute kidney injury with underlying CKD stage [id number] to be

## 2021-10-11 NOTE — CONSULTS
Infectious Disease Consult Note  10/11/2021   Patient Name: Reese Carlson : 1940   Impression   BLE MRSA Cellulitis:  · Asymptomatic Bacteruria:  · Temps have averaged around 99.0 F  · No leukocytosis  · 10/8-right leg wound culture: MRSA  · 10/8-Urine culture: Enterococcus faecalis >100,000  · 10/8-UA WBC 19, RBC <1  · Urine symptoms: asymptomatic    · Acute Right Femoral Neck Fracture:  · 10/10-s/p per Dr. Reyes Public: Right hip hemiarthroplasty. DX: right femoral neck fracture.  TOBY on CKD4:  · Dr. Patricia Butcher onboard    · Morbid Obesity:  BMI 44.49    · HTN   · AF/ SVT  · PHTN  · VHD  · HFpEF:  · Dr. Sophie Foster onboard    · Hypothyroidism    · ARON, Does not wear CPAP     Multi-morbidity: per PMHx:  HTN, HFpEF, SVT, GERD, Hypothyroidism, CKD4, left femur fracture s/p repair with intramedullary nail , ventral incisional hernia repair, Morbid Obesity, SVT, AF    Plan:   DC IV vancomycin  · Start Zyvox 600 mg po bid x 8 days (end date 10/19/21)   OK from ID standpoint to DC to ARU when ready    Thank you for allowing me to consult in the care of this patient.  ------------------------  REASON FOR CONSULT: Infective syndrome     Requested by: Dr. Nargis Aguilera is a [de-identified] y.o.  female who was admitted 10/8/2021 for further evaluation and management of falling at home. She states she was trying to get her slippers from her laundry room floor and fell at home. She was not using her walker, which she always uses. She denies hitting her head or loss of consciousness. She has a medical alert which she used. She c/o severe right hip pain. She was confirmed to have a right femoral neck fracture. ? Infectious diseases service was consulted to evaluate the pt, and recommend further investigative and therapeutic measures.     ROS: Other systems reviewed Including eyes, ENT, respiratory, cardiovascular, GI, , dermatologic, neurologic, psych, hem/lymphatic, musculoskeletal and endocrine were negative other than what is mentioned above.      Patient Active Problem List    Diagnosis Date Noted    Cellulitis 10/10/2021    Displaced fracture of right femoral neck ([facility] [address] [facility] 10/08/2021    Closed transcervical fracture of proximal femur, left, [facility] 05/21/2021    Morbidly obese ([facility] 07/23/2020    History of ventral hernia repair 10/19/2018    HTN (hypertension) 08/10/2018    S/P herniorrhaphy 07/11/2018    S/P ventral herniorrhaphy 06/13/2018    H/O ventral hernia 06/06/2018    Chronic kidney disease (CKD) stage G4/A1, severely decreased glomerular filtration rate (GFR) between 15-29 mL/min/1.73 square meter and albuminuria creatinine ratio less than 30 mg/g ([facility]) 05/16/2017    CRF (chronic renal failure) 09/20/2016    Hypothyroid 09/20/2016    Ventral hernia without obstruction or gangrene 09/18/2016    SVT (supraventricular tachycardia) ([facility] 09/18/2016    Chronic kidney disease (CKD) stage G4/A2, severely decreased glomerular filtration rate (GFR) between 15-29 mL/min/1.73 square meter and albuminuria creatinine ratio between  mg/g ([facility] 06/21/2016     Past Medical History:   Diagnosis Date    Acid reflux     'no recent issue\"    Arthritis     \"Left Index Finger\"    CHF (congestive heart failure) ([facility]     per old chart hx of CHF with admission 12/2016 with pulmonary edema    Chronic kidney disease     Sees Dr. Jerri Mix have one kidney, dr Jose Siegel told me that in 2011 I think\"    GERD (gastroesophageal reflux disease)     History of blood transfusion 2011 Or 2012    No Reaction To Blood Transfusion Received    Hypertension     ( pt states she is not on any medication for blood pressure)    Hypomagnesemia     Hypothyroidism     MRSA (methicillin resistant staph aureus) culture positive 10/08/2021    Leg    Shortness of breath on exertion     SVT (supraventricular tachycardia) ([facility]     per old chart hx of SVT with admission 2016 tx with Adenosine and per notes in 5/2018 hx of SVT- no cardiologist    Teeth missing     Upper And Lower    Wears glasses       Past Surgical History:   Procedure Laterality Date    ABDOMEN SURGERY      DENTAL SURGERY      Teeth Extracted In Past    ENDOSCOPY, COLON, DIAGNOSTIC  2011 Or 2012    EYE SURGERY  ? when    clyde cataract ext    FEMUR FRACTURE SURGERY Left 5/22/2021    FEMUR IM NAIL REGINALDO INSERTION performed by Brenda Gayle MD at [facility]  2011    Abdominal Hernia Repair     OTHER SURGICAL HISTORY  05/27/2018    exp lap . converted to exp laporotomy with ventral hernia repair with mesh    OTHER SURGICAL HISTORY  04/41/7593    umbilical scar excision    VENTRAL HERNIA REPAIR  09/16/2016    Robotic laparoscopic      Family History   Problem Relation Age of Onset    Cancer Father         Lung Cancer    Arthritis Mother     Cancer Brother         Skin Cancer    High Cholesterol Brother       Infectious disease related family history - not contibutory. SOCIAL HISTORY  Social History     Tobacco Use    Smoking status: Never Smoker    Smokeless tobacco: Never Used   Substance Use Topics    Alcohol use: No       Born:[place], [place]   Lives:[place], [place] alone   Occupation:   No recent travel of significance.  No recent unusual exposures.  NO pets   ? ALLERGIES  No Known Allergies   MEDICATIONS  Reviewed and are per the chart/EMR. ? Antibiotics:   Present:  Zyvox 10/11-    Past:  Ceftriaxone 10/8  Keflex 10/7-  Doxycycline 10/7-?   Vancomycin 10/9-11  -------------------------------------------------------------------------------------------------------------------    Vital Signs:  Vitals:    10/11/21 0944   BP: 122/75   Pulse: 107   Resp: 19   Temp: 98.5 °F (36.9 °C)   SpO2:          Exam:    VS: noted; wt 235 lb( 106.8 kg) Height 5'1\"  Gen: alert and oriented X3, no distress  Skin: no stigmata of endocarditis  Wounds: C/D/I BLE erythema and edema with scabbed area, right hip with Hemovac draining serosanguinous fluid  HEMT: AT/NC Oropharynx pink, moist, and without lesions or exudates; dentition in good state of repair  Eyes: PERRLA, EOMI, conjunctiva pink, sclera anicteric. Neck: Supple. Trachea midline. No LAD. Chest: no distress and CTA. Good air movement. Heart: RRR and no MRG. Abd: soft, non-distended, no tenderness, no hepatomegaly. Normoactive bowel sounds. Ext: no clubbing, cyanosis, or edema  Catheter Site: without erythema or tenderness draining clear yellow urine  Neuro: Mental status intact. CN 2-12 intact and no focal sensory or motor deficits    ? Diagnostic Studies: reviewed  10/8/21 XR Hip 2-3 VW W Pelvis Right:  Impression   1. Acute fracture of the right femoral neck with mild displacement. 2. Status post ORIF of the left femur.  Redemonstration of the previously   seen fracture of the proximal femur.  No significant callus formation is   present. 10/8/21 XR Chest Portable:  Impression   Mild cardiomegaly.  Mild-to-moderate congestion and/or infiltrates identified   in the lungs. 10/8/21 Limited Echo:  Summary   This is a limited echocardiogram.   Technically difficult study, due to body habitus.   Left ventricular systolic function is normal.   Ejection fraction is visually estimated at 50-55%.   Moderate mitral regurgitation.   Moderate tricuspid regurgitation; RVSP: 45 mmHg.   No evidence of any pericardial effusion. 10/9/21 VL Dup Lower Extremity Venous Bilateral  Impression   The exam is severely limited due to patient body habitus. The distal femoral   veins popliteal veins and calf veins are not well visualized bilaterally.       No gross DVT is identified     10/10/21 XR Hip Right (2-3 Views)  Impression   Right hip replacement in normal alignment.       No acute interval fracture.       Immediate postoperative changes.      ??  I have examined this patient and available medical records on this date and have made the above observations, conclusions and recommendations. Electronically signed by: Electronically signed by Mariel Knight.  SHERYL Ward CNP on 10/11/2021 at 12:58 PM

## 2021-10-11 NOTE — PROGRESS NOTES
Vanessa Alaniz (1940)    Daily Progress Note-  Peña Sweeney MD, MD                     Today's Date:     10/11/2021          Subjective:      Awake and alert this morning  Pain in hip tolerable  Pain rated pain is perceived as moderate (4-6 pain scale)  Denies shortness of breath or chest pain. Objective:   Patient Vitals for the past 4 hrs:   BP Temp Temp src Pulse Resp SpO2 Weight   10/11/21 0403 -- -- -- -- -- -- 235 lb 7.2 oz (106.8 kg)   10/11/21 0400 122/64 98.8 °F (37.1 °C) Oral 73 13 94 % --     I/O last 3 completed shifts: In: 8468 [P.O.:490; I.V.:700]  Out: 1850 [Urine:1450; Drains:300; Blood:100]  DRAIN/TUBE OUTPUT:  Closed/Suction Drain Right;Ventral Hip Accordion 10 Dominican-Output (ml): 50 ml    Physical Exam:   Orientation:  alert and oriented to person, place and time    Right Lower Extremity    Incision:  dressing in place, clean, dry and intact    Lower Extremity Motor :    Moving lower extremities without difficulty today. Able to dorsiflex and   plantar flex foot/ankle. Lower Extremity Sensory:   Neurovascularly intact to gross sensation and touch in lower extremities. Pulses:    present 2+ bilaterally lower extremities. Legs Erythema no still present however improve.   Evidence of venous stasis ulcers stable no wound breakdown    LABS   CBC:   Recent Labs     10/09/21  0255 10/11/21  0348   WBC 7.0 8.7   HGB 10.4* 9.8*   * 157     BMP:    Recent Labs     10/09/21  0255 10/10/21  0315 10/11/21  0348    139 139   K 4.9 4.8 5.0    107 106   CO2 20* 20* 21   BUN 45* 47* 50*   CREATININE 5.0* 5.0* 4.8*   GLUCOSE 110* 71 105*         Medications   Meds:    vancomycin (VANCOCIN) intermittent dosing (placeholder)   Other See Admin Instructions    sodium chloride flush  10 mL IntraVENous 2 times per day  sennosides-docusate sodium  1 tablet Oral BID    acetaminophen  650 mg Oral Q6H    enoxaparin  30 mg SubCUTAneous Daily    allopurinol  50 mg Oral Daily    levothyroxine  50 mcg Oral Daily    metoprolol tartrate  25 mg Oral BID       Assessment and Plan     1. POD # 1 Right hemiarthroplasty (10/10/21)    1:  Mobilize with Physical therapy   -WBAT   -posterior hip precautions   -OK to stop abductor wedge today  2. Acute blood loss anemia, not a complication   -Hb 9.8   -Repeat in am  3:  Continue Deep venous thrombosis prophylaxis    -Chemoprophylaxis Lovenox   -Mechanical-CITLALLI hose, -SCD's, ambulation  4:  Continue Pain Control   -wean to PO meds  5. Remove hemovac tomorrow if output < 30cc  6.   Bilateral lower extremity cellulitis   -Wound cultures show MRSA   -On Vancomycin  6:  D/C Planning:     -Wade Amezcua MD, MD

## 2021-10-11 NOTE — CARE COORDINATION
PT/OT notified CM that they are recommending ARU for pt. .CM met with pt for d/c planning. Introduced self and updated white board. Pt lives alone and was independent with ADL's prior to admission. Pt has a PCP, has insurance, and is able to afford her medication. Pt has a walker and a shower seat. Discussed the PT/OT recommendations for ARU with pt and she is agreeable. Pt states that she will not go to a nursing home again. Referral made to [facility] via confidential VM. Pt has Medicare and will not require a pre-cert. Will await the decision from ARU to accept or not.   TE

## 2021-10-11 NOTE — PROGRESS NOTES
Daily Progress Note  Awake and alert  Feeling fair, Does have some hip pain, but tolerable  NSR on tele HR 90's BP stable  Denies any CP or SOB    S/p hip surgery right sided  Doing better today  Heart rate and BP stable  ARON she does  Not want to use her CPAP  Supportive care for now  Diuretics as tolerated for right heart failure   Cr is 4.8-unchanged       Rt. Hip fracture secondary to mechanical fall    Right Hemiarthroplasty 10/10/21    POD #1    Abductor wedge in place    hgb stable.     Pan tolerable    Ortho following      Hx of PAF    Not on AC OP-currently on lovenox    On lopressor    NSR on tele    Echo done and mod MR but preserved EF    Will monitor    HFpEF    EF 50-55%    ProBNP 9,203 down to 7,560    2.5L UOP    Diuretics per nephro    Denies any SOB, NP edema    TOBY    Cr. 4.8 today    Nephro is folloiwng     Cellulitis- on ABx for this  Will follow     Echo-10/8/21  Summary   This is a limited echocardiogram.   Technically difficult study, due to body habitus.   Left ventricular systolic function is normal.   Ejection fraction is visually estimated at 50-55%.   Moderate mitral regurgitation.   Moderate tricuspid regurgitation; RVSP: 45 mmHg.   No evidence of any pericardial effusion.     PAST MEDICAL HISTORY:  The patient had anemia post surgery last time  also, and she had atrial fibrillation postsurgery also.  She has a  history of having hypertension present.  History of diastolic heart  failure present.  History of renal insufficiency present.  She has a  history of chronic venous ulcer present.  Diastolic dysfunction, history  of SVTs in the past.  Moderate mitral regurgitation noted.  Moderate  tricuspid regurgitation noted.  Pulmonary hypertension present.  The  patient is followed by Dr. Amber Gandhi for renal insufficiency present.     PAST SURGICAL HISTORY:  Hernia repair.     SOCIAL HISTORY:  She does not smoke, does not drink.     ALLERGIES:  NKDA.     MEDICATIONS:  At home, she is on ALKPHOS 81 78 74     PT/INR:   Recent Labs     10/08/21  1240   PROTIME 12.1   INR 0.94     APTT:   Recent Labs     10/08/21  1240   APTT 30.5     BNP:  No results for input(s): BNP in the last 72 hours.       Assessment:  Patient Active Problem List    Diagnosis Date Noted    Cellulitis 10/10/2021    [facility] 10/08/2021    Closed transcervical fracture of proximal femur, left, initial encounter ([facility] 05/21/2021    Morbidly obese ([facility] 07/23/2020    History of ventral hernia repair 10/19/2018    HTN (hypertension) 08/10/2018    S/P herniorrhaphy 07/11/2018    S/P ventral herniorrhaphy 06/13/2018    H/O ventral hernia 06/06/2018    Chronic kidney disease (CKD) stage G4/A1, severely decreased glomerular filtration rate (GFR) between 15-29 mL/min/1.73 square meter and albuminuria creatinine ratio less than 30 mg/g ([facility]) 05/16/2017    CRF (chronic renal failure) 09/20/2016    Hypothyroid 09/20/2016    Ventral hernia without obstruction or gangrene 09/18/2016    SVT (supraventricular tachycardia) ([facility] 09/18/2016    Chronic kidney disease (CKD) stage G4/A2, severely decreased glomerular filtration rate (GFR) between 15-29 mL/min/1.73 square meter and albuminuria creatinine ratio between  mg/g ([facility] 06/21/2016       Electronically signed by SHERYL Reagan CNP on 10/11/2021 at 9:41 AM     Electronically signed by Becky Hernandez MD on 10/11/21 at 1:19 PM EDT

## 2021-10-11 NOTE — PROGRESS NOTES
[facility]  consulted by Dr. Magdalene Alcaraz for monitoring and adjustment. Indication for treatment: BLE cellulitis  Goal trough: 15 mcg/mL    Pertinent Laboratory Values:   Temp Readings from Last 3 Encounters:   10/10/21 98.1 °F (36.7 °C) (Oral)   10/10/21 96.3 °F (35.7 °C)   08/16/21 97.8 °F (36.6 °C)     Recent Labs     10/08/21  0340 10/09/21  0255   WBC 5.8 7.0     Recent Labs     10/08/21  0340 10/09/21  0255 10/10/21  0315   BUN 46* 45* 47*   CREATININE 5.3* 5.0* 5.0*     Estimated Creatinine Clearance: 10 mL/min (A) (based on SCr of 5 mg/dL (H)). Intake/Output Summary (Last 24 hours) at 10/10/2021 1628  Last data filed at 10/10/2021 1215  Gross per 24 hour   Intake 1070 ml   Output 2050 ml   Net -980 ml     Pertinent Cultures:  Date    Source    Results  10/08   Urine    Enterococcus faecalis  10/08   Wound    MRSA (Moderate growth)      VANCOMYCIN RANDOM:    Recent Labs     10/10/21  0315 10/11/21  0348   VANCORANDOM 20.0 15.5     Assessment:  · WBC normal, pt afebrile  · TOBY on CKD, SCR steady around 5, UOP appears ok  · Day(s) of therapy: 3  · Vancomycin concentration:   · 10/10 - 20, supra-therapeutic  · 10/15 - 15.5, therapeutic    Plan:  · Continue intermittent vancomycin dosing based on levels  · Vanco level therapeutic this AM, ok to re-dose  · Give vancomycin 1250mg ivpb x1 dose today  · Recheck the vanco level in 2 days  · Pharmacy will continue to monitor patient and adjust therapy as indicated    VANCOMYCIN CONCENTRATION SCHEDULED FOR 10/13/2021  @ 0600    Thank you for the consult,  Rosalba Joe.  Shannon Luna RPH  1:32 PM  10/11/21

## 2021-10-11 NOTE — PROGRESS NOTES
PS sent to Dr Markus Haskins requesting Signed and Held orders for admission to ARU. He states he is \"on it. \"

## 2021-10-11 NOTE — PROGRESS NOTES
PS to Dr Jacobo Petty stating: Patient is approved for ARU. Need DC order and signed and held orders for readmit, including code status and diet please. 1754: DC order received, PS message sent requesting: Still need the signed and held orders for readmit including code status and diet please.

## 2021-10-12 PROBLEM — B95.62 MRSA CELLULITIS: Status: ACTIVE | Noted: 2021-10-10

## 2021-10-12 PROCEDURE — 97530 THERAPEUTIC ACTIVITIES: CPT

## 2021-10-12 PROCEDURE — 99232 SBSQ HOSP IP/OBS MODERATE 35: CPT | Performed by: PHYSICAL MEDICINE & REHABILITATION

## 2021-10-12 PROCEDURE — 97167 OT EVAL HIGH COMPLEX 60 MIN: CPT

## 2021-10-12 PROCEDURE — 97163 PT EVAL HIGH COMPLEX 45 MIN: CPT

## 2021-10-12 PROCEDURE — 1280000000 HC REHAB R&B

## 2021-10-12 PROCEDURE — 97535 SELF CARE MNGMENT TRAINING: CPT

## 2021-10-12 PROCEDURE — 6360000002 HC RX W HCPCS: Performed by: NURSE PRACTITIONER

## 2021-10-12 PROCEDURE — 94761 N-INVAS EAR/PLS OXIMETRY MLT: CPT

## 2021-10-12 PROCEDURE — 6370000000 HC RX 637 (ALT 250 FOR IP): Performed by: PHYSICAL MEDICINE & REHABILITATION

## 2021-10-12 PROCEDURE — 6370000000 HC RX 637 (ALT 250 FOR IP): Performed by: NURSE PRACTITIONER

## 2021-10-12 RX ADMIN — POLYETHYLENE GLYCOL (3350) 17 G: 17 POWDER, FOR SOLUTION ORAL at 21:34

## 2021-10-12 RX ADMIN — DOCUSATE SODIUM 50 MG AND SENNOSIDES 8.6 MG 1 TABLET: 8.6; 5 TABLET, FILM COATED ORAL at 20:54

## 2021-10-12 RX ADMIN — ACETAMINOPHEN 650 MG: 325 TABLET ORAL at 13:32

## 2021-10-12 RX ADMIN — ACETAMINOPHEN 650 MG: 325 TABLET ORAL at 20:54

## 2021-10-12 RX ADMIN — ALLOPURINOL 50 MG: 100 TABLET ORAL at 08:18

## 2021-10-12 RX ADMIN — ACETAMINOPHEN 650 MG: 325 TABLET ORAL at 04:48

## 2021-10-12 RX ADMIN — OXYCODONE HYDROCHLORIDE 5 MG: 5 TABLET ORAL at 10:40

## 2021-10-12 RX ADMIN — METOPROLOL TARTRATE 25 MG: 25 TABLET, FILM COATED ORAL at 08:18

## 2021-10-12 RX ADMIN — ACETAMINOPHEN 650 MG: 325 TABLET ORAL at 08:18

## 2021-10-12 RX ADMIN — DOCUSATE SODIUM 50 MG AND SENNOSIDES 8.6 MG 1 TABLET: 8.6; 5 TABLET, FILM COATED ORAL at 08:21

## 2021-10-12 RX ADMIN — LINEZOLID 600 MG: 600 TABLET ORAL at 08:18

## 2021-10-12 RX ADMIN — ENOXAPARIN SODIUM 30 MG: 30 INJECTION SUBCUTANEOUS at 08:24

## 2021-10-12 RX ADMIN — OXYCODONE HYDROCHLORIDE 5 MG: 5 TABLET ORAL at 15:38

## 2021-10-12 RX ADMIN — LINEZOLID 600 MG: 600 TABLET ORAL at 20:55

## 2021-10-12 RX ADMIN — OXYCODONE HYDROCHLORIDE 5 MG: 5 TABLET ORAL at 02:28

## 2021-10-12 RX ADMIN — LEVOTHYROXINE SODIUM 50 MCG: 0.05 TABLET ORAL at 06:39

## 2021-10-12 ASSESSMENT — PAIN SCALES - GENERAL
PAINLEVEL_OUTOF10: 8
PAINLEVEL_OUTOF10: 7
PAINLEVEL_OUTOF10: 0
PAINLEVEL_OUTOF10: 10
PAINLEVEL_OUTOF10: 8
PAINLEVEL_OUTOF10: 2
PAINLEVEL_OUTOF10: 1
PAINLEVEL_OUTOF10: 2
PAINLEVEL_OUTOF10: 0

## 2021-10-12 ASSESSMENT — PAIN DESCRIPTION - PAIN TYPE
TYPE: SURGICAL PAIN
TYPE: SURGICAL PAIN

## 2021-10-12 ASSESSMENT — PAIN DESCRIPTION - LOCATION
LOCATION: BACK
LOCATION: LEG
LOCATION: LEG

## 2021-10-12 ASSESSMENT — PAIN DESCRIPTION - FREQUENCY
FREQUENCY: INTERMITTENT
FREQUENCY: INTERMITTENT

## 2021-10-12 ASSESSMENT — PAIN DESCRIPTION - DESCRIPTORS
DESCRIPTORS: ACHING
DESCRIPTORS: ACHING

## 2021-10-12 ASSESSMENT — PAIN DESCRIPTION - ORIENTATION
ORIENTATION: RIGHT
ORIENTATION: RIGHT

## 2021-10-12 ASSESSMENT — PAIN - FUNCTIONAL ASSESSMENT
PAIN_FUNCTIONAL_ASSESSMENT: PREVENTS OR INTERFERES SOME ACTIVE ACTIVITIES AND ADLS
PAIN_FUNCTIONAL_ASSESSMENT: PREVENTS OR INTERFERES SOME ACTIVE ACTIVITIES AND ADLS

## 2021-10-12 NOTE — PLAN OF CARE
ARU Interdisciplinary Plan of Care [facility] Dr. Martinez S Dirk [facility], [address]  [phone]  Fax: [phone]        Rigo Arceo    : 1940  Acct #: [de-identified]  MRN: [id number]   PHYSICIAN:  Lupe Adler MD  Primary Active Problems:   Active Hospital Problems    Diagnosis Date Noted    Uncontrolled pain [R52]     Generalized weakness [R53.1]     Acute blood loss anemia [D62]     Paroxysmal atrial fibrillation (HCC) [I48.0]     Acute on chronic diastolic (congestive) heart failure (HCC) [I50.33]     Traumatic closed fracture of neck of femur with minimal displacement, right, initial encounter ([facility] [place] 10/11/2021    MRSA cellulitis [L03.90, B95.62] 10/10/2021    Morbid obesity with BMI of 45.0-49.9, adult ([facility] [E66.01, Z68.42] 2020    Essential hypertension [I10] 08/10/2018    Chronic kidney disease, stage IV (severe) ([facility] [N18.4] 2017    Gastroesophageal reflux disease [K21.9] 2017       Rehabilitation Diagnosis:     Fracture of unspecified part of neck of right femur, initial encounter for closed fracture [S72.001A]  Traumatic closed fracture of neck of femur with minimal displacement, right, initial encounter ([facility] [S72.001A]       ADMIT DATE:10/11/2021   CARE PLAN     NURSING:  Rigo Arceo while on this unit will:      Bowel and Bladder   [x] Be continent of bowel and bladder      [x] Have an adequate number of bowel movements   [] Urinate with no urinary retention >300ml in bladder   [] Bladder Scan: (details)   [] Complete bladder protocol with mobley removal   [] Initiate Bladder Program to toilet every ___ hours   [] Initiate Bowel Program to toilet every ___hours   [] Bladder training    [] Bowel training  Pulmonary   [x] Maintain O2 SATs at 92% or greater  Pain Management   [x] Have pain managed while on ARU        [] Be pain free by discharge    [] Medication Management and Education  Maintenance of Skin Integrity/Wound Management   [x] Have no skin breakdown while on ARU   [] Have improved skin integrity via wound measurements   [] Have no signs/symptoms of infection via infection protection and monitoring at the          wound site  Fall Prevention   [] Be free from injury during hospitalization via fall prevention measures     [] Disease management and Education  Precautions   [x] Weight Bearing Precautions   [] Swallowing Precautions   [x] Monitoring of Risks of Complications   [x] DVT Prophylaxis    [] Fluid/electrolyte/Nutrition Management    [] Complete education with patient/family with understanding demonstrated for          in-room safety with transfers to bed, toilet, wheelchair, shower as well as                bathroom activities and hygiene. [] Adjustment   [] Other:   Nursing interventions may include bowel/bladder training, education for medical assistive devices, medication education, O2 saturation management, energy conservation, stress management techniques, fall prevention, alarms protocol, seating and positioning, skin/wound care, pressure relief instruction,dressing changes,  infection protection, DVT prophylaxis, and/or assistance with in room safety with transfers to bed, toilet, wheelchair, shower as well as bathroom activities and hygiene. Patient/caregiver education for:   [x] Disease/sustained injury/management      [x] Medication Use   [x] Surgical intervention   [x] Safety/Precautions   [x] Body mechanics and or joint protection   [x] Health maintenance         PHYSICAL THERAPY:  Goals:                  Short term goals  Time Frame for Short term goals: 12-14 tx days:  Short term goal 1: Pt will complete rolling L/R and sit->sup using leg  to assist RLE and using bed features with Min A following posterior hip precautions on RLE.   Short term goal 2: Pt will complete sup->sit using bed features and leg  to assist RLE with SBA-Sup following posterior hip precautions on RLE. Short term goal 3: Pt will complete OOB transfers using RW with Min A following posterior hip precautions. Short term goal 4: Pt will ambulate 50 ft with turns over level surface and 10 ft of unevem surface using RW with CGA. Short term goal 5: Pt will ascend/descend 2\" curb step using RW with Min A. Short term goal 6: Pt will complete object retrieval from the floor in standing with 1UE support on RW and using reacher with CGA. These goals were reviewed with this patient at the time of assessment and Bossman Quevedo is in agreement. Plan of Care: Pt to be seen 5 days per week for a minimum of 60 minutes for 16 days. Current Treatment Recommendations: Strengthening, Gait Training, Patient/Caregiver Education & Training, ROM, Equipment Evaluation, Education, & procurement, Balance Training, Pain Management, Modalities, Functional Mobility Training, Endurance Training, Home Exercise Program, Positioning, Transfer Training, Wheelchair Mobility Training, Safety Education & Training community reintegration,animal assisted therapy, and concurrent/group therapy.     PT IRF-SARAH scores and goals for initial assessment:   Bed Mobility:   Sit to Lying  Assistance Needed: Dependent  Comment: Total A x 2 (significantly limited by pain and anxiety)  CARE Score: 1  Discharge Goal: Partial/moderate assistance  Roll Left and Right  Comment: movements were attempted, however pt was too painful upon initiation and refused to continue  Reason if not Attempted: Not attempted due to medical condition or safety concerns  CARE Score: 88  Discharge Goal: Partial/moderate assistance  Lying to Sitting on Side of Bed  Assistance Needed: Dependent  Comment: Total A x 2 (significantly limited by pain and anxiety)  CARE Score: 1  Discharge Goal: Supervision or touching assistance    Transfers:    Sit to Stand  Assistance Needed: Dependent  Comment: Max A x 2 at EOB using RW x 1 trial today  CARE Score: 1  Discharge Goal: Partial/moderate assistance  Chair/Bed-to-Chair Transfer  Assistance Needed: Dependent  Comment: required use of Conny Grippe with 2-person assist due to poor standing tolerance with RW and inability to actively move RLE due to pain and anxiety  CARE Score: 1  Discharge Goal: Partial/moderate assistance     Car Transfer  Reason if not Attempted: Not attempted due to medical condition or safety concerns  CARE Score: 88  Discharge Goal: Partial/moderate assistance    Ambulation:    Walking Ability  Does the Patient Walk?: Yes     Walk 10 Feet  Comment: pt with poor standing tolerance and unable to actively perform weight shifting today due to pain and anxiety  Reason if not Attempted: Not attempted due to medical condition or safety concerns  CARE Score: 88  Discharge Goal: Supervision or touching assistance     Walk 50 Feet with Two Turns  Reason if not Attempted: Not attempted due to medical condition or safety concerns  CARE Score: 88  Discharge Goal: Supervision or touching assistance     Walk 150 Feet  Comment: had limited mobility since L hip surgery; was not performing this activity at baseline; limited potential to progressing to this mobility task  Reason if not Attempted: Not applicable  CARE Score: 9  Discharge Goal: Not Applicable     Walking 10 Feet on Uneven Surfaces  Reason if not Attempted: Not attempted due to medical condition or safety concerns  CARE Score: 88  Discharge Goal: Partial/moderate assistance     1 Step (Curb)  Comment: pt was not performing this activity at baseline but anticipating that pt can progress to being able to ascend/descend 2\" curb step while at ARU  Reason if not Attempted: Not attempted due to medical condition or safety concerns  CARE Score: 88  Discharge Goal: Partial/moderate assistance     4 Steps  Comment: had limited mobility since L hip surgery; was not performing this activity at baseline; limited potential to progressing to this mobility task  Reason if not Attempted: Not applicable  CARE Score: 9  Discharge Goal: Not Applicable     12 Steps  Comment: had limited mobility since L hip surgery; was not performing this activity at baseline; limited potential to progressing to this mobility task  Reason if not Attempted: Not applicable  CARE Score: 9  Discharge Goal: Not Applicable    Gait Deviations: []None []Slow harish  [] Increased ASHLEY  [] Staggers []Deviated Path  [] Decreased step length  [] Decreased step height  []Decreased arm swing  [] Shuffles  [] Decreased head and trunk rotation  []other:        Wheelchair:  w/c Ability: Wheelchair Ability  Uses a Wheelchair and/or Scooter?: Yes (will need continued assessment tomorrow due to pt's poor activity tolerance and time constraint today)                Balance:        Object: Picking Up Object  Comment: pt with poor standing tolerance and required BUE support on RW to maintain static standing  Reason if not Attempted: Not attempted due to medical condition or safety concerns  CARE Score: 88  Discharge Goal: Supervision or touching assistance  OCCUPATIONAL THERAPY:  Goals:             Short term goals  Time Frame for Short term goals: STGs=LTGs :  Long term goals  Time Frame for Long term goals : 14-17 days or until d/c  Long term goal 1: Pt will complete grooming tasks Ind seated  Long term goal 2: Pt will complete total body bathing c min A using AE PRN  Long term goal 3: Pt will complete UB dressing c setup  Long term goal 4: Pt will complete LB dressing c mod A using AE  Long term goal 5: Pt will doff/don footwear c min A using AE  Long term goals 6: Pt will complete toileting c max A  Long term goal 7: Pt will complete functional transfers (bed, chair, toilet, shower) c DME PRN and min A  Long term goal 8: Pt will perform therex/therax to facilitate increased strength/endurance/ax tolerance (c emphasis on dynamic standing balance/tolerance > 5 mins, and BUE endurance) c SBA :    These goals were reviewed with this patient at the time of assessment and Tyrone Parra is in agreement    Plan of Care:  Pt to be seen 5 days per week for a minimum of 60 minutes for 14 days. Plan  Times per day: Daily  Current Treatment Recommendations: Strengthening, Balance Training, Functional Mobility Training, Endurance Training, Pain Management, Safety Education & Training, Patient/Caregiver Education & Training, Equipment Evaluation, Education, & procurement, Positioning, Self-Care / ADL         cognitive training, home management, energy conservation training, community reintegration, splint fabrication, patient/caregiver education and training, animal assisted therapy, and concurrent and/or group therapy. OT IRF-SARAH scores and goals for initial assessment:    ADLs:    Eating: Eating  Assistance Needed: Independent  Comment: able to open packages/containers  CARE Score: 6  Discharge Goal: Independent       Oral Hygiene: Oral Hygiene  Assistance Needed: Setup or clean-up assistance  Comment: seated EOB  CARE Score: 5  Discharge Goal: Independent    UB/LB Bathing: Shower/Bathe Self  Assistance Needed: Partial/moderate assistance  Comment: required assist for bottom and distal BLEs  CARE Score: 3  Discharge Goal: Partial/moderate assistance    UB Dressing: Upper Body Dressing  Assistance Needed: Partial/moderate assistance  Comment: seated EOB able to thread BUEs and pull overhead, assist to pull down in back  CARE Score: 3  Discharge Goal: Supervision or touching assistance         LB Dressing: Lower Body Dressing  Assistance Needed: Dependent  Comment: to don pants from bed level  CARE Score: 1  Discharge Goal: Partial/moderate assistance    Donning and [place] Footwear: Putting On/Taking Off Footwear  Assistance Needed: Dependent  Comment: to doff/don hospital socks  CARE Score: 1  Discharge Goal: Partial/moderate assistance      Toileting:  Toileting Hygiene  Comment: has mobley cath and denied need for BM; predict dependent based on performance  Reason if not Attempted: Not attempted due to medical condition or safety concerns  CARE Score: 88  Discharge Goal: Substantial/maximal assistance      Toilet Transfers: Toilet Transfer  Comment: pt too fatigued and painful; also denied need for BM and has mobley cath. Is a 2 person assist for transfers using Shamar Nitin  Reason if not Attempted: Not attempted due to medical condition or safety concerns  CARE Score: 88  Discharge Goal: Partial/moderate assistance      SPEECH THERAPY: (If ordered)  Plan of Care and Goals:   LTG                                                         LTG:                           Treatments may include speech/language/communication therapy, cognitive training, animal assisted therapy, group therapy, education, and/or dysphagia therapy based on the above goals. Co-treats where appropriate with PT or OT to facilitate patient goals in functional tasks. These goals were reviewed with this patient at the time of assessment and Ron Alaniz is in agreement. CASE MANAGEMENT:  Goals:   Assist patient/family with discharge planning, patient/family counseling, assistance in obtaining recommended equipment and other services, and coordination with insurance during ARU stay. Patient Goals:   Return to maximum level of independent function. Nutrition goal:Patient will consume at least 50-75% at meals during stay        Activities Prior to Admit:   Homemaking Responsibilities: Yes  Active : No  Mode of Transportation: Family, Car  Occupation: Retired            Intensity of [facility] will be seen a minimum of 3 hours of therapy per day/a minimum of 5 out of 7 days per week. [] In this rare instance due to the nature of this patient's medical involvement, this patient will be seen 15 hours per week (900 minutes within a 7 day period).     Treatments may include therapeutic exercises, gait training, neuromuscular re-ed, transfer training, community reintegration, bed mobility, w/c mobility and training, self care, home mgmt, cognitive training, energy conservation,dysphagia tx, speech/language/communication therapy, group therapy, and patient/family education. In addition, dietician/nutritionist may monitor calorie count as well as intake and collaboratively work with SLP on dietary upgrades. Neuropsychology/Psychology may evaluate and provide necessary support. Group therapy as appropriate to facilitate improved endurance, STR, COORD, function, safety, transfers, awareness and insight into deficits, problem solving, memory, and social interaction and engagement. Medical issues being managed closely and that require 24 hour availability of a physician:   [] Swallowing Precautions                                     [] Weight bearing precautions   [x] Wound Care                             [x] Infection Prevention   [x] DVT Prophylaxis/assessment              [x] Monitoring for complications    [x] Fall Precautions/Prevention                         [x] Fluid/Electrolyte/Nutrition Balance   [] Voice Protection                           [x] Medication Management   [x] Respiratory                   [x] Pain Mgmt   [x] Bowel/Bladder Fx    Medical Prognosis: [] Good  [x] Fair    [] Guarded   Total expected IRF days 16                                            Physician anticipated functional outcomes:  FWW and HHC OT/PT and supervision.   Rehab Goals:   [] Return to premorbid function of_______________________________.    [] Independent   [] Mod I  [x] Supervision  [] CGA   [] Min A   [] Mod A  Level for ambulation []without assistive device  [x] with assistive device        [] Independent   [] Mod I  [x] Supervision [] CGA   [] Min A   [] Mod A  Level for transfers []without assistive device  [x] with assistive device         [] Independent   [] Mod I  [x] Supervision [] CGA   [] Min A   [] Mod A Level with ADL's []without assistive device   [x] with assistive device     ___________________     Level with cognitive skills requiring [] No assist [x] Supervision  [] Active Assist/Cues     [] Maximize level of mobility and ADL's to decrease burden on caregiver    IPOC brief synthesis of Preadmission Screen, Post-Admission Evaluation, and Therapy Evaluations: Acute inpatient rehabilitation with occupational and physical therapy 180 minutes 5 out of every 7 days. Will address basic and  advancing mobility with self-care instruction and adaptive equipment training. Caregiver education will be offered. Expected length of stay  prior to a supervised level of function for discharge home with a walker and HHC OT/PT is 2-1/2 weeks.     Additional recommendation:     1. Right femoral neck fracture with hemiarthroplasty: The patient requires daily occupational and physical therapy. We must train her to do self-care activities and mobility tasks following posterior hip precautions. She requires adaptive equipment training, range of motion and strength recovery and aggressive pulmonary hygiene. Pain management, monitoring of her hemoglobin and heart rate and lower limb wound care. It is not clear that there is someone to offer caregiver training to. DVT prophylaxis. Bowel and bladder retraining. Nutritional support. 2. DVT prophylaxis: Lovenox 30 mg subcu daily. I must monitor her hemoglobin and platelet count periodically while on this medication. Weightbearing activities will be pursued daily. GI prophylaxis is available. 3. Acute on chronic kidney disease stage IV: Consulting nephrology to monitor her renal failure. Avoiding nephrotoxic medications when possible. Encouraging consistent oral hydration. Periodic monitoring of her chemistries.   4. Lower limb cellulitis with MRSA: Infectious disease has stopped her vancomycin and started Zyvox 600 mg twice a day through 10/19/2021. The wound care service is directing her topical care. Limb elevation. Intermittent diuretics. 5. Acute on chronic diastolic congestive heart failure: Lopressor for afterload reduction, intermittent use of diuretics and daily weights. 6. Uncontrolled pain: Scheduled acetaminophen, cryotherapy and narcotics. She is on oral oxycodone and injectable Dilaudid for now. We will transition to oral medicines only promptly. Progressive mobilization. 7. Atrial fibrillation: Lopressor for rate control. Her chronic anemia precludes more aggressive anticoagulation.     Anticipated discharge destination:    [] Home Independently   [x] Home with supervision    []SNF     [] Other       This plan has been reviewed with me in a language I understand.  I have had the opportunity to include my input with my therapy team.    ________________________________________________   ______________________  Patient/Significant Other      Date    I have reviewed this initial plan of care and agree with its contents:    Title   Name    Date    Time    Physician: Han Rich MD 10/13/2021 1:30 PM    Case Mgmt:  Alondra Thomas 10/13/2021 [id number]    OT: Jose Carlos Borja Erwin 88, OTR/L 10/12/2021 1519    PT: Debi Means, BETH   10/12/2021    16:19    RN: Harika Toledo RN 10/13/21    ST:    Dietician: ANNEMARIE Verduzco  10/12/2021  15:31

## 2021-10-12 NOTE — PROGRESS NOTES
Comprehensive Nutrition Assessment    Type and Reason for Visit:  Initial, Consult (oral nutrition supplement)    Nutrition Recommendations/Plan:   Continue regular diet at this time   Will continue to offer oral nutrition supplement during stay  Encourage consistent meal intake   Will continue to follow up during stay     Nutrition Assessment:  Admit to rehab with recent hx fall, now s/p hip hemiarthroplasty. Tolerating regular diet but reduced appetite at this time. Current order for standard oral nutrition supplement, will drink some but not vanilla. Encouraged to try eating more at meals. Will follow at moderate nutrition risk at this time. Malnutrition Assessment:  Malnutrition Status: At risk for malnutrition (Comment)    Context:  Acute Illness       Estimated Daily Nutrient Needs:  Energy (kcal):  0387-3533; Weight Used for Energy Requirements:  Current     Protein (g):  57-69 (1.2-1.4 g/kg); Weight Used for Protein Requirements:  Ideal        Fluid (ml/day):  1700; Method Used for Fluid Requirements:  1 ml/kcal      Nutrition Related Findings:  resting in bed, uncomfortable after therapy today, meds noted: zyvox, synthroid, senokot      Wounds:  Surgical Incision       Current Nutrition Therapies:    ADULT DIET;  Regular  Adult Oral Nutrition Supplement; Standard High Calorie/High Protein Oral Supplement    Anthropometric Measures:  · Height: 5' 1\" (154.9 cm)  · Current Body Weight: 238 lb 15.7 oz (108.4 kg)   · Usual Body Weight: 250 lb (113.4 kg) (per hx in May)     · Ideal Body Weight: 105 lbs; % Ideal Body Weight 227.6 %   · BMI: 45.2  · Adjusted Body Weight:  ; No Adjustment   · BMI Categories: Obese Class 3 (BMI 40.0 or greater)       Nutrition Diagnosis:   · Predicted inadequate energy intake related to pain, increase demand for energy/nutrients as evidenced by intake 26-50%      Nutrition Interventions:   Food and/or Nutrient Delivery:  Continue Current Diet, Continue Oral Nutrition Supplement, Snacks (Comment)  Nutrition Education/Counseling:  Education initiated   Coordination of Nutrition Care:  Continue to monitor while inpatient    Goals:  Patient will consume at least 50-75% at meals during stay       Nutrition Monitoring and Evaluation:   Behavioral-Environmental Outcomes:  None Identified   Food/Nutrient Intake Outcomes:  Food and Nutrient Intake, Supplement Intake  Physical Signs/Symptoms Outcomes:  Biochemical Data, Meal Time Behavior, Skin, Weight     Discharge Planning:    Continue current diet     Electronically signed by Shantel Sutherland RD, LD on 10/12/21 at 3:29 PM EDT    Contact: [phone]

## 2021-10-12 NOTE — PROGRESS NOTES
Occupational Therapy                              [facility] ARU OCCUPATIONAL THERAPY EVALUATION    Chart Review:  Past Medical History:   Diagnosis Date    Acid reflux     'no recent issue\"    Arthritis     \"Left Index Finger\"    CHF (congestive heart failure) ([facility]     per old chart hx of CHF with admission 12/2016 with pulmonary edema    Chronic kidney disease     Sees Dr. Lara Standard have one kidney, dr Kelvin Marroquin told me that in 2011 I think\"    GERD (gastroesophageal reflux disease)     History of blood transfusion 2011 Or 2012    No Reaction To Blood Transfusion Received    Hypertension     ( pt states she is not on any medication for blood pressure)    Hypomagnesemia     Hypothyroidism     MRSA (methicillin resistant staph aureus) culture positive 10/08/2021    Leg    Shortness of breath on exertion     SVT (supraventricular tachycardia) ([facility]     per old chart hx of SVT with admission 2016 tx with Adenosine and per notes in 5/2018 hx of SVT- no cardiologist    Teeth missing     Upper And Lower    Wears glasses      Past Surgical History:   Procedure Laterality Date    ABDOMEN SURGERY      DENTAL SURGERY      Teeth Extracted In Past    ENDOSCOPY, COLON, DIAGNOSTIC  2011 Or 2012    EYE SURGERY  ? when    clyde cataract ext    FEMUR FRACTURE SURGERY Left 5/22/2021    FEMUR IM NAIL REGINALDO INSERTION performed by Denisse Conti MD at [address] [place] [address]    Abdominal Hernia Repair     HIP SURGERY Right 10/8/2021    RIGHT HIP HEMIARTHROPLASTY performed by Denisse Conti MD at [address] [place] [address]  05/27/2018    exp lap . converted to exp laporotomy with ventral hernia repair with mesh    OTHER SURGICAL HISTORY  49/61/1673    umbilical scar excision    VENTRAL HERNIA REPAIR  09/16/2016    Robotic laparoscopic     Social History:  Social/Functional History  Lives With: Alone (has local family support)  Type of Home: House  Home Layout: Two level, Able to Live on Main level with bedroom/bathroom, Performs ADL's on one level  Home Access: Ramped entrance (bilat rails)  Bathroom Shower/Tub: Walk-in shower  Bathroom Toilet: Handicap height  Bathroom Equipment: Built-in shower seat, Hand-held shower, Grab bars in shower, Grab bars around toilet  Home Equipment: Rolling walker, [employer], Tammi Jayro, Alert Button, Reacher, Grab bars, Lift chair, Sock aid, Long-handled shoehorn  Receives Help From: Family, Neighbor, Friend(s)  ADL Assistance: Needs assistance (niece supervised pt during ADL (bathing and dressing), otherwise Mod Ind with toileting activity)  Homemaking Responsibilities: Yes  Meal Prep Responsibility: Primary  Laundry Responsibility: Secondary  Cleaning Responsibility: Primary (does not run the sweeper)  Bill Paying/Finance Responsibility: Primary  Shopping Responsibility: Secondary (does grocery list)  [address]: No  Health Care Management: Primary  Ambulation Assistance: Independent (Mod Ind using RW (household and limited community distances))  Active : No  Mode of Transportation: Family, Car  Occupation: Retired  Type of occupation: worked in bank  Additional Comments: has a regular, flat bed (tall height); usually sleeps in lift chair; had 2 falls in the past year with injuries requiring surgery    Restrictions:  Restrictions/Precautions  Restrictions/Precautions: Weight Bearing, Fall Risk, ROM Restrictions, General Precautions  Lower Extremity Weight Bearing Restrictions  Right Lower Extremity Weight Bearing: Weight Bearing As Tolerated     Position Activity Restriction  Hip Precautions: Posterior hip precautions, No hip flexion > 90 degrees, No ADduction, No hip internal rotation  Other position/activity restrictions: mobley catheter, wound drain         Pain Level: 8  Pain Location: Back    Objective:  Observation/Palpation  Posture: Fair  Observation: Pt agreeable to evaluation, but reporting pain and stiffness after sitting in recliner so long.  Pt intermittently tearful and also clearly anxious  Scar: Pt recently treated for cellulitis, pt still presents with BLE redness and healing scabs             Vision  Vision: Impaired  Vision Exceptions: Wears glasses for distance  Vision - Basic Assessment  Prior Vision: Wears glasses all the time  Patient Visual Report: No visual complaint reported. Hearing  Hearing: Within functional limits    ROM:      LUE AROM (degrees)  LUE AROM : WFL     Left Hand AROM (degrees)  Left Hand AROM: WFL     RUE AROM (degrees)  RUE AROM : WFL     Right Hand AROM (degrees)  Right Hand AROM: WFL    Strength:    LUE Strength  Gross LUE Strength: WFL  L Hand General: 4+/5  LUE Strength Comment: 4+/5 grossly  RUE Strength  Gross RUE Strength: WFL  R Hand General: 4+/5  RUE Strength Comment: 4+/5 grossly    Quality of Movement:   Tone RUE  RUE Tone: Normotonic  Tone LUE  LUE Tone: Normotonic  Coordination  Movements Are Fluid And Coordinated: Yes       Sensation:    Sensation  Overall Sensation Status: [facility]     ADLs:  Eating: Eating  Assistance Needed: Independent  Comment: able to open packages/containers  CARE Score: 6  Discharge Goal: Independent       Oral Hygiene: Oral Hygiene  Assistance Needed: Setup or clean-up assistance  Comment: seated EOB  CARE Score: 5  Discharge Goal: Independent    UB/LB Bathing: Shower/Bathe Self  Assistance Needed: Partial/moderate assistance  Comment: required assist for bottom and distal BLEs  CARE Score: 3  Discharge Goal: Partial/moderate assistance    UB Dressing: Upper Body Dressing  Assistance Needed: Partial/moderate assistance  Comment: seated EOB able to thread BUEs and pull overhead, assist to pull down in back  CARE Score: 3  Discharge Goal: Supervision or touching assistance         LB Dressing:   Lower Body Dressing  Assistance Needed: Dependent  Comment: to don pants from bed level  CARE Score: 1  Discharge Goal: Partial/moderate assistance    Donning and [place] Footwear: Putting On/Taking Off Footwear  Assistance Needed: Dependent  Comment: to doff/don hospital socks  CARE Score: 1  Discharge Goal: Partial/moderate assistance      Toileting: Toileting Hygiene  Comment: has mobley cath and denied need for BM; predict dependent based on performance  Reason if not Attempted: Not attempted due to medical condition or safety concerns  CARE Score: 88  Discharge Goal: Substantial/maximal assistance      Bed Mobility:    Bed mobility  Rolling to Left: 2 Person assistance  Rolling to Right: Unable to assess (unable to tolerate fully rolling onto R hip)  Sit to Supine: 2 Person assistance  Scootin Person assistance    Transfers:    Transfers  Stand Pivot Transfers: 2 Person assistance (using Barry Genin)  Sit to stand: 2 Person assistance (min-mod A x2 from [employer])  Stand to sit: 2 Person assistance           Toilet Transfer  Comment: pt too fatigued and painful; also denied need for BM and has mobley cath. Is a 2 person assist for transfers using Barry Genin  Reason if not Attempted: Not attempted due to medical condition or safety concerns  CARE Score: 88  Discharge Goal: Partial/moderate assistance    Functional Mobility:    Balance  Sitting Balance: Supervision (seated EOB)  Standing Balance: Dependent/Total  Standing Balance  Time: 1 stand <1 min  Activity: ADL  Comment: In Barry [employer]in. Discussed with RN blood observed on chux pad and bathing wipe while perineal cleaning was performed        Cognition:  Cognition  Overall Cognitive Status: WFL    Perception:  Perception  Overall Perceptual Status: WFL      Assessment:     Performance deficits / Impairments: Decreased functional mobility , Decreased ADL status, Decreased strength, Decreased endurance, Decreased balance, Decreased high-level IADLs, Decreased posture    The patient is a [de-identified]year old female admitted onto ARU after hospitalization for R hip fx and is s/p hemiarthroplasty.   Pt is cleared for WBAT RLE and to follow facilitate increased strength/endurance/ax tolerance (c emphasis on dynamic standing balance/tolerance > 5 mins, and BUE endurance) c SBA    Plan:    Pt will be seen at least 60 minutes per day for a minimum of 5 days per week, plus group therapy as appropriate  Current Treatment Recommendations: Strengthening, Balance Training, Functional Mobility Training, Endurance Training, Pain Management, Safety Education & Training, Patient/Caregiver Education & Training, Equipment Evaluation, Education, & procurement, Positioning, Self-Care / ADL    OT Individual Minutes  Time In: 1300  Time Out: 1430  Minutes: 90                Number of Minutes/Billable Intervention      OT Evaluation 20   Therapeutic Exercise    ADL Self-care 70   Neuro Re-Ed    Therapeutic Activity    Group    Other:    TOTAL 90     Electronically signed by:    Doris Andrade MS, OTR/L  License #[id number]  10/12/2021, 3:09 PM

## 2021-10-12 NOTE — H&P
Willetta Bernheim    : 1940  Acct #: [de-identified]  MRN: [id number]              History and physical      Admitting diagnosis: Right femoral neck fracture ( [address])    Comorbid diagnoses impacting rehabilitation: Uncontrolled pain, generalized weakness, gait disturbance, acute blood loss anemia, lower limb MRSA cellulitis, essential hypertension, paroxysmal atrial fibrillation, chronic kidney disease stage IV, acute on chronic diastolic congestive heart failure, morbid obesity (BMI 45.2), GERD, history of left hip fracture in May of 2021. Chief complaint: Right leg pain. History of present illness: Patient is an 72-year-old right-hand-dominant female who was attempting to ambulate in her home without her walker in the evening of 2021. She describes a loss of balance with fall onto her right side injuring her right hip. Emergency services were summoned and she was brought to our ED. She denied any precipitating palpitations, dizziness or seizure-like activity before her fall. In our ED she was mildly hypotensive with a right femoral neck fracture and significant lower limb edema with active cellulitis with venous stasis ulcerations. Her creatinine was elevated at 5.3 and she is in atrial fibrillation. She was placed on antibiotics for her lower limb cellulitis and orthopedics was consulted. Cardiology and nephrology provided preoperative clearance. On 10/10/2021 Dr. Vanessa Chavez performed a right hemiarthroplasty by way of a posterior approach. He is allowing weightbearing as tolerated she is to follow posterior hip precautions. The patient has been struggling with generalized weakness, uncontrolled pain and mild dyspnea. She has required significant assistance for basic mobility and self-care and cannot return directly home. She declined referral to a skilled nursing facility and will use our acute rehab unit to prepare for returning home again.     Review of systems: Some positional pt states she is not on any medication for blood pressure)    Hypomagnesemia     Hypothyroidism     MRSA (methicillin resistant staph aureus) culture positive 10/08/2021    Leg    Shortness of breath on exertion     SVT (supraventricular tachycardia) ([facility]     per old chart hx of SVT with admission 2016 tx with Adenosine and per notes in 5/2018 hx of SVT- no cardiologist    Teeth missing     Upper And Lower    Wears glasses         Past Surgical History:     Past Surgical History:   Procedure Laterality Date    ABDOMEN SURGERY      DENTAL SURGERY      Teeth Extracted In Past    ENDOSCOPY, COLON, DIAGNOSTIC  2011 Or 2012    EYE SURGERY  ? when    clyde cataract ext    FEMUR FRACTURE SURGERY Left 5/22/2021    FEMUR IM NAIL REGINALDO INSERTION performed by Chivo Quintana MD at [address] [place] [address]    Abdominal Hernia Repair     HIP SURGERY Right 10/8/2021    RIGHT HIP HEMIARTHROPLASTY performed by Chivo Quintana MD at [place] [address]  05/27/2018    exp lap . converted to exp laporotomy with ventral hernia repair with mesh    OTHER SURGICAL HISTORY  10/51/7252    umbilical scar excision    VENTRAL HERNIA REPAIR  09/16/2016    Robotic laparoscopic       Current Medications:     Current Facility-Administered Medications:     sennosides-docusate sodium (SENOKOT-S) 8.6-50 MG tablet 1 tablet, 1 tablet, Oral, BID PRN, C Milinda Boxer, MD    HYDROcodone-acetaminophen [facility]) 5-325 MG per tablet 1 tablet, 1 tablet, Oral, Q4H PRN, C Milinda Boxer, MD, 1 tablet at 10/16/21 1510    loperamide (IMODIUM) capsule 2 mg, 2 mg, Oral, 4x Daily PRN, C Milinda Boxer, MD    LORazepam (ATIVAN) tablet 0.25 mg, 0.25 mg, Oral, BID, C Milinda Boxer, MD, 0.25 mg at 10/16/21 2107    ondansetron (ZOFRAN-ODT) disintegrating tablet 4 mg, 4 mg, Oral, Q6H PRN, C Milinda Boxer, MD, 4 mg at 10/16/21 1510    miconazole (MICOTIN) 2 % powder, , Topical, BID, C Milinda Boxer, MD, Given at 10/16/21 2106    0.9 % sodium chloride infusion, 25 mL, IntraVENous, PRN, Maida Walsh, APRN - CNP    enoxaparin (LOVENOX) injection 30 mg, 30 mg, SubCUTAneous, Daily, Maida Jaminlah, APRN - CNP, 30 mg at 10/16/21 9942    magnesium hydroxide (MILK OF MAGNESIA) 400 MG/5ML suspension 30 mL, 30 mL, Oral, Daily PRN, Maida Nevillelah, APRN - CNP    allopurinol (ZYLOPRIM) tablet 50 mg, 50 mg, Oral, Daily, Maida Dapilah, APRN - CNP, 50 mg at 10/16/21 7468    levothyroxine (SYNTHROID) tablet 50 mcg, 50 mcg, Oral, Daily, Maida Jaminlah, APRN - CNP, 50 mcg at 10/16/21 0630    linezolid (ZYVOX) tablet 600 mg, 600 mg, Oral, 2 times per day, Maida Walsh, APRN - CNP, 600 mg at 10/16/21 2107    metoprolol tartrate (LOPRESSOR) tablet 25 mg, 25 mg, Oral, BID, Maida Walsh, APRN - CNP, 25 mg at 10/16/21 2106    acetaminophen (TYLENOL) tablet 650 mg, 650 mg, Oral, Q4H PRN, SKYE Jones MD, 650 mg at 10/15/21 0910    polyethylene glycol (GLYCOLAX) packet 17 g, 17 g, Oral, Daily PRN, SKYE Jones MD, 17 g at 10/12/21 2134    bisacodyl (DULCOLAX) suppository 10 mg, 10 mg, Rectal, Daily PRN, SKYE Jones MD    Family History:   Family History   Problem Relation Age of Onset    Cancer Father         Lung Cancer    Arthritis Mother     Cancer Brother         Skin Cancer    High Cholesterol Brother        Exam:    Blood pressure (!) 111/49, pulse 76, temperature 97.9 °F (36.6 °C), temperature source Oral, resp. rate 18, height 5' 1\" (1.549 m), weight 239 lb 3.2 oz (108.5 kg), last menstrual period 01/23/1995, SpO2 99 %, not currently breastfeeding. General: Patient was seen semirecumbent in bed. Alert but easily distracted. Tearful at times. Anxious. Follows one-step commands. HEENT: Her neck is supple. She has an abrasion over the bridge of her nose. Symmetric facial expression. MMM. Pulmonary: Mildly labored breathing due to her girth. Distant breath sounds. No wheezing.     Cardiac: alertness. Limitations/barriers for the patient: Her age, she lives alone, her multiple medical comorbidities and her morbid obesity. Recommendation: Acute inpatient rehabilitation with occupational and physical therapy 180 minutes 5 out of every 7 days. Will address basic and  advancing mobility with self-care instruction and adaptive equipment training. Caregiver education will be offered. Expected length of stay  prior to a supervised level of function for discharge home with a walker and C OT/PT is 2-1/2 weeks. Additional recommendation:    1. Right femoral neck fracture with hemiarthroplasty: The patient requires daily occupational and physical therapy. We must train her to do self-care activities and mobility tasks following posterior hip precautions. She requires adaptive equipment training, range of motion and strength recovery and aggressive pulmonary hygiene. Pain management, monitoring of her hemoglobin and heart rate and lower limb wound care. It is not clear that there is someone to offer caregiver training to. DVT prophylaxis. Bowel and bladder retraining. Nutritional support. 2. DVT prophylaxis: Lovenox 30 mg subcu daily. I must monitor her hemoglobin and platelet count periodically while on this medication. Weightbearing activities will be pursued daily. GI prophylaxis is available. 3. Acute on chronic kidney disease stage IV: Consulting nephrology to monitor her renal failure. Avoiding nephrotoxic medications when possible. Encouraging consistent oral hydration. Periodic monitoring of her chemistries. 4. Lower limb cellulitis with MRSA: Infectious disease has stopped her vancomycin and started Zyvox 600 mg twice a day through 10/19/2021. The wound care service is directing her topical care. Limb elevation. Intermittent diuretics.   5. Acute on chronic diastolic congestive heart failure: Lopressor for afterload reduction, intermittent use of diuretics and daily weights. 6. Uncontrolled pain: Scheduled acetaminophen, cryotherapy and narcotics. She is on oral oxycodone and injectable Dilaudid for now. We will transition to oral medicines only promptly. Progressive mobilization. 7. Atrial fibrillation: Lopressor for rate control. Her chronic anemia precludes more aggressive anticoagulation. I personally performed a history and physical on this patient within 24 hours of admission to the rehab unit. I have reviewed the preadmission screening and concur with its findings without change. A detailed plan of care will be established by hospital day 4 and I attest the patient is appropriate for inpatient rehabilitation at this time. I have compared the patient's current functional status noted during my history and physical with that of the preadmission screen and I have found no significant differences.

## 2021-10-12 NOTE — PROGRESS NOTES
Spoke with [facility]. They are ready to accept this patient tonight when orders are submitted. Secure message sent to hospitalist team. Spoke with nursing supervisor Renita Dooley and reviewed plan of care.  Provided patient with ice packs for both left hand and right hip for comfort and reviewed that patient will be moving to ARU tonight with patient voicing understanding

## 2021-10-12 NOTE — CONSULTS
IV Consult cancelled, patient now on oral antibiotic therapy. No PIV necessary. Please re-consult if therapeutic needs increaese.

## 2021-10-12 NOTE — FLOWSHEET NOTE
Admission skin assessment per drake sofia and Nena Mitchell LPN. Pt has right foot great toe scab. Pt has large area of redness/and dry skin to her right lower leg with scab in the middle. No drainage/odor noted. Pt has large area with redness/dry skin with scab to her right lower leg. No drainage or odor noted. Pt has redness to her groins and upper thighs. Pt has a blister noted to her left inner thigh. Pt has a strip pink abrasion to her right inner thigh leading to the back of her thigh. Pt has mobley catheter in place. Pt has redness and small abrasion areas to the inner corners under her breasts bilaterally. Pt right hip incision with staples to the right lateral thigh with surgical dressing. Pt has accordian drain to her right hip incision wound with serosanguinous fluid draining. Pt left hand swollen. Pt has bruising to bilat arms/and right hand. Pt coccyx reddened. Pt has crusty scab on the end of her nose. Pt has general dry flaky skin to her back/body.

## 2021-10-12 NOTE — PROGRESS NOTES
Physical Therapy    [facility] ARU PHYSICAL THERAPY EVALUATION    Chart Review:  Past Medical History:   Diagnosis Date    Acid reflux     'no recent issue\"    Arthritis     \"Left Index Finger\"    CHF (congestive heart failure) ([facility] [address]     per old chart hx of CHF with admission 12/2016 with pulmonary edema    Chronic kidney disease     Sees Dr. Luis Chao have one kidney, dr Wiggins Memos told me that in 2011 I think\"    GERD (gastroesophageal reflux disease)     History of blood transfusion 2011 Or 2012    No Reaction To Blood Transfusion Received    Hypertension     ( pt states she is not on any medication for blood pressure)    Hypomagnesemia     Hypothyroidism     MRSA (methicillin resistant staph aureus) culture positive 10/08/2021    Leg    Shortness of breath on exertion     SVT (supraventricular tachycardia) ([facility] [address]     per old chart hx of SVT with admission 2016 tx with Adenosine and per notes in 5/2018 hx of SVT- no cardiologist    Teeth missing     Upper And Lower    Wears glasses      Past Surgical History:   Procedure Laterality Date    ABDOMEN SURGERY      DENTAL SURGERY      Teeth Extracted In Past    ENDOSCOPY, COLON, DIAGNOSTIC  2011 Or 2012    EYE SURGERY  ? when    clyde cataract ext    FEMUR FRACTURE SURGERY Left 5/22/2021    FEMUR IM NAIL REGINALDO INSERTION performed by Chivo Quintana MD at [id number] [place] [address]    Abdominal Hernia Repair     HIP SURGERY Right 10/8/2021    RIGHT HIP HEMIARTHROPLASTY performed by Chivo Quintana MD at [address] [place] [address]  05/27/2018    exp lap . converted to exp laporotomy with ventral hernia repair with mesh    OTHER SURGICAL HISTORY  [id number]/[id number]    umbilical scar excision    VENTRAL HERNIA REPAIR  09/16/2016    Robotic laparoscopic     Fall History:  2 falls in the past year with injuries requiring surgery  Social History:  Social/Functional History  Lives With: Alone (has local family support)  Type of Home: House  Home Layout: Two level, Able to Live on Main level with bedroom/bathroom, Performs ADL's on one level  Home Access: Ramped entrance (bilat rails)  Bathroom Shower/Tub: Walk-in shower  Bathroom Toilet: Handicap height  Bathroom Equipment: Built-in shower seat, Hand-held shower, Grab bars in shower, Grab bars around toilet  Home Equipment: Rolling walker, [employer], [employer], [employer], Reacher, Grab bars, Lift chair, Sock aid, Long-handled shoehorn  Receives Help From: Family, Neighbor, Friend(s)  ADL Assistance: Needs assistance (niece supervised pt during ADL (bathing and dressing), otherwise Mod Ind with toileting activity)  Homemaking Responsibilities: Yes  Meal Prep Responsibility: Primary  Laundry Responsibility: Secondary  Cleaning Responsibility: Primary (does not run the sweeper)  Bill Paying/Finance Responsibility: Primary  Shopping Responsibility: Secondary (does grocery list)  [address] [facility] [address]: No  Health Care Management: Primary  Ambulation Assistance: Independent (Mod Ind using RW (household and limited community distances))  Active : No  Mode of Transportation: Family, Car  Occupation: Retired  Type of occupation: worked in bank  Additional Comments: has a regular, flat bed (tall height); usually sleeps in lift chair; had 2 falls in the past year with injuries requiring surgery    Restrictions:  Restrictions/Precautions  Restrictions/Precautions: Weight Bearing, Fall Risk, ROM Restrictions, General Precautions  Position Activity Restriction  Hip Precautions: Posterior hip precautions, No hip flexion > 90 degrees, No ADduction, No hip internal rotation  Other position/activity restrictions: mobley catheter, wound drain    Subjective: Pt resting in semi-gonzalez's position, states not sleeping well last night, alert and cooperative.     Pain Level: 5/10   Pain Location: Back, R hip     Objective:  Orientation  Overall Orientation Status: Within Functional Limits  Orientation Level: Oriented X4 today due to pain and anxiety  Reason if not Attempted: Not attempted due to medical condition or safety concerns  CARE Score: 88  Discharge Goal: Supervision or touching assistance     Walk 50 Feet with Two Turns  Reason if not Attempted: Not attempted due to medical condition or safety concerns  CARE Score: 88  Discharge Goal: Supervision or touching assistance     Walk 150 Feet  Comment: had limited mobility since L hip surgery; was not performing this activity at baseline; limited potential to progressing to this mobility task  Reason if not Attempted: Not applicable  CARE Score: 9  Discharge Goal: Not Applicable     Walking 10 Feet on Uneven Surfaces  Reason if not Attempted: Not attempted due to medical condition or safety concerns  CARE Score: 88  Discharge Goal: Partial/moderate assistance     1 Step (Curb)  Comment: pt was not performing this activity at baseline but anticipating that pt can progress to being able to ascend/descend 2\" curb step while at ARU  Reason if not Attempted: Not attempted due to medical condition or safety concerns  CARE Score: 88  Discharge Goal: Partial/moderate assistance     4 Steps  Comment: had limited mobility since L hip surgery; was not performing this activity at baseline; limited potential to progressing to this mobility task  Reason if not Attempted: Not applicable  CARE Score: 9  Discharge Goal: Not Applicable     12 Steps  Comment: had limited mobility since L hip surgery; was not performing this activity at baseline; limited potential to progressing to this mobility task  Reason if not Attempted: Not applicable  CARE Score: 9  Discharge Goal: Not Applicable         Wheelchair:  w/c Ability: Wheelchair Ability  Uses a Wheelchair and/or Scooter?: Yes (will need continued assessment tomorrow due to pt's poor activity tolerance and time constraint today)                Balance:        Object: Picking Up Object  Comment: pt with poor standing tolerance and required BUE support on RW to maintain static standing  Reason if not Attempted: Not attempted due to medical condition or safety concerns  CARE Score: 88  Discharge Goal: Supervision or touching assistance    Assessment:   The patient is an [de-identified]year old female admitted onto ARU after hospitalization for mechanical fall at home and sustained R femoral neck Fx S/P R hip hemiarthroplasty. Pt is WBAT and is to follow posterior hip precautions. Pt with Hx of recent L femoral Fx (May 2021) due to a fall as well requiring surgery and rehab stay at [facility]. Pt returned home last July after her SNF stay. Pt also with Hx of  A-fib, CKD, and CHF. Pt also being treated for BLE cellulitis with venous stasis ulcerations. Today, pt presented with discomfort in bed due to poor ability to readjust self, had increased pain on R hip and anxiety in all positions of mobility assessment, had poor standing tolerance, unable to functionally ambulate, required 2-person assist including use of standing lift equipment to safely complete transfer OOB. Pt was tearful and painful during mobility assessment in the room requiring redirection, reassurance/encouragement, and increased time. It is anticipated that pt will require increased assist on discharge as she is not safe to return home alone. Her ability to return home will also be highly dependent on the speed of her progress as she required  at least 4 weeks of rehab at [facility] for ORIF of L hip versus a more invasive surgical procedure on her R hip this time. Co-tx is recommended at this time due to pt's poor functional mobility, high fall risk, and complex physical limitations and co-morbidities.       Body structures, Functions, Activity limitations: Decreased functional mobility , Decreased high-level IADLs, Decreased posture, Decreased ADL status, Decreased endurance, Decreased ROM, Decreased strength, Decreased balance, Increased pain     Prognosis: Good, Guarded  Decision Making: High Complexity  Clinical Presentation: unstable/unpredictable characteristics      Patient education:   ARU schedule, ARU expectations for participation, plan of care, posterior hip precautions   Treatment Initiated:  Functional mobility training, patient education, transfers training using Rabia Noriega   Barriers to Improvement:  Pain, anxiety, limited PLOF due to recent L hip surgery, status of cellulitis    Discharge Recommendations:  [facility] with increased assist and CGT versus SNF   Equipment Recommendations:  Has RW, reacher, and manual w/c     Goals:  Patient Goals   Patient goals : pain control, be able to walk and care for self, to go home  Short term goals  Time Frame for Short term goals: 12-14 tx days:  Short term goal 1: Pt will complete rolling L/R and sit->sup using leg  to assist RLE and using bed features with Min A following posterior hip precautions on RLE. Short term goal 2: Pt will complete sup->sit using bed features and leg  to assist RLE with SBA-Sup following posterior hip precautions on RLE. Short term goal 3: Pt will complete OOB transfers using RW with Min A following posterior hip precautions. Short term goal 4: Pt will ambulate 50 ft with turns over level surface and 10 ft of unevem surface using RW with CGA. Short term goal 5: Pt will ascend/descend 2\" curb step using RW with Min A. Short term goal 6: Pt will complete object retrieval from the floor in standing with 1UE support on RW and using reacher with CGA.      Plan:    REQUIRES PT FOLLOW UP: Yes  Pt will be seen at least 60 minutes per day for a minimum of 5 days per week, plus group therapy as appropriate  Plan  Times per day: Daily  Current Treatment Recommendations: Strengthening, Gait Training, Patient/Caregiver Education & Training, ROM, Equipment Evaluation, Education, & procurement, Balance Training, Pain Management, Modalities, Functional Mobility Training, Endurance Training, Home Exercise Program, Positioning, Transfer Training, Wheelchair Mobility Training, Safety Education & Training    PT Individual Minutes  Time In: 0830  Time Out: 1010  Minutes: 100   Variance: +10  Reason: extra time required to complete tasks         Timed Code Treatment Minutes: 85 Minutes    Number of Minutes/Billable Intervention      PT Evaluation 15   Gait Training    Therapeutic Exercise    Neuro Re-Ed    Therapeutic Activity 85   Wheelchair Propulsion    Group    Other:    TOTAL 100       Electronically signed by:    Dylan Victoria, PT   10/12/2021, 16:05

## 2021-10-13 PROCEDURE — 97542 WHEELCHAIR MNGMENT TRAINING: CPT

## 2021-10-13 PROCEDURE — 99232 SBSQ HOSP IP/OBS MODERATE 35: CPT | Performed by: PHYSICAL MEDICINE & REHABILITATION

## 2021-10-13 PROCEDURE — 97530 THERAPEUTIC ACTIVITIES: CPT

## 2021-10-13 PROCEDURE — 6360000002 HC RX W HCPCS: Performed by: NURSE PRACTITIONER

## 2021-10-13 PROCEDURE — 97535 SELF CARE MNGMENT TRAINING: CPT

## 2021-10-13 PROCEDURE — 1280000000 HC REHAB R&B

## 2021-10-13 PROCEDURE — 94761 N-INVAS EAR/PLS OXIMETRY MLT: CPT

## 2021-10-13 PROCEDURE — 6370000000 HC RX 637 (ALT 250 FOR IP): Performed by: NURSE PRACTITIONER

## 2021-10-13 RX ADMIN — ACETAMINOPHEN 650 MG: 325 TABLET ORAL at 02:21

## 2021-10-13 RX ADMIN — METOPROLOL TARTRATE 25 MG: 25 TABLET, FILM COATED ORAL at 20:45

## 2021-10-13 RX ADMIN — DOCUSATE SODIUM 50 MG AND SENNOSIDES 8.6 MG 1 TABLET: 8.6; 5 TABLET, FILM COATED ORAL at 08:54

## 2021-10-13 RX ADMIN — OXYCODONE HYDROCHLORIDE 5 MG: 5 TABLET ORAL at 05:59

## 2021-10-13 RX ADMIN — DOCUSATE SODIUM 50 MG AND SENNOSIDES 8.6 MG 1 TABLET: 8.6; 5 TABLET, FILM COATED ORAL at 20:45

## 2021-10-13 RX ADMIN — ALLOPURINOL 50 MG: 100 TABLET ORAL at 08:53

## 2021-10-13 RX ADMIN — ENOXAPARIN SODIUM 30 MG: 30 INJECTION SUBCUTANEOUS at 08:53

## 2021-10-13 RX ADMIN — ACETAMINOPHEN 650 MG: 325 TABLET ORAL at 20:45

## 2021-10-13 RX ADMIN — LEVOTHYROXINE SODIUM 50 MCG: 0.05 TABLET ORAL at 06:00

## 2021-10-13 RX ADMIN — ACETAMINOPHEN 650 MG: 325 TABLET ORAL at 08:54

## 2021-10-13 RX ADMIN — ACETAMINOPHEN 650 MG: 325 TABLET ORAL at 14:18

## 2021-10-13 RX ADMIN — METOPROLOL TARTRATE 25 MG: 25 TABLET, FILM COATED ORAL at 08:54

## 2021-10-13 RX ADMIN — OXYCODONE HYDROCHLORIDE 5 MG: 5 TABLET ORAL at 10:10

## 2021-10-13 RX ADMIN — LINEZOLID 600 MG: 600 TABLET ORAL at 20:45

## 2021-10-13 RX ADMIN — LINEZOLID 600 MG: 600 TABLET ORAL at 08:54

## 2021-10-13 RX ADMIN — OXYCODONE HYDROCHLORIDE 5 MG: 5 TABLET ORAL at 14:18

## 2021-10-13 RX ADMIN — OXYCODONE HYDROCHLORIDE 5 MG: 5 TABLET ORAL at 02:21

## 2021-10-13 ASSESSMENT — PAIN SCALES - GENERAL
PAINLEVEL_OUTOF10: 5
PAINLEVEL_OUTOF10: 4
PAINLEVEL_OUTOF10: 0
PAINLEVEL_OUTOF10: 10

## 2021-10-13 ASSESSMENT — PAIN DESCRIPTION - DESCRIPTORS: DESCRIPTORS: ACHING;DISCOMFORT

## 2021-10-13 ASSESSMENT — PAIN DESCRIPTION - LOCATION: LOCATION: HIP

## 2021-10-13 ASSESSMENT — PAIN DESCRIPTION - ORIENTATION: ORIENTATION: RIGHT

## 2021-10-13 ASSESSMENT — PAIN DESCRIPTION - PAIN TYPE: TYPE: SURGICAL PAIN

## 2021-10-13 ASSESSMENT — PAIN DESCRIPTION - PROGRESSION: CLINICAL_PROGRESSION: NOT CHANGED

## 2021-10-13 NOTE — PROGRESS NOTES
Pt does not want cpap at this time. Pt does not have a diagnosis of ARON, but could be tested with an apnea link to encourage cpap use, if needed.

## 2021-10-13 NOTE — CARE COORDINATION
Case Management Admission Note      Patient:Malu Crafword      :1940  WAV:[id number]  Rehab Dx/Hx: Fracture of unspecified part of neck of right femur, initial encounter for closed fracture [S72.001A]  Traumatic closed fracture of neck of femur with minimal displacement, right, initial encounter ([facility] [address] [S72.001A]    Chief Complaint:   Past Medical History:   Diagnosis Date    Acid reflux     'no recent issue\"    Arthritis     \"Left Index Finger\"    CHF (congestive heart failure) ([facility] [address]     per old chart hx of CHF with admission 2016 with pulmonary edema    Chronic kidney disease     Sees Dr. Ramonita Wild have one kidney, dr Jacquie Buck told me that in  I think\"    GERD (gastroesophageal reflux disease)     History of blood transfusion  Or     No Reaction To Blood Transfusion Received    Hypertension     ( pt states she is not on any medication for blood pressure)    Hypomagnesemia     Hypothyroidism     MRSA (methicillin resistant staph aureus) culture positive 10/08/2021    Leg    Shortness of breath on exertion     SVT (supraventricular tachycardia) ([facility]     per old chart hx of SVT with admission  tx with Adenosine and per notes in 2018 hx of SVT- no cardiologist    Teeth missing     Upper And Lower    Wears glasses      Past Surgical History:   Procedure Laterality Date    ABDOMEN SURGERY      DENTAL SURGERY      Teeth Extracted In Past    ENDOSCOPY, COLON, DIAGNOSTIC   Or     EYE SURGERY  ? when    clyde cataract ext    FEMUR FRACTURE SURGERY Left 2021    FEMUR IM NAIL REGINALDO INSERTION performed by Jerri Kim MD at [address]    Abdominal Hernia Repair     HIP SURGERY Right 10/8/2021    RIGHT HIP HEMIARTHROPLASTY performed by Jerri Kim MD at [address] [place] [address] [id number]  2018    exp lap . converted to exp laporotomy with ventral hernia repair with mesh    OTHER SURGICAL HISTORY      umbilical scar excision    VENTRAL HERNIA REPAIR  09/16/2016    Robotic laparoscopic     No Known Allergies  Precautions: falls and skin    Date of Admit: 10/11/2021  Room #: 1008/1008-A      Current functional status at time of admit:        Home Living/DME Available:      Type of Home: House  Home Access: Ramped entrance (bilat rails)  Bathroom Shower/Tub: Walk-in shower  Bathroom Toilet: Handicap height  Bathroom Equipment: Built-in shower seat, Hand-held shower, Grab bars in shower, Grab bars around toilet  Home Equipment: Rolling walker, [employer], [employer], [employer], Reacher, Grab bars, Lift chair, Sock aid, Long-handled shoehorn       IADL Hx:   Homemaking Responsibilities: Yes  Active : No  Mode of Transportation: Family, Car  Occupation: Retired          Spouse:   Family:  Local family PRN, niece helps most    Comments:  Case mgt met with patient in room. Patient tearful with frustration and pain when case mgt entered. States she's very limited by pain during treatment sessions, but hasn't been requesting additional/changes to her pain management. Case mgt alerted Gatito Reis RN. She's due for pain meds at 1000. Case mgt distracted pain from her pain with talk of mystery books. She would appreciate a visit from Wilmer next time he's in - case mgt will alert [place], [place]. Patient reports no dc concerns at this time, but wasn't focused on anything except her pain. BIMS completed,  Whiteboard updated. Case mgt alerted Ancelmo PT, to patient's high pain level at this time.     Laron Pedro, 10/13/2021, 9:36 AM

## 2021-10-13 NOTE — PATIENT CARE CONFERENCE
ACUTE REHAB TEAM CONFERENCE SUMMARY   [facility]    NAME: Vanessa Alaniz  : 1940 ADMIT DATE: 10/11/2021    Rehab Admitting Dx: Fracture of unspecified part of neck of right femur, initial encounter for closed fracture [S72.001A]  Traumatic closed fracture of neck of femur with minimal displacement, right, initial encounter ([facility] [S72.001A]  Patient Comorbid Conditions: Active Hospital Problems    Diagnosis Date Noted    Uncontrolled pain [R52]     Generalized weakness [R53.1]     Acute blood loss anemia [D62]     Paroxysmal atrial fibrillation (HCC) [I48.0]     Acute on chronic diastolic (congestive) heart failure (HCC) [I50.33]     Traumatic closed fracture of neck of femur with minimal displacement, right, initial encounter ([facility] [S72.001A] 10/11/2021    MRSA cellulitis [L03.90, B95.62] 10/10/2021    Morbid obesity with BMI of 45.0-49.9, adult ([facility] [E66.01, Z68.42] 2020    Essential hypertension [I10] 08/10/2018    Chronic kidney disease, stage IV (severe) ([facility] [N18.4] 2017    Gastroesophageal reflux disease [K21.9] 2017     Date: 10/14/2021    CASE MANAGEMENT  Current issues/needs regarding patient and family discharge status:   Patient plans dc home alone. She does have local family including a supportive niece. Ramped entrance and some DME PTA. Patient reports being limited by pain. PHYSICAL THERAPY (Updated in QI)  Short term goals  Time Frame for Short term goals: 12-14 tx days:  Short term goal 1: Pt will complete rolling L/R and sit->sup using leg  to assist RLE and using bed features with Min A following posterior hip precautions on RLE. Short term goal 2: Pt will complete sup->sit using bed features and leg  to assist RLE with SBA-Sup following posterior hip precautions on RLE. Short term goal 3: Pt will complete OOB transfers using RW with Min A following posterior hip precautions.   Short term goal 4: Pt will ambulate 50 ft with turns over level surface and 10 ft of unevem surface using RW with CGA. Short term goal 5: Pt will ascend/descend 2\" curb step using RW with Min A. Short term goal 6: Pt will complete object retrieval from the floor in standing with 1UE support on RW and using reacher with CGA. Short term goal 7: pt will complete 150 ft of w/c propulsion using manual w/c at mod ind          Impairments/deficits, barriers: Body structures, Functions, Activity limitations: Decreased functional mobility , Decreased high-level IADLs, Decreased posture, Decreased ADL status, Decreased endurance, Decreased ROM, Decreased strength, Decreased balance, Increased pain     Prognosis: Good, Guarded  Decision Making: High Complexity  Clinical Presentation: unstable/unpredictable characteristics  Equipment needed at discharge: has RW and manual w/c       PT IRF-SARAH scores since initial assessment  Bed Mobility:   Sit to Lying  Assistance Needed: Dependent  Comment: Total A x 2 with additional assist on mobley catheter and wound drain line management  CARE Score: 1  Discharge Goal: Partial/moderate assistance    Roll Left and Right  Comment: rolling to L using bed rail required 2-person assist today (had poor tolerance to sustain this position due to pain); refused to attempt rolling to R due to aggravation of pain following rolling to L  Reason if not Attempted: Not attempted due to medical condition or safety concerns  CARE Score: 88  Discharge Goal: Partial/moderate assistance    Lying to Sitting on Side of Bed  Assistance Needed: Dependent  Comment: Max A x 2 using bed features; initiated use of leg  to assist RLE however was unsuccessful to significantly move it over EOB and continued to require passive movement due to pain and impaired strength  CARE Score: 1  Discharge Goal: Supervision or touching assistance    Transfers:    Sit to Stand  Assistance Needed: Dependent  Comment:  Mod A + 2nd person assist providing variable physical assist to shift weight forward and to steady pt upon immediate standing; used RW to complete this transfer  CARE Score: 1  Discharge Goal: Partial/moderate assistance    Chair/Bed-to-Chair Transfer  Assistance Needed: Dependent  Comment: required use of Yousif Hoes with 2-person assist due to poor standing tolerance with RW and inability to actively move RLE due to pain and anxiety  CARE Score: 1  Discharge Goal: Partial/moderate assistance         Car Transfer  Reason if not Attempted: Not attempted due to medical condition or safety concerns  CARE Score: 88  Discharge Goal: Partial/moderate assistance    Ambulation:    Walking Ability  Does the Patient Walk?: Yes     Walk 10 Feet  Comment: pt with poor standing tolerance and unable to actively perform weight shifting today due to pain and anxiety  Reason if not Attempted: Not attempted due to medical condition or safety concerns  CARE Score: 88  Discharge Goal: Supervision or touching assistance     Walk 50 Feet with Two Turns  Reason if not Attempted: Not attempted due to medical condition or safety concerns  CARE Score: 88  Discharge Goal: Supervision or touching assistance     Walk 150 Feet  Comment: had limited mobility since L hip surgery; was not performing this activity at baseline; limited potential to progressing to this mobility task  Reason if not Attempted: Not applicable  CARE Score: 9  Discharge Goal: Not Applicable     Walking 10 Feet on Uneven Surfaces  Reason if not Attempted: Not attempted due to medical condition or safety concerns  CARE Score: 88  Discharge Goal: Partial/moderate assistance     1 Step (Curb)  Comment: pt was not performing this activity at baseline but anticipating that pt can progress to being able to ascend/descend 2\" curb step while at ARU  Reason if not Attempted: Not attempted due to medical condition or safety concerns  CARE Score: 88  Discharge Goal: Partial/moderate assistance     4 Steps  Comment: had limited mobility since L hip surgery; was not performing this activity at baseline; limited potential to progressing to this mobility task  Reason if not Attempted: Not applicable  CARE Score: 9  Discharge Goal: Not Applicable     12 Steps  Comment: had limited mobility since L hip surgery; was not performing this activity at baseline; limited potential to progressing to this mobility task  Reason if not Attempted: Not applicable  CARE Score: 9  Discharge Goal: Not Applicable           Wheelchair:  w/c Ability: Wheelchair Ability  Uses a Wheelchair and/or Scooter?: Yes    Wheel 50 Feet with Two Turns  Assistance Needed: Partial/moderate assistance (min A for completion of last 5-10 ft 2/2 onset of fatigue)  Comment: per verbal report of OT, pt was only able to propel 45 ft with Min A for turns as observed during co-tx on 10/13/2021  CARE Score: 3    Wheel 150 Feet  Assistance Needed: Substantial/maximal assistance  Comment: per verbal report of OT, pt was only able to propel 45 ft with Min A as observed during co-tx on 10/13/2021  CARE Score: 2  Discharge Goal: Independent              Balance:        Object: Picking Up Object  Comment: pt with poor standing tolerance and required BUE support on RW to maintain static standing  Reason if not Attempted: Not attempted due to medical condition or safety concerns  CARE Score: 88  Discharge Goal: Supervision or touching assistance    Fall Risk: []  Yes  []  No    OCCUPATIONAL THERAPY  (Updated in QI)  Short term goals  Time Frame for Short term goals: STGs=LTGs :   Long term goals  Time Frame for Long term goals : 14-17 days or until d/c  Long term goal 1: Pt will complete grooming tasks Ind seated  Long term goal 2: Pt will complete total body bathing c min A using AE PRN  Long term goal 3: Pt will complete UB dressing c setup  Long term goal 4: Pt will complete LB dressing c mod A using AE  Long term goal 5: Pt will doff/don footwear c min A using AE  Long term goals 6: Pt will complete toileting c max A  Long term goal 7: Pt will complete functional transfers (bed, chair, toilet, shower) c DME PRN and min A  Long term goal 8: Pt will perform therex/therax to facilitate increased strength/endurance/ax tolerance (c emphasis on dynamic standing balance/tolerance > 5 mins, and BUE endurance) c SBA :                                       OT IRF-SARAH scores and goals since initial assessment:    ADLs:    Eating: Eating  Assistance Needed: Independent  Comment: able to open packages/containers  CARE Score: 6  Discharge Goal: Independent       Oral Hygiene: Oral Hygiene  Assistance Needed: Setup or clean-up assistance  Comment: seated EOB  CARE Score: 5  Discharge Goal: Independent    UB/LB Bathing: Shower/Bathe Self  Assistance Needed: Partial/moderate assistance  Comment: required assist for bottom and distal BLEs  CARE Score: 3  Discharge Goal: Partial/moderate assistance    UB Dressing: Upper Body Dressing  Assistance Needed: Partial/moderate assistance  Comment: seated EOB able to thread BUEs and pull overhead, assist to pull down in back  CARE Score: 3  Discharge Goal: Supervision or touching assistance         LB Dressing: Lower Body Dressing  Assistance Needed: Dependent  Comment: to don pants from bed level  CARE Score: 1  Discharge Goal: Partial/moderate assistance    Donning and Bellamy Footwear: Putting On/Taking Off Footwear  Assistance Needed: Dependent  Comment: to doff/don hospital socks  CARE Score: 1  Discharge Goal: Partial/moderate assistance      Toileting: Toileting Hygiene  Assistance Needed: Dependent  Comment: 2 person assist c use of abdullahi stedy, pt required Mod A x1 for CM, unsuccessful BM. Min A for steadying in stance for CM. Reason if not Attempted: Not attempted due to medical condition or safety concerns  CARE Score: 1  Discharge Goal: Substantial/maximal assistance      Toilet Transfers:    Toilet Transfer  Assistance Needed: Dependent  Comment: 2 person assist c use of abdullahi hannon, pt required MIN A x1 SBA X1 for controlled descent onto [facility] frame over toilet. Pt required Min A to maintain sitting posture while seated on toilet 2* pt's increased pain and required BUE support from grab bars. Reason if not Attempted: Not attempted due to medical condition or safety concerns  CARE Score: 1  Discharge Goal: Partial/moderate assistance      Impairments/deficits, barriers:  pain  Assessment  Performance deficits / Impairments: Decreased functional mobility , Decreased ADL status, Decreased strength, Decreased endurance, Decreased balance, Decreased high-level IADLs, Decreased posture  Decision Making: High Complexity  REQUIRES OT FOLLOW UP: Yes  Equipment needed at discharge: heavy duty BSC      COGNITIVE FUNCTION/SPEECH THERAPY (AS INDICATED)  LTG                                              Nursing Current Medical Status:   [x] Is continent of bowel and bladder with mobley    [] Is incontinent of bowel and bladder    [x] Has had an adequate number of bowel movements   [] Urinates with no urinary retention >300ml in bladder   [] Targeting bladder protocol with mobley removal   [x] Maintaining O2 SATs at 92% or greater   [x] Has pain managed while on ARU         [x] Has had no skin breakdown while on ARU   [] Has improved skin integrity via wound measurements   [x] Has no signs/symptoms of infection at the wound site   [] Pressure wounds Stage/Location:    [] Arrived on unit with pressure wound  [x] Has been free from injury during hospitalization   [] Has experienced a fall during hospitalization  [] Ongoing education with patient/family with understanding demonstrated for:  [] Receives IV Fluids  [x] Other: MRSA Leg        NUTRITION  Weight: 240 lb 15.4 oz (109.3 kg) / Body mass index is 45.53 kg/m². Current diet: ADULT DIET;  Regular  Adult Oral Nutrition Supplement; Standard High Calorie/High Protein Oral Supplement  Intake: Reduced appetite but trying to increase intake, recent meal intake %, trial oral nutrition supplement      Medical improvements/barriers: Painful with bed mobility, max A 2 supine to sit with adaptive equip, improved standing tolerance with 2 person assist, toilet transfers using STEDY, continued pain mgmt        Team goals for next treatment period/Intervention for current barriers:   [x] Pt will increase activity tolerance for daily tasks. [x] Pt will improve bed mobility with reduced assist.  [x] Pt will improve safety in fx tasks with reduced cues/assist  [x] Pt will improve transfers with reduced assist  [x] Pt will improve toileting with reduced assist  [x] Pt will improve ADL's with use of adaptive equipment with reduced assist  [] Pt will improve pain mgmt for maximum participation in tx program  [] Pt will improve communication to get basic needs met on unit  [] Pt will improve swallowing for safe diet advancement with use of strategies  []  Plan for discharge to home. Patient Strengths: Motivated, Cooperative and Pleasant    Justification for Continued Stay  Based on my medical assessment of the patient and review of information from the interdisciplinary team as part of this weekly team conference, the patient continues to meet the following criteria for IRF level of care:   The patient requires active and ongoing intervention of multiple therapy disciplines   The patient requires and intensive rehabilitation therapy program   The patient requires continued physician supervision by a rehabilitation physician   The patient requires 24 hours rehab nursing care   The patient requires an intensive and coordinated interdisciplinary team approach to the delivery of rehabilitative care.      Assessment/Plan   [x]  The patient is making good progression towards their long term goals and is actively participating in and has a reasonable expectation to continue to benefit from the intensive rehabilitation therapy program   []  The estimated discharge date has been changed from initial team conference due to:   []  The estimated discharge destination has been changed from initial team conference due to:         Ongoing tx following discharge: [x]HHC  PT/OT/NSG/Aide   []OUTPATIENT     [] No Further Treatment     [x] Family/Caregiver Training when ambulating  []  Transitional Living Arrangement   [] Home Assessment (date  )     [] Family Conference   []  Therapeutic Pass       []  Other: (specify)    Estimated Discharge Date: 10/28/21    Estimated Discharge Destination: []home alone   [x]home alone with assist prn  []Continuous supervision []Return home with s/o/spouse/family   [] Assisted living    []SNF     Team members participating in today's conference. [x] Delia Rai, Medical Director  [x] Violet Plascencia,    [] Jose Colon, Nurse Mgr [x] Alisa Lutz, Nurse Supervisor   [x]  Miryam Yañez, PT   [x] Romana Rohrer, OT   []  Mami Silva, PT  [] Lily Olivares, OT      [x]  Mrery Brown, SLP    []  Milagro Gaitan, SLP   [] Alyssia Valencia, LEE   []  Shaggy Ritter, RD     [x] Samy Liao,     [x]Laverne Mueller,     [] Jael Goetz RN[] Zakiya Arenas RN     [] Alee Argueta, MYRA    [] Ray Pedro RN    [] Ellen Owen,  MYRA  [] Dennis Driscoll RN    []     I have led this Team Conference and agree with the plan, Rebecca Carlos MD, 10/14/2021, 12:40 PM  Goals have been updated to reflect recent status.     Team conference note transcribed this date by: Ector Holcomb MA, [id number] [facility], Therapy Coordinator'

## 2021-10-13 NOTE — PROGRESS NOTES
and required redirection. Pt able to be redirected and pt agreeable to OT/PT session. Pain level/location:    6/10       Location: R hip   Discharge recommendations  Anticipated discharge date:  TBD  Destination: []home alone   []home alone w assist prn   [] home w/ family    [] Continuous supervision       []SNF    [] Assisted living     [] Other:   Continued therapy: []HHC OT  []OUTPATIENT  OT   [] No Further OT  Equipment needs: Heavy duty TTB     Toiletin person assist c use of abdullahi stedy, pt required Mod A x1 for CM, unsuccessful BM. Min A for steadying in stance for CM. Toilet Transfers:   2 person assist c use of abdullahi steady, Min A x1, CGA x1. Pt required Min A for trunk steadying while seated on toilet 2* increased pain in hip. Device Used:    []   Standard Toilet         [x]   [facility]           [x]  Bedside Commode over toilet       []   Elevated Toilet          []   Other:        Bed Mobility and Transfers         [x]   Pt received out of bed   SEE PT NOTES FOR DETAILS    Functional Mobility:    Assistance:  To/from bathroom c use of Abdullahi Steady (total A) Pt completed w/c mobility training in hallway (See PT notes for details), pt required cues for proper hand positioning when propelling w/c and adjustment of w/c armrest by OT. Device:   []   Rolling Walker     []   Standard Francisco Javier Highman [x]   Wheelchair        []   Marvin Purpura       []   4-Wheeled Francisco Javier Highman         []   Cardiac Walker       []   Other:        Additional Therapeutic activities/exercises completed this date:     [x]   ADL Training Max A c LB dressing, pt required assistance c cassandra VILLEGAS and mobley, pt demo donning pants over hips in stance c CGA x1 and Min A x1 for full management. Pt educated and trained on compensatory strategies when donning/doffing LB clothing item.    [x]   Balance/Postural training CGA-SBA x2 when seated EOB     [x]   Bed/Transfer Training   []   Endurance Training   []   Neuromuscular Re-ed   []   Nu-step: Time:        Level:         #Steps:       []   Rebounder:    []  Seated     []  Standing        []   Supine Ther Ex (reps/sets):     []   Seated Ther Ex (reps/sets):     []   Standing Ther Ex (reps/sets):     [x]   Other:Pt educated on abdominal manual massages to facilitate BM when seated on toilet. Pt reports she has not had a BM since prior to admission to hospital. Therapist also facilitated and performed massage for pt. Comments: All intervention performed to increase pt's endurance, ax tolerance, balance, BUE AROM/strength, and I c ADLs/IADLs and functional transfers/mobility. Patient/Caregiver Education and Training:   []   Adaptive Equipment Use  []   Bed Mobility/Transfer Technique/Safety  [x]   Energy Conservation Tips  []   Family training  [x]   Postural Awareness  [x]   Safety During Functional Activities  [x]   Reinforced Patient's Precautions Pt able to verbalize no bending past hips, however required clarification on other posterior hip precautions, (no crossing legs, pointing toes in, refraining from adduction.)  []   Progress was updated and reviewed in Rehabtracker with patient and/or family this         date. Treatment Plan for Next Session: increase functional transfers ability, standing tolerance and balance required for ADLs      Assessment: This pt demonstrated a positive response to today's treatment as evidenced by completion of therapy session. The patient is making  progress toward established goals as evidenced by QI scores. Ongoing deficits are observed in the areas of decreased standing tolerance, functional transfers, and increased pain in R hip are major barriers. Continued focus on increasing ADL independence is recommended.        Treatment/Activity Tolerance:   [x] Tolerated treatment with no adverse effects    [] Patient limited by fatigue  [] Patient limited by pain   [] Patient limited by medical complications:    [] Adverse reaction to Tx:   [] Significant change in status    Safety:       [x]  bed alarm set    []  chair alarm set    []  Pt refused alarms                []  Telesitter activated      []  Gait belt used during tx session      []other:       Number of Minutes/Billable Intervention  Therapeutic Exercise    ADL Self-care 60   Neuro Re-Ed    Therapeutic Activity 30   Group    Other:    TOTAL 90       Social History  Social/Functional History  Lives With: Alone (has local family support)  Type of Home: House  Home Layout: Two level, Able to Live on Main level with bedroom/bathroom, Performs ADL's on one level  Home Access: Ramped entrance (bilat rails)  Bathroom Shower/Tub: Walk-in shower  Bathroom Toilet: Handicap height  Bathroom Equipment: Built-in shower seat, Hand-held shower, Grab bars in shower, Grab bars around toilet  Home Equipment: Rolling walker, [employer], [place], Reacher, Grab bars, Lift chair, Sock aid, Long-handled shoehorn  Receives Help From: Family, Neighbor, Friend(s)  ADL Assistance: Needs assistance (niece supervised pt during ADL (bathing and dressing), otherwise Mod Ind with toileting activity)  Homemaking Responsibilities: Yes  Meal Prep Responsibility: Primary  Laundry Responsibility: Secondary  Cleaning Responsibility: Primary (does not run the sweeper)  Bill Paying/Finance Responsibility: Primary  Shopping Responsibility: Secondary (does grocery list)  [address]: No  Health Care Management: Primary  Ambulation Assistance: Independent (Mod Ind using RW (household and limited community distances))  Active : No  Mode of Transportation: Family, Car  Occupation: Retired  Type of occupation: worked in bank  Additional Comments: has a regular, flat bed (tall height); usually sleeps in lift chair; had 2 falls in the past year with injuries requiring surgery    Objective                                                                                    Goals:  (Update in navigator)  Short term goals  Time Frame for Short term goals: STGs=LTGs:  Long term goals  Time Frame for Long term goals : 14-17 days or until d/c  Long term goal 1: Pt will complete grooming tasks Ind seated  Long term goal 2: Pt will complete total body bathing c min A using AE PRN  Long term goal 3: Pt will complete UB dressing c setup  Long term goal 4: Pt will complete LB dressing c mod A using AE  Long term goal 5: Pt will doff/don footwear c min A using AE  Long term goals 6: Pt will complete toileting c max A  Long term goal 7: Pt will complete functional transfers (bed, chair, toilet, shower) c DME PRN and min A  Long term goal 8: Pt will perform therex/therax to facilitate increased strength/endurance/ax tolerance (c emphasis on dynamic standing balance/tolerance > 5 mins, and BUE endurance) c SBA:        Plan of Care                                                                              Times per week: 5 days per week for a minimum of 60 minutes/day plus group as appropriate for 60 minutes.   Treatment to include Plan  Times per day: Daily  Current Treatment Recommendations: Strengthening, Balance Training, Functional Mobility Training, Endurance Training, Pain Management, Safety Education & Training, Patient/Caregiver Education & Training, Equipment Evaluation, Education, & procurement, Positioning, Self-Care / ADL    Electronically signed by   Ronnie Harris OT,  10/13/2021, 1:05 PM

## 2021-10-13 NOTE — PROGRESS NOTES
Ron Alaniz    : 1940  Acct #: [de-identified]  MRN: [id number]              PM&R Progress Note      Admitting diagnosis: Right femoral neck fracture ( [place] [facility])     Comorbid diagnoses impacting rehabilitation: Uncontrolled pain, generalized weakness, gait disturbance, acute blood loss anemia, lower limb MRSA cellulitis, essential hypertension, paroxysmal atrial fibrillation, chronic kidney disease stage IV, acute on chronic diastolic congestive heart failure, morbid obesity (BMI 45.2), GERD, history of left hip fracture in May of 2021. Chief complaint: Right leg pain as expected. Some positional dizziness. Prior (baseline) level of function: Independent.     Current level of function:         Current  IRF-SARAH and Goals:   Occupational Therapy:    Short term goals  Time Frame for Short term goals: STGs=LTGs :   Long term goals  Time Frame for Long term goals : 14-17 days or until d/c  Long term goal 1: Pt will complete grooming tasks Ind seated  Long term goal 2: Pt will complete total body bathing c min A using AE PRN  Long term goal 3: Pt will complete UB dressing c setup  Long term goal 4: Pt will complete LB dressing c mod A using AE  Long term goal 5: Pt will doff/don footwear c min A using AE  Long term goals 6: Pt will complete toileting c max A  Long term goal 7: Pt will complete functional transfers (bed, chair, toilet, shower) c DME PRN and min A  Long term goal 8: Pt will perform therex/therax to facilitate increased strength/endurance/ax tolerance (c emphasis on dynamic standing balance/tolerance > 5 mins, and BUE endurance) c SBA :                                       Eating: Eating  Assistance Needed: Independent  Comment: able to open packages/containers  CARE Score: 6  Discharge Goal: Independent       Oral Hygiene: Oral Hygiene  Assistance Needed: Setup or clean-up assistance  Comment: seated EOB  CARE Score: 5  Discharge Goal: Independent    UB/LB Bathing: Shower/Bathe Self  Assistance Needed: Partial/moderate assistance  Comment: required assist for bottom and distal BLEs  CARE Score: 3  Discharge Goal: Partial/moderate assistance    UB Dressing: Upper Body Dressing  Assistance Needed: Partial/moderate assistance  Comment: seated EOB able to thread BUEs and pull overhead, assist to pull down in back  CARE Score: 3  Discharge Goal: Supervision or touching assistance         LB Dressing: Lower Body Dressing  Assistance Needed: Dependent  Comment: to don pants from bed level  CARE Score: 1  Discharge Goal: Partial/moderate assistance    Donning and Joshua Footwear: Putting On/Taking Off Footwear  Assistance Needed: Dependent  Comment: to doff/don hospital socks  CARE Score: 1  Discharge Goal: Partial/moderate assistance      Toileting: Toileting Hygiene  Comment: has mobley cath and denied need for BM; predict dependent based on performance  Reason if not Attempted: Not attempted due to medical condition or safety concerns  CARE Score: 88  Discharge Goal: Substantial/maximal assistance      Toilet Transfers: Toilet Transfer  Comment: pt too fatigued and painful; also denied need for BM and has mobley cath. Is a 2 person assist for transfers using Lior Doom  Reason if not Attempted: Not attempted due to medical condition or safety concerns  CARE Score: 88  Discharge Goal: Partial/moderate assistance    Physical Therapy:   Short term goals  Time Frame for Short term goals: 12-14 tx days:  Short term goal 1: Pt will complete rolling L/R and sit->sup using leg  to assist RLE and using bed features with Min A following posterior hip precautions on RLE. Short term goal 2: Pt will complete sup->sit using bed features and leg  to assist RLE with SBA-Sup following posterior hip precautions on RLE. Short term goal 3: Pt will complete OOB transfers using RW with Min A following posterior hip precautions.   Short term goal 4: Pt will ambulate 50 ft with turns over level surface and 10 ft of unevem surface using RW with CGA. Short term goal 5: Pt will ascend/descend 2\" curb step using RW with Min A. Short term goal 6: Pt will complete object retrieval from the floor in standing with 1UE support on RW and using reacher with CGA.             Bed Mobility:   Sit to Lying  Assistance Needed: Dependent  Comment: Total A x 2 (significantly limited by pain and anxiety)  CARE Score: 1  Discharge Goal: Partial/moderate assistance  Roll Left and Right  Comment: movements were attempted, however pt was too painful upon initiation and refused to continue  Reason if not Attempted: Not attempted due to medical condition or safety concerns  CARE Score: 88  Discharge Goal: Partial/moderate assistance  Lying to Sitting on Side of Bed  Assistance Needed: Dependent  Comment: Total A x 2 (significantly limited by pain and anxiety)  CARE Score: 1  Discharge Goal: Supervision or touching assistance    Transfers:    Sit to Stand  Assistance Needed: Dependent  Comment: Max A x 2 at EOB using RW x 1 trial today  CARE Score: 1  Discharge Goal: Partial/moderate assistance  Chair/Bed-to-Chair Transfer  Assistance Needed: Dependent  Comment: required use of Laqueta Schiller with 2-person assist due to poor standing tolerance with RW and inability to actively move RLE due to pain and anxiety  CARE Score: 1  Discharge Goal: Partial/moderate assistance     Car Transfer  Reason if not Attempted: Not attempted due to medical condition or safety concerns  CARE Score: 88  Discharge Goal: Partial/moderate assistance    Ambulation:    Walking Ability  Does the Patient Walk?: Yes     Walk 10 Feet  Comment: pt with poor standing tolerance and unable to actively perform weight shifting today due to pain and anxiety  Reason if not Attempted: Not attempted due to medical condition or safety concerns  CARE Score: 88  Discharge Goal: Supervision or touching assistance     Walk 50 Feet with Two Turns  Reason if not Attempted: Not attempted due to medical condition or safety concerns  CARE Score: 88  Discharge Goal: Supervision or touching assistance     Walk 150 Feet  Comment: had limited mobility since L hip surgery; was not performing this activity at baseline; limited potential to progressing to this mobility task  Reason if not Attempted: Not applicable  CARE Score: 9  Discharge Goal: Not Applicable     Walking 10 Feet on Uneven Surfaces  Reason if not Attempted: Not attempted due to medical condition or safety concerns  CARE Score: 88  Discharge Goal: Partial/moderate assistance     1 Step (Curb)  Comment: pt was not performing this activity at baseline but anticipating that pt can progress to being able to ascend/descend 2\" curb step while at ARU  Reason if not Attempted: Not attempted due to medical condition or safety concerns  CARE Score: 88  Discharge Goal: Partial/moderate assistance     4 Steps  Comment: had limited mobility since L hip surgery; was not performing this activity at baseline; limited potential to progressing to this mobility task  Reason if not Attempted: Not applicable  CARE Score: 9  Discharge Goal: Not Applicable     12 Steps  Comment: had limited mobility since L hip surgery; was not performing this activity at baseline; limited potential to progressing to this mobility task  Reason if not Attempted: Not applicable  CARE Score: 9  Discharge Goal: Not Applicable       Wheelchair:  w/c Ability: Wheelchair Ability  Uses a Wheelchair and/or Scooter?: Yes (will need continued assessment tomorrow due to pt's poor activity tolerance and time constraint today)                Balance:        Object: Picking Up Object  Comment: pt with poor standing tolerance and required BUE support on RW to maintain static standing  Reason if not Attempted: Not attempted due to medical condition or safety concerns  CARE Score: 88  Discharge Goal: Supervision or touching assistance    I      Exam:    Blood pressure (!) 115/51, pulse 66, temperature 97.9 °F (36.6 °C), temperature source Oral, resp. rate 16, height 5' 1\" (1.549 m), weight 238 lb 15.7 oz (108.4 kg), last menstrual period 01/23/1995, SpO2 96 %, not currently breastfeeding. General: Semiupright in bed. Quiet but in no distress. Following one-step commands. Seems easily distracted. HEENT: Mucous membranes are little dry. Neck supple. Slow and deliberate speech. Pulmonary: Unlabored breathing with shallow respirations. No coughing. Cardiac: A controlled rate but she does have premature beats. Abdomen: Patient's abdomen is soft and nondistended. Bowel sounds were present throughout. There was no rebound, guarding or masses noted. Upper extremities: Her  strength. No bruising or swelling. Lower extremities: Right hip and thigh are very tender to palpation. Incision was dressed and dry. No signs of DVT. Sitting balance was fair. Standing balance was poor. Lab Results   Component Value Date    WBC 8.7 10/11/2021    HGB 9.8 (L) 10/11/2021    HCT 31.4 (L) 10/11/2021    MCV 98.1 10/11/2021     10/11/2021     Lab Results   Component Value Date    INR 0.94 10/08/2021    INR 1.06 05/21/2021    INR 1.03 12/17/2016    PROTIME 12.1 10/08/2021    PROTIME 12.8 05/21/2021    PROTIME 11.8 12/17/2016     Lab Results   Component Value Date    CREATININE 4.8 (H) 10/11/2021    BUN 50 (H) 10/11/2021     10/11/2021    K 5.0 10/11/2021     10/11/2021    CO2 21 10/11/2021     Lab Results   Component Value Date    ALT 7 (L) 10/11/2021    AST 35 10/11/2021    ALKPHOS 74 10/11/2021    BILITOT 0.4 10/11/2021       Expected length of stay  prior to a supervised level of function for discharge home with a walker and C OT/PT is 2 weeks. Recommendations:    1. Right femoral neck fracture with hemiarthroplasty: Developing the routine for her daily occupational and physical therapy.   Planning to train her to do self-care activities and mobility tasks following posterior hip precautions. Ongoing adaptive equipment training, range of motion and strength recovery and aggressive pulmonary hygiene. Pain management, monitoring of her hemoglobin and heart rate and lower limb wound care. It is not clear that there is someone to offer caregiver training to. DVT prophylaxis. Bowel and bladder retraining. Nutritional support. Verbal cues and moderate to maximum physical assistance for transfers and self-care. 2. DVT prophylaxis: Lovenox 30 mg subcu daily. I must monitor her hemoglobin and platelet count periodically while on this medication. Weightbearing activities may be limited but will be tried daily. GI prophylaxis is available. No signs of acute blood loss but her hemoglobin is down to 9.8.  3. Acute on chronic kidney disease stage IV: Consulting nephrology to monitor her renal failure. Avoiding nephrotoxic medications when possible. Encouraging consistent oral hydration. Periodic monitoring of her chemistries. 4. Lower limb cellulitis with MRSA: Infectious disease has stopped her vancomycin and started Zyvox 600 mg twice a day through 10/19/2021. Good GI tolerance. The wound care service is directing her topical care. Limb elevation. Intermittent diuretics. 5. Acute on chronic diastolic congestive heart failure: Lopressor for afterload reduction, intermittent use of diuretics and daily weights. 6. Uncontrolled pain: Scheduled acetaminophen, cryotherapy and narcotics. She has been on oral oxycodone and injectable Dilaudid. We are transitioning her to oral analgesics only. Progressive mobilization. 7. Atrial fibrillation: Lopressor for rate control. Her chronic anemia precludes more aggressive anticoagulation.

## 2021-10-13 NOTE — PROGRESS NOTES
Physical Therapy  Physical Rehabilitation: PHYSICAL THERAPY     [x] daily progress note       [] discharge       Patient Name:  Gatito Contreras   :  1940 MRN: [id number]  Room:  [address] [place] [address] Date of Admission: 10/11/2021    Rehabilitation Diagnosis:     Fracture of unspecified part of neck of right femur, initial encounter for closed fracture [S72.001A]  Traumatic closed fracture of neck of femur with minimal displacement, right, initial encounter ([facility] [S72.001A]    Social History  Social/Functional History  Lives With: Alone (has local family support)  Type of Home: House  Home Layout: Two level, Able to Live on Main level with bedroom/bathroom, Performs ADL's on one level  Home Access: Ramped entrance (bilat rails)  Bathroom Shower/Tub: Walk-in shower  Bathroom Toilet: Handicap height  Bathroom Equipment: Built-in shower seat, Hand-held shower, Grab bars in shower, Grab bars around toilet  Home Equipment: Rolling walker, [place], [place], [employer], Reacher, Grab bars, Lift chair, Sock aid, Long-handled shoehorn  Receives Help From: Family, Neighbor, Friend(s)  ADL Assistance: Needs assistance (niece supervised pt during ADL (bathing and dressing), otherwise Mod Ind with toileting activity)  Homemaking Responsibilities: Yes  Meal Prep Responsibility: Primary  Laundry Responsibility: Secondary  Cleaning Responsibility: Primary (does not run the sweeper)  Bill Paying/Finance Responsibility: Primary  Shopping Responsibility: Secondary (does grocery list)  [address] [facility] [address]: No  Health Care Management: Primary  Ambulation Assistance: Independent (Mod Ind using RW (household and limited community distances))  Active : No  Mode of Transportation: Family, Car  Occupation: Retired  Type of occupation: worked in bank  Additional Comments: has a regular, flat bed (tall height); usually sleeps in lift chair; had 2 falls in the past year with injuries requiring surgery    Objective Goals:  (Update in navigator)  Short term goals  Time Frame for Short term goals: 12-14 tx days:  Short term goal 1: Pt will complete rolling L/R and sit->sup using leg  to assist RLE and using bed features with Min A following posterior hip precautions on RLE. Short term goal 2: Pt will complete sup->sit using bed features and leg  to assist RLE with SBA-Sup following posterior hip precautions on RLE. Short term goal 3: Pt will complete OOB transfers using RW with Min A following posterior hip precautions. Short term goal 4: Pt will ambulate 50 ft with turns over level surface and 10 ft of unevem surface using RW with CGA. Short term goal 5: Pt will ascend/descend 2\" curb step using RW with Min A. Short term goal 6: Pt will complete object retrieval from the floor in standing with 1UE support on RW and using reacher with CGA.   Short term goal 7: pt will complete 150 ft of w/c propulsion using manual w/c at mod ind:   :        Plan of Care                                                                              Times per week: 5-6 days per week  minutes/day  Treatment to include Current Treatment Recommendations: Strengthening, Gait Training, Patient/Caregiver Education & Training, ROM, Equipment Evaluation, Education, & procurement, Balance Training, Pain Management, Modalities, Functional Mobility Training, Endurance Training, Home Exercise Program, Positioning, Transfer Training, Wheelchair Mobility Training, Safety Education & Training      Date  10/13/2021   TIMES IN/OUT   1005 - 1135   Individual Tx Minutes    Co-Tx Minutes 12 - 1205 A cotreat was completed this date with  [x] PT    [] OT   [] ST      This pt requires simultaneous intervention of 2 skilled therapists to safely complete tasks to address complexity of deficits defined in the goals including:  []Attention   [x] Safety    []Problem Solving [x]Sequencing     [x]Initiation    []Processing      []Recall of learned tasks  [] Communication  [] Self Feeding   []ADL's    []UE Function    []Motor planning   [x]Balance  []Energy Conservation   [x]Verbal cues   [x] Tactile Cues  []Barriers     [x]Transfers   [x]Device Use   TOTAL Tx time seen 90   Variance Time    Variance Reason       Restrictions/Precautions Restrictions/Precautions: Weight Bearing, Fall Risk, ROM Restrictions, General Precautions      Current Diet/Swallowing Issues ADULT DIET; Regular  Adult Oral Nutrition Supplement; Standard High Calorie/High Protein Oral Supplement   Communication with other providers: [x] Ok to see per nursing  [] Medical hold and reason  [] Spoke with (team    member) regarding   Subjective observations: There's no pain sitting here, but it gets up there when I'm moving around! Pain level/location:  8 /10 R hip pain   Bed Mobility/Transfers   Supine to sit: mod x 2 ; inc time for advancement of BL LE's to EOB; assist to initiate trunk transition, control descent of BL LE lowering, to scoot and position hips safely to EOB. Seated balance: fair + ; intermittently requires CGA to min A for steady 2/2 retropulsion  Pt performed sit <--> stand from EOB/toilet/w/c using abdullahi-steady with mod A x 2 ; assist level gradually improves ith practice, improved pt confidence in functional mobility; reliant on BL UE support to assist in pullling to stand 2/2 pain. Standing balance: fair - ; reliant on BL UE support, progresses to intermittently able to stand w/ SBA  Verbal/tactile cuing was provided to improve pt performance/safety.        Amb/Locomotion: AD/Distance/Assist      Steps (#)/Assist/Rails/AD    Ramp:AD/Assist    Curb:height AD/Assist    Uneven:type AD/Assist    W/C distance/Assist   Propelled 45 feet using manual w/c with min A to tend to chair path deviations, complete final few ft 2/2 onset of fatigue; inc time for completion, rest break x 2, min cues to improve sequencing to improve efficiency and coordination of BL UE effort. [facility]. processing time, intermittent cues for sequencing and set-up, good recall/demonstration of precautions. Pt demonstrates inc anxiousness prior to ability that minimally affects the quality of functional mobility; pain limiting and contributing at this time. Strategies that improved performance: Max encouragement; prioritization of functional mobility training w/ respect for pt pain at this time. Barriers to progress/participation: Pain   Alarm placed/where? Fall Risk Pt was left in bed with bed alarm set and call button within reach. Discharge recommendations (equipment with rationale, location, supervision level, additional therapy)  Current Equipment needs:    Cont w/ est POC including equipment and DC recommendations                 Therapeutic activities/exercises completed this date:  Gait and transfer training as stated above. - pt performing functional mobility, balance, transfer training and w/c propulsion this session. PT providing intermittent CGA to SBA during particaption w/ OT . Pt participating in completion of bed mobility, seated/standing balance activities, transfer training, wheelchair training, and activity pacing this session. Patient/Caregiver Education and Training: Emphasis on functional mobility sequencing, safety, inc activity, and low level exercise this session. Treatment Plan for Next Session: Cont w/ est POC ; progress functional mobility as pt tolerance allows; PT / OT plans for parallel bar gait training next session as pain and endurance allows; cont to be seen as co-tx 2/2 strength, balance, endurance, and functional mobility deficits. ASSESSMENT: Initiated functional mobility, balance, transfer, and wheelchair training this session. Pt co-tx initiated w/ OT Maryanne 2/2 justification listed above.  Pain, guarding, and anxiety cont to impact pt functional mobility, requiring inc time, attention to sequencing, and rest breaks to attend to onset of pain/fatigue. Pt begins to demonstrate inc confidence this session, demonstrating slight improvement in functional mobility as comparative to yesterdays assist levels. Will cont to progress pt POC as tolerance allows.     Assessment / Impression                                                          Treatment/Activity Tolerance:   [] Tolerated Treatment well:     [x] Patient limited by fatigue/pain:      [] Patient limited by medical complications:    [] Adverse Reaction to Tx:   [] Significant change in status      Electronically signed by   Virginia Nunez PT, DPT  10/13/2021, 1:14 PM

## 2021-10-14 PROCEDURE — 94150 VITAL CAPACITY TEST: CPT

## 2021-10-14 PROCEDURE — 94010 BREATHING CAPACITY TEST: CPT

## 2021-10-14 PROCEDURE — 97535 SELF CARE MNGMENT TRAINING: CPT

## 2021-10-14 PROCEDURE — 6370000000 HC RX 637 (ALT 250 FOR IP): Performed by: NURSE PRACTITIONER

## 2021-10-14 PROCEDURE — 6360000002 HC RX W HCPCS: Performed by: NURSE PRACTITIONER

## 2021-10-14 PROCEDURE — 94761 N-INVAS EAR/PLS OXIMETRY MLT: CPT

## 2021-10-14 PROCEDURE — 97110 THERAPEUTIC EXERCISES: CPT

## 2021-10-14 PROCEDURE — 1280000000 HC REHAB R&B

## 2021-10-14 PROCEDURE — 99233 SBSQ HOSP IP/OBS HIGH 50: CPT | Performed by: PHYSICAL MEDICINE & REHABILITATION

## 2021-10-14 PROCEDURE — 2500000003 HC RX 250 WO HCPCS: Performed by: PHYSICAL MEDICINE & REHABILITATION

## 2021-10-14 PROCEDURE — 97530 THERAPEUTIC ACTIVITIES: CPT

## 2021-10-14 PROCEDURE — 6370000000 HC RX 637 (ALT 250 FOR IP): Performed by: PHYSICAL MEDICINE & REHABILITATION

## 2021-10-14 RX ORDER — ONDANSETRON 4 MG/1
4 TABLET, ORALLY DISINTEGRATING ORAL EVERY 6 HOURS PRN
Status: DISCONTINUED | OUTPATIENT
Start: 2021-10-14 | End: 2021-10-22 | Stop reason: HOSPADM

## 2021-10-14 RX ADMIN — OXYCODONE HYDROCHLORIDE 5 MG: 5 TABLET ORAL at 09:15

## 2021-10-14 RX ADMIN — MICONAZOLE NITRATE: 2 POWDER TOPICAL at 21:54

## 2021-10-14 RX ADMIN — OXYCODONE HYDROCHLORIDE 5 MG: 5 TABLET ORAL at 13:39

## 2021-10-14 RX ADMIN — ACETAMINOPHEN 650 MG: 325 TABLET ORAL at 04:22

## 2021-10-14 RX ADMIN — OXYCODONE HYDROCHLORIDE 5 MG: 5 TABLET ORAL at 21:53

## 2021-10-14 RX ADMIN — ENOXAPARIN SODIUM 30 MG: 30 INJECTION SUBCUTANEOUS at 09:16

## 2021-10-14 RX ADMIN — LINEZOLID 600 MG: 600 TABLET ORAL at 09:24

## 2021-10-14 RX ADMIN — ONDANSETRON 4 MG: 4 TABLET, ORALLY DISINTEGRATING ORAL at 04:22

## 2021-10-14 RX ADMIN — DOCUSATE SODIUM 50 MG AND SENNOSIDES 8.6 MG 1 TABLET: 8.6; 5 TABLET, FILM COATED ORAL at 09:15

## 2021-10-14 RX ADMIN — LINEZOLID 600 MG: 600 TABLET ORAL at 21:53

## 2021-10-14 RX ADMIN — LEVOTHYROXINE SODIUM 50 MCG: 0.05 TABLET ORAL at 06:50

## 2021-10-14 RX ADMIN — ALLOPURINOL 50 MG: 100 TABLET ORAL at 09:15

## 2021-10-14 RX ADMIN — ACETAMINOPHEN 650 MG: 325 TABLET ORAL at 21:53

## 2021-10-14 RX ADMIN — METOPROLOL TARTRATE 25 MG: 25 TABLET, FILM COATED ORAL at 09:15

## 2021-10-14 RX ADMIN — ACETAMINOPHEN 650 MG: 325 TABLET ORAL at 15:20

## 2021-10-14 RX ADMIN — DOCUSATE SODIUM 50 MG AND SENNOSIDES 8.6 MG 1 TABLET: 8.6; 5 TABLET, FILM COATED ORAL at 21:53

## 2021-10-14 RX ADMIN — ACETAMINOPHEN 650 MG: 325 TABLET ORAL at 09:16

## 2021-10-14 ASSESSMENT — PAIN DESCRIPTION - FREQUENCY
FREQUENCY: INTERMITTENT

## 2021-10-14 ASSESSMENT — PAIN DESCRIPTION - DESCRIPTORS
DESCRIPTORS: ACHING;BURNING
DESCRIPTORS: ACHING;BURNING
DESCRIPTORS: ACHING;DISCOMFORT

## 2021-10-14 ASSESSMENT — PAIN SCALES - GENERAL
PAINLEVEL_OUTOF10: 5
PAINLEVEL_OUTOF10: 8
PAINLEVEL_OUTOF10: 9
PAINLEVEL_OUTOF10: 5
PAINLEVEL_OUTOF10: 7
PAINLEVEL_OUTOF10: 0

## 2021-10-14 ASSESSMENT — PAIN - FUNCTIONAL ASSESSMENT: PAIN_FUNCTIONAL_ASSESSMENT: PREVENTS OR INTERFERES SOME ACTIVE ACTIVITIES AND ADLS

## 2021-10-14 ASSESSMENT — PAIN DESCRIPTION - LOCATION
LOCATION: HIP

## 2021-10-14 ASSESSMENT — PAIN DESCRIPTION - ORIENTATION
ORIENTATION: RIGHT

## 2021-10-14 ASSESSMENT — PAIN DESCRIPTION - PAIN TYPE
TYPE: SURGICAL PAIN

## 2021-10-14 ASSESSMENT — PAIN DESCRIPTION - PROGRESSION: CLINICAL_PROGRESSION: NOT CHANGED

## 2021-10-14 NOTE — PROGRESS NOTES
Physical Therapy    [x] daily progress note       [] discharge       Patient Name:  Joana Akhtar   :  1940 MRN: [id number]  Room:  [address] [place], [place] [address] Date of Admission: 10/11/2021  Rehabilitation Diagnosis:   Fracture of unspecified part of neck of right femur, initial encounter for closed fracture [S72.001A]  Traumatic closed fracture of neck of femur with minimal displacement, right, initial encounter ([facility].) [S72.001A]       Date 10/14/2021       Day of ARU Week:  4   Time IN//1030  1300/1336   Individual Tx Minutes    Group Tx Minutes    Co-Treat Minutes 60+36  A cotreat was completed this date with  [] PT    [x] OT   [] ST      This pt requires simultaneous intervention of 2 skilled therapists to safely complete tasks to address complexity of deficits defined in the goals including:  []Attention   [x] Safety    [x]Problem Solving    [x]Sequencing     []Initiation    [x]Processing      [x]Recall of learned tasks  [] Communication  [] Self Feeding   [x]ADL's    []UE Function    []Motor planning   [x]Balance  []Energy Conservation   [x]Verbal cues   [x] Tactile Cues  []Barriers     [x]Transfers   [x]Device Use   Concurrent Tx Minutes    TOTAL Tx Time Mins 96   Variance Time +6   Variance Time []   Refusal due to:     []   Medical hold/reason:    []   Illness   []   Off Unit for test/procedure  [x]   Extra time needed to complete tasks  []   Therapeutic need  []   Other (specify):   Restrictions Restrictions/Precautions  Restrictions/Precautions: Weight Bearing, Fall Risk, ROM Restrictions, General Precautions  Position Activity Restriction  Hip Precautions: Posterior hip precautions, No hip flexion > 90 degrees, No ADduction, No hip internal rotation  Other position/activity restrictions: mobley catheter, wound drain   Interdisciplinary communication [x]   Cleared for therapy per nursing     [x]   RN notified about issues during PM session (request for pain meds)   []   RN updated on pt performance  [] Spoke with   []   Spoke with OT  []   Spoke with MD  []   Other:    Subjective observations and cognitive status: (AM) Pt in bed, appeared in distress due to pain, required encouragement to participate. (PM) Pt in bed, smiling and appeared more comfortable, reports feeling sick but did not progress to any episode of vomiting, reports decreased appetite.        Pain level/location: (AM) 10/10       Location: R hip  (PM)  0/10  At rest in bed prior to mobility activities; expressed pain on R hip during transfers and toileting activity    Discharge recommendations  Anticipated discharge date:  10/28/2021  Destination: []home alone   []home alone with assist PRN     [x] home w/ family      [] Continuous supervision  []SNF    [] Assisted living     [] Other:  Continued therapy: [x]HHC PT  []OUTPATIENT PT   [] No Further PT  []SNF PT  Caregiver training recommended: [x]Yes  [] No   Equipment needs: has RW and manual w/c     PT IRF-SARAH scores and goals for discharge assessment:   Roll Left and Right  Comment: rolling to L using bed rail required 2-person assist today (had poor tolerance to sustain this position due to pain); refused to attempt rolling to R due to aggravation of pain following rolling to L  Reason if not Attempted: Not attempted due to medical condition or safety concerns  CARE Score: 88  Discharge Goal: Partial/moderate assistance    Sit to Lying  Assistance Needed: Dependent  Comment: Total A x 2 with additional assist on mobley catheter and wound drain line management  CARE Score: 1  Discharge Goal: Partial/moderate assistance    Lying to Sitting on Side of Bed  Assistance Needed: Dependent  Comment: (AM) Max A x 2 using bed features; initiated use of leg  to assist RLE however was unsuccessful to significantly move it over EOB and continued to require passive movement due to pain and impaired strength  (PM) Min A using bed features (required assist with RLE despite use of leg ) with additional assist on mobley catheter and wound drain line management   CARE Score: 1  Discharge Goal: Supervision or touching assistance    Sit to Stand  Assistance Needed: Dependent  Comment: Mod A + 2nd person assist providing variable physical assist to shift weight forward and to steady pt upon immediate standing; used RW to complete this transfer  CARE Score: 1  Discharge Goal: Partial/moderate assistance    Chair/Bed-to-Chair Transfer  Assistance Needed: Dependent  Comment: Klever Penn with 2-person assist for w/c->recliner transfer  CARE Score: 1  Discharge Goal: Partial/moderate assistance    Toilet Transfer  Assistance Needed: Dependent  Comment: 2 person assist c use of Klever Penn and [facility] frame over toilet   CARE Score: 1  Discharge Goal: Partial/moderate assistance    Additional Therapeutic activities/exercises completed this date:     []   Nu-step:  Time:        Level:         #Steps:       []   Rebounder:    []  Seated     []  Standing        [x]   Balance training: Static standing with BUE support on RW x 30 sec on 1st trial and then progressed to x 1 min on 2nd trial with Min A x 2; activity progressed to static standing with alternating hand release from RW x 5 sec x 2 reps on each hand; weight shifting to step backwards and laterally to the R prior to sitting at EOB using RW          [x]   Postural training: verbal and tactile cues to facilitate trunk extension to increase static standing tolerance     []   Supine ther ex (reps/sets):     [x]   Seated ther ex (reps/sets): AAROM R leg kicks x 10 reps     [x]   Standing ther ex (reps/sets): Bilat Quads activation with facilitated partial standing->upright standing using Klever Penn for increased weight bearing     [x]   Other:  Toileting activity completed with 2-person assist using Klever Penn    [x]   Other: Pt positioning in bed and recliner to protect R hip, pressure relief, and comfort     Comments:      Patient/Caregiver Education and Training: []   Role of PT  []   Education about Dx  []   Use of call light for assist   []   HEP provided and explained   [x]   Treatment plan reviewed  []   Home safety  []   Wheelchair mobility/management   []   Body mechanics  [x]   Bed Mobility/Transfer technique  []   Gait technique/sequencing  []   Proper use of assistive device/adaptive equipment  []   Stair training/Advanced mobility safety and technique  [x]   Reinforced patient's precautions/mobility while maintaining precautions  [x]   Postural awareness  []   Family/caregiver training  []   Progress was updated and reviewed in Rehabtracker with patient and/or family this date. []   Other:    Treatment Plan for Next Session: static marching with RW, attempt stand step transfer for bed->w/c/chair       Assessment: This pt demonstrated a positive response to today's treatment as evidenced by successful completion of 4 trials of sit<->stand transfers at the EOB using RW during AM session, decreased anxiety with use of abdullahi Stedy for OOB transfers, and decreased physical assist required to complete sup->sit during PM session. The patient remains to have poorly controlled pain limiting her activity tolerance.        Treatment/Activity Tolerance:   [] Tolerated treatment with no adverse effects    [] Patient limited by fatigue  [x] Patient limited by pain   [] Patient limited by medical complications:    [] Adverse reaction to Tx:   [] Significant change in status    Safety:       [x]  (AM) bed alarm set    [x]  (PM) chair alarm set    []  Pt refused alarms                []  Telesitter activated      [x]  Gait belt used during tx session      []other:       Number of Minutes/Billable Intervention  Gait Training    Therapeutic Exercise 30   Neuro Re-Ed    Therapeutic Activity 66   Wheelchair Propulsion    Group    Other:    TOTAL 96     Social History  Social/Functional History  Lives With: Alone (has local family support)  Type of Home: House  Home Layout: Two level, Able to Live on Main level with bedroom/bathroom, Performs ADL's on one level  Home Access: Ramped entrance (bilat rails)  Bathroom Shower/Tub: Walk-in shower  Bathroom Toilet: Handicap height  Bathroom Equipment: Built-in shower seat, Hand-held shower, Grab bars in shower, Grab bars around toilet  Home Equipment: Rolling walker, Moni Lennox, [employer], [employer], Reacher, Grab bars, Lift chair, Sock aid, Long-handled shoehorn  Receives Help From: Family, Neighbor, Friend(s)  ADL Assistance: Needs assistance (niece supervised pt during ADL (bathing and dressing), otherwise Mod Ind with toileting activity)  Homemaking Responsibilities: Yes  Meal Prep Responsibility: Primary  Laundry Responsibility: Secondary  Cleaning Responsibility: Primary (does not run the sweeper)  Bill Paying/Finance Responsibility: Primary  Shopping Responsibility: Secondary (does grocery list)  [address] [facility] [address]: No  Health Care Management: Primary  Ambulation Assistance: Independent (Mod Ind using RW (household and limited community distances))  Active : No  Mode of Transportation: Family, Car  Occupation: Retired  Type of occupation: worked in bank  Additional Comments: has a regular, flat bed (tall height); usually sleeps in lift chair; had 2 falls in the past year with injuries requiring surgery    Objective                                                                                    Goals:  (Update in navigator)  Short term goals  Time Frame for Short term goals: 12-14 tx days:  Short term goal 1: Pt will complete rolling L/R and sit->sup using leg  to assist RLE and using bed features with Min A following posterior hip precautions on RLE. Short term goal 2: Pt will complete sup->sit using bed features and leg  to assist RLE with SBA-Sup following posterior hip precautions on RLE. Short term goal 3: Pt will complete OOB transfers using RW with Min A following posterior hip precautions.   Short term goal 4: Pt will ambulate 50 ft with turns over level surface and 10 ft of unevem surface using RW with CGA. Short term goal 5: Pt will ascend/descend 2\" curb step using RW with Min A. Short term goal 6: Pt will complete object retrieval from the floor in standing with 1UE support on RW and using reacher with CGA. Short term goal 7: pt will complete 150 ft of w/c propulsion using manual w/c at mod ind:   :        Plan of Care                                                                              Times per week: 5 days per week for a minimum of 60 minutes/day plus group as appropriate for 60 minutes.   Treatment to include Current Treatment Recommendations: Strengthening, Gait Training, Patient/Caregiver Education & Training, ROM, Equipment Evaluation, Education, & procurement, Balance Training, Pain Management, Modalities, Functional Mobility Training, Endurance Training, Home Exercise Program, Positioning, Transfer Training, Wheelchair Mobility Training, Safety Education & Training    Electronically signed by   Julissa Shaver, PT  10/14/2021, 2:21 PM

## 2021-10-14 NOTE — CARE COORDINATION
Patient reviewed at today's care conference. Patient will dc home 10/28 but will need cga assist.  Pain currently limited by pain. Has a RW & WC. Will need drop arm BSC. Currently has a mobley. [facility] pt/ot/RN/HHA recommended. Case mgt met with patient in room and reviewed dc date and plan. Patient agreeable. She reports that her niece will be able to help her at discharge. When pressed patient wouldn't commit that niece could be present continuously. Whiteboard updated.

## 2021-10-14 NOTE — PROGRESS NOTES
Patient instructed and educated on [employer]. Patient able to do 1500 ml. Vital capacity. Patient's goal is 1500ml.  Electronically signed by Abbey Mobley RCP on 10/14/2021 at 12:14 PM

## 2021-10-14 NOTE — PROGRESS NOTES
Faith Landaverde    : 1940  Acct #: [de-identified]  MRN: [id number]              PM&R Progress Note      Admitting diagnosis: Right femoral neck fracture ( [place] [address])     Comorbid diagnoses impacting rehabilitation: Uncontrolled pain, generalized weakness, gait disturbance, acute blood loss anemia, lower limb MRSA cellulitis, essential hypertension, paroxysmal atrial fibrillation, chronic kidney disease stage IV, acute on chronic diastolic congestive heart failure, morbid obesity (BMI 45.2), GERD, history of left hip fracture in May of 2021.     Chief complaint: Staff notes some difficulty with her attending to instructions. She continues to complain of hip pain without chills or cough. Prior (baseline) level of function: Independent.     Current level of function:         Current  IRF-SARAH and Goals:   Occupational Therapy:    Short term goals  Time Frame for Short term goals: STGs=LTGs :   Long term goals  Time Frame for Long term goals : 14-17 days or until d/c  Long term goal 1: Pt will complete grooming tasks Ind seated  Long term goal 2: Pt will complete total body bathing c min A using AE PRN  Long term goal 3: Pt will complete UB dressing c setup  Long term goal 4: Pt will complete LB dressing c mod A using AE  Long term goal 5: Pt will doff/don footwear c min A using AE  Long term goals 6: Pt will complete toileting c max A  Long term goal 7: Pt will complete functional transfers (bed, chair, toilet, shower) c DME PRN and min A  Long term goal 8: Pt will perform therex/therax to facilitate increased strength/endurance/ax tolerance (c emphasis on dynamic standing balance/tolerance > 5 mins, and BUE endurance) c SBA :                                       Eating: Eating  Assistance Needed: Independent  Comment: able to open packages/containers  CARE Score: 6  Discharge Goal: Independent       Oral Hygiene: Oral Hygiene  Assistance Needed: Setup or clean-up assistance  Comment: seated EOB  CARE Score: 5  Discharge Goal: Independent    UB/LB Bathing: Shower/Bathe Self  Assistance Needed: Partial/moderate assistance  Comment: required assist for bottom and distal BLEs  CARE Score: 3  Discharge Goal: Partial/moderate assistance    UB Dressing: Upper Body Dressing  Assistance Needed: Partial/moderate assistance  Comment: seated EOB able to thread BUEs and pull overhead, assist to pull down in back  CARE Score: 3  Discharge Goal: Supervision or touching assistance         LB Dressing: Lower Body Dressing  Assistance Needed: Dependent  Comment: to don pants from bed level  CARE Score: 1  Discharge Goal: Partial/moderate assistance    Donning and [place] Footwear: Putting On/Taking Off Footwear  Assistance Needed: Dependent  Comment: to doff/don hospital socks  CARE Score: 1  Discharge Goal: Partial/moderate assistance      Toileting: Toileting Hygiene  Comment: has mobley cath and denied need for BM; predict dependent based on performance  Reason if not Attempted: Not attempted due to medical condition or safety concerns  CARE Score: 88  Discharge Goal: Substantial/maximal assistance      Toilet Transfers: Toilet Transfer  Comment: 88 (ombley and no BM)  Reason if not Attempted: Not attempted due to medical condition or safety concerns  CARE Score: 88  Discharge Goal: Partial/moderate assistance    Physical Therapy:   Short term goals  Time Frame for Short term goals: 12-14 tx days:  Short term goal 1: Pt will complete rolling L/R and sit->sup using leg  to assist RLE and using bed features with Min A following posterior hip precautions on RLE. Short term goal 2: Pt will complete sup->sit using bed features and leg  to assist RLE with SBA-Sup following posterior hip precautions on RLE. Short term goal 3: Pt will complete OOB transfers using RW with Min A following posterior hip precautions.   Short term goal 4: Pt will ambulate 50 ft with turns over level surface and 10 ft of unevem surface using RW with CGA.  Short term goal 5: Pt will ascend/descend 2\" curb step using RW with Min A. Short term goal 6: Pt will complete object retrieval from the floor in standing with 1UE support on RW and using reacher with CGA.   Short term goal 7: pt will complete 150 ft of w/c propulsion using manual w/c at mod ind            Bed Mobility:   Sit to Lying  Assistance Needed: Dependent  Comment: Total A x 2 (significantly limited by pain and anxiety)  CARE Score: 1  Discharge Goal: Partial/moderate assistance  Roll Left and Right  Comment: movements were attempted, however pt was too painful upon initiation and refused to continue  Reason if not Attempted: Not attempted due to medical condition or safety concerns  CARE Score: 88  Discharge Goal: Partial/moderate assistance  Lying to Sitting on Side of Bed  Assistance Needed: Dependent  Comment: Total A x 2 (significantly limited by pain and anxiety)  CARE Score: 1  Discharge Goal: Supervision or touching assistance    Transfers:    Sit to Stand  Assistance Needed: Dependent  Comment: Max A x 2 at EOB using RW x 1 trial today  CARE Score: 1  Discharge Goal: Partial/moderate assistance  Chair/Bed-to-Chair Transfer  Assistance Needed: Dependent  Comment: required use of [employer] with 2-person assist due to poor standing tolerance with RW and inability to actively move RLE due to pain and anxiety  CARE Score: 1  Discharge Goal: Partial/moderate assistance     Car Transfer  Reason if not Attempted: Not attempted due to medical condition or safety concerns  CARE Score: 88  Discharge Goal: Partial/moderate assistance    Ambulation:    Walking Ability  Does the Patient Walk?: Yes     Walk 10 Feet  Comment: pt with poor standing tolerance and unable to actively perform weight shifting today due to pain and anxiety  Reason if not Attempted: Not attempted due to medical condition or safety concerns  CARE Score: 88  Discharge Goal: Supervision or touching assistance     Walk 50 Feet with required BUE support on RW to maintain static standing  Reason if not Attempted: Not attempted due to medical condition or safety concerns  CARE Score: 88  Discharge Goal: Supervision or touching assistance    I      Exam:    Blood pressure 126/60, pulse 68, temperature 97.7 °F (36.5 °C), temperature source Oral, resp. rate 17, height 5' 1\" (1.549 m), weight 246 lb 0.5 oz (111.6 kg), last menstrual period 01/23/1995, SpO2 96 %, not currently breastfeeding. General: Observed sitting up in a bedside chair with legs elevated. Fair attention to my visit. In no distress. HEENT: Full visual field. MMM. No JVD. Pulmonary: Unlabored respirations without wheezes or rales. Cardiac: Occasional premature beat with a controlled rate. Abdomen: Patient's abdomen is soft and nondistended. Bowel sounds were present throughout. There was no rebound, guarding or masses noted. Upper extremities: Manipulating her blanket with both hands. No new bruising or discoloration. Lower extremities: Right hip dressed. The thigh is swollen and tender. Very limited right hip flexion noted. Calf soft. Sitting balance was fair. Standing balance was poor.     Lab Results   Component Value Date    WBC 8.7 10/11/2021    HGB 9.8 (L) 10/11/2021    HCT 31.4 (L) 10/11/2021    MCV 98.1 10/11/2021     10/11/2021     Lab Results   Component Value Date    INR 0.94 10/08/2021    INR 1.06 05/21/2021    INR 1.03 12/17/2016    PROTIME 12.1 10/08/2021    PROTIME 12.8 05/21/2021    PROTIME 11.8 12/17/2016     Lab Results   Component Value Date    CREATININE 4.8 (H) 10/11/2021    BUN 50 (H) 10/11/2021     10/11/2021    K 5.0 10/11/2021     10/11/2021    CO2 21 10/11/2021     Lab Results   Component Value Date    ALT 7 (L) 10/11/2021    AST 35 10/11/2021    ALKPHOS 74 10/11/2021    BILITOT 0.4 10/11/2021       Expected length of stay  prior to a supervised level of function for discharge home with a walker and Leander Knapp OT/PT is 2 weeks. Recommendations:    1. Right femoral neck fracture with hemiarthroplasty:  She is struggling to initiate lower limb movements with either leg during transfers and bed mobility. Needs significant cueing to engage in the daily occupational and physical therapy. Focusing on techniques for self-care activities and mobility tasks following posterior hip precautions. Ongoing adaptive equipment training, range of motion and strength recovery and aggressive pulmonary hygiene.  Pursuing pain management, monitoring of her hemoglobin and heart rate and lower limb wound care.  Caregiver training is planned if possible. Simpson catheter draining clear urine.  Nutritional support. Verbal cues and moderate to maximum physical assistance for transfers and self-care again today. 2. DVT prophylaxis: Lovenox 30 mg subcu daily.  I must monitor her hemoglobin and platelet count periodically while on this medication.  Weightbearing activities may be limited but will be tried daily.  GI prophylaxis is available. No new bruising or swelling. Hemoglobin 9.8.  3. Acute on chronic kidney disease stage IV:   Avoiding nephrotoxic medications when possible.  Encouraging consistent oral hydration.  Periodic monitoring of her chemistries. 4. Lower limb cellulitis with MRSA: Infectious disease has stopped her vancomycin and started Zyvox 600 mg twice a day through 10/19/2021.    Good GI tolerance. The wound care service is directing her topical lower limb skin care.  Limb elevation.  Intermittent diuretics if needed. 5. Acute on chronic diastolic congestive heart failure: Lopressor for afterload reduction, intermittent use of diuretics and daily weights. Daily weights do not reflect any decompensation. 6. Uncontrolled pain: Scheduled acetaminophen, cryotherapy and narcotics.   Some difficulty initiating activities and following directions so we will extend her oxycodone to every 6 hours if possible.  Progressive mobilization.   7. Atrial fibrillation: Lopressor for rate control.  Her chronic anemia precludes more aggressive anticoagulation.

## 2021-10-14 NOTE — PROGRESS NOTES
Occupational Therapy  Physical Rehabilitation: OCCUPATIONAL THERAPY     [x] daily progress note       [] discharge       Patient Name:  Bossman Quevedo   :  1940 MRN: [id number]  Room:  [address] [place] [address] Date of Admission: 10/11/2021  Rehabilitation Diagnosis:   Fracture of unspecified part of neck of right femur, initial encounter for closed fracture [S72.001A]  Traumatic closed fracture of neck of femur with minimal displacement, right, initial encounter ([facility] [S72.001A]       Date 10/14/2021       Day of ARU Week:  4   Time IN/OUT [id number]   Individual Tx Minutes    Group Tx Minutes    Co-Treat Minutes 60+36 This pt requires simultaneous intervention of 2 skilled therapists to safely complete tasks to address complexity of deficits defined in the goals including:  cotreat was completed this date with  [x] PT    [] OT   [] ST   [x]Attention   [x] Safety    [x]Problem Solving    []Sequencing     []Initiation    []Processing      []Recall of learned tasks  [] Communication  [] Self Feeding   [x]ADL's    []UE Function    []Motor planning   [x]Balance  [x]Energy Conservation   []Verbal cues   [] Tactile Cues  [x]Barriers     [x]Transfers   [x]Device Use     Concurrent Tx Minutes    TOTAL Tx Time Mins 90   Variance Time    Variance Time []   Refusal due to:     []   Medical hold/reason:    []   Illness   []   Off Unit for test/procedure  []   Extra time needed to complete task  []   Therapeutic need  []   Other (specify):   Restrictions Restrictions/Precautions: Weight Bearing, Fall Risk, ROM Restrictions, General Precautions     Hip Precautions: Posterior hip precautions, No hip flexion > 90 degrees, No ADduction, No hip internal rotation   Communication with other providers: [x]   OK to see per nursing:     []   Spoke with team member regarding:      Subjective observations and cognitive status: Pt in semi-fowlers position. Pt visibly upset and tearful upon arrival c nsg student present.  Pt states, \"I've been in so much pain and I didn't get any sleep. \" Nsg student reports that pt was seated in w/A        Pain level/location:    20/10  A.m. session  0/10 at rest in p.m. session     Location: R hip    Discharge recommendations  Anticipated discharge date:  10/28  Destination: []home alone   []home alone w assist prn   [] home w/ family    [] Continuous supervision       []SNF    [] Assisted living     [] Other:   Continued therapy: []HHC OT  []OUTPATIENT  OT   [] No Further OT  Equipment needs: Joanna Alcocer requires a drop arm bedside commode due to being unable to use the toilet within the home and confined to a single room confined to one level of the home. This patient requires a side transfer has a body configuration that requires extra width provided by a drop arm commode. Bed Mobility:           [x]   Pt received out of bed   Rolling R/L:  Total A  Supine --> Sit:  Total A a.m. session, P.M. session pt required SBA x2, pt able to complete task c cues for hand placement as well as BLE movements. Sit --> Supine: Total A  See PT notes for details; pt requires 2 person assist    Transfers:    Sit--> Stand: Total A; 2 person (Min x1 CGA-Min Ax1)  Stand --> Sit:   2 person A (Min x1 CGA-Min Ax1)  Assistive device required for transfer:   RW      Toilet : SBA x1 for safety and Mod A x1 for CM; pt demo pulling down briefs and pants 75% however required min A to fully doff pants past thighs as pt's width in thighs caused pants to be stuck in between pt's legs. Pt able to don pants over hips c Min A for management over R hip and orientation/adjustment of pants. Pt demo standing c use of abdullahi stedy without physical support.      Toilet Transfers: SBA x1 Min A x1 for controlled descent, CGA x1 for stance from [facility] over toilet, pt demo standing c use of abdullahi stedy      Additional Therapeutic activities/exercises completed this date:     [x]   ADL Training   [x]   Balance/Postural training seated dynamic sitting balance activity completed EOB to increase BUE strength, trunk posture, and sitting balance required for ADL/IADLs. Pt demo x10 reps of BUE shld flexion exercises c unilateral support and SBA for safety. [x]   Bed/Transfer Training: Pt requiring education and cues for proper hand placement during bed and sit<>stand transfers by OTR to facilitate and increase pt's independence, while PT assisted c LE movement and balance. []   Endurance Training   []   Neuromuscular Re-ed   []   Nu-step:  Time:        Level:         #Steps:       []   Rebounder:    []  Seated     []  Standing        []   Supine Ther Ex (reps/sets):     []   Seated Ther Ex (reps/sets):     [x]   Standing Ther Ex (reps/sets): To increase standing tolerance and WB on RLE, pt completed x4 stands and tolerated 30 secs, 60 seconds, and two stances while completing BUE intermittent unsupported stand c FWW.     []   Other:      Comments:      Patient/Caregiver Education and Training:   []   [employer] Equipment Use  [x]   Bed Mobility/Transfer Technique/Safety  [x]   Energy Conservation Tips  []   Family training  [x]   Postural Awareness  [x]   Safety During Functional Activities  [x]   Reinforced Patient's Precautions   []   Progress was updated and reviewed in Rehabtracker with patient and/or family this         date. Treatment Plan for Next Session: increase standing tolerance, weight bearing on BLE, and unsupported stance. Assessment: This pt demonstrated a positive response to today's treatment as evidenced by completion of therapy session. Pt's pain continues to be a barrier that impacts pt's physical performance. Pt requires encouragement and redirection throughout therapy session as pt becomes tearful during OOB tasks.     Treatment/Activity Tolerance:   [x] Tolerated treatment with no adverse effects    [] Patient limited by fatigue  [] Patient limited by pain   [] Patient limited by medical complications:    [] Adverse reaction to Tx:   [] Significant change in status    Safety:       [x]  bed alarm set    []  chair alarm set    []  Pt refused alarms                []  Telesitter activated      [x]  Gait belt used during tx session      []other:       Number of Minutes/Billable Intervention  Therapeutic Exercise 40   ADL Self-care 36   Neuro Re-Ed    Therapeutic Activity 20   Group    Other:    TOTAL 96       Social History  Social/Functional History  Lives With: Alone (has local family support)  Type of Home: House  Home Layout: Two level, Able to Live on Main level with bedroom/bathroom, Performs ADL's on one level  Home Access: Ramped entrance (bilat rails)  Bathroom Shower/Tub: Walk-in shower  Bathroom Toilet: Handicap height  Bathroom Equipment: Built-in shower seat, Hand-held shower, Grab bars in shower, Grab bars around toilet  Home Equipment: Rolling walker, [employer] [place], [place], [employer], Reacher, Grab bars, Lift chair, Sock aid, Long-handled shoehorn  Receives Help From: Family, Neighbor, Friend(s)  ADL Assistance: Needs assistance (niece supervised pt during ADL (bathing and dressing), otherwise Mod Ind with toileting activity)  Homemaking Responsibilities: Yes  Meal Prep Responsibility: Primary  Laundry Responsibility: Secondary  Cleaning Responsibility: Primary (does not run the sweeper)  Bill Paying/Finance Responsibility: Primary  Shopping Responsibility: Secondary (does grocery list)  Dependent Care Responsibility: No  Health Care Management: Primary  Ambulation Assistance: Independent (Mod Ind using RW (household and limited community distances))  Active : No  Mode of Transportation: Family, Car  Occupation: Retired  Type of occupation: worked in bank  Additional Comments: has a regular, flat bed (tall height); usually sleeps in lift chair; had 2 falls in the past year with injuries requiring surgery    Objective                                                                                    Goals: (Update in navigator)  Short term goals  Time Frame for Short term goals: STGs=LTGs:  Long term goals  Time Frame for Long term goals : 14-17 days or until d/c  Long term goal 1: Pt will complete grooming tasks Ind seated  Long term goal 2: Pt will complete total body bathing c min A using AE PRN  Long term goal 3: Pt will complete UB dressing c setup  Long term goal 4: Pt will complete LB dressing c mod A using AE  Long term goal 5: Pt will doff/don footwear c min A using AE  Long term goals 6: Pt will complete toileting c max A  Long term goal 7: Pt will complete functional transfers (bed, chair, toilet, shower) c DME PRN and min A  Long term goal 8: Pt will perform therex/therax to facilitate increased strength/endurance/ax tolerance (c emphasis on dynamic standing balance/tolerance > 5 mins, and BUE endurance) c SBA:        Plan of Care                                                                              Times per week: 5 days per week for a minimum of 60 minutes/day plus group as appropriate for 60 minutes.   Treatment to include Plan  Times per day: Daily  Current Treatment Recommendations: Strengthening, Balance Training, Functional Mobility Training, Endurance Training, Pain Management, Safety Education & Training, Patient/Caregiver Education & Training, Equipment Evaluation, Education, & procurement, Positioning, Self-Care / ADL    Electronically signed by   Ayah Fields OT,  10/14/2021, 10:13 AM

## 2021-10-15 LAB
HCT VFR BLD CALC: 27.1 % (ref 37–47)
HEMOGLOBIN: 8.3 GM/DL (ref 12.5–16)
MCH RBC QN AUTO: 30.7 PG (ref 27–31)
MCHC RBC AUTO-ENTMCNC: 30.6 % (ref 32–36)
MCV RBC AUTO: 100.4 FL (ref 78–100)
PDW BLD-RTO: 18.4 % (ref 11.7–14.9)
PLATELET # BLD: 225 K/CU MM (ref 140–440)
PMV BLD AUTO: 9.6 FL (ref 7.5–11.1)
RBC # BLD: 2.7 M/CU MM (ref 4.2–5.4)
WBC # BLD: 11.7 K/CU MM (ref 4–10.5)

## 2021-10-15 PROCEDURE — 97110 THERAPEUTIC EXERCISES: CPT

## 2021-10-15 PROCEDURE — 36415 COLL VENOUS BLD VENIPUNCTURE: CPT

## 2021-10-15 PROCEDURE — 6370000000 HC RX 637 (ALT 250 FOR IP): Performed by: NURSE PRACTITIONER

## 2021-10-15 PROCEDURE — 2500000003 HC RX 250 WO HCPCS: Performed by: PHYSICAL MEDICINE & REHABILITATION

## 2021-10-15 PROCEDURE — 99232 SBSQ HOSP IP/OBS MODERATE 35: CPT | Performed by: PHYSICAL MEDICINE & REHABILITATION

## 2021-10-15 PROCEDURE — 94761 N-INVAS EAR/PLS OXIMETRY MLT: CPT

## 2021-10-15 PROCEDURE — 6360000002 HC RX W HCPCS: Performed by: NURSE PRACTITIONER

## 2021-10-15 PROCEDURE — 94150 VITAL CAPACITY TEST: CPT

## 2021-10-15 PROCEDURE — 99213 OFFICE O/P EST LOW 20 MIN: CPT

## 2021-10-15 PROCEDURE — 97535 SELF CARE MNGMENT TRAINING: CPT

## 2021-10-15 PROCEDURE — 6370000000 HC RX 637 (ALT 250 FOR IP): Performed by: PHYSICAL MEDICINE & REHABILITATION

## 2021-10-15 PROCEDURE — 85027 COMPLETE CBC AUTOMATED: CPT

## 2021-10-15 PROCEDURE — 1280000000 HC REHAB R&B

## 2021-10-15 PROCEDURE — 97530 THERAPEUTIC ACTIVITIES: CPT

## 2021-10-15 RX ORDER — HYDROCODONE BITARTRATE AND ACETAMINOPHEN 5; 325 MG/1; MG/1
1 TABLET ORAL EVERY 4 HOURS PRN
Status: DISCONTINUED | OUTPATIENT
Start: 2021-10-15 | End: 2021-10-18

## 2021-10-15 RX ORDER — LOPERAMIDE HYDROCHLORIDE 2 MG/1
2 CAPSULE ORAL 4 TIMES DAILY PRN
Status: DISCONTINUED | OUTPATIENT
Start: 2021-10-15 | End: 2021-10-22 | Stop reason: HOSPADM

## 2021-10-15 RX ORDER — ACETAMINOPHEN 325 MG/1
650 TABLET ORAL
Status: DISCONTINUED | OUTPATIENT
Start: 2021-10-15 | End: 2021-10-15

## 2021-10-15 RX ORDER — LORAZEPAM 0.5 MG/1
0.25 TABLET ORAL 2 TIMES DAILY
Status: DISCONTINUED | OUTPATIENT
Start: 2021-10-15 | End: 2021-10-18

## 2021-10-15 RX ORDER — SENNA AND DOCUSATE SODIUM 50; 8.6 MG/1; MG/1
1 TABLET, FILM COATED ORAL 2 TIMES DAILY PRN
Status: DISCONTINUED | OUTPATIENT
Start: 2021-10-15 | End: 2021-10-22 | Stop reason: HOSPADM

## 2021-10-15 RX ADMIN — LINEZOLID 600 MG: 600 TABLET ORAL at 20:26

## 2021-10-15 RX ADMIN — ONDANSETRON 4 MG: 4 TABLET, ORALLY DISINTEGRATING ORAL at 00:10

## 2021-10-15 RX ADMIN — ONDANSETRON 4 MG: 4 TABLET, ORALLY DISINTEGRATING ORAL at 19:29

## 2021-10-15 RX ADMIN — ACETAMINOPHEN 650 MG: 325 TABLET ORAL at 09:10

## 2021-10-15 RX ADMIN — ENOXAPARIN SODIUM 30 MG: 30 INJECTION SUBCUTANEOUS at 09:12

## 2021-10-15 RX ADMIN — ALLOPURINOL 50 MG: 100 TABLET ORAL at 09:11

## 2021-10-15 RX ADMIN — OXYCODONE HYDROCHLORIDE 5 MG: 5 TABLET ORAL at 06:26

## 2021-10-15 RX ADMIN — HYDROCODONE BITARTRATE AND ACETAMINOPHEN 1 TABLET: 5; 325 TABLET ORAL at 21:32

## 2021-10-15 RX ADMIN — MICONAZOLE NITRATE: 2 POWDER TOPICAL at 09:10

## 2021-10-15 RX ADMIN — MICONAZOLE NITRATE: 2 POWDER TOPICAL at 20:26

## 2021-10-15 RX ADMIN — ACETAMINOPHEN 650 MG: 325 TABLET ORAL at 14:30

## 2021-10-15 RX ADMIN — ONDANSETRON 4 MG: 4 TABLET, ORALLY DISINTEGRATING ORAL at 14:30

## 2021-10-15 RX ADMIN — LEVOTHYROXINE SODIUM 50 MCG: 0.05 TABLET ORAL at 06:26

## 2021-10-15 RX ADMIN — LORAZEPAM 0.25 MG: 0.5 TABLET ORAL at 20:27

## 2021-10-15 RX ADMIN — LINEZOLID 600 MG: 600 TABLET ORAL at 09:11

## 2021-10-15 RX ADMIN — OXYCODONE HYDROCHLORIDE 5 MG: 5 TABLET ORAL at 10:29

## 2021-10-15 RX ADMIN — METOPROLOL TARTRATE 25 MG: 25 TABLET, FILM COATED ORAL at 09:12

## 2021-10-15 RX ADMIN — METOPROLOL TARTRATE 25 MG: 25 TABLET, FILM COATED ORAL at 20:27

## 2021-10-15 ASSESSMENT — PAIN SCALES - GENERAL
PAINLEVEL_OUTOF10: 6
PAINLEVEL_OUTOF10: 7
PAINLEVEL_OUTOF10: 0
PAINLEVEL_OUTOF10: 9
PAINLEVEL_OUTOF10: 8
PAINLEVEL_OUTOF10: 0
PAINLEVEL_OUTOF10: 4

## 2021-10-15 ASSESSMENT — PAIN DESCRIPTION - ORIENTATION
ORIENTATION: RIGHT
ORIENTATION: RIGHT

## 2021-10-15 ASSESSMENT — PAIN DESCRIPTION - LOCATION
LOCATION: HIP
LOCATION: HIP

## 2021-10-15 ASSESSMENT — PAIN DESCRIPTION - PAIN TYPE
TYPE: SURGICAL PAIN
TYPE: SURGICAL PAIN

## 2021-10-15 ASSESSMENT — PAIN DESCRIPTION - ONSET: ONSET: ON-GOING

## 2021-10-15 ASSESSMENT — PAIN DESCRIPTION - DESCRIPTORS
DESCRIPTORS: ACHING;DISCOMFORT
DESCRIPTORS: ACHING

## 2021-10-15 ASSESSMENT — PAIN DESCRIPTION - FREQUENCY
FREQUENCY: INTERMITTENT
FREQUENCY: INTERMITTENT

## 2021-10-15 ASSESSMENT — PAIN DESCRIPTION - PROGRESSION
CLINICAL_PROGRESSION: NOT CHANGED
CLINICAL_PROGRESSION: NOT CHANGED

## 2021-10-15 NOTE — CONSULTS
[address] [address] Continence Nurse  Consult Note       Huong Douglas  AGE: [de-identified] y.o. GENDER: female  : 1940  TODAY'S DATE:  10/15/2021    Subjective:     Reason for  Evaluation and Assessment: wound care evalPaty Douglas is a [de-identified] y.o. female referred by:   [x] Physician  [] Nursing  [] Other:     Wound Identification:  Wound Type: pressure  Contributing Factors: edema, chronic pressure, decreased mobility and obesity        PAST MEDICAL HISTORY        Diagnosis Date    Acid reflux     'no recent issue\"    Arthritis     \"Left Index Finger\"    [facility]est[facility]     per old chart hx of CHF with admission 2016 with pulmonary edema    Chronic kidney disease     Sees Dr. Naoma Mortimer have one kidney, dr Ailyn Coronel told me that in  I think\"    GERD (gastroesophageal reflux disease)     History of blood transfusion  Or     No Reaction To Blood Transfusion Received    Hypertension     ( pt states she is not on any medication for blood pressure)    Hypomagnesemia     Hypothyroidism     MRSA (methicillin resistant staph aureus) culture positive 10/08/2021    Leg    Shortness of breath on exertion     SVT (supraventricular tachycardia) ([facility]     per old chart hx of SVT with admission  tx with Adenosine and per notes in 2018 hx of SVT- no cardiologist    Teeth missing     Upper And Lower    Wears glasses        PAST SURGICAL HISTORY    Past Surgical History:   Procedure Laterality Date    ABDOMEN SURGERY      DENTAL SURGERY      Teeth Extracted In Past    ENDOSCOPY, COLON, DIAGNOSTIC   Or     EYE SURGERY  ? when    clyde cataract ext    FEMUR FRACTURE SURGERY Left 2021    FEMUR IM NAIL REGINALDO INSERTION performed by Edwin Jasmine MD at [address] [address]    Abdominal Hernia Repair     HIP SURGERY Right 10/8/2021    RIGHT HIP HEMIARTHROPLASTY performed by Edwin Jasmine MD at [address] [place] [address] 05/27/2018    exp lap . converted to exp laporotomy with ventral hernia repair with mesh    OTHER SURGICAL HISTORY  34/24/2680    umbilical scar excision    VENTRAL HERNIA REPAIR  09/16/2016    Robotic laparoscopic       FAMILY HISTORY    Family History   Problem Relation Age of Onset    Cancer Father         Lung Cancer    Arthritis Mother     Cancer Brother         Skin Cancer    High Cholesterol Brother        SOCIAL HISTORY    Social History     Tobacco Use    Smoking status: Never Smoker    Smokeless tobacco: Never Used   Vaping Use    Vaping Use: Never used   Substance Use Topics    Alcohol use: Not Currently    Drug use: No       ALLERGIES    No Known Allergies    MEDICATIONS    No current facility-administered medications on file prior to encounter.      Current Outpatient Medications on File Prior to Encounter   Medication Sig Dispense Refill    polyethylene glycol (GLYCOLAX) 17 g packet Take 17 g by mouth daily as needed for Constipation 527 g 1    linezolid (ZYVOX) 600 MG tablet Take 1 tablet by mouth every 12 hours for 16 doses 16 tablet 0    metoprolol tartrate (LOPRESSOR) 25 MG tablet Take 1 tablet by mouth 2 times daily 180 tablet 1    magnesium oxide (MAG-OX) 400 (241.3 Mg) MG TABS tablet TAKE ONE TABLET BY MOUTH TWICE A  tablet 1    levothyroxine (SYNTHROID) 50 MCG tablet Take 1 tablet by mouth Daily 90 tablet 1    sennosides-docusate sodium (SENOKOT-S) 8.6-50 MG tablet Take 1 tablet by mouth 2 times daily 14 tablet 0    allopurinol (ZYLOPRIM) 100 MG tablet Take 0.5 tablets by mouth daily 90 tablet 1    nystatin (MYCOSTATIN) 973262 UNIT/GM powder Apply 3 times daily x 10 to 14 days 45 g 0    furosemide (LASIX) 20 MG tablet Take 1 tablet by mouth 2 times daily Take 20 mg by mouth 2 times daily 180 tablet 1         Objective:      BP (!) 131/57   Pulse 81   Temp 98.5 °F (36.9 °C) (Oral)   Resp 16   Ht 5' 1\" (1.549 m)   Wt 237 lb 7 oz (107.7 kg)   LMP 01/23/1995   SpO2 98%   BMI 44.86 kg/m²   Polo Risk Score: Polo Scale Score: 15    LABS    CBC:   Lab Results   Component Value Date    WBC 8.7 10/11/2021    RBC 3.20 10/11/2021    HGB 9.8 10/11/2021    HCT 31.4 10/11/2021    MCV 98.1 10/11/2021    MCH 30.6 10/11/2021    MCHC 31.2 10/11/2021    RDW 17.2 10/11/2021     10/11/2021    MPV 9.9 10/11/2021     CMP:    Lab Results   Component Value Date     10/11/2021    K 5.0 10/11/2021     10/11/2021    CO2 21 10/11/2021    BUN 50 10/11/2021    CREATININE 4.8 10/11/2021    GFRAA 11 10/11/2021    LABGLOM 9 10/11/2021    GLUCOSE 105 10/11/2021    PROT 6.5 10/11/2021    PROT 5.5 03/24/2012    LABALBU 2.7 10/11/2021    CALCIUM 8.6 10/11/2021    BILITOT 0.4 10/11/2021    ALKPHOS 74 10/11/2021    AST 35 10/11/2021    ALT 7 10/11/2021     Albumin:    Lab Results   Component Value Date    LABALBU 2.7 10/11/2021     PT/INR:    Lab Results   Component Value Date    PROTIME 12.1 10/08/2021    PROTIME 14.5 03/24/2012    INR 0.94 10/08/2021     HgBA1c:  No results found for: LABA1C      Assessment:     Patient Active Problem List   Diagnosis    Chronic kidney disease (CKD) stage G4/A2, severely decreased glomerular filtration rate (GFR) between 15-29 mL/min/1.73 square meter and albuminuria creatinine ratio between  mg/g (HCC)    Ventral hernia without obstruction or gangrene    SVT (supraventricular tachycardia) (HCC)    CRF (chronic renal failure)    Hypothyroid    Chronic kidney disease, stage IV (severe) (HCC)    Gastroesophageal reflux disease    H/O ventral hernia    S/P ventral herniorrhaphy    S/P herniorrhaphy    Essential hypertension    History of ventral hernia repair    Morbid obesity with BMI of 45.0-49.9, adult ([address]    Closed transcervical fracture of proximal femur, left, initial encounter ([address]    Displaced fracture of right femoral neck ([address]    MRSA cellulitis    Traumatic closed fracture of neck of femur with minimal displacement, right, initial encounter ([facility]    Uncontrolled pain    Generalized weakness    Acute blood loss anemia    Paroxysmal atrial fibrillation (HCC)    Acute on chronic diastolic (congestive) heart failure ([facility]       Measurements:  Wound 10/08/21 Pretibial Right; Anterior (Active)   Wound Image   10/08/21 1030   Wound Etiology Venous 10/15/21 0830   Dressing Status Other (Comment) 10/11/21 1945   Wound Cleansed Cleansed with saline 10/15/21 0830   Dressing/Treatment Open to air 10/15/21 0830   Wound Length (cm) 5.5 cm 10/15/21 0830   Wound Width (cm) 4 cm 10/15/21 0830   Wound Depth (cm) 0 cm 10/15/21 0830   Wound Surface Area (cm^2) 22 cm^2 10/15/21 0830   Change in Wound Size % (l*w) 8.33 10/15/21 0830   Wound Volume (cm^3) 0 cm^3 10/15/21 0830   Wound Healing % 100 10/15/21 0830   Distance Tunneling (cm) 0 cm 10/15/21 0830   Tunneling Position ___ O'Clock 0 10/15/21 0830   Undermining Starts ___ O'Clock 0 10/15/21 0830   Undermining Ends___ O'Clock 0 10/15/21 0830   Undermining Maxium Distance (cm) 0 10/15/21 0830   Wound Assessment Eschar dry 10/14/21 0745   Drainage Amount None 10/15/21 0830   Odor None 10/15/21 0830   Deena-wound Assessment Blanchable erythema;Edematous;Dry/flaky 10/11/21 1945   Margins Attached edges 10/15/21 0830   Number of days: 7       Wound 10/08/21 Pretibial Left (Active)   Wound Image   10/08/21 1030   Wound Etiology Other 10/11/21 1945   Dressing Status Other (Comment) 10/11/21 1945   Wound Cleansed Cleansed with saline 10/13/21 2200   Dressing/Treatment Open to air 10/15/21 0830   Wound Length (cm) 0 cm 10/15/21 0830   Wound Width (cm) 0 cm 10/15/21 0830   Wound Depth (cm) 0 cm 10/15/21 0830   Wound Surface Area (cm^2) 0 cm^2 10/15/21 0830   Change in Wound Size % (l*w) 100 10/15/21 0830   Wound Volume (cm^3) 0 cm^3 10/15/21 0830   Wound Healing % 100 10/15/21 0830   Distance Tunneling (cm) 0 cm 10/11/21 1945   Tunneling Position ___ O'Clock 0 10/11/21 1945   Undermining Starts ___ O'Clock 0 10/11/21 1945   Undermining Ends___ O'Clock 00 10/11/21 1945   Undermining Maxium Distance (cm) 0 10/11/21 1945   Wound Assessment Eschar dry 10/14/21 0745   Drainage Amount None 10/13/21 2200   Odor None 10/13/21 2200   Deena-wound Assessment Blanchable erythema;Dry/flaky;Edematous 10/11/21 1945   Margins Attached edges 10/11/21 1945   Number of days: 7       Wound 10/12/21 Toe (Comment  which one) Anterior;Right small dry scab to right great toe (Active)   Wound Etiology Pressure Unstageable 10/15/21 0830   Wound Cleansed Cleansed with saline 10/15/21 0830   Dressing/Treatment Open to air 10/15/21 0830   Wound Length (cm) 1 cm 10/15/21 0830   Wound Width (cm) 1.2 cm 10/15/21 0830   Wound Depth (cm) 0.1 cm 10/15/21 0830   Wound Surface Area (cm^2) 1.2 cm^2 10/15/21 0830   Wound Volume (cm^3) 0.12 cm^3 10/15/21 0830   Distance Tunneling (cm) 0 cm 10/15/21 0830   Tunneling Position ___ O'Clock 0 10/15/21 0830   Undermining Starts ___ O'Clock 0 10/15/21 0830   Undermining Ends___ O'Clock 0 10/15/21 0830   Undermining Maxium Distance (cm) 0 10/15/21 0830   Drainage Amount None 10/15/21 0830   Odor None 10/15/21 0830   Margins Attached edges 10/15/21 0830   Number of days: 3       Wound 10/15/21 Ankle Right;Lateral (Active)   Wound Etiology Deep tissue/Injury 10/15/21 0830   Wound Cleansed Not Cleansed 10/15/21 0830   Wound Length (cm) 1 cm 10/15/21 0830   Wound Width (cm) 2 cm 10/15/21 0830   Wound Depth (cm) 0 cm 10/15/21 0830   Wound Surface Area (cm^2) 2 cm^2 10/15/21 0830   Wound Volume (cm^3) 0 cm^3 10/15/21 0830   Distance Tunneling (cm) 0 cm 10/15/21 0830   Tunneling Position ___ O'Clock 0 10/15/21 0830   Undermining Starts ___ O'Clock 0 10/15/21 0830   Undermining Ends___ O'Clock 0 10/15/21 0830   Undermining Maxium Distance (cm) 0 10/15/21 0830   Drainage Amount None 10/15/21 0830   Deena-wound Assessment Intact 10/15/21 0830   Margins Attached edges 10/15/21 0830   Number of days: 0       Wound Dressing/Treatment Hydrofiber;Silicone border 36/03/49 0830   Wound Length (cm) 10 cm 10/15/21 0830   Wound Width (cm) 5 cm 10/15/21 0830   Wound Depth (cm) 0.1 cm 10/15/21 0830   Wound Surface Area (cm^2) 50 cm^2 10/15/21 0830   Wound Volume (cm^3) 5 cm^3 10/15/21 0830   Tunneling Position ___ O'Clock 0 10/15/21 0830   Undermining Starts ___ O'Clock 0 10/15/21 0830   Undermining Ends___ O'Clock 0 10/15/21 0830   Undermining Maxium Distance (cm) 0 10/15/21 0830   Drainage Amount Moderate 10/15/21 0830   Drainage Description Serosanguinous 10/15/21 0830   Odor None 10/15/21 0830   Deena-wound Assessment Intact 10/15/21 0830   Margins Defined edges 10/15/21 0830   Wound Thickness Description not for Pressure Injury Full thickness 10/15/21 0830   Number of days: 0       Response to treatment:  With complaints of pain. Pain Assessment:  Severity:  mild  Quality of pain: sore  Wound Pain Timing/Severity: when turned  Premedicated: no    Plan:     Plan of Care: Wound 10/15/21 Ankle Right;Lateral-Dressing/Treatment:  (optifoam border)  Wound 10/15/21 Thigh Proximal;Right;Posterior-Dressing/Treatment: Open to air  Wound 10/15/21 Thigh Anterior;Right-Dressing/Treatment: Hydrofiber Ag (optiform border)  Wound 10/15/21 Coccyx cluster-Dressing/Treatment: Hydrofiber, Silicone border  Wound 10/12/21 Toe (Comment  which one) Anterior;Right small dry scab to right great toe-Dressing/Treatment: Open to air  Wound 10/08/21 Pretibial Right; Anterior-Dressing/Treatment: Open to air  Wound 10/08/21 Pretibial Left-Dressing/Treatment: Open to air     Patient was in the bathroom asking for help when wound care came in the room. Pt was sitting on the commode. Assisted pt to the bed with stand up steady device. Pt has dry areas to both legs cleansed with NS measured and pictured. Open to air. Pt has deep tissue injury to rt lateral ankle/rt posterior thigh measured and pictured. Dressing as above.  Sacrum with deep tissue injury cleansed with NS measured and pictured. Dressing as above. Rt anterior thigh with open wound appears from hemovac drain measured and pictured dressing as above. Rt great toe with dry brown eschar. Pt turned to  Lt side. Heels red/blanching. Heels floated. Atmos air pump on the bed. Groin folds red/yeasty cleaned and applied micotin powder. Pt is at moderate risk for skin breakdown AEB wayne. Follow wayne orders. Specialty Bed Required : yes  [] Low Air Loss   [x] Pressure Redistribution  [] Fluid Immersion  [] Bariatric  [] Total Pressure Relief  [] Other:     Discharge Plan:  Placement for patient upon discharge:tbd   Hospice Care: no  Patient appropriate for Outpatient [address]: [facility]    Patient/Caregiver Teaching:  Level of patient/caregiver understanding able to: cares explained as given. No evidence of learning. Electronically signed by Mahesh Dougherty.  MYRA Serrano,  on 10/15/2021 at 11:50 AM

## 2021-10-15 NOTE — PROGRESS NOTES
Hygiene  Assistance Needed: Setup or clean-up assistance  Comment: seated EOB  CARE Score: 5  Discharge Goal: Independent    UB/LB Bathing: Shower/Bathe Self  Assistance Needed: Partial/moderate assistance  Comment: required assist for bottom and distal BLEs  CARE Score: 3  Discharge Goal: Partial/moderate assistance    UB Dressing: Upper Body Dressing  Assistance Needed: Partial/moderate assistance  Comment: seated EOB able to thread BUEs and pull overhead, assist to pull down in back  CARE Score: 3  Discharge Goal: Supervision or touching assistance         LB Dressing: Lower Body Dressing  Assistance Needed: Dependent  Comment: to don pants from bed level  CARE Score: 1  Discharge Goal: Partial/moderate assistance    Donning and [place] Footwear: Putting On/Taking Off Footwear  Assistance Needed: Dependent  Comment: to doff/don hospital socks  CARE Score: 1  Discharge Goal: Partial/moderate assistance      Toileting: Toileting Hygiene  Assistance Needed: Dependent  Comment: 2 person assist c use of abdullahi stedy, pt required Mod A x1 for CM, unsuccessful BM. Min A for steadying in stance for CM. Reason if not Attempted: Not attempted due to medical condition or safety concerns  CARE Score: 1  Discharge Goal: Substantial/maximal assistance      Toilet Transfers: Toilet Transfer  Assistance Needed: Dependent  Comment: 2 person assist c use of abdullahi stedy, pt required MIN A x1 SBA X1 for controlled descent onto [facility] frame over toilet. Pt required Min A to maintain sitting posture while seated on toilet 2* pt's increased pain and required BUE support from grab bars.   Reason if not Attempted: Not attempted due to medical condition or safety concerns  CARE Score: 1  Discharge Goal: Partial/moderate assistance    Physical Therapy:   Short term goals  Time Frame for Short term goals: 12-14 tx days:  Short term goal 1: Pt will complete rolling L/R and sit->sup using leg  to assist RLE and using bed features with Min A following posterior hip precautions on RLE. Short term goal 2: Pt will complete sup->sit using bed features and leg  to assist RLE with SBA-Sup following posterior hip precautions on RLE. Short term goal 3: Pt will complete OOB transfers using RW with Min A following posterior hip precautions. Short term goal 4: Pt will ambulate 50 ft with turns over level surface and 10 ft of unevem surface using RW with CGA. Short term goal 5: Pt will ascend/descend 2\" curb step using RW with Min A. Short term goal 6: Pt will complete object retrieval from the floor in standing with 1UE support on RW and using reacher with CGA. Short term goal 7: pt will complete 150 ft of w/c propulsion using manual w/c at mod ind            Bed Mobility:   Sit to Lying  Assistance Needed: Dependent  Comment: Total A x 2 with additional assist on mobley catheter and wound drain line management  CARE Score: 1  Discharge Goal: Partial/moderate assistance  Roll Left and Right  Comment: rolling to L using bed rail required 2-person assist today (had poor tolerance to sustain this position due to pain); refused to attempt rolling to R due to aggravation of pain following rolling to L  Reason if not Attempted: Not attempted due to medical condition or safety concerns  CARE Score: 88  Discharge Goal: Partial/moderate assistance  Lying to Sitting on Side of Bed  Assistance Needed: Dependent  Comment: Max A x 2 using bed features; initiated use of leg  to assist RLE however was unsuccessful to significantly move it over EOB and continued to require passive movement due to pain and impaired strength  CARE Score: 1  Discharge Goal: Supervision or touching assistance    Transfers:    Sit to Stand  Assistance Needed: Dependent  Comment:  Mod A + 2nd person assist providing variable physical assist to shift weight forward and to steady pt upon immediate standing; used RW to complete this transfer  CARE Score: 1  Discharge Goal: Partial/moderate assistance  Chair/Bed-to-Chair Transfer  Assistance Needed: Dependent  Comment: Bebe Gr with 2-person assist for w/c->recliner transfer  CARE Score: 1  Discharge Goal: Partial/moderate assistance  Toilet Transfer  Assistance Needed: Dependent  CARE Score: 1  Car Transfer  Reason if not Attempted: Not attempted due to medical condition or safety concerns  CARE Score: 88  Discharge Goal: Partial/moderate assistance    Ambulation:    Walking Ability  Does the Patient Walk?: Yes     Walk 10 Feet  Comment: pt with poor standing tolerance and unable to actively perform weight shifting today due to pain and anxiety  Reason if not Attempted: Not attempted due to medical condition or safety concerns  CARE Score: 88  Discharge Goal: Supervision or touching assistance     Walk 50 Feet with Two Turns  Reason if not Attempted: Not attempted due to medical condition or safety concerns  CARE Score: 88  Discharge Goal: Supervision or touching assistance     Walk 150 Feet  Comment: had limited mobility since L hip surgery; was not performing this activity at baseline; limited potential to progressing to this mobility task  Reason if not Attempted: Not applicable  CARE Score: 9  Discharge Goal: Not Applicable     Walking 10 Feet on Uneven Surfaces  Reason if not Attempted: Not attempted due to medical condition or safety concerns  CARE Score: 88  Discharge Goal: Partial/moderate assistance     1 Step (Curb)  Comment: pt was not performing this activity at baseline but anticipating that pt can progress to being able to ascend/descend 2\" curb step while at ARU  Reason if not Attempted: Not attempted due to medical condition or safety concerns  CARE Score: 88  Discharge Goal: Partial/moderate assistance     4 Steps  Comment: had limited mobility since L hip surgery; was not performing this activity at baseline; limited potential to progressing to this mobility task  Reason if not Attempted: Not applicable  CARE Score: 9  Discharge Goal: Not Applicable     12 Steps  Comment: had limited mobility since L hip surgery; was not performing this activity at baseline; limited potential to progressing to this mobility task  Reason if not Attempted: Not applicable  CARE Score: 9  Discharge Goal: Not Applicable       Wheelchair:  w/c Ability: Wheelchair Ability  Uses a Wheelchair and/or Scooter?: Yes  Wheel 50 Feet with Two Turns  Assistance Needed: Partial/moderate assistance (min A for completion of last 5-10 ft 2/2 onset of fatigue)  Comment: per verbal report of OT, pt was only able to propel 45 ft with Min A for turns as observed during co-tx on 10/13/2021  CARE Score: 3  Wheel 150 Feet  Assistance Needed: Substantial/maximal assistance  Comment: per verbal report of OT, pt was only able to propel 45 ft with Min A as observed during co-tx on 10/13/2021  CARE Score: 2  Discharge Goal: Independent          Balance:        Object: Picking Up Object  Comment: pt with poor standing tolerance and required BUE support on RW to maintain static standing  Reason if not Attempted: Not attempted due to medical condition or safety concerns  CARE Score: 88  Discharge Goal: Supervision or touching assistance    I      Exam:    Blood pressure (!) 104/58, pulse 68, temperature 98 °F (36.7 °C), resp. rate 18, height 5' 1\" (1.549 m), weight 240 lb 15.4 oz (109.3 kg), last menstrual period 01/23/1995, SpO2 94 %, not currently breastfeeding. General: Reclined in bed. Lower limb slightly elevated. Quiet. HEENT: MMM. No JVD or adenopathy. Pulmonary: No wheezes, rales or coughing. Cardiac: Infrequent premature beats. Overall heart rate is controlled. Abdomen: Patient's abdomen is soft and nondistended. Bowel sounds were present throughout. There was no rebound, guarding or masses noted. Upper extremities: Able to bring both hands up to meet mine. Fair dexterity. Lower extremities: No new swelling or bruising.   Right thigh remains very tender to palpation. Incision dressed and dry. Sitting balance was fair+. Standing balance was poor. Lab Results   Component Value Date    WBC 8.7 10/11/2021    HGB 9.8 (L) 10/11/2021    HCT 31.4 (L) 10/11/2021    MCV 98.1 10/11/2021     10/11/2021     Lab Results   Component Value Date    INR 0.94 10/08/2021    INR 1.06 05/21/2021    INR 1.03 12/17/2016    PROTIME 12.1 10/08/2021    PROTIME 12.8 05/21/2021    PROTIME 11.8 12/17/2016     Lab Results   Component Value Date    CREATININE 4.8 (H) 10/11/2021    BUN 50 (H) 10/11/2021     10/11/2021    K 5.0 10/11/2021     10/11/2021    CO2 21 10/11/2021     Lab Results   Component Value Date    ALT 7 (L) 10/11/2021    AST 35 10/11/2021    ALKPHOS 74 10/11/2021    BILITOT 0.4 10/11/2021       Expected length of stay  prior to a supervised level of function for discharge home with a walker and [address] OT/PT is 10/28/2021. Recommendations:    1. Right femoral neck fracture with hemiarthroplasty:   Anxiety and some fear of falling seems to be limiting activities in her daily occupational and physical therapy.  She has developed some skin fold erythema and Vicodin will be started. Focusing on techniques for self-care activities and mobility tasks following posterior hip precautions. Ongoing adaptive equipment training, range of motion and strength recovery and aggressive pulmonary hygiene. Pain control is not good but her anxieties and difficulty following directions may require us to de-escalate analgesics. Ongoing limb wound care.  Caregiver training is planned if possible. Simpson catheter draining clear urine. Encouraging her to eat.  Verbal cues and moderate physical assistance for transfers and self-care today.   2. DVT prophylaxis: Lovenox 30 mg subcu daily.  I must monitor her hemoglobin and platelet count periodically while on this medication.  Weightbearing activities are a struggle but are attempted daily.  GI prophylaxis is available.  No clinical signs of blood loss. Hemoglobin 9.8.  3. Acute on chronic kidney disease stage IV:   Avoiding nephrotoxic medications when possible.  Encouraging consistent oral hydration.  Periodic monitoring of her chemistries. 4. Lower limb cellulitis with MRSA: Infectious disease has stopped her vancomycin and started Zyvox 600 mg twice a day through 10/19/2021.    Good GI tolerance.  The wound care service is directing her topical lower limb skin care.  Limb elevation.  Intermittent diuretics if needed. 5. Acute on chronic diastolic congestive heart failure: Lopressor for afterload reduction, intermittent use of diuretics and daily weights. Daily weights do not reflect any decompensation. 6. Uncontrolled pain: Scheduled acetaminophen, cryotherapy and narcotics. Some difficulty initiating activities and following directions so we limit oxycodone to 3 or 4 times daily. Progressive mobilization. 7. Atrial fibrillation: Lopressor for rate control.  Her chronic anemia precludes more aggressive anticoagulation.     Counseling and Coordination of Care: In care conference today I met with the patient's OT, PT, RN and . We discussed the patient's problems, progress and prognosis. Disposition issues were clarified and plans were established for ongoing rehabilitation efforts beyond the ARU stay. I reviewed this information with the patient during a second distinct visit with the patient. More than half of the total time of 33 minutes spent with the patient involved counseling and coordination of care.

## 2021-10-15 NOTE — PROGRESS NOTES
Physical Therapy    [x] daily progress note       [] discharge       Patient Name:  Danuta Begum   :  1940 MRN: [id number]  Room:  [address] [place], [place] [address] Date of Admission: 10/11/2021  Rehabilitation Diagnosis:   Fracture of unspecified part of neck of right femur, initial encounter for closed fracture [S72.001A]  Traumatic closed fracture of neck of femur with minimal displacement, right, initial encounter ([facility] [S72.001A]       Date 10/15/2021       Day of ARU Week:  5   Time IN/OUT 1000/1100   Individual Tx Minutes    Group Tx Minutes    Co-Treat Minutes 60  A cotreat was completed this date with  [] PT    [x] OT   [] ST      This pt requires simultaneous intervention of 2 skilled therapists to safely complete tasks to address complexity of deficits defined in the goals including:  []Attention   [x] Safety    [x]Problem Solving    [x]Sequencing     []Initiation    []Processing      [x]Recall of learned tasks  [] Communication  [] Self Feeding   [x]ADL's    []UE Function    [x]Motor planning   [x]Balance  []Energy Conservation   [x]Verbal cues   [] Tactile Cues  []Barriers     [x]Transfers   [x]Device Use   Concurrent Tx Minutes    TOTAL Tx Time Mins 60   Variance Time    Variance Time []   Refusal due to:     []   Medical hold/reason:    []   Illness   []   Off Unit for test/procedure  []   Extra time needed to complete task  []   Therapeutic need  []   Other (specify):   Restrictions Restrictions/Precautions  Restrictions/Precautions: Weight Bearing, Fall Risk, ROM Restrictions, General Precautions  Position Activity Restriction  Hip Precautions: Posterior hip precautions, No hip flexion > 90 degrees, No ADduction, No hip internal rotation  Other position/activity restrictions: mobley catheter, wound drain   Interdisciplinary communication [x]   Cleared for therapy per nursing     []   RN notified about issues during session  []   RN updated on pt performance  []   Spoke with   []   Spoke with OT  []   Spoke with MD  []   Other:    Subjective observations and cognitive status: Pt asleep in dark room, awakens to verbal prompts, states having hardly any sleep due to diarrhea and had a bowel accident in bed, reports feeling upset last night. Pain level/location: 6/10 (at rest in bed)       Location: R hip    Discharge recommendations  Anticipated discharge date:  10/28/2021  Destination: []home alone   []home alone with assist PRN     [x] home w/ family      [] Continuous supervision  []SNF    [] Assisted living     [] Other:  Continued therapy: [x]HHC PT  []OUTPATIENT PT   [] No Further PT  []SNF PT  Caregiver training recommended: [x]Yes  [] No   Equipment needs: has RW and manual w/c      Bed Mobility:           []   Pt received out of bed   Scooting: Max A x 2   Lying --> Sit:  Min A (required assist on RLE)  Sit --> lying: Max A x 2   Bed features used: [] Yes    [x] HOB elevated      [x] Bed rail                                    [] No     Transfers:    Sit--> Stand: SBA from EOB, Mod A from w/c with RW   Stand --> Sit:  CGA towards w/c seat  Bed --> Toilet:   Total A using standing lift equipment due to GI issues requiring quick transfer to the bathroom   Wheelchair->bed: Min A + SBA of 2nd person for safety to complete stand step transfer using RW   Toilet Transfer (if applicable):  SBA with Barry Genin and BSC frame over toilet   Assistive device used for transfer:  Barry Genin, elevated surface height, RW   Pt successfully completed 2 trials of standing with bilat hands pulling on standing lift equipment and 1 trial using RW (required use of trunk momentum to facilitate anterior weight shifting)     Additional Therapeutic activities/exercises completed this date:     []   Nu-step:  Time:        Level:         #Steps:       []   Rebounder:    []  Seated     []  Standing        [x]   Balance training: Static standing with 1UE support on RW ->no UE support with Min A as pt assisted with readjusting LB clothing          [x]   Postural training: verbal cues to increase trunk extension upon immediate standing for increased stability and increased static standing tolerance     []   Supine ther ex (reps/sets):     [x]   Seated ther ex (reps/sets): partial R leg kicks (<50% of full ROM) to be able to doff socks and take part in LB dressing as facilitated by OT using reacher      [x]   Standing ther ex (reps/sets): Bilat Quads activation with facilitated partial standing->upright standing using Barry Genin for increased weight bearing     [x]   Other: Toileting activity completed with 2-person assist (activity facilitated by use of Barry Genin due to pt's decreased standing tolerance)     [x]   Other: Pt positioning in bed to protect R hip, pressure relief, and comfort     Comments:      Patient/Caregiver Education and Training:   []   Role of PT  []   Education about Dx  []   Use of call light for assist   []   HEP provided and explained   [x]   Treatment plan reviewed  []   Home safety  []   Wheelchair mobility/management   []   Body mechanics  [x]   Bed Mobility/Transfer technique  []   Gait technique/sequencing  []   Proper use of assistive device/adaptive equipment  []   Stair training/Advanced mobility safety and technique  [x]   Reinforced patient's precautions/mobility while maintaining precautions  []   Postural awareness  []   Family/caregiver training  []   Progress was updated and reviewed in Rehabtracker with patient and/or family this date. [x]   Other: Pt education about importance of hydration to prevent dehydration from ongoing diarrhea    Treatment Plan for Next Session: transfers and short distance gait training using RW, BLE thera ex      Assessment: This pt demonstrated a positive response to today's treatment as evidenced by decreased physical assist to stand using Barry Genin and being able to progress to stand step transfer using RW.  The patient is making good progress toward established goals as evidenced by QI scores.      Treatment/Activity Tolerance:   [] Tolerated treatment with no adverse effects    [x] Patient limited by fatigue  [x] Patient limited by pain   [] Patient limited by medical complications:    [] Adverse reaction to Tx:   [] Significant change in status    Safety:       [x]  bed alarm set    []  chair alarm set    []  Pt refused alarms                []  Telesitter activated      [x]  Gait belt used during tx session      []other:       Number of Minutes/Billable Intervention  Gait Training    Therapeutic Exercise 30   Neuro Re-Ed    Therapeutic Activity 30   Wheelchair Propulsion    Group    Other:    TOTAL 60       Social History  Social/Functional History  Lives With: Alone (has local family support)  Type of Home: House  Home Layout: Two level, Able to Live on Main level with bedroom/bathroom, Performs ADL's on one level  Home Access: Ramped entrance (bilat rails)  Bathroom Shower/Tub: Walk-in shower  Bathroom Toilet: Handicap height  Bathroom Equipment: Built-in shower seat, Hand-held shower, Grab bars in shower, Grab bars around toilet  Home Equipment: Rolling walker, Ernestine Bybee, [address], [employer], Reacher, Grab bars, Lift chair, Sock aid, Long-handled shoehorn  Receives Help From: Family, Neighbor, Friend(s)  ADL Assistance: Needs assistance (niece supervised pt during ADL (bathing and dressing), otherwise Mod Ind with toileting activity)  Homemaking Responsibilities: Yes  Meal Prep Responsibility: Primary  Laundry Responsibility: Secondary  Cleaning Responsibility: Primary (does not run the sweeper)  Bill Paying/Finance Responsibility: Primary  Shopping Responsibility: Secondary (does grocery list)  [address] No  Health Care Management: Primary  Ambulation Assistance: Independent (Mod Ind using RW (household and limited community distances))  Active : No  Mode of Transportation: Family, Car  Occupation: Retired  Type of occupation: worked in bank  Additional Comments: has a regular, flat bed (tall height); usually sleeps in lift chair; had 2 falls in the past year with injuries requiring surgery    Objective                                                                                    Goals:  (Update in navigator)  Short term goals  Time Frame for Short term goals: 12-14 tx days:  Short term goal 1: Pt will complete rolling L/R and sit->sup using leg  to assist RLE and using bed features with Min A following posterior hip precautions on RLE. Short term goal 2: Pt will complete sup->sit using bed features and leg  to assist RLE with SBA-Sup following posterior hip precautions on RLE. Short term goal 3: Pt will complete OOB transfers using RW with Min A following posterior hip precautions. Short term goal 4: Pt will ambulate 50 ft with turns over level surface and 10 ft of unevem surface using RW with CGA. Short term goal 5: Pt will ascend/descend 2\" curb step using RW with Min A. Short term goal 6: Pt will complete object retrieval from the floor in standing with 1UE support on RW and using reacher with CGA. Short term goal 7: pt will complete 150 ft of w/c propulsion using manual w/c at mod ind:   :        Plan of Care                                                                              Times per week: 5 days per week for a minimum of 60 minutes/day plus group as appropriate for 60 minutes.   Treatment to include Current Treatment Recommendations: Strengthening, Gait Training, Patient/Caregiver Education & Training, ROM, Equipment Evaluation, Education, & procurement, Balance Training, Pain Management, Modalities, Functional Mobility Training, Endurance Training, Home Exercise Program, Positioning, Transfer Training, Wheelchair Mobility Training, Safety Education & Training    Electronically signed by   Julissa Shaver PT  10/15/2021, 9:59 AM

## 2021-10-15 NOTE — FLOWSHEET NOTE
[x] daily progress note       [] discharge       Patient Name:  Yolanda Reyes   :  1940 MRN: [id number]  Room:  [address] [place], [place] [address] Date of Admission: 10/11/2021  Rehabilitation Diagnosis:   Fracture of unspecified part of neck of right femur, initial encounter for closed fracture [S72.001A]  Traumatic closed fracture of neck of femur with minimal displacement, right, initial encounter ([facility] [S72.001A]       Date 10/15/2021       Day of ARU Week:  5   Time IN/OUT 1282-2051   Co-Treat Minutes 30   TOTAL Tx Time Mins 30   Restrictions Restrictions/Precautions  Restrictions/Precautions: Weight Bearing, Fall Risk, ROM Restrictions, General Precautions  Position Activity Restriction  Hip Precautions: Posterior hip precautions, No hip flexion > 90 degrees, No ADduction, No hip internal rotation  Other position/activity restrictions: mobley catheter, wound drain   Communication with other providers: [x]   OK to see per nursing:     [x]   Spoke with RN Brittany Marquez) regarding pt would like pain medication and anti-nausea medication. Subjective observations and cognitive status: Pt seen sitting up in w/c at beginning of treatment. Pt passed from OT (Maryanne). Pt agreeable to therapy. Pt reported increased nausea and pain. Pt tearful at times. Pain level/location: 5/10       Location: right hip    Discharge recommendations  Anticipated discharge date:  10/28/2021  Destination: []?home alone   []?home alone with assist PRN     [x]? home w/ family      []? Continuous supervision  []? SNF    []? Assisted living     []? Other:  Continued therapy: [x]? Leander Knapp PT  []? OUTPATIENT PT   []? No Further PT  []? SNF PT  Caregiver training recommended: [x]? Yes  []? No   Equipment needs: has RW and manual w/c      Bed Mobility:           [x]   Pt received out of bed   Scooting: Max A with Trendelenburg features of hospital bed   Sit --> lying:   Mod A for BLEs (pt very guarded)     Transfers:    Sit--> Stand: CGA  Stand --> Sit: CGA  Chair-->Bed/Bed --> Chair: CGA  Assistive device required for transfer:  RW    Gait:    Distance: 3'  Assistance:  CGA  Device:  RW  Gait Quality:  Antalgic pattern; vcs for proper weight shifting     Patient/Caregiver Education and Training:   [x]   Bed Mobility/Transfer technique/safety  [x]   Gait technique/sequencing  [x]   Proper use of assistive device  []   Advanced mobility safety and technique  [x]   Reinforced patient's precautions/mobility while maintaining precautions  []   Postural awareness  []   Family training    Treatment Plan for Next Session: gait; transfers; exercises      Assessment: This pt demonstrated a positive response to today's treatment as evidenced by improved mobility. The patient is making progress toward established goals as evidenced by QI scores. Ongoing deficits are observed in the areas of strength, balance, and endurance and continued focus on strengthening, balance training, and endurance training is recommended. Treatment/Activity Tolerance:   [] Tolerated treatment with no adverse effects    [] Patient limited by fatigue  [x] Patient limited by pain and nausea    [] Patient limited by medical complications:    [] Adverse reaction to Tx:   [] Significant change in status    Safety:       [x]  bed alarm set    []  chair alarm set    []  Pt refused alarms                []  Telesitter activated      [x]  Gait belt used during tx session      [x]other:  Pt left in semi-gonzalez's position in bed with call light at end of treatment.        Number of Minutes/Billable Intervention  Gait Training    Therapeutic Exercise    Neuro Re-Ed    Therapeutic Activity 30   Wheelchair Propulsion    Group    Other:    TOTAL 30         Social History  Social/Functional History  Lives With: Alone (has local family support)  Type of Home: House  Home Layout: Two level, Able to Live on Main level with bedroom/bathroom, Performs ADL's on one level  Home Access: Ramped entrance (bilat rails)  Bathroom Shower/Tub: Walk-in shower  Bathroom Toilet: Handicap height  Bathroom Equipment: Built-in shower seat, Hand-held shower, Grab bars in shower, Grab bars around toilet  Home Equipment: Rolling walker, [place], [employer], [employer], Reacher, [employer], Lift chair, Sock aid, Long-handled shoehorn  Receives Help From: Family, Neighbor, Friend(s)  ADL Assistance: Needs assistance (niece supervised pt during ADL (bathing and dressing), otherwise Mod Ind with toileting activity)  Homemaking Responsibilities: Yes  Meal Prep Responsibility: Primary  Laundry Responsibility: Secondary  Cleaning Responsibility: Primary (does not run the sweeper)  Bill Paying/Finance Responsibility: Primary  Shopping Responsibility: Secondary (does grocery list)  [address]: No  Health Care Management: Primary  Ambulation Assistance: Independent (Mod Ind using RW (household and limited community distances))  Active : No  Mode of Transportation: Family, Car  Occupation: Retired  Type of occupation: worked in bank  Additional Comments: has a regular, flat bed (tall height); usually sleeps in lift chair; had 2 falls in the past year with injuries requiring surgery    Objective                                                                                    Goals:  (Update in navigator)  Short term goals  Time Frame for Short term goals: 12-14 tx days:  Short term goal 1: Pt will complete rolling L/R and sit->sup using leg  to assist RLE and using bed features with Min A following posterior hip precautions on RLE. Short term goal 2: Pt will complete sup->sit using bed features and leg  to assist RLE with SBA-Sup following posterior hip precautions on RLE. Short term goal 3: Pt will complete OOB transfers using RW with Min A following posterior hip precautions. Short term goal 4: Pt will ambulate 50 ft with turns over level surface and 10 ft of unevem surface using RW with CGA.   Short term goal 5: Pt will ascend/descend 2\" curb step using RW with Min A. Short term goal 6: Pt will complete object retrieval from the floor in standing with 1UE support on RW and using reacher with CGA. Short term goal 7: pt will complete 150 ft of w/c propulsion using manual w/c at mod ind:   :        Plan of Care                                                                              Times per week: 5 days per week for a minimum of 60 minutes/day plus group as appropriate for 60 minutes.   Treatment to include Current Treatment Recommendations: Strengthening, Gait Training, Patient/Caregiver Education & Training, ROM, Equipment Evaluation, Education, & procurement, Balance Training, Pain Management, Modalities, Functional Mobility Training, Endurance Training, Home Exercise Program, Positioning, Transfer Training, Wheelchair Mobility Training, Safety Education & Training    Electronically signed by   Christine Cheatham, [id number]  10/15/2021, 4:33 PM

## 2021-10-15 NOTE — PROGRESS NOTES
Occupational Therapy    Physical Rehabilitation: OCCUPATIONAL THERAPY     [x] daily progress note       [] discharge       Patient Name:  Chana Machuca   :  1940 MRN: [id number]  Room:  [address] [place] [address] Date of Admission: 10/11/2021  Rehabilitation Diagnosis:   Fracture of unspecified part of neck of right femur, initial encounter for closed fracture [S72.001A]  Traumatic closed fracture of neck of femur with minimal displacement, right, initial encounter ([facility] [S72.001A]       Date 10/15/2021       Day of ARU Week:  5   Time IN/OUT [phone]   Individual Tx Minutes 30   Group Tx Minutes    Co-Treat Minutes 60  A cotreat was completed this date with  [x] PT    [] OT   [] ST      This pt requires simultaneous intervention of 2 skilled therapists to safely complete tasks to address complexity of deficits defined in the goals including:  []Attention   [] Safety    []Problem Solving    []Sequencing     []Initiation    []Processing      []Recall of learned tasks  [] Communication  [] Self Feeding   [x]ADL's    []UE Function    []Motor planning   [x]Balance  []Energy Conservation   []Verbal cues   [] Tactile Cues  [x]Barriers     [x]Transfers   [x]Device Use     Concurrent Tx Minutes    TOTAL Tx Time Mins 90   Variance Time    Variance Time []   Refusal due to:     []   Medical hold/reason:    []   Illness   []   Off Unit for test/procedure  []   Extra time needed to complete task  []   Therapeutic need  []   Other (specify):   Restrictions Restrictions/Precautions: Weight Bearing, Fall Risk, ROM Restrictions, General Precautions     Hip Precautions: Posterior hip precautions, No hip flexion > 90 degrees, No ADduction, No hip internal rotation   Communication with other providers: [x]   OK to see per nursing:     []   Spoke with team member regarding:      Subjective observations and cognitive status: Pt in supine position upon arrival. Pt agreeable to OT/PT session.  Pt reporting lack of sleep and unpleasant services from night time staff members. Pt in semi-fowlers upon arrival. Pt reporting no pain at rest. Agreeable to OT sessesion. Pain level/location:    /10       Location:    Discharge recommendations  Anticipated discharge date:  10/28/21  Destination: []home alone   []home alone w assist prn   [x] home w/ family    [x] Continuous supervision       []SNF    [] Assisted living     [] Other:   Continued therapy: [x]HHC OT  []OUTPATIENT  OT   [] No Further OT  Equipment needs: Chana Machuca requires a drop arm bedside commode due to being unable to use the toilet within the home  And confined to a single roomconfined to one level of the home. This patientrequires a side transfer has a body configuration that requires extra width provided by a drop arm commode. ADLs:    Eating: Eating  Assistance Needed: Independent  Comment: able to open packages/containers  CARE Score: 6  Discharge Goal: Independent       Oral Hygiene: Oral Hygiene  Assistance Needed: Independent  Comment: MOD I completed seated sink-side  CARE Score: 6  Discharge Goal: Independent    UB/LB Bathing: Shower/Bathe Self  Assistance Needed: Partial/moderate assistance  Comment: Requires Min A for drying BLE and washing andrew-area. Pt demo use of LHS for washing BLE. CARE Score: 3  Discharge Goal: Partial/moderate assistance    UB Dressing: Upper Body Dressing  Assistance Needed: Partial/moderate assistance  Comment: Min A for donning shirt down back  CARE Score: 3  Discharge Goal: Supervision or touching assistance         LB Dressing: Lower Body Dressing  Assistance Needed: Partial/moderate assistance  Comment: Mod A to thread RLE through depends and pants, Min A to don over hips. Pt demo use of reacher to thread LLE and demo donning over L hip.   CARE Score: 3  Discharge Goal: Partial/moderate assistance    Donning and [place]jo Footwear: Putting On/Taking Off Footwear  Assistance Needed: Dependent  Comment: Pt educated on use of reacher and sock-aid, however d/t time constraints pt required total A to complete task. Anticipating pt will require Min A. CARE Score: 1  Discharge Goal: Partial/moderate assistance      Toileting: Toileting Hygiene  Assistance Needed: Substantial/maximal assistance  Comment: Max A for CM and andrew-hygiene  Reason if not Attempted: Not attempted due to medical condition or safety concerns  CARE Score: 2  Discharge Goal: Substantial/maximal assistance      Toilet Transfers: Toilet Transfer  Assistance Needed: Substantial/maximal assistance  Comment: Max A c use of mariah hannon. Pt demo partial sit<>stand c SBA  Reason if not Attempted: Not attempted due to medical condition or safety concerns  CARE Score: 2  Discharge Goal: Partial/moderate assistance  Device Used:    []   Standard Toilet         []   Grab Bars           []  Bedside Commode       []   Elevated Toilet          []   Other:        Bed Mobility:           [x]   Pt received out of bed     Scooting:  CGA  Supine --> Sit:  SBA-MIN A c RLE lifting off bed, pt demo use of bed features (high fowlers and bed rails)  Sit --> Supine: Total A ( 2 person; Mod-Max x1 for BLE, Mod A for UB)    Transfers:    Sit--> Stand:  SBA c use of abdullahi stedy  Stand --> Sit:   CGA c use of abdullahi stedy  Stand-Pivot:     Other:  Stand Step transfer from w/c> Bed, see PT notes for details. OT at [facility] for safety  Assistive device required for transfer:   Reema Bolaños and LUIGI      Functional Mobility:    Assistance:  To/from bathroom total A  Device:   []   Rolling Walker     []   Standard Dulcie Sicks []   Wheelchair        []   [employer]       []   Amye [place]         []   Cardiac Walker       [x]   Other:Abdullahi Steady         Additional Therapeutic activities/exercises completed this date:     [x]   ADL Training   [x]   Balance/Postural training Pt demo dynamic standing balance required for LB ADLs while in stance c use of abdullahi hannon and RW.     [x]   Bed/Transfer Training   [] Endurance Training   []   Neuromuscular Re-ed   []   Nu-step:  Time:        Level:         #Steps:       []   Rebounder:    []  Seated     []  Standing        []   Supine Ther Ex (reps/sets):     []   Seated Ther Ex (reps/sets):     []   Standing Ther Ex (reps/sets):     []   Other:      Comments:  A.M. and P.M. sessions required completion of ADLs, pt c diahrrea and required use of bathroom for p.m. session. Patient/Caregiver Education and Training:   [x]   Adaptive Equipment Use Training c use of LHS and reacher  []   Bed Mobility/Transfer Technique/Safety  []   Energy Conservation Tips  []   Family training  [x]   Postural Awareness  [x]   Safety During Functional Activities  []   Reinforced Patient's Precautions   []   Progress was updated and reviewed in Rehabtracker with patient and/or family this         date. Treatment Plan for Next Session: cont OT POC; increase standing tolerance, dynamic standing balance for ADLs, sock-aid management      Assessment: This pt demonstrated a positive response to today's treatment as evidenced by completion of therapy session. The patient is making good progress toward established goals as evidenced by QI scores. Ongoing deficits are observed in the areas of decreased ROM 2* to hip precautions requiring compensatory strategies and use of AE and continued focus on these areas required for ADLs/IADLs is recommended.        Treatment/Activity Tolerance:   [x] Tolerated treatment with no adverse effects    [] Patient limited by fatigue  [] Patient limited by pain   [] Patient limited by medical complications:    [] Adverse reaction to Tx:   [] Significant change in status    Safety:       [x]  bed alarm set    [x]  chair alarm set    []  Pt refused alarms                []  Telesitter activated      [x]  Gait belt used during tx session      []other:       Number of Minutes/Billable Intervention  Therapeutic Exercise    ADL Self-care 90   Neuro Re-Ed    Therapeutic Activity    Group    Other:    TOTAL 90       Social History  Social/Functional History  Lives With: Alone (has local family support)  Type of Home: House  Home Layout: Two level, Able to Live on Main level with bedroom/bathroom, Performs ADL's on one level  Home Access: Ramped entrance (bilat rails)  Bathroom Shower/Tub: Walk-in shower  Bathroom Toilet: Handicap height  Bathroom Equipment: Built-in shower seat, Hand-held shower, Grab bars in shower, Grab bars around toilet  Home Equipment: Rolling walker, [employer], [employer], [employer], Reacher, Grab bars, Lift chair, Sock aid, Long-handled shoehorn  Receives Help From: Family, Neighbor, Friend(s)  ADL Assistance: Needs assistance (niece supervised pt during ADL (bathing and dressing), otherwise Mod Ind with toileting activity)  Homemaking Responsibilities: Yes  Meal Prep Responsibility: Primary  Laundry Responsibility: Secondary  Cleaning Responsibility: Primary (does not run the sweeper)  Bill Paying/Finance Responsibility: Primary  Shopping Responsibility: Secondary (does grocery list)  [address] [facility] [address]: No  Health Care Management: Primary  Ambulation Assistance: Independent (Mod Ind using RW (household and limited community distances))  Active : No  Mode of Transportation: Family, Car  Occupation: Retired  Type of occupation: worked in bank  Additional Comments: has a regular, flat bed (tall height); usually sleeps in lift chair; had 2 falls in the past year with injuries requiring surgery    Objective                                                                                    Goals:  (Update in navigator)  Short term goals  Time Frame for Short term goals: STGs=LTGs:  Long term goals  Time Frame for Long term goals : 14-17 days or until d/c  Long term goal 1: Pt will complete grooming tasks Ind seated  Long term goal 2: Pt will complete total body bathing c min A using AE PRN  Long term goal 3: Pt will complete UB dressing c setup  Long term goal 4: Pt will complete LB dressing c mod A using AE  Long term goal 5: Pt will doff/don footwear c min A using AE  Long term goals 6: Pt will complete toileting c max A  Long term goal 7: Pt will complete functional transfers (bed, chair, toilet, shower) c DME PRN and min A  Long term goal 8: Pt will perform therex/therax to facilitate increased strength/endurance/ax tolerance (c emphasis on dynamic standing balance/tolerance > 5 mins, and BUE endurance) c SBA:        Plan of Care                                                                              Times per week: 5 days per week for a minimum of 60 minutes/day plus group as appropriate for 60 minutes.   Treatment to include Plan  Times per day: Daily  Current Treatment Recommendations: Strengthening, Balance Training, Functional Mobility Training, Endurance Training, Pain Management, Safety Education & Training, Patient/Caregiver Education & Training, Equipment Evaluation, Education, & procurement, Positioning, Self-Care / ADL    Electronically signed by   Ayah Fields OT,  10/15/2021, 3:44 PM

## 2021-10-16 PROCEDURE — 94761 N-INVAS EAR/PLS OXIMETRY MLT: CPT

## 2021-10-16 PROCEDURE — 97116 GAIT TRAINING THERAPY: CPT

## 2021-10-16 PROCEDURE — 97110 THERAPEUTIC EXERCISES: CPT

## 2021-10-16 PROCEDURE — 1280000000 HC REHAB R&B

## 2021-10-16 PROCEDURE — 6360000002 HC RX W HCPCS: Performed by: NURSE PRACTITIONER

## 2021-10-16 PROCEDURE — 97530 THERAPEUTIC ACTIVITIES: CPT

## 2021-10-16 PROCEDURE — 97542 WHEELCHAIR MNGMENT TRAINING: CPT

## 2021-10-16 PROCEDURE — 94664 DEMO&/EVAL PT USE INHALER: CPT

## 2021-10-16 PROCEDURE — 94150 VITAL CAPACITY TEST: CPT

## 2021-10-16 PROCEDURE — 6370000000 HC RX 637 (ALT 250 FOR IP): Performed by: NURSE PRACTITIONER

## 2021-10-16 PROCEDURE — 2500000003 HC RX 250 WO HCPCS: Performed by: PHYSICAL MEDICINE & REHABILITATION

## 2021-10-16 PROCEDURE — 6370000000 HC RX 637 (ALT 250 FOR IP): Performed by: PHYSICAL MEDICINE & REHABILITATION

## 2021-10-16 RX ORDER — TRAZODONE HYDROCHLORIDE 50 MG/1
50 TABLET ORAL NIGHTLY PRN
Status: DISCONTINUED | OUTPATIENT
Start: 2021-10-16 | End: 2021-10-18

## 2021-10-16 RX ORDER — PANTOPRAZOLE SODIUM 40 MG/1
40 TABLET, DELAYED RELEASE ORAL
Status: DISCONTINUED | OUTPATIENT
Start: 2021-10-16 | End: 2021-10-22 | Stop reason: HOSPADM

## 2021-10-16 RX ORDER — CALCIUM CARBONATE 200(500)MG
500 TABLET,CHEWABLE ORAL 3 TIMES DAILY PRN
Status: DISCONTINUED | OUTPATIENT
Start: 2021-10-16 | End: 2021-10-22 | Stop reason: HOSPADM

## 2021-10-16 RX ADMIN — HYDROCODONE BITARTRATE AND ACETAMINOPHEN 1 TABLET: 5; 325 TABLET ORAL at 15:10

## 2021-10-16 RX ADMIN — LORAZEPAM 0.25 MG: 0.5 TABLET ORAL at 08:27

## 2021-10-16 RX ADMIN — TRAZODONE HYDROCHLORIDE 50 MG: 50 TABLET ORAL at 22:06

## 2021-10-16 RX ADMIN — ALLOPURINOL 50 MG: 100 TABLET ORAL at 08:28

## 2021-10-16 RX ADMIN — PANTOPRAZOLE SODIUM 40 MG: 40 TABLET, DELAYED RELEASE ORAL at 22:06

## 2021-10-16 RX ADMIN — HYDROCODONE BITARTRATE AND ACETAMINOPHEN 1 TABLET: 5; 325 TABLET ORAL at 22:06

## 2021-10-16 RX ADMIN — METOPROLOL TARTRATE 25 MG: 25 TABLET, FILM COATED ORAL at 21:06

## 2021-10-16 RX ADMIN — METOPROLOL TARTRATE 25 MG: 25 TABLET, FILM COATED ORAL at 08:28

## 2021-10-16 RX ADMIN — ENOXAPARIN SODIUM 30 MG: 30 INJECTION SUBCUTANEOUS at 08:28

## 2021-10-16 RX ADMIN — HYDROCODONE BITARTRATE AND ACETAMINOPHEN 1 TABLET: 5; 325 TABLET ORAL at 11:25

## 2021-10-16 RX ADMIN — MICONAZOLE NITRATE: 2 POWDER TOPICAL at 21:06

## 2021-10-16 RX ADMIN — LEVOTHYROXINE SODIUM 50 MCG: 0.05 TABLET ORAL at 06:30

## 2021-10-16 RX ADMIN — HYDROCODONE BITARTRATE AND ACETAMINOPHEN 1 TABLET: 5; 325 TABLET ORAL at 06:30

## 2021-10-16 RX ADMIN — LINEZOLID 600 MG: 600 TABLET ORAL at 08:27

## 2021-10-16 RX ADMIN — MICONAZOLE NITRATE: 2 POWDER TOPICAL at 09:00

## 2021-10-16 RX ADMIN — ONDANSETRON 4 MG: 4 TABLET, ORALLY DISINTEGRATING ORAL at 15:10

## 2021-10-16 RX ADMIN — LORAZEPAM 0.25 MG: 0.5 TABLET ORAL at 21:07

## 2021-10-16 RX ADMIN — LINEZOLID 600 MG: 600 TABLET ORAL at 21:07

## 2021-10-16 ASSESSMENT — PAIN DESCRIPTION - ONSET
ONSET: ON-GOING

## 2021-10-16 ASSESSMENT — PAIN SCALES - GENERAL
PAINLEVEL_OUTOF10: 7
PAINLEVEL_OUTOF10: 0
PAINLEVEL_OUTOF10: 5
PAINLEVEL_OUTOF10: 0
PAINLEVEL_OUTOF10: 5
PAINLEVEL_OUTOF10: 7
PAINLEVEL_OUTOF10: 5
PAINLEVEL_OUTOF10: 0

## 2021-10-16 ASSESSMENT — PAIN DESCRIPTION - LOCATION
LOCATION: ABDOMEN
LOCATION: ABDOMEN
LOCATION: HIP

## 2021-10-16 ASSESSMENT — PAIN DESCRIPTION - ORIENTATION
ORIENTATION: RIGHT
ORIENTATION: RIGHT
ORIENTATION: RIGHT;LEFT

## 2021-10-16 ASSESSMENT — PAIN DESCRIPTION - PAIN TYPE
TYPE: SURGICAL PAIN
TYPE: SURGICAL PAIN

## 2021-10-16 ASSESSMENT — PAIN DESCRIPTION - PROGRESSION: CLINICAL_PROGRESSION: NOT CHANGED

## 2021-10-16 ASSESSMENT — PAIN DESCRIPTION - FREQUENCY
FREQUENCY: INTERMITTENT

## 2021-10-16 ASSESSMENT — PAIN - FUNCTIONAL ASSESSMENT
PAIN_FUNCTIONAL_ASSESSMENT: ACTIVITIES ARE NOT PREVENTED
PAIN_FUNCTIONAL_ASSESSMENT: ACTIVITIES ARE NOT PREVENTED
PAIN_FUNCTIONAL_ASSESSMENT: PREVENTS OR INTERFERES SOME ACTIVE ACTIVITIES AND ADLS

## 2021-10-16 ASSESSMENT — PAIN DESCRIPTION - DESCRIPTORS
DESCRIPTORS: DISCOMFORT
DESCRIPTORS: DISCOMFORT
DESCRIPTORS: ACHING

## 2021-10-16 NOTE — PROGRESS NOTES
Jennings Nyhan    : 1940  Acct #: [de-identified]  MRN: [id number]              PM&R Progress Note      Admitting diagnosis: Right femoral neck fracture ( [address])     Comorbid diagnoses impacting rehabilitation: Uncontrolled pain, generalized weakness, gait disturbance, acute blood loss anemia, lower limb MRSA cellulitis, essential hypertension, paroxysmal atrial fibrillation, chronic kidney disease stage IV, acute on chronic diastolic congestive heart failure, morbid obesity (BMI 45.2), GERD, history of left hip fracture in May of 2021.     Chief complaint: Limited appetite but no nausea or vomiting. Some loose bowel movements have caused her to take in less oral bowel intervention. Prior (baseline) level of function: Independent.     Current level of function:         Current  IRF-SARAH and Goals:   Occupational Therapy:    Short term goals  Time Frame for Short term goals: STGs=LTGs :   Long term goals  Time Frame for Long term goals : 14-17 days or until d/c  Long term goal 1: Pt will complete grooming tasks Ind seated  Long term goal 2: Pt will complete total body bathing c min A using AE PRN  Long term goal 3: Pt will complete UB dressing c setup  Long term goal 4: Pt will complete LB dressing c mod A using AE  Long term goal 5: Pt will doff/don footwear c min A using AE  Long term goals 6: Pt will complete toileting c max A  Long term goal 7: Pt will complete functional transfers (bed, chair, toilet, shower) c DME PRN and min A  Long term goal 8: Pt will perform therex/therax to facilitate increased strength/endurance/ax tolerance (c emphasis on dynamic standing balance/tolerance > 5 mins, and BUE endurance) c SBA :                                       Eating: Eating  Assistance Needed: Independent  Comment: able to open packages/containers  CARE Score: 6  Discharge Goal: Independent       Oral Hygiene: Oral Hygiene  Assistance Needed: Independent  Comment: MOD I completed seated sink-side  CARE Score: 6  Discharge Goal: Independent    UB/LB Bathing: Shower/Bathe Self  Assistance Needed: Partial/moderate assistance  Comment: Requires Min A for drying BLE and washing andrew-area. Pt demo use of LHS for washing BLE. CARE Score: 3  Discharge Goal: Partial/moderate assistance    UB Dressing: Upper Body Dressing  Assistance Needed: Partial/moderate assistance  Comment: Min A for donning shirt down back  CARE Score: 3  Discharge Goal: Supervision or touching assistance         LB Dressing: Lower Body Dressing  Assistance Needed: Partial/moderate assistance  Comment: Mod A to thread RLE through depends and pants, Min A to don over hips. Pt demo use of reacher to thread LLE and demo donning over L hip. CARE Score: 3  Discharge Goal: Partial/moderate assistance    Donning and Slater Footwear: Putting On/Taking Off Footwear  Assistance Needed: Dependent  Comment: Pt educated on use of reacher and sock-aid, however d/t time constraints pt required total A to complete task. Anticipating pt will require Min A. CARE Score: 1  Discharge Goal: Partial/moderate assistance      Toileting: Toileting Hygiene  Assistance Needed: Substantial/maximal assistance  Comment: Max A for CM and andrew-hygiene  Reason if not Attempted: Not attempted due to medical condition or safety concerns  CARE Score: 2  Discharge Goal: Substantial/maximal assistance      Toilet Transfers: Toilet Transfer  Assistance Needed: Substantial/maximal assistance  Comment: Max A c use of mariah stedy. Pt demo partial sit<>stand c SBA  Reason if not Attempted: Not attempted due to medical condition or safety concerns  CARE Score: 2  Discharge Goal: Partial/moderate assistance    Physical Therapy:   Short term goals  Time Frame for Short term goals: 12-14 tx days:  Short term goal 1: Pt will complete rolling L/R and sit->sup using leg  to assist RLE and using bed features with Min A following posterior hip precautions on RLE.   Short term goal 2: Pt will complete sup->sit using bed features and leg  to assist RLE with SBA-Sup following posterior hip precautions on RLE. Short term goal 3: Pt will complete OOB transfers using RW with Min A following posterior hip precautions. Short term goal 4: Pt will ambulate 50 ft with turns over level surface and 10 ft of unevem surface using RW with CGA. Short term goal 5: Pt will ascend/descend 2\" curb step using RW with Min A. Short term goal 6: Pt will complete object retrieval from the floor in standing with 1UE support on RW and using reacher with CGA. Short term goal 7: pt will complete 150 ft of w/c propulsion using manual w/c at mod ind            Bed Mobility:   Sit to Lying  Assistance Needed: Dependent  Comment: Total A x 2 with additional assist on mobley catheter and wound drain line management  CARE Score: 1  Discharge Goal: Partial/moderate assistance  Roll Left and Right  Comment: rolling to L using bed rail required 2-person assist today (had poor tolerance to sustain this position due to pain); refused to attempt rolling to R due to aggravation of pain following rolling to L  Reason if not Attempted: Not attempted due to medical condition or safety concerns  CARE Score: 88  Discharge Goal: Partial/moderate assistance  Lying to Sitting on Side of Bed  Assistance Needed: Dependent  Comment: Max A x 2 using bed features; initiated use of leg  to assist RLE however was unsuccessful to significantly move it over EOB and continued to require passive movement due to pain and impaired strength  CARE Score: 1  Discharge Goal: Supervision or touching assistance    Transfers:    Sit to Stand  Assistance Needed: Dependent  Comment:  Mod A + 2nd person assist providing variable physical assist to shift weight forward and to steady pt upon immediate standing; used RW to complete this transfer  CARE Score: 1  Discharge Goal: Partial/moderate assistance  Chair/Bed-to-Chair Transfer  Assistance Needed: Dependent  Comment: Dayna Goes with 2-person assist for w/c->recliner transfer  CARE Score: 1  Discharge Goal: Partial/moderate assistance  Toilet Transfer  Assistance Needed: Dependent  CARE Score: 1  Car Transfer  Reason if not Attempted: Not attempted due to medical condition or safety concerns  CARE Score: 88  Discharge Goal: Partial/moderate assistance    Ambulation:    Walking Ability  Does the Patient Walk?: Yes     Walk 10 Feet  Comment: pt with poor standing tolerance and unable to actively perform weight shifting today due to pain and anxiety  Reason if not Attempted: Not attempted due to medical condition or safety concerns  CARE Score: 88  Discharge Goal: Supervision or touching assistance     Walk 50 Feet with Two Turns  Reason if not Attempted: Not attempted due to medical condition or safety concerns  CARE Score: 88  Discharge Goal: Supervision or touching assistance     Walk 150 Feet  Comment: had limited mobility since L hip surgery; was not performing this activity at baseline; limited potential to progressing to this mobility task  Reason if not Attempted: Not applicable  CARE Score: 9  Discharge Goal: Not Applicable     Walking 10 Feet on Uneven Surfaces  Reason if not Attempted: Not attempted due to medical condition or safety concerns  CARE Score: 88  Discharge Goal: Partial/moderate assistance     1 Step (Curb)  Comment: pt was not performing this activity at baseline but anticipating that pt can progress to being able to ascend/descend 2\" curb step while at ARU  Reason if not Attempted: Not attempted due to medical condition or safety concerns  CARE Score: 88  Discharge Goal: Partial/moderate assistance     4 Steps  Comment: had limited mobility since L hip surgery; was not performing this activity at baseline; limited potential to progressing to this mobility task  Reason if not Attempted: Not applicable  CARE Score: 9  Discharge Goal: Not Applicable     12 Steps  Comment: had limited mobility since L hip surgery; was not performing this activity at baseline; limited potential to progressing to this mobility task  Reason if not Attempted: Not applicable  CARE Score: 9  Discharge Goal: Not Applicable       Wheelchair:  w/c Ability: Wheelchair Ability  Uses a Wheelchair and/or Scooter?: Yes  Wheel 50 Feet with Two Turns  Assistance Needed: Partial/moderate assistance (min A for completion of last 5-10 ft 2/2 onset of fatigue)  Comment: per verbal report of OT, pt was only able to propel 45 ft with Min A for turns as observed during co-tx on 10/13/2021  CARE Score: 3  Wheel 150 Feet  Assistance Needed: Substantial/maximal assistance  Comment: per verbal report of OT, pt was only able to propel 45 ft with Min A as observed during co-tx on 10/13/2021  CARE Score: 2  Discharge Goal: Independent          Balance:        Object: Picking Up Object  Comment: pt with poor standing tolerance and required BUE support on RW to maintain static standing  Reason if not Attempted: Not attempted due to medical condition or safety concerns  CARE Score: 88  Discharge Goal: Supervision or touching assistance    I      Exam:    Blood pressure (!) 125/52, pulse 77, temperature 98.1 °F (36.7 °C), temperature source Oral, resp. rate 15, height 5' 1\" (1.549 m), weight 237 lb 7 oz (107.7 kg), last menstrual period 01/23/1995, SpO2 98 %, not currently breastfeeding. General: Semiupright in bed. Seems anxious. Insight is not good. HEENT: Full visual field. MMM. Clear speech. Pulmonary: Symmetric air exchange without coughing. Cardiac: Controlled rate. Occasional premature beat. Abdomen: Patient's abdomen is soft and nondistended. Bowel sounds were present throughout. There was no rebound, guarding or masses noted. Upper extremities: No signs of DVT. Lower extremities: No new swelling or bruising. Her incisions clean and dry. Lower leg erythema seems a little less intense.     Sitting balance was good. Standing balance was poor. Lab Results   Component Value Date    WBC 11.7 (H) 10/15/2021    HGB 8.3 (L) 10/15/2021    HCT 27.1 (L) 10/15/2021    .4 (H) 10/15/2021     10/15/2021     Lab Results   Component Value Date    INR 0.94 10/08/2021    INR 1.06 05/21/2021    INR 1.03 12/17/2016    PROTIME 12.1 10/08/2021    PROTIME 12.8 05/21/2021    PROTIME 11.8 12/17/2016     Lab Results   Component Value Date    CREATININE 4.8 (H) 10/11/2021    BUN 50 (H) 10/11/2021     10/11/2021    K 5.0 10/11/2021     10/11/2021    CO2 21 10/11/2021     Lab Results   Component Value Date    ALT 7 (L) 10/11/2021    AST 35 10/11/2021    ALKPHOS 74 10/11/2021    BILITOT 0.4 10/11/2021       Expected length of stay  prior to a supervised level of function for discharge home with a walker and [facility] OT/PT is 10/28/2021. Recommendations:    1. Right femoral neck fracture with hemiarthroplasty: She seems to struggle to process instructions from the therapy team.  This may be her anxieties or it may be a reaction to the analgesics. I will start low-dose lorazepam and change her oxycodone to hydrocodone. She is requiring significant cueing during the daily occupational and physical therapy.  She has developed some skin fold erythema and Vicodin will be started.  Reinforcing techniques for self-care activities and mobility tasks following posterior hip precautions. Emphasizing adaptive equipment training, range of motion and strength recovery and aggressive pulmonary hygiene. Wound care team is directing her limb wound care.  Caregiver training is planned if possible.  Simpson catheter draining clear urine. Verbal cues and moderate physical assistance for transfers and self-care again today.   2. DVT prophylaxis: Lovenox 30 mg subcu daily.  I must monitor her hemoglobin and platelet count periodically while on this medication.  Weightbearing activities are a struggle but are attempted daily. [employer] prophylaxis is available.  No new bruising or swelling. Hemoglobin down to 8.3. Monitoring closely. 3. Acute on chronic kidney disease stage IV:   Avoiding nephrotoxic medications when possible.  Encouraging consistent oral hydration.  Periodic monitoring of her chemistries. 4. Lower limb cellulitis with MRSA: Infectious disease has stopped her vancomycin and started Zyvox 600 mg twice a day through 10/19/2021.    Good GI tolerance.  The wound care service is directing her topical lower limb skin care.  Limb elevation.  Intermittent diuretics if needed. 5. Acute on chronic diastolic congestive heart failure: Lopressor for afterload reduction, intermittent use of diuretics and daily weights.  Daily weights do not reflect any decompensation. 6. Uncontrolled pain: Scheduled acetaminophen, cryotherapy and narcotics.  Some difficulty initiating activities and following directions so we limit oxycodone to 3 or 4 times daily. Progressive mobilization.   7. Atrial fibrillation: Lopressor for rate control.  Her chronic anemia precludes more aggressive anticoagulation.

## 2021-10-16 NOTE — PROGRESS NOTES
Physical Therapy    [x] daily progress note       [x] discharge       Patient Name:  Bossman Quevedo   :  1940 MRN: [id number]  Room:  [address] [place], [place] [address]A Date of Admission: 10/11/2021  Rehabilitation Diagnosis:   Fracture of unspecified part of neck of right femur, initial encounter for closed fracture [S72.001A]  Traumatic closed fracture of neck of femur with minimal displacement, right, initial encounter ([facility] [S72.001A]       Date 10/16/2021       Day of ARU Week:  6   Time IN/OUT In 1300 Out 1400   Individual Tx Minutes 60   Group Tx Minutes    Co-Treat Minutes    Concurrent Tx Minutes    TOTAL Tx Time Mins 60   Variance Time 0   Variance Time []   Refusal due to:     []   Medical hold/reason:    []   Illness   []   Off Unit for test/procedure  []   Extra time needed to complete task  []   Therapeutic need  []   Other (specify):   Restrictions Restrictions/Precautions  Restrictions/Precautions: Weight Bearing, Fall Risk, ROM Restrictions, General Precautions  Position Activity Restriction  Hip Precautions: Posterior hip precautions, No hip flexion > 90 degrees, No ADduction, No hip internal rotation  Other position/activity restrictions: mobley catheter, wound drain   Communication with other providers: [x]   OK to see per nursing:     []   Spoke with team member regarding:      Subjective observations and cognitive status: Pt on toilet w/ nursing aide in bathroom. Pt in tears d/t pain and anxiety. Pt agreeable to therapy today but requires mod-max encouragement throughout. Pain level/location:    10       Location: R Hip   Discharge recommendations  Anticipated discharge date:  10/28/2021  Destination: []??home alone   []? ?home alone with assist PRN     [x]? ? home w/ family      []? ? Continuous supervision  []? ?SNF    []? ? Assisted living     []? ? Other:  Continued therapy: [x]? ?HHC PT  []??OUTPATIENT PT   []? ? No Further PT  []? ?SNF PT  Caregiver training recommended: [x]? ? Yes  []? ?[employer] needs: has RW and manual w/c          Transfers:    Sit--> Stand:  Kaila  Stand --> Sit:   Kaila  Toilet Transfer (if applicable): Kaila x2   Assistive device required for transfer:   2ww     Gait:    Distance:  3x5ft using 2ww w/ CGA. Sock donned over the \"toe portion\" of pt's R slipper today to provide a slick surface for easier RLE advancement during swing phase. Device:  2ww  Gait Quality:  Slow pace, antalgic. Pt demos better advancement of RLE after sock was donned over top of slipper. Pain and anxiety limit distance today. Lengthy rest breaks required between amb trials. Wheelchair Propulsion:  Wheel 25 feet with 2 turns: SBA w/ min cues for safety awareness when turning. Extremities Used:   BUE. Type:    [x]  Manual        []  Electric:      Patient/Caregiver Education and Training:   []   Bed Mobility/Transfer technique/safety  [x]   Gait technique/sequencing  [x]   Proper use of assistive device  []   Advanced mobility safety and technique  [x]   Reinforced patient's precautions/mobility while maintaining precautions  []   Postural awareness  []   Family training  []   Progress was updated and reviewed in Rehabtracker with patient and/or family this date. Treatment Plan for Next Session: gait, transfers, exes, etc.    Assessment:  Pt limited by pain and anxiety today. Mod-Max encouragement required throughout for pt to cont. Participating. Pt c/o 9/10 pain level in R hip upon completion of treatment. Pt cont. To require skilled PT to improve functional ind.      Treatment/Activity Tolerance:   [x] Tolerated treatment with no adverse effects    [x] Patient limited by fatigue  [x] Patient limited by pain   [] Patient limited by medical complications:    [] Adverse reaction to Tx:   [] Significant change in status    Safety:       []  bed alarm set    [x]  chair alarm set    []  Pt refused alarms                []  Telesitter activated      [x]  Gait belt used during tx session      []other: Number of Minutes/Billable Intervention  Gait Training 30   Therapeutic Exercise    Neuro Re-Ed    Therapeutic Activity 15   Wheelchair Propulsion 15   Group    Other:    TOTAL 60         Social History  Social/Functional History  Lives With: Alone (has local family support)  Type of Home: House  Home Layout: Two level, Able to Live on Main level with bedroom/bathroom, Performs ADL's on one level  Home Access: Ramped entrance (bilat rails)  Bathroom Shower/Tub: Walk-in shower  Bathroom Toilet: Handicap height  Bathroom Equipment: Built-in shower seat, Hand-held shower, Grab bars in shower, Grab bars around toilet  Home Equipment: Rolling walker, [employer], [address], [employer], Reacher, Grab bars, Lift chair, Sock aid, Long-handled shoehorn  Receives Help From: Family, Neighbor, Friend(s)  ADL Assistance: Needs assistance (niece supervised pt during ADL (bathing and dressing), otherwise Mod Ind with toileting activity)  Homemaking Responsibilities: Yes  Meal Prep Responsibility: Primary  Laundry Responsibility: Secondary  Cleaning Responsibility: Primary (does not run the sweeper)  Bill Paying/Finance Responsibility: Primary  Shopping Responsibility: Secondary (does grocery list)  [address]: No  Health Care Management: Primary  Ambulation Assistance: Independent (Mod Ind using RW (household and limited community distances))  Active : No  Mode of Transportation: Family, Car  Occupation: Retired  Type of occupation: worked in bank  Additional Comments: has a regular, flat bed (tall height); usually sleeps in lift chair; had 2 falls in the past year with injuries requiring surgery    Objective                                                                                    Goals:  (Update in navigator)  Short term goals  Time Frame for Short term goals: 12-14 tx days:  Short term goal 1: Pt will complete rolling L/R and sit->sup using leg  to assist RLE and using bed features with Min A following posterior hip precautions on RLE. Short term goal 2: Pt will complete sup->sit using bed features and leg  to assist RLE with SBA-Sup following posterior hip precautions on RLE. Short term goal 3: Pt will complete OOB transfers using RW with Min A following posterior hip precautions. Short term goal 4: Pt will ambulate 50 ft with turns over level surface and 10 ft of unevem surface using RW with CGA. Short term goal 5: Pt will ascend/descend 2\" curb step using RW with Min A. Short term goal 6: Pt will complete object retrieval from the floor in standing with 1UE support on RW and using reacher with CGA. Short term goal 7: pt will complete 150 ft of w/c propulsion using manual w/c at mod ind:   :        Plan of Care                                                                              Times per week: 5 days per week for a minimum of 60 minutes/day plus group as appropriate for 60 minutes.   Treatment to include Current Treatment Recommendations: Strengthening, Gait Training, Patient/Caregiver Education & Training, ROM, Equipment Evaluation, Education, & procurement, Balance Training, Pain Management, Modalities, Functional Mobility Training, Endurance Training, Home Exercise Program, Positioning, Transfer Training, Wheelchair Mobility Training, Safety Education & Training    Electronically signed by   Radha Bai PTA,  10/16/2021, 1:11 PM     Times corrected by Larry Dockery PT   10/18/2021     8:31

## 2021-10-16 NOTE — PROGRESS NOTES
Occupational Therapy    Physical Rehabilitation: OCCUPATIONAL THERAPY     [x] daily progress note       [] discharge       Patient Name:  Janice Engel   :  1940 MRN: [id number]  Room:  [address] [place] [address] Date of Admission: 10/11/2021  Rehabilitation Diagnosis:   Fracture of unspecified part of neck of right femur, initial encounter for closed fracture [S72.001A]  Traumatic closed fracture of neck of femur with minimal displacement, right, initial encounter ([facility] [S72.001A]       Date 10/16/2021       Day of ARU Week:  6   Time IN/OUT 14:03 - 15:03   Individual Tx Minutes 60   Group Tx Minutes    Co-Treat Minutes    Concurrent Tx Minutes    TOTAL Tx Time Mins 60   Variance Time    Variance Time []   Refusal due to:     []   Medical hold/reason:    []   Illness   []   Off Unit for test/procedure  []   Extra time needed to complete task  []   Therapeutic need  []   Other (specify):   Restrictions Restrictions/Precautions: Weight Bearing, Fall Risk, ROM Restrictions, General Precautions     Hip Precautions: Posterior hip precautions, No hip flexion > 90 degrees, No ADduction, No hip internal rotation   Communication with other providers: [x]   OK to see per nursing:     []   Spoke with team member regarding:      Subjective observations and cognitive status: Pt in wheelchair with family member present. Pt tearful and nausous because of the pain, but participated in all requested activities to the best of her ability. Pain level/location:   9 /10       Location: R. Hip  Nurse contacted and pt isn't due for pain medication for another 1.5 hours.    Discharge recommendations  Anticipated discharge date: 10/28/2021  Destination: []home alone   []home alone w assist prn   [x] home w/ family    [x] Continuous supervision       []SNF    [] Assisted living     [] Other:   Continued therapy: [x][facility] OT  []OUTPATIENT  OT   [] No Further OT  Equipment needs: Janice Pump requires a drop arm bedside commode due to being unable to use the toilet within the home  And confined to a single roomconfined to one level of the home. This patientrequires a side transfer has a body configuration that requires extra width provided by a drop arm commode. Bed Mobility:           [x]   Pt received out of bed   Scooting: Min A  Sit --> Supine: Max A; Assist to lift BLEs onto the bed and move her shoulders to the middle of the bed   Comment: Multiple extended rest breaks needed d/t fatigue and pain. Transfers:    Sit--> Stand: Min A; 2 transfers completed  Stand --> Sit:  Min A; 2 transfers completed  Comment: Multiple extended rest breaks needed d/t fatigue and pain. Additional Therapeutic activities/exercises completed this date:     [x]   ADL Training: Pt independently completed oral hygiene at the sink. []   Balance/Postural training     []   Bed/Transfer Training   []   Endurance Training   []   Neuromuscular Re-ed   []   Nu-step:  Time:        Level:         #Steps:       []   Rebounder:    []  Seated     []  Standing        []   Supine Ther Ex (reps/sets):     [x]   Seated Ther Ex (reps/sets): With green theraband, pt completed 10 exercise x10 repetitions on BUEs for strengthening. Improved BUE strengthening needed to improve her independence with transfers. Multiple extended rest breaks needed d/t fatigue and pain. []   Standing Ther Ex (reps/sets):     []   Other:    Patient/Caregiver Education and Training:   []   Adaptive Equipment Use  []   Bed Mobility/Transfer Technique/Safety  [x]   Energy Conservation Tips: Pt educated on PLBing d/t her being short of breath and breathing through her mouth. Pt also educated on the benefits of fueling her body with food.   []   Family training  []   Postural Awareness  []   Safety During Functional Activities  [x]   Reinforced Patient's Precautions: Pt able to independently identify 2/3 precautions   []   Progress was updated and reviewed in Rehabtracker with patient and/or family this         date. Treatment Plan for Next Session: Endurance     Assessment: This pt demonstrated a positive  response to today's treatment as evidenced by her ability to complete transfers with 1 therapist. The patient is making fair progress toward established goals as evidenced by QI scores. Ongoing deficits are observed in the areas of her endurance. Treatment/Activity Tolerance:   [] Tolerated treatment with no adverse effects    [x] Patient limited by fatigue  [x] Patient limited by pain   [] Patient limited by medical complications:    [] Adverse reaction to Tx:   [] Significant change in status    Safety:       [x]  bed alarm set    []  chair alarm set    []  Pt refused alarms                []  Telesitter activated      [x]  Gait belt used during tx session      [x]other: Call light and phone within reach. Family member still present.       Number of Minutes/Billable Intervention  Therapeutic Exercise 30 Minutes   ADL Self-care    Neuro Re-Ed    Therapeutic Activity 30 Minutes   Group    Other:    TOTAL 60 Minutes       Social History  Social/Functional History  Lives With: Alone (has local family support)  Type of Home: House  Home Layout: Two level, Able to Live on Main level with bedroom/bathroom, Performs ADL's on one level  Home Access: Ramped entrance (bilat rails)  Bathroom Shower/Tub: Walk-in shower  Bathroom Toilet: Handicap height  Bathroom Equipment: Built-in shower seat, Hand-held shower, Grab bars in shower, Grab bars around toilet  Home Equipment: Rolling walker, [employer], [place], [employer], Reacher, Grab bars, Lift chair, Sock aid, Long-handled shoehorn  Receives Help From: Family, Neighbor, Friend(s)  ADL Assistance: Needs assistance (niece supervised pt during ADL (bathing and dressing), otherwise Mod Ind with toileting activity)  Homemaking Responsibilities: Yes  Meal Prep Responsibility: Primary  Laundry Responsibility: Secondary  Cleaning Responsibility: Primary

## 2021-10-17 PROCEDURE — 1280000000 HC REHAB R&B

## 2021-10-17 PROCEDURE — 94761 N-INVAS EAR/PLS OXIMETRY MLT: CPT

## 2021-10-17 PROCEDURE — 94150 VITAL CAPACITY TEST: CPT

## 2021-10-17 PROCEDURE — 6370000000 HC RX 637 (ALT 250 FOR IP): Performed by: PHYSICAL MEDICINE & REHABILITATION

## 2021-10-17 PROCEDURE — 6360000002 HC RX W HCPCS: Performed by: NURSE PRACTITIONER

## 2021-10-17 PROCEDURE — 6370000000 HC RX 637 (ALT 250 FOR IP): Performed by: NURSE PRACTITIONER

## 2021-10-17 PROCEDURE — 2500000003 HC RX 250 WO HCPCS: Performed by: PHYSICAL MEDICINE & REHABILITATION

## 2021-10-17 RX ADMIN — LORAZEPAM 0.25 MG: 0.5 TABLET ORAL at 13:17

## 2021-10-17 RX ADMIN — LORAZEPAM 0.25 MG: 0.5 TABLET ORAL at 22:42

## 2021-10-17 RX ADMIN — LEVOTHYROXINE SODIUM 50 MCG: 0.05 TABLET ORAL at 05:45

## 2021-10-17 RX ADMIN — ENOXAPARIN SODIUM 30 MG: 30 INJECTION SUBCUTANEOUS at 10:03

## 2021-10-17 RX ADMIN — MICONAZOLE NITRATE: 2 POWDER TOPICAL at 13:18

## 2021-10-17 RX ADMIN — MICONAZOLE NITRATE: 2 POWDER TOPICAL at 22:51

## 2021-10-17 RX ADMIN — ALLOPURINOL 50 MG: 100 TABLET ORAL at 10:02

## 2021-10-17 RX ADMIN — HYDROCODONE BITARTRATE AND ACETAMINOPHEN 1 TABLET: 5; 325 TABLET ORAL at 22:45

## 2021-10-17 RX ADMIN — LINEZOLID 600 MG: 600 TABLET ORAL at 10:02

## 2021-10-17 RX ADMIN — METOPROLOL TARTRATE 25 MG: 25 TABLET, FILM COATED ORAL at 10:02

## 2021-10-17 RX ADMIN — PANTOPRAZOLE SODIUM 40 MG: 40 TABLET, DELAYED RELEASE ORAL at 05:45

## 2021-10-17 RX ADMIN — LINEZOLID 600 MG: 600 TABLET ORAL at 22:42

## 2021-10-17 RX ADMIN — HYDROCODONE BITARTRATE AND ACETAMINOPHEN 1 TABLET: 5; 325 TABLET ORAL at 15:14

## 2021-10-17 RX ADMIN — METOPROLOL TARTRATE 25 MG: 25 TABLET, FILM COATED ORAL at 22:42

## 2021-10-17 ASSESSMENT — PAIN SCALES - GENERAL
PAINLEVEL_OUTOF10: 0
PAINLEVEL_OUTOF10: 10
PAINLEVEL_OUTOF10: 0
PAINLEVEL_OUTOF10: 10

## 2021-10-17 ASSESSMENT — PAIN DESCRIPTION - ONSET: ONSET: ON-GOING

## 2021-10-17 ASSESSMENT — PAIN DESCRIPTION - PROGRESSION: CLINICAL_PROGRESSION: GRADUALLY IMPROVING

## 2021-10-17 ASSESSMENT — PAIN DESCRIPTION - DESCRIPTORS: DESCRIPTORS: ACHING;DISCOMFORT

## 2021-10-17 ASSESSMENT — PAIN DESCRIPTION - LOCATION: LOCATION: HIP

## 2021-10-17 ASSESSMENT — PAIN DESCRIPTION - PAIN TYPE: TYPE: ACUTE PAIN

## 2021-10-17 ASSESSMENT — PAIN DESCRIPTION - ORIENTATION: ORIENTATION: RIGHT

## 2021-10-17 ASSESSMENT — PAIN DESCRIPTION - FREQUENCY: FREQUENCY: INTERMITTENT

## 2021-10-18 LAB
ANION GAP SERPL CALCULATED.3IONS-SCNC: 12 MMOL/L (ref 4–16)
BUN BLDV-MCNC: 63 MG/DL (ref 6–23)
CALCIUM SERPL-MCNC: 8.3 MG/DL (ref 8.3–10.6)
CHLORIDE BLD-SCNC: 103 MMOL/L (ref 99–110)
CO2: 18 MMOL/L (ref 21–32)
CREAT SERPL-MCNC: 2.4 MG/DL (ref 0.6–1.1)
GFR AFRICAN AMERICAN: 24 ML/MIN/1.73M2
GFR NON-AFRICAN AMERICAN: 19 ML/MIN/1.73M2
GLUCOSE BLD-MCNC: 116 MG/DL (ref 70–99)
HCT VFR BLD CALC: 23.1 % (ref 37–47)
HEMOGLOBIN: 7 GM/DL (ref 12.5–16)
MCH RBC QN AUTO: 31.1 PG (ref 27–31)
MCHC RBC AUTO-ENTMCNC: 30.3 % (ref 32–36)
MCV RBC AUTO: 102.7 FL (ref 78–100)
PDW BLD-RTO: 19.5 % (ref 11.7–14.9)
PLATELET # BLD: 218 K/CU MM (ref 140–440)
PMV BLD AUTO: 9 FL (ref 7.5–11.1)
POTASSIUM SERPL-SCNC: 4.6 MMOL/L (ref 3.5–5.1)
RBC # BLD: 2.25 M/CU MM (ref 4.2–5.4)
SODIUM BLD-SCNC: 133 MMOL/L (ref 135–145)
WBC # BLD: 12.8 K/CU MM (ref 4–10.5)

## 2021-10-18 PROCEDURE — 1280000000 HC REHAB R&B

## 2021-10-18 PROCEDURE — 80048 BASIC METABOLIC PNL TOTAL CA: CPT

## 2021-10-18 PROCEDURE — 97530 THERAPEUTIC ACTIVITIES: CPT

## 2021-10-18 PROCEDURE — 2500000003 HC RX 250 WO HCPCS: Performed by: PHYSICAL MEDICINE & REHABILITATION

## 2021-10-18 PROCEDURE — 6360000002 HC RX W HCPCS: Performed by: NURSE PRACTITIONER

## 2021-10-18 PROCEDURE — 97535 SELF CARE MNGMENT TRAINING: CPT

## 2021-10-18 PROCEDURE — 6370000000 HC RX 637 (ALT 250 FOR IP): Performed by: NURSE PRACTITIONER

## 2021-10-18 PROCEDURE — 6370000000 HC RX 637 (ALT 250 FOR IP): Performed by: PHYSICAL MEDICINE & REHABILITATION

## 2021-10-18 PROCEDURE — 94761 N-INVAS EAR/PLS OXIMETRY MLT: CPT

## 2021-10-18 PROCEDURE — 99232 SBSQ HOSP IP/OBS MODERATE 35: CPT | Performed by: PHYSICAL MEDICINE & REHABILITATION

## 2021-10-18 PROCEDURE — 36415 COLL VENOUS BLD VENIPUNCTURE: CPT

## 2021-10-18 PROCEDURE — 85027 COMPLETE CBC AUTOMATED: CPT

## 2021-10-18 PROCEDURE — 94150 VITAL CAPACITY TEST: CPT

## 2021-10-18 RX ORDER — ACETAMINOPHEN 500 MG
1000 TABLET ORAL
Status: DISCONTINUED | OUTPATIENT
Start: 2021-10-18 | End: 2021-10-22 | Stop reason: HOSPADM

## 2021-10-18 RX ADMIN — MICONAZOLE NITRATE: 2 POWDER TOPICAL at 08:58

## 2021-10-18 RX ADMIN — TRAZODONE HYDROCHLORIDE 50 MG: 50 TABLET ORAL at 00:49

## 2021-10-18 RX ADMIN — LINEZOLID 600 MG: 600 TABLET ORAL at 08:55

## 2021-10-18 RX ADMIN — ACETAMINOPHEN 1000 MG: 500 TABLET, FILM COATED ORAL at 21:04

## 2021-10-18 RX ADMIN — ENOXAPARIN SODIUM 30 MG: 30 INJECTION SUBCUTANEOUS at 08:54

## 2021-10-18 RX ADMIN — ALLOPURINOL 50 MG: 100 TABLET ORAL at 08:55

## 2021-10-18 RX ADMIN — HYDROCODONE BITARTRATE AND ACETAMINOPHEN 1 TABLET: 5; 325 TABLET ORAL at 08:55

## 2021-10-18 RX ADMIN — ACETAMINOPHEN 1000 MG: 500 TABLET, FILM COATED ORAL at 16:41

## 2021-10-18 RX ADMIN — LORAZEPAM 0.25 MG: 0.5 TABLET ORAL at 08:55

## 2021-10-18 RX ADMIN — METOPROLOL TARTRATE 25 MG: 25 TABLET, FILM COATED ORAL at 08:55

## 2021-10-18 RX ADMIN — HYDROCODONE BITARTRATE AND ACETAMINOPHEN 1 TABLET: 5; 325 TABLET ORAL at 13:14

## 2021-10-18 RX ADMIN — MICONAZOLE NITRATE: 2 POWDER TOPICAL at 21:04

## 2021-10-18 RX ADMIN — LINEZOLID 600 MG: 600 TABLET ORAL at 21:05

## 2021-10-18 ASSESSMENT — PAIN SCALES - GENERAL
PAINLEVEL_OUTOF10: 0
PAINLEVEL_OUTOF10: 7
PAINLEVEL_OUTOF10: 0
PAINLEVEL_OUTOF10: 4
PAINLEVEL_OUTOF10: 0
PAINLEVEL_OUTOF10: 4
PAINLEVEL_OUTOF10: 5

## 2021-10-18 ASSESSMENT — PAIN DESCRIPTION - PAIN TYPE: TYPE: ACUTE PAIN

## 2021-10-18 ASSESSMENT — PAIN DESCRIPTION - PROGRESSION: CLINICAL_PROGRESSION: GRADUALLY IMPROVING

## 2021-10-18 ASSESSMENT — PAIN DESCRIPTION - LOCATION: LOCATION: HIP

## 2021-10-18 ASSESSMENT — PAIN DESCRIPTION - ORIENTATION: ORIENTATION: RIGHT

## 2021-10-18 ASSESSMENT — PAIN DESCRIPTION - DESCRIPTORS: DESCRIPTORS: ACHING;DISCOMFORT

## 2021-10-18 ASSESSMENT — PAIN DESCRIPTION - ONSET: ONSET: ON-GOING

## 2021-10-18 ASSESSMENT — PAIN DESCRIPTION - FREQUENCY: FREQUENCY: INTERMITTENT

## 2021-10-18 NOTE — PROGRESS NOTES
Occupational Therapy    Physical Rehabilitation: OCCUPATIONAL THERAPY     [x] daily progress note       [] discharge       Patient Name:  Kiet Mitchell   :  1940 MRN: [id number]  Room:  [address] [place], [place] [address] Date of Admission: 10/11/2021  Rehabilitation Diagnosis:   Fracture of unspecified part of neck of right femur, initial encounter for closed fracture [S72.001A]  Traumatic closed fracture of neck of femur with minimal displacement, right, initial encounter [facility] [S72.001A]       Date 10/18/2021       Day of ARU Week:  1   Time IN/OUT 6717-8774   Individual Tx Minutes 40   Group Tx Minutes    Co-Treat Minutes    Concurrent Tx Minutes    TOTAL Tx Time Mins 40   Variance Time -20   Variance Time [x]   Refusal due to: Increased pain in hip, as pt was seated in w/c for 4 hours prior to OT entering for p.m. session. []   Medical hold/reason:    []   Illness   []   Off Unit for test/procedure  []   Extra time needed to complete task  []   Therapeutic need  []   Other (specify):   Restrictions Restrictions/Precautions: Weight Bearing, Fall Risk, ROM Restrictions, General Precautions     Hip Precautions: Posterior hip precautions, No hip flexion > 90 degrees, No ADduction, No hip internal rotation   Communication with other providers: []   OK to see per nursing:     []   Spoke with team member regarding:      Subjective observations and cognitive status: Pt seated in w/c upon arrival. Pt reports, \"The lady said she will help me get back to bed in a little bit, but that time never came. \" With communication from [facility] staff, pt appears to not have pressed call light to return back to bed. Pt c/o pain in R hips rating at 8/10 requesting for return to bed.    Pain level/location:    /10       Location:    Discharge recommendations  Anticipated discharge date:  10/28  Destination: []home alone   []home alone w assist prn   [x] home w/ family    [x] Continuous supervision       []SNF    [] Assisted living     [] Other: Continued therapy: [x][facility] OT  []OUTPATIENT  OT   [] No Further OT   Equipment needs: Elsy Hancock a drop arm bedside commode due to being unable to use the toilet within the home  And confined to a single roomconfined to one level of the home.  This patientrequires a side transfer has a body configuration that requires extra width provided by a drop arm commode. ADLs:     LB Dressing: Lower Body Dressing  Assistance Needed: Partial/moderate assistance  Comment: Mod A to don over hips when in stance, pt able to thread BLE through pant leg c use of reacher and min A to raise RLE. CARE Score: 3  Discharge Goal: Partial/moderate assistance    UB Dressing: Min A c donning shirt down back.      Bed Mobility:           [x]   Pt received out of bed   Sit --> Supine:  2 Person Assist; for LB/UB positioning 2* increased pain and fatigue this date    Transfers:    Sit--> Stand: 2 Person Assist; for LB/UB positioning 2* increased pain and fatigue this date  c use of abdullahi stedy    Stand --> Sit:   Mod A x1, CGA x1  Other:    Assistive device required for transfer:   Smitty Epley       Additional Therapeutic activities/exercises completed this date:     [x]   ADL Training   [x]   Balance/Postural training     []   Bed/Transfer Training   []   Endurance Training   []   Neuromuscular Re-ed   []   Nu-step:  Time:        Level:         #Steps:       []   Rebounder:    []  Seated     []  Standing        []   Supine Ther Ex (reps/sets):     []   Seated Ther Ex (reps/sets):     []   Standing Ther Ex (reps/sets):     []   Other:      Comments:      Patient/Caregiver Education and Training:   []   [employer] Equipment Use  []   Bed Mobility/Transfer Technique/Safety  [x]   Energy Conservation Tips  []   Family training  [x]   Postural Awareness  [x]   Safety During Functional Activities  []   Reinforced Patient's Precautions   []   Progress was updated and reviewed in Rehabtracker with patient and/or family this date.    Treatment Plan for Next Session: increase OOB activities, standing tolerance/ endurance, pain management      Assessment: This pt demonstrated a positive response to today's treatment as evidenced by completion of therapy sesion. The patient is making fair progress toward established goals as evidenced by QI scores. Ongoing deficits are observed in the areas of pain related to hip which are impacting pt's standing tolerance, functional transfers,  and ADL performance and continued focus on these areas is recommended.        Treatment/Activity Tolerance:   [x] Tolerated treatment with no adverse effects    [] Patient limited by fatigue  [] Patient limited by pain   [] Patient limited by medical complications:    [] Adverse reaction to Tx:   [] Significant change in status    Safety:       [x]  bed alarm set    []  chair alarm set    []  Pt refused alarms                []  Telesitter activated      [x]  Gait belt used during tx session      []other:       Number of Minutes/Billable Intervention  Therapeutic Exercise    ADL Self-care 30   Neuro Re-Ed    Therapeutic Activity 10   Group    Other:    TOTAL 40       Social History  Social/Functional History  Lives With: Alone (has local family support)  Type of Home: House  Home Layout: Two level, Able to Live on Main level with bedroom/bathroom, Performs ADL's on one level  Home Access: Ramped entrance (bilat rails)  Bathroom Shower/Tub: Walk-in shower  Bathroom Toilet: Handicap height  Bathroom Equipment: Built-in shower seat, Hand-held shower, Grab bars in shower, Grab bars around toilet  Home Equipment: Rolling walker, [place], [employer], [employer], Reacher, Grab bars, Lift chair, Sock aid, Long-handled Suman Clayton Help From: Family, Neighbor, Friend(s)  ADL Assistance: Needs assistance (niece supervised pt during ADL (bathing and dressing), otherwise Mod Ind with toileting activity)  Homemaking Responsibilities: Yes  Meal Prep Responsibility: Primary  Laundry Responsibility: Secondary  Cleaning Responsibility: Primary (does not run the sweeper)  Bill Paying/Finance Responsibility: Primary  Shopping Responsibility: Secondary (does grocery list)  Dependent Care Responsibility: No  Health Care Management: Primary  Ambulation Assistance: Independent (Mod Ind using RW (household and limited community distances))  Active : No  Mode of Transportation: Family, Car  Occupation: Retired  Type of occupation: worked in bank  Additional Comments: has a regular, flat bed (tall height); usually sleeps in lift chair; had 2 falls in the past year with injuries requiring surgery    Objective                                                                                    Goals:  (Update in navigator)  Short term goals  Time Frame for Short term goals: STGs=LTGs:  Long term goals  Time Frame for Long term goals : 14-17 days or until d/c  Long term goal 1: Pt will complete grooming tasks Ind seated  Long term goal 2: Pt will complete total body bathing c min A using AE PRN  Long term goal 3: Pt will complete UB dressing c setup  Long term goal 4: Pt will complete LB dressing c mod A using AE  Long term goal 5: Pt will doff/don footwear c min A using AE  Long term goals 6: Pt will complete toileting c max A  Long term goal 7: Pt will complete functional transfers (bed, chair, toilet, shower) c DME PRN and min A  Long term goal 8: Pt will perform therex/therax to facilitate increased strength/endurance/ax tolerance (c emphasis on dynamic standing balance/tolerance > 5 mins, and BUE endurance) c SBA:        Plan of Care                                                                              Times per week: 5 days per week for a minimum of 60 minutes/day plus group as appropriate for 60 minutes.   Treatment to include Plan  Times per day: Daily  Current Treatment Recommendations: Strengthening, Balance Training, Functional Mobility Training, Endurance Training, Pain Management, Safety Education & Training, Patient/Caregiver Education & Training, Equipment Evaluation, Education, & procurement, Positioning, Self-Care / ADL    Electronically signed by   Pham Matos OT,  10/18/2021, 2:06 PM

## 2021-10-18 NOTE — PROGRESS NOTES
Comprehensive Nutrition Assessment    Type and Reason for Visit:  Reassess    Nutrition Recommendations/Plan:   Add standard oral nutrition supplements TID  Please document all po intake  Will closely monitor po intake, weight trends, poc    Nutrition Assessment:  Pt remains on regular diet with standard oral nutrition supplements ordered BID, pt consuming varied po intake in the past 72 hr per documentation (0-100%), pt out of room at time of visit, noted weight loss since admission, will continue to monitor at high nutrition risk    Malnutrition Assessment:  Malnutrition Status: At risk for malnutrition (Comment)    Context:  Acute Illness       Estimated Daily Nutrient Needs:  Energy (kcal):  5717-4842; Weight Used for Energy Requirements:  Current     Protein (g):  57-69 (1.2-1.4 g/kg); Weight Used for Protein Requirements:  Ideal        Fluid (ml/day):  1700; Method Used for Fluid Requirements:  1 ml/kcal      Wounds:  Multiple, Open Wounds       Current Nutrition Therapies:    ADULT DIET;  Regular  Adult Oral Nutrition Supplement; Standard High Calorie/High Protein Oral Supplement    Anthropometric Measures:  · Height: 5' 0.98\" (154.9 cm)  · Current Body Weight: 231 lb 0.7 oz (104.8 kg)   · Admission Body Weight: 238 lb 15.7 oz (108.4 kg) (first measured weight)    · Usual Body Weight: 250 lb (113.4 kg) (per hx in May)     · Ideal Body Weight: 105 lbs; % Ideal Body Weight 220 %   · BMI: 43.7  · BMI Categories: Obese Class 3 (BMI 40.0 or greater)       Nutrition Diagnosis:   · Inadequate oral intake related to inadequate protein-energy intake as evidenced by weight loss    Nutrition Interventions:   Food and/or Nutrient Delivery:  Continue Current Diet, Modify Oral Nutrition Supplement  Nutrition Education/Counseling:  No recommendation at this time   Coordination of Nutrition Care:  Continue to monitor while inpatient    Goals:  pt will consistently consume greater than 50-75% of meals and supplements

## 2021-10-18 NOTE — PROGRESS NOTES
Faith Landaverde    : 1940  Acct #: [de-identified]  MRN: [id number]              PM&R Progress Note      Admitting diagnosis: Right femoral neck fracture ( [place] [address])     Comorbid diagnoses impacting rehabilitation: Uncontrolled pain, generalized weakness, gait disturbance, acute blood loss anemia, lower limb MRSA cellulitis, essential hypertension, paroxysmal atrial fibrillation, chronic kidney disease stage IV, acute on chronic diastolic congestive heart failure, morbid obesity (BMI 45.2), GERD, history of left hip fracture in May of 2021.     Chief complaint: The patient is quite confused but not agitated. Tearful. Reports various pains somewhat inconsistently. Prior (baseline) level of function: Independent.     Current level of function:         Current  IRF-SARAH and Goals:   Occupational Therapy:    Short term goals  Time Frame for Short term goals: STGs=LTGs :   Long term goals  Time Frame for Long term goals : 14-17 days or until d/c  Long term goal 1: Pt will complete grooming tasks Ind seated  Long term goal 2: Pt will complete total body bathing c min A using AE PRN  Long term goal 3: Pt will complete UB dressing c setup  Long term goal 4: Pt will complete LB dressing c mod A using AE  Long term goal 5: Pt will doff/don footwear c min A using AE  Long term goals 6: Pt will complete toileting c max A  Long term goal 7: Pt will complete functional transfers (bed, chair, toilet, shower) c DME PRN and min A  Long term goal 8: Pt will perform therex/therax to facilitate increased strength/endurance/ax tolerance (c emphasis on dynamic standing balance/tolerance > 5 mins, and BUE endurance) c SBA :                                       Eating: Eating  Assistance Needed: Independent  Comment: able to open packages/containers  CARE Score: 6  Discharge Goal: Independent       Oral Hygiene: Oral Hygiene  Assistance Needed: Independent  Comment: MOD I completed seated sink-side  CARE Score: 6  Discharge Goal: Independent    UB/LB Bathing: Shower/Bathe Self  Assistance Needed: Partial/moderate assistance  Comment: Requires Min A for drying BLE and washing andrew-area. Pt demo use of LHS for washing BLE. CARE Score: 3  Discharge Goal: Partial/moderate assistance    UB Dressing: Upper Body Dressing  Assistance Needed: Partial/moderate assistance  Comment: Min A for donning shirt down back  CARE Score: 3  Discharge Goal: Supervision or touching assistance         LB Dressing: Lower Body Dressing  Assistance Needed: Partial/moderate assistance  Comment: Mod A to don over hips when in stance, pt able to thread BLE through pant leg c use of reacher and min A to raise RLE. CARE Score: 3  Discharge Goal: Partial/moderate assistance    Donning and [place] Footwear: Putting On/Taking Off Footwear  Assistance Needed: Dependent  Comment: Pt educated on use of reacher and sock-aid, however d/t time constraints pt required total A to complete task. Anticipating pt will require Min A. CARE Score: 1  Discharge Goal: Partial/moderate assistance      Toileting: Toileting Hygiene  Assistance Needed: Substantial/maximal assistance  Comment: Max A for CM and andrew-hygiene  Reason if not Attempted: Not attempted due to medical condition or safety concerns  CARE Score: 2  Discharge Goal: Substantial/maximal assistance      Toilet Transfers: Toilet Transfer  Assistance Needed: Substantial/maximal assistance  Comment: Max A c use of mariahghulam hannon. Pt demo partial sit<>stand c SBA  Reason if not Attempted: Not attempted due to medical condition or safety concerns  CARE Score: 2  Discharge Goal: Partial/moderate assistance    Physical Therapy:   Short term goals  Time Frame for Short term goals: 12-14 tx days:  Short term goal 1: Pt will complete rolling L/R and sit->sup using leg  to assist RLE and using bed features with Min A following posterior hip precautions on RLE.   Short term goal 2: Pt will complete sup->sit using bed features and leg  to assist RLE with SBA-Sup following posterior hip precautions on RLE. Short term goal 3: Pt will complete OOB transfers using RW with Min A following posterior hip precautions. Short term goal 4: Pt will ambulate 50 ft with turns over level surface and 10 ft of unevem surface using RW with CGA. Short term goal 5: Pt will ascend/descend 2\" curb step using RW with Min A. Short term goal 6: Pt will complete object retrieval from the floor in standing with 1UE support on RW and using reacher with CGA. Short term goal 7: pt will complete 150 ft of w/c propulsion using manual w/c at mod ind            Bed Mobility:   Sit to Lying  Assistance Needed: Dependent  Comment: Total A x 2 with additional assist on mobley catheter and wound drain line management  CARE Score: 1  Discharge Goal: Partial/moderate assistance  Roll Left and Right  Comment: rolling to L using bed rail required 2-person assist today (had poor tolerance to sustain this position due to pain); refused to attempt rolling to R due to aggravation of pain following rolling to L  Reason if not Attempted: Not attempted due to medical condition or safety concerns  CARE Score: 88  Discharge Goal: Partial/moderate assistance  Lying to Sitting on Side of Bed  Assistance Needed: Dependent  Comment: Max A x 2 using bed features; initiated use of leg  to assist RLE however was unsuccessful to significantly move it over EOB and continued to require passive movement due to pain and impaired strength  CARE Score: 1  Discharge Goal: Supervision or touching assistance    Transfers:    Sit to Stand  Assistance Needed: Dependent  Comment:  Mod A + 2nd person assist providing variable physical assist to shift weight forward and to steady pt upon immediate standing; used RW to complete this transfer  CARE Score: 1  Discharge Goal: Partial/moderate assistance  Chair/Bed-to-Chair Transfer  Assistance Needed: Dependent  Comment: Leonardo Mojica with 2-person assist for w/c->recliner transfer  CARE Score: 1  Discharge Goal: Partial/moderate assistance  Toilet Transfer  Assistance Needed: Dependent  CARE Score: 1  Car Transfer  Reason if not Attempted: Not attempted due to medical condition or safety concerns  CARE Score: 88  Discharge Goal: Partial/moderate assistance    Ambulation:    Walking Ability  Does the Patient Walk?: Yes     Walk 10 Feet  Comment: pt with poor standing tolerance and unable to actively perform weight shifting today due to pain and anxiety  Reason if not Attempted: Not attempted due to medical condition or safety concerns  CARE Score: 88  Discharge Goal: Supervision or touching assistance     Walk 50 Feet with Two Turns  Reason if not Attempted: Not attempted due to medical condition or safety concerns  CARE Score: 88  Discharge Goal: Supervision or touching assistance     Walk 150 Feet  Comment: had limited mobility since L hip surgery; was not performing this activity at baseline; limited potential to progressing to this mobility task  Reason if not Attempted: Not applicable  CARE Score: 9  Discharge Goal: Not Applicable     Walking 10 Feet on Uneven Surfaces  Reason if not Attempted: Not attempted due to medical condition or safety concerns  CARE Score: 88  Discharge Goal: Partial/moderate assistance     1 Step (Curb)  Comment: pt was not performing this activity at baseline but anticipating that pt can progress to being able to ascend/descend 2\" curb step while at ARU  Reason if not Attempted: Not attempted due to medical condition or safety concerns  CARE Score: 88  Discharge Goal: Partial/moderate assistance     4 Steps  Comment: had limited mobility since L hip surgery; was not performing this activity at baseline; limited potential to progressing to this mobility task  Reason if not Attempted: Not applicable  CARE Score: 9  Discharge Goal: Not Applicable     12 Steps  Comment: had limited mobility since L hip surgery; was not performing this activity at baseline; limited potential to progressing to this mobility task  Reason if not Attempted: Not applicable  CARE Score: 9  Discharge Goal: Not Applicable       Wheelchair:  w/c Ability: Wheelchair Ability  Uses a Wheelchair and/or Scooter?: Yes  Wheel 50 Feet with Two Turns  Assistance Needed: Partial/moderate assistance (min A for completion of last 5-10 ft 2/2 onset of fatigue)  Comment: per verbal report of OT, pt was only able to propel 45 ft with Min A for turns as observed during co-tx on 10/13/2021  CARE Score: 3  Wheel 150 Feet  Assistance Needed: Substantial/maximal assistance  Comment: per verbal report of OT, pt was only able to propel 45 ft with Min A as observed during co-tx on 10/13/2021  CARE Score: 2  Discharge Goal: Independent          Balance:        Object: Picking Up Object  Comment: pt with poor standing tolerance and required BUE support on RW to maintain static standing  Reason if not Attempted: Not attempted due to medical condition or safety concerns  CARE Score: 88  Discharge Goal: Supervision or touching assistance    I      Exam:    Blood pressure (!) 105/58, pulse 68, temperature 97.5 °F (36.4 °C), temperature source Oral, resp. rate 18, height 5' 0.98\" (1.549 m), weight 231 lb 0.7 oz (104.8 kg), last menstrual period 01/23/1995, SpO2 99 %, not currently breastfeeding. General: Observed sitting in a bedside chair and then lying back in bed. Oriented to name only. Inconsistently following one-step commands. No agitation. Tearful. HEENT: MMM. No JVD. Full visual fields. Pulmonary: Unlabored with blunted breath sounds inferiorly. Cardiac: Infrequent premature beats. The rate is controlled. Abdomen: Patient's abdomen is soft and nondistended. Bowel sounds were present throughout. There was no rebound, guarding or masses noted. Upper extremities: Moves her upper limbs though functional range of motion. No new bruising or swelling. No tremor.     Lower extremities: Right hip and thigh are swollen and tender as expected. Staples approximate the skin edges well. Some serous drainage. No signs of DVT. Sitting balance was poor. Standing balance was poor. Lab Results   Component Value Date    WBC 11.7 (H) 10/15/2021    HGB 8.3 (L) 10/15/2021    HCT 27.1 (L) 10/15/2021    .4 (H) 10/15/2021     10/15/2021     Lab Results   Component Value Date    INR 0.94 10/08/2021    INR 1.06 05/21/2021    INR 1.03 12/17/2016    PROTIME 12.1 10/08/2021    PROTIME 12.8 05/21/2021    PROTIME 11.8 12/17/2016     Lab Results   Component Value Date    CREATININE 4.8 (H) 10/11/2021    BUN 50 (H) 10/11/2021     10/11/2021    K 5.0 10/11/2021     10/11/2021    CO2 21 10/11/2021     Lab Results   Component Value Date    ALT 7 (L) 10/11/2021    AST 35 10/11/2021    ALKPHOS 74 10/11/2021    BILITOT 0.4 10/11/2021       Expected length of stay  prior to a supervised level of function for discharge home with a walker and [facility] OT/PT is 10/28/2021. Recommendations:    1. Right femoral neck fracture with hemiarthroplasty: The patient is very confused and a bit lethargic today. A mild tranquilizer and sleep aid were added over the weekend and this seems to have caused her significant problems. I will stop the trazodone and the lorazepam.  Also will decrease her narcotic. Very limited engagement in her daily occupational and physical therapy. We still need to train her to do self-care activities and mobility tasks following posterior hip precautions. Ongoing adaptive equipment training, range of motion and strength recovery and aggressive pulmonary hygiene. Pain management, monitoring of her hemoglobin and heart rate and lower limb wound care. DVT prophylaxis. Bowel and bladder retraining. Nutritional support. Maximum verbal cues and maximum physical assistance needed for mobility today. 2. DVT prophylaxis: Lovenox 30 mg subcu daily.   I must monitor her hemoglobin and platelet count periodically while on this medication. Weightbearing activities are limited but will be tried daily. GI prophylaxis is available. No signs of acute blood loss. Checking labs today. 3. Acute on chronic kidney disease stage IV: Consulting nephrology to monitor her renal failure. Avoiding nephrotoxic medications when possible. Encouraging consistent oral hydration. Periodic monitoring of her chemistries. 4. Lower limb cellulitis with MRSA: Infectious disease has stopped her vancomycin and started Zyvox 600 mg twice a day through 10/19/2021. The wound care service is directing her topical care. Limb elevation. Intermittent diuretics. 5. Acute on chronic diastolic congestive heart failure: Lopressor for afterload reduction, intermittent use of diuretics and daily weights. 6. Uncontrolled pain: Scheduled acetaminophen, cryotherapy and narcotics. I will decrease her narcotic load to improve her mentation. Progressive mobilization. 7. Atrial fibrillation: Lopressor for rate control. Her chronic anemia precludes more aggressive anticoagulation.

## 2021-10-18 NOTE — PROGRESS NOTES
Occupational Therapy  Physical Rehabilitation: OCCUPATIONAL THERAPY     [x] daily progress note       [] discharge       Patient Name:  Sara Cuevas   :  1940 MRN: [id number]  Room:  [address] [place], [place] [address]A Date of Admission: 10/11/2021  Rehabilitation Diagnosis:   Fracture of unspecified part of neck of right femur, initial encounter for closed fracture [S72.001A]  Traumatic closed fracture of neck of femur with minimal displacement, right, initial encounter ([facility] [S72.001A]       Date 10/18/2021       Day of ARU Week:  1   Time IN/OUT 0   Individual Tx Minutes    Group Tx Minutes    Co-Treat Minutes    Concurrent Tx Minutes    TOTAL Tx Time Mins 0   Variance Time -60   Variance Time []   Refusal due to:     []   Medical hold/reason:    []   Illness   []   Off Unit for test/procedure  []   Extra time needed to complete task  []   Therapeutic need  []   Other (specify):   Restrictions Restrictions/Precautions: Weight Bearing, Fall Risk, ROM Restrictions, General Precautions     Hip Precautions: Posterior hip precautions, No hip flexion > 90 degrees, No ADduction, No hip internal rotation   Communication with other providers: [x]   OK to see per nursing:     [x]   Spoke with Sherrlyn Olszewski RN regarding concerns listed below for pt's continuing confusion and lack of participation      Subjective observations and cognitive status: Pt in bed on approach, quickly upset and in tears within seconds. Pt reporting \"these guys over there (unclear if pt is hallucinating or if she was referring to [facility] as pt later referenced Dr Jeffrey Abdullahi) said I need to have another surgery\". Therapist reassured pt there is no reference of this in her chart or indication that something has happened to require this, however pt was not convinced.   Also attempted to orient pt as she [facility] and referenced needing to return to [facility]; however when therapist told her [facility] pt stated \"I know that, don't talk to me like I'm an idiot\". Pt refused all activity including bed level exercises d/t need to talk to Dr about surgery and was not redirectable. Pain level/location:    /10       Location:    Discharge recommendations  Anticipated discharge date:  10/28  Destination: []?home alone   []?home alone w assist prn   [x]? home w/ family    [x]? Continuous supervision       []? SNF    []? Assisted living     []? Other:   Continued therapy: [x]? Leander Knapp OT  []? OUTPATIENT  OT   []? No Further OT   Equipment needs: Norberto Ache a drop arm bedside commode due to being unable to use the toilet within the home  And confined to a single roomconfined to one level of the home.  This patientrequires a side transfer has a body configuration that requires extra width provided by a drop arm commode.        Treatment/Activity Tolerance:   [] Tolerated treatment with no adverse effects    [] Patient limited by fatigue  [] Patient limited by pain   [x] Patient limited by medical complications: Confusion: refused   [] Adverse reaction to Tx:   [] Significant change in status    Safety:       [x]  bed alarm set    []  chair alarm set    []  Pt refused alarms                []  Telesitter activated      []  Gait belt used during tx session      []other:       Number of Minutes/Billable Intervention  Therapeutic Exercise    ADL Self-care    Neuro Re-Ed    Therapeutic Activity    Group    Other:    TOTAL 0       Social History  Social/Functional History  Lives With: Alone (has local family support)  Type of Home: House  Home Layout: Two level, Able to Live on Main level with bedroom/bathroom, Performs ADL's on one level  Home Access: Ramped entrance (bilat rails)  Bathroom Shower/Tub: Walk-in shower  Bathroom Toilet: Handicap height  Bathroom Equipment: Built-in shower seat, Hand-held shower, Grab bars in shower, Grab bars around toilet  Home Equipment: Rolling walker, [place], [employer], [employer], Reacher, Grab bars, Lift chair, Sock aid, Long-handled shoehorn  Receives Help From: Family, Neighbor, Friend(s)  ADL Assistance: Needs assistance (niece supervised pt during ADL (bathing and dressing), otherwise Mod Ind with toileting activity)  Homemaking Responsibilities: Yes  Meal Prep Responsibility: Primary  Laundry Responsibility: Secondary  Cleaning Responsibility: Primary (does not run the sweeper)  Bill Paying/Finance Responsibility: Primary  Shopping Responsibility: Secondary (does grocery list)  [address]: No  Health Care Management: Primary  Ambulation Assistance: Independent (Mod Ind using RW (household and limited community distances))  Active : No  Mode of Transportation: Family, Car  Occupation: Retired  Type of occupation: worked in bank  Additional Comments: has a regular, flat bed (tall height); usually sleeps in lift chair; had 2 falls in the past year with injuries requiring surgery    Objective                                                                                    Goals:  (Update in navigator)  Short term goals  Time Frame for Short term goals: STGs=LTGs:  Long term goals  Time Frame for Long term goals : 14-17 days or until d/c  Long term goal 1: Pt will complete grooming tasks Ind seated  Long term goal 2: Pt will complete total body bathing c min A using AE PRN  Long term goal 3: Pt will complete UB dressing c setup  Long term goal 4: Pt will complete LB dressing c mod A using AE  Long term goal 5: Pt will doff/don footwear c min A using AE  Long term goals 6: Pt will complete toileting c max A  Long term goal 7: Pt will complete functional transfers (bed, chair, toilet, shower) c DME PRN and min A  Long term goal 8: Pt will perform therex/therax to facilitate increased strength/endurance/ax tolerance (c emphasis on dynamic standing balance/tolerance > 5 mins, and BUE endurance) c SBA:        Plan of Care Times per week: 5 days per week for a minimum of 60 minutes/day plus group as appropriate for 60 minutes. Treatment to include Plan  Times per day: Daily  Current Treatment Recommendations: Strengthening, Balance Training, Functional Mobility Training, Endurance Training, Pain Management, Safety Education & Training, Patient/Caregiver Education & Training, Equipment Evaluation, Education, & procurement, Positioning, Self-Care / ADL    Electronically signed by   Daisy Zaragoza MS, OTR/L  License #[id number]  10/18/2021, 2:51 PM

## 2021-10-18 NOTE — PROGRESS NOTES
home with his mother. \" Mobley catheter in place, able to recall 2 out 3 posterior hip precautions but required verbal prompting to recall no turning of R foot inwards, redness and rashes noted on perineal area (RN aware)     Pain level/location:  0 /10 at rest in bed upon PT's arrival and then stated 4/10 when RN was in the room      Location: R hip    Discharge recommendations  Anticipated discharge date:  10/28/2021  Destination: []??home alone   []? ?home alone with assist PRN     [x]? ? home w/ family      [x]? ? Continuous supervision  []? ?SNF    []? ? Assisted living     []? ? Other:  Continued therapy: [x]? ?C PT  []??OUTPATIENT PT   []? ? No Further PT  []? ?SNF PT  Caregiver training recommended: [x]? ? Yes  []? ? No      Bed Mobility:           []   Pt received out of bed   Rolling R: Mod A  Rolling L:  Max A with additional assist to position pillow between thighs to prevent adduction of R hip beyond midline and mobley catheter management   Scooting:   Total A x 2   Lying --> Sit:  Max A (continues to require assist on RLE despite adaptive equipment training due to pt's self-limiting behavior; despite set-up assist of bed features and extra time provided, pt unable to transition trunk to sit up requiring assist from PT today; pt tearful and kept her eyes closed during transfer process requiring constant encouragement during transfer process)    Bed features used: [] Yes    [x] HOB elevated      [x] Bed rail                                    [] No     Transfers:    Sit--> Stand:  1st attempt with 1-person assist unsuccessful due to pt's increased pain, weakness, and anxiety; required Mod A x 2 on 2nd attempt to complete transfer at EOB   Stand --> Sit:   Mod A x 2 to EOB   Chair-->Bed/Bed --> Chair:  Total A (required use of Bernardine Massy today due to pt's poor standing tolerance and inability to shift weight in static standing)  Assistive device used for transfer:   Renetta Bedolla   Pt required additional assist with mobley catheter management with all transfers. Gait: Pt unable today due to increased pain, weakness, and anxiety      Patient/Caregiver Education and Training:   []   Role of PT  []   Education about Dx  []   Use of call light for assist   []   HEP provided and explained   [x]   Treatment plan reviewed  []   Home safety  []   Wheelchair mobility/management   []   Body mechanics  [x]   Bed Mobility/Transfer technique  []   Gait technique/sequencing  []   Proper use of assistive device/adaptive equipment  []   Stair training/Advanced mobility safety and technique  [x]   Reinforced patient's precautions/mobility while maintaining precautions  []   Postural awareness  []   Family/caregiver training  []   Progress was updated and reviewed in Rehabtracker with patient and/or family this date. []   Other:    Treatment Plan for Next Session: transfers and continue to progress towards 10 ft gait training using RW      Assessment: This pt demonstrated a negative response to today's treatment as evidenced by increased physical assist to complete sup->sit, sit<->stand, and bed->w/c transfers today similar to her functional mobility status on PT eval. The patient is making inconsistent progress toward established goals as evidenced by QI scores.      Treatment/Activity Tolerance:   [] Tolerated treatment with no adverse effects    [x] Patient limited by fatigue, self-limiting behavior   [x] Patient limited by pain   [] Patient limited by medical complications:    [] Adverse reaction to Tx:   [] Significant change in status    Safety:       []  bed alarm set    [x]  chair alarm set    []  Pt refused alarms                []  Telesitter activated      [x]  Gait belt used during tx session      []other:       Number of Minutes/Billable Intervention  Gait Training    Therapeutic Exercise    Neuro Re-Ed    Therapeutic Activity 66   Wheelchair Propulsion    Group    Other:    TOTAL 66       Social History  Social/Functional History  Lives With: Alone (has local family support)  Type of Home: House  Home Layout: Two level, Able to Live on Main level with bedroom/bathroom, Performs ADL's on one level  Home Access: Ramped entrance (bilat rails)  Bathroom Shower/Tub: Walk-in shower  Bathroom Toilet: Handicap height  Bathroom Equipment: Built-in shower seat, Hand-held shower, Grab bars in shower, Grab bars around toilet  Home Equipment: Rolling walker, [place], [place], [employer], Reacher, Grab bars, Lift chair, Sock aid, Long-handled shoehorn  Receives Help From: Family, Neighbor, Friend(s)  ADL Assistance: Needs assistance (niece supervised pt during ADL (bathing and dressing), otherwise Mod Ind with toileting activity)  Homemaking Responsibilities: Yes  Meal Prep Responsibility: Primary  Laundry Responsibility: Secondary  Cleaning Responsibility: Primary (does not run the sweeper)  Bill Paying/Finance Responsibility: Primary  Shopping Responsibility: Secondary (does grocery list)  [address] [facility] [address]: No  Health Care Management: Primary  Ambulation Assistance: Independent (Mod Ind using RW (household and limited community distances))  Active : No  Mode of Transportation: Family, Car  Occupation: Retired  Type of occupation: worked in bank  Additional Comments: has a regular, flat bed (tall height); usually sleeps in lift chair; had 2 falls in the past year with injuries requiring surgery    Objective                                                                                    Goals:  (Update in navigator)  Short term goals  Time Frame for Short term goals: 12-14 tx days:  Short term goal 1: Pt will complete rolling L/R and sit->sup using leg  to assist RLE and using bed features with Min A following posterior hip precautions on RLE. Short term goal 2: Pt will complete sup->sit using bed features and leg  to assist RLE with SBA-Sup following posterior hip precautions on RLE.   Short term goal 3: Pt will complete OOB transfers using RW with Min A following posterior hip precautions. Short term goal 4: Pt will ambulate 50 ft with turns over level surface and 10 ft of unevem surface using RW with CGA. Short term goal 5: Pt will ascend/descend 2\" curb step using RW with Min A. Short term goal 6: Pt will complete object retrieval from the floor in standing with 1UE support on RW and using reacher with CGA. Short term goal 7: pt will complete 150 ft of w/c propulsion using manual w/c at mod ind:   :        Plan of Care                                                                              Times per week: 5 days per week for a minimum of 60 minutes/day plus group as appropriate for 60 minutes.   Treatment to include Current Treatment Recommendations: Strengthening, Gait Training, Patient/Caregiver Education & Training, ROM, Equipment Evaluation, Education, & procurement, Balance Training, Pain Management, Modalities, Functional Mobility Training, Endurance Training, Home Exercise Program, Positioning, Transfer Training, Wheelchair Mobility Training, Safety Education & Training    Electronically signed by   Karen Ortiz PT  10/18/2021, 8:29 AM

## 2021-10-19 PROCEDURE — 6360000002 HC RX W HCPCS: Performed by: NURSE PRACTITIONER

## 2021-10-19 PROCEDURE — 97530 THERAPEUTIC ACTIVITIES: CPT

## 2021-10-19 PROCEDURE — 97535 SELF CARE MNGMENT TRAINING: CPT

## 2021-10-19 PROCEDURE — 6370000000 HC RX 637 (ALT 250 FOR IP): Performed by: NURSE PRACTITIONER

## 2021-10-19 PROCEDURE — 6370000000 HC RX 637 (ALT 250 FOR IP): Performed by: PHYSICAL MEDICINE & REHABILITATION

## 2021-10-19 PROCEDURE — 97116 GAIT TRAINING THERAPY: CPT

## 2021-10-19 PROCEDURE — 99232 SBSQ HOSP IP/OBS MODERATE 35: CPT | Performed by: PHYSICAL MEDICINE & REHABILITATION

## 2021-10-19 PROCEDURE — 1280000000 HC REHAB R&B

## 2021-10-19 PROCEDURE — 2500000003 HC RX 250 WO HCPCS: Performed by: PHYSICAL MEDICINE & REHABILITATION

## 2021-10-19 PROCEDURE — 94150 VITAL CAPACITY TEST: CPT

## 2021-10-19 PROCEDURE — 94761 N-INVAS EAR/PLS OXIMETRY MLT: CPT

## 2021-10-19 RX ADMIN — ACETAMINOPHEN 1000 MG: 500 TABLET, FILM COATED ORAL at 08:46

## 2021-10-19 RX ADMIN — LEVOTHYROXINE SODIUM 50 MCG: 0.05 TABLET ORAL at 05:53

## 2021-10-19 RX ADMIN — ACETAMINOPHEN 1000 MG: 500 TABLET, FILM COATED ORAL at 22:18

## 2021-10-19 RX ADMIN — ACETAMINOPHEN 1000 MG: 500 TABLET, FILM COATED ORAL at 17:01

## 2021-10-19 RX ADMIN — MICONAZOLE NITRATE: 2 POWDER TOPICAL at 22:18

## 2021-10-19 RX ADMIN — METOPROLOL TARTRATE 25 MG: 25 TABLET, FILM COATED ORAL at 08:46

## 2021-10-19 RX ADMIN — ALLOPURINOL 50 MG: 100 TABLET ORAL at 08:46

## 2021-10-19 RX ADMIN — LINEZOLID 600 MG: 600 TABLET ORAL at 08:46

## 2021-10-19 RX ADMIN — ACETAMINOPHEN 1000 MG: 500 TABLET, FILM COATED ORAL at 12:27

## 2021-10-19 RX ADMIN — ENOXAPARIN SODIUM 30 MG: 30 INJECTION SUBCUTANEOUS at 08:46

## 2021-10-19 RX ADMIN — MICONAZOLE NITRATE: 2 POWDER TOPICAL at 08:46

## 2021-10-19 RX ADMIN — PANTOPRAZOLE SODIUM 40 MG: 40 TABLET, DELAYED RELEASE ORAL at 05:53

## 2021-10-19 ASSESSMENT — PAIN DESCRIPTION - ORIENTATION: ORIENTATION: RIGHT

## 2021-10-19 ASSESSMENT — PAIN DESCRIPTION - PROGRESSION: CLINICAL_PROGRESSION: GRADUALLY IMPROVING

## 2021-10-19 ASSESSMENT — PAIN DESCRIPTION - FREQUENCY: FREQUENCY: INTERMITTENT

## 2021-10-19 ASSESSMENT — PAIN SCALES - GENERAL
PAINLEVEL_OUTOF10: 4
PAINLEVEL_OUTOF10: 0

## 2021-10-19 ASSESSMENT — PAIN DESCRIPTION - DESCRIPTORS: DESCRIPTORS: ACHING

## 2021-10-19 ASSESSMENT — PAIN DESCRIPTION - ONSET: ONSET: ON-GOING

## 2021-10-19 ASSESSMENT — PAIN DESCRIPTION - LOCATION: LOCATION: KNEE;LEG

## 2021-10-19 ASSESSMENT — PAIN - FUNCTIONAL ASSESSMENT: PAIN_FUNCTIONAL_ASSESSMENT: ACTIVITIES ARE NOT PREVENTED

## 2021-10-19 ASSESSMENT — PAIN DESCRIPTION - PAIN TYPE: TYPE: ACUTE PAIN

## 2021-10-19 NOTE — FLOWSHEET NOTE
[x] daily progress note       [] discharge       Patient Name:  Chana Machuca   :  1940 MRN: [id number]  Room:  [address] [place], [place] [address] Date of Admission: 10/11/2021  Rehabilitation Diagnosis:   Fracture of unspecified part of neck of right femur, initial encounter for closed fracture [S72.001A]  Traumatic closed fracture of neck of femur with minimal displacement, right, initial encounter ([facility] [S72.001A]       Date 10/19/2021       Day of ARU Week:  2   Time IN/OUT 5713-4070   Individual Tx Minutes 60   TOTAL Tx Time Mins 60   Restrictions Restrictions/Precautions  Restrictions/Precautions: Weight Bearing, Fall Risk, ROM Restrictions, General Precautions  Position Activity Restriction  Hip Precautions: Posterior hip precautions, No hip flexion > 90 degrees, No ADduction, No hip internal rotation  Other position/activity restrictions: mobley catheter, wound drain   Communication with other providers: [x]   OK to see per nursing:     []   Spoke with team member regarding:      Subjective observations and cognitive status: Pt seen in semi-gonzalez's position in bed at beginning of treatment. Pt required min encouragement to participate. Pt reported \"I did so much therapy earlier. I am exhausted. \"    Pain level/location: 7/10       Location: right hip    Discharge recommendations  Anticipated discharge date:  10/28/2021  Destination: []???home alone   []? ??home alone with assist PRN     [x]? ?? home w/ family      [x]? ?? Continuous supervision  []? ??SNF    []??? Assisted living     []? ?? Other:  Continued therapy: [x]? ? ? [facility] PT  []???OUTPATIENT PT   []??? No Further PT  []???SNF PT  Caregiver training recommended: [x]? ? ? Yes  []??? No      Bed Mobility:           [x]   Pt received out of bed   Scooting:  Dependent; pt not initiating with following instructions. Pt required trendelenburg positioning in hospital bed to assist with scooting up in bed.   Lying --> Sit:  Dependent; provided patient with leg  for right LE, but patient not performing well with leg . Pt required assist for upper body and BLEs, and scooting to EOB. Sit --> lying:  Dependent (pt required assist with BLEs and upper body, as patient automatically laying across bed versus towards HOB. Pt very guarded with all transfers and does not follow commands. Transfers:    Sit--> Stand: min A   Stand --> Sit:   CGA  Chair-->Bed/Bed --> Chair: pt declined at this time stating \"I don't think I can do this right now. My leg is too painful and tired. \"   Assistive device required for transfer: RW  Pt required max vcs for proper hand placement. Gait:    Pt declined at this time d/t fatigue and pain. Wheelchair Propulsion:  Pt unable to transfer to w/c at this time d/t fatigue and pain. Additional Therapeutic activities/exercises completed this date:     []   Nu-step:  Time:        Level:         #Steps:       []   Rebounder:    []  Seated     []  Standing        []   Balance training         []   Postural training    [x]   Supine ther ex (reps/sets): ankle pumps, quad sets, buttock squeezes, heel slides (AAROM on right LE) x 20 reps each; SAQs x 10 reps (for strengthening)     []   Seated ther ex (reps/sets):     []   Standing ther ex (reps/sets):     []   Picking up object from floor (standing):                   []   Reacher used   []   Other:   []   Other      Patient/Caregiver Education and Training:   [x]   Bed Mobility/Transfer technique/safety  []   Gait technique/sequencing  []   Proper use of assistive device  []   Advanced mobility safety and technique  [x]   Reinforced patient's precautions/mobility while maintaining precautions  []   Postural awareness  []   Family training    Treatment Plan for Next Session: Attempt gait; transfers; exercises     Assessment: This pt demonstrated a negative response to today's treatment as evidenced by decreased function.   The patient is not making progress toward established goals as evidenced by QI scores. Ongoing deficits are observed in the areas of strength, balance, and endurance and continued focus on strengthening, balance training, and endurance training is recommended. Treatment/Activity Tolerance:   [] Tolerated treatment with no adverse effects    [x] Patient limited by fatigue  [x] Patient limited by pain   [] Patient limited by medical complications:    [] Adverse reaction to Tx:   [] Significant change in status    Safety:       [x]  bed alarm set    []  chair alarm set    []  Pt refused alarms                []  Telesitter activated      [x]  Gait belt used during tx session      [x]other: pt left in semi-gonzalez's position in bed with call light at end of treatment.           Number of Minutes/Billable Intervention  Gait Training 30   Therapeutic Exercise    Neuro Re-Ed    Therapeutic Activity 30   Wheelchair Propulsion    Group    Other:    TOTAL 60         Social History  Social/Functional History  Lives With: Alone (has local family support)  Type of Home: House  Home Layout: Two level, Able to Live on Main level with bedroom/bathroom, Performs ADL's on one level  Home Access: Ramped entrance (bilat rails)  Bathroom Shower/Tub: Walk-in shower  Bathroom Toilet: Handicap height  Bathroom Equipment: Built-in shower seat, Hand-held shower, Grab bars in shower, Grab bars around toilet  Home Equipment: Rolling walker, [employer], [employer], [employer], Reacher, Grab bars, Lift chair, Sock aid, Long-handled shoehorn  Receives Help From: Family, Neighbor, Friend(s)  ADL Assistance: Needs assistance (niece supervised pt during ADL (bathing and dressing), otherwise Mod Ind with toileting activity)  Homemaking Responsibilities: Yes  Meal Prep Responsibility: Primary  Laundry Responsibility: Secondary  Cleaning Responsibility: Primary (does not run the sweeper)  Bill Paying/Finance Responsibility: Primary  Shopping Responsibility: Secondary (does grocery list)  [address]: No  Health Care Management: Primary  Ambulation Assistance: Independent (Mod Ind using RW (household and limited community distances))  Active : No  Mode of Transportation: Family, Car  Occupation: Retired  Type of occupation: worked in bank  Additional Comments: has a regular, flat bed (tall height); usually sleeps in lift chair; had 2 falls in the past year with injuries requiring surgery    Objective                                                                                    Goals:  (Update in navigator)  Short term goals  Time Frame for Short term goals: 12-14 tx days:  Short term goal 1: Pt will complete rolling L/R and sit->sup using leg  to assist RLE and using bed features with Min A following posterior hip precautions on RLE. Short term goal 2: Pt will complete sup->sit using bed features and leg  to assist RLE with SBA-Sup following posterior hip precautions on RLE. Short term goal 3: Pt will complete OOB transfers using RW with Min A following posterior hip precautions. Short term goal 4: Pt will ambulate 50 ft with turns over level surface and 10 ft of unevem surface using RW with CGA. Short term goal 5: Pt will ascend/descend 2\" curb step using RW with Min A. Short term goal 6: Pt will complete object retrieval from the floor in standing with 1UE support on RW and using reacher with CGA. Short term goal 7: pt will complete 150 ft of w/c propulsion using manual w/c at mod ind:   :        Plan of Care                                                                              Times per week: 5 days per week for a minimum of 60 minutes/day plus group as appropriate for 60 minutes.   Treatment to include Current Treatment Recommendations: Strengthening, Gait Training, Patient/Caregiver Education & Training, ROM, Equipment Evaluation, Education, & procurement, Balance Training, Pain Management, Modalities, Functional Mobility Training, Endurance Training, Home Exercise Program, Positioning, Transfer Training, Wheelchair Mobility Training, Safety Education & Training    Electronically signed by   Ashutosh Celestin, [id number]  10/19/2021, 2:37 PM

## 2021-10-19 NOTE — PROGRESS NOTES
level/location:    5/10       Location: R hip   Discharge recommendations  Anticipated discharge date:  10/28/21  Destination: []home alone   []home alone w assist prn   [x] home w/ family    [] Continuous supervision       []SNF    [] Assisted living     [] Other:   Continued therapy: [x]HHC OT  []OUTPATIENT  OT   [] No Further OT  Equipment needs: Rose Shirley a drop arm bedside commode due to being unable to use the toilet within the home  And confined to a single roomconfined to one level of the home.  This patientrequires a side transfer has a body configuration that requires extra width provided by a drop arm commode. ADLs:    Eating: Eating  Assistance Needed: Independent  Comment: able to open packages/containers  CARE Score: 6  Discharge Goal: Independent       Oral Hygiene: Oral Hygiene  Assistance Needed: Independent  Comment: MOD I completed seated sink-side  CARE Score: 6  Discharge Goal: Independent    UB/LB Bathing: Shower/Bathe Self  Assistance Needed: Partial/moderate assistance  Comment: Mod A for LB washing, pt able to wash UB  CARE Score: 3  Discharge Goal: Partial/moderate assistance    UB Dressing: Upper Body Dressing  Assistance Needed: Setup or clean-up assistance  Comment: Pt able to don/doff long sleeve in seated position  CARE Score: 5  Discharge Goal: Supervision or touching assistance         LB Dressing: Lower Body Dressing  Assistance Needed: Substantial/maximal assistance  Comment: Max A  to thread BLE and don over hips 2* increased pain this date. CARE Score: 2  Discharge Goal: Partial/moderate assistance    Donning and [place] Footwear: Putting On/Taking Off Footwear  Assistance Needed: Partial/moderate assistance  Comment: Min A c use of sock-aid, pt able to doff socks c use of reacher. CARE Score: 3  Discharge Goal: Partial/moderate assistance      Toileting:  Toileting Hygiene  Assistance Needed: Substantial/maximal assistance  Comment: Max A required for CM and andrew-hygiene  CARE Score: 2  Discharge Goal: Substantial/maximal assistance      Toilet Transfers: Toilet Transfer  Assistance Needed: Substantial/maximal assistance  Comment: Max A c use of abdullahi stedy; partial sit<>stands ranged from Min-Mod A  CARE Score: 2  Discharge Goal: Partial/moderate assistance  Device Used:    []   Standard Toilet         []   Grab Bars           []  Bedside Commode       []   Elevated Toilet          []   Other:        Bed Mobility:           [x]   Pt received out of bed   Scooting:  Min A  Supine --> Sit:  Mod A for trunk and BLE assistanc  Sit --> Supine: Total A    Transfers:    Sit--> Stand: Mod A c use of abdullahi stedy  Stand --> Sit:   Mod A c use of abdullahi stedy  Other:    Assistive device required for transfer:   Yousif Lucio      Functional Mobility:    Assistance:  To/from bathroom c abdullahi stedy and total A  Device:   []   Rolling Walker     []   Standard Walker []   Wheelchair        []   [employer]       []   4-Wheeled Walker         []   Cardiac Walker       []   Other:      Additional Therapeutic activities/exercises completed this date:     [x]   ADL Training   [x]   Balance/Postural training     []   Bed/Transfer Training   [x]   Endurance Training To increase standing endurance in prep for increased I c ADLs and mobility, pt completed activity in stance x3 mins c Min A     []   Neuromuscular Re-ed   []   Nu-step:  Time:        Level:         #Steps:       []   Rebounder:    []  Seated     []  Standing        []   Supine Ther Ex (reps/sets):     []   Seated Ther Ex (reps/sets):     [x]   Standing Ther Ex (reps/sets):  Pt demo x5 sit<>stands required for bathing and toileting activity c use of abdullahi stedy. Pt demo increased weakness and fatigue requiring Min-Mod A for sit>stand.    []   Other:      Comments:      Patient/Caregiver Education and Training:   [x]   [employer] Equipment Use  []   Bed Mobility/Transfer Technique/Safety  []   Energy Conservation Tips  []   Family training  []   Postural Awareness  []   Safety During Functional Activities  []   Reinforced Patient's Precautions   []   Progress was updated and reviewed in Rehabtracker with patient and/or family this         date. Treatment Plan for Next Session: cont OT POC      Assessment: This pt demonstrated a positive response to today's treatment as evidenced by completion of therapy session. The patient is making fair progress toward established goals as evidenced by QI scores. Ongoing deficits are observed in the areas of decreased standing tolerance, strength, ADL functional performance and continued focus on these areas is recommended.        Treatment/Activity Tolerance:   [x] Tolerated treatment with no adverse effects    [] Patient limited by fatigue  [] Patient limited by pain   [] Patient limited by medical complications:    [] Adverse reaction to Tx:   [] Significant change in status    Safety:       [x]  bed alarm set    []  chair alarm set    []  Pt refused alarms                []  Telesitter activated      [x]  Gait belt used during tx session      []other:       Number of Minutes/Billable Intervention  Therapeutic Exercise    ADL Self-care 90   Neuro Re-Ed    Therapeutic Activity 30   Group    Other:    TOTAL 120       Social History  Social/Functional History  Lives With: Alone (has local family support)  Type of Home: House  Home Layout: Two level, Able to Live on Main level with bedroom/bathroom, Performs ADL's on one level  Home Access: Ramped entrance (bilat rails)  Bathroom Shower/Tub: Walk-in shower  Bathroom Toilet: Handicap height  Bathroom Equipment: Built-in shower seat, Hand-held shower, Grab bars in shower, Grab bars around toilet  Home Equipment: Rolling walker, [place], [place], [employer], Reacher, Grab bars, Lift chair, Sock aid, Long-handled Suman Evan Help From: Family, Neighbor, Friend(s)  ADL Assistance: Needs assistance (niece supervised pt during ADL (bathing and dressing), otherwise Mod Ind with toileting activity)  Homemaking Responsibilities: Yes  Meal Prep Responsibility: Primary  Laundry Responsibility: Secondary  Cleaning Responsibility: Primary (does not run the sweeper)  Bill Paying/Finance Responsibility: Primary  Shopping Responsibility: Secondary (does grocery list)  [address]: No  Health Care Management: Primary  Ambulation Assistance: Independent (Mod Ind using RW (household and limited community distances))  Active : No  Mode of Transportation: Family, Car  Occupation: Retired  Type of occupation: worked in bank  Additional Comments: has a regular, flat bed (tall height); usually sleeps in lift chair; had 2 falls in the past year with injuries requiring surgery    Objective                                                                                    Goals:  (Update in navigator)  Short term goals  Time Frame for Short term goals: STGs=LTGs:  Long term goals  Time Frame for Long term goals : 14-17 days or until d/c  Long term goal 1: Pt will complete grooming tasks Ind seated  Long term goal 2: Pt will complete total body bathing c min A using AE PRN  Long term goal 3: Pt will complete UB dressing c setup  Long term goal 4: Pt will complete LB dressing c mod A using AE  Long term goal 5: Pt will doff/don footwear c min A using AE  Long term goals 6: Pt will complete toileting c max A  Long term goal 7: Pt will complete functional transfers (bed, chair, toilet, shower) c DME PRN and min A  Long term goal 8: Pt will perform therex/therax to facilitate increased strength/endurance/ax tolerance (c emphasis on dynamic standing balance/tolerance > 5 mins, and BUE endurance) c SBA:        Plan of Care                                                                              Times per week: 5 days per week for a minimum of 60 minutes/day plus group as appropriate for 60 minutes.   Treatment to include Plan  Times per day: Daily  Current Treatment Recommendations: Strengthening, Balance Training, Functional Mobility Training, Endurance Training, Pain Management, Safety Education & Training, Patient/Caregiver Education & Training, Equipment Evaluation, Education, & procurement, Positioning, Self-Care / ADL    Electronically signed by   Charley Samaniego OT,  10/19/2021, 11:23 AM

## 2021-10-20 LAB
BACTERIA: ABNORMAL /HPF
BILIRUBIN URINE: NEGATIVE MG/DL
BLOOD, URINE: ABNORMAL
CLARITY: ABNORMAL
COLOR: YELLOW
GLUCOSE, URINE: NEGATIVE MG/DL
KETONES, URINE: NEGATIVE MG/DL
LEUKOCYTE ESTERASE, URINE: ABNORMAL
NITRITE URINE, QUANTITATIVE: NEGATIVE
PH, URINE: 5 (ref 5–8)
PROTEIN UA: 30 MG/DL
RBC URINE: 155 /HPF (ref 0–6)
RENAL EPITHELIAL, UA: 3 /HPF
SPECIFIC GRAVITY UA: 1.02 (ref 1–1.03)
SQUAMOUS EPITHELIAL: 6 /HPF
TRANSITIONAL EPITHELIAL: 9 /HPF
TRICHOMONAS: ABNORMAL /HPF
UROBILINOGEN, URINE: NEGATIVE MG/DL (ref 0.2–1)
WBC UA: 331 /HPF (ref 0–5)
YEAST: ABNORMAL /HPF

## 2021-10-20 PROCEDURE — 97530 THERAPEUTIC ACTIVITIES: CPT

## 2021-10-20 PROCEDURE — 87086 URINE CULTURE/COLONY COUNT: CPT

## 2021-10-20 PROCEDURE — 94150 VITAL CAPACITY TEST: CPT

## 2021-10-20 PROCEDURE — 6370000000 HC RX 637 (ALT 250 FOR IP): Performed by: PHYSICAL MEDICINE & REHABILITATION

## 2021-10-20 PROCEDURE — 6360000002 HC RX W HCPCS: Performed by: NURSE PRACTITIONER

## 2021-10-20 PROCEDURE — 94761 N-INVAS EAR/PLS OXIMETRY MLT: CPT

## 2021-10-20 PROCEDURE — 87186 SC STD MICRODIL/AGAR DIL: CPT

## 2021-10-20 PROCEDURE — 99232 SBSQ HOSP IP/OBS MODERATE 35: CPT | Performed by: PHYSICAL MEDICINE & REHABILITATION

## 2021-10-20 PROCEDURE — 2580000003 HC RX 258: Performed by: PHYSICAL MEDICINE & REHABILITATION

## 2021-10-20 PROCEDURE — 87077 CULTURE AEROBIC IDENTIFY: CPT

## 2021-10-20 PROCEDURE — 2500000003 HC RX 250 WO HCPCS: Performed by: PHYSICAL MEDICINE & REHABILITATION

## 2021-10-20 PROCEDURE — 1280000000 HC REHAB R&B

## 2021-10-20 PROCEDURE — 6360000002 HC RX W HCPCS: Performed by: PHYSICAL MEDICINE & REHABILITATION

## 2021-10-20 PROCEDURE — 81001 URINALYSIS AUTO W/SCOPE: CPT

## 2021-10-20 PROCEDURE — 6370000000 HC RX 637 (ALT 250 FOR IP): Performed by: NURSE PRACTITIONER

## 2021-10-20 RX ORDER — SODIUM CHLORIDE 9 MG/ML
25 INJECTION, SOLUTION INTRAVENOUS PRN
Status: DISCONTINUED | OUTPATIENT
Start: 2021-10-20 | End: 2021-10-22 | Stop reason: HOSPADM

## 2021-10-20 RX ORDER — SODIUM CHLORIDE 0.9 % (FLUSH) 0.9 %
10 SYRINGE (ML) INJECTION EVERY 12 HOURS SCHEDULED
Status: DISCONTINUED | OUTPATIENT
Start: 2021-10-20 | End: 2021-10-22 | Stop reason: HOSPADM

## 2021-10-20 RX ORDER — SODIUM CHLORIDE 0.9 % (FLUSH) 0.9 %
10 SYRINGE (ML) INJECTION PRN
Status: DISCONTINUED | OUTPATIENT
Start: 2021-10-20 | End: 2021-10-22 | Stop reason: HOSPADM

## 2021-10-20 RX ADMIN — ENOXAPARIN SODIUM 30 MG: 30 INJECTION SUBCUTANEOUS at 08:04

## 2021-10-20 RX ADMIN — ACETAMINOPHEN 1000 MG: 500 TABLET, FILM COATED ORAL at 18:05

## 2021-10-20 RX ADMIN — SODIUM CHLORIDE, PRESERVATIVE FREE 10 ML: 5 INJECTION INTRAVENOUS at 20:28

## 2021-10-20 RX ADMIN — ALLOPURINOL 50 MG: 100 TABLET ORAL at 08:04

## 2021-10-20 RX ADMIN — MICONAZOLE NITRATE: 2 POWDER TOPICAL at 21:26

## 2021-10-20 RX ADMIN — PIPERACILLIN AND TAZOBACTAM 2250 MG: 2; .25 INJECTION, POWDER, LYOPHILIZED, FOR SOLUTION INTRAVENOUS at 19:55

## 2021-10-20 RX ADMIN — LEVOTHYROXINE SODIUM 50 MCG: 0.05 TABLET ORAL at 06:39

## 2021-10-20 RX ADMIN — ACETAMINOPHEN 1000 MG: 500 TABLET, FILM COATED ORAL at 12:58

## 2021-10-20 RX ADMIN — ACETAMINOPHEN 1000 MG: 500 TABLET, FILM COATED ORAL at 08:47

## 2021-10-20 RX ADMIN — MICONAZOLE NITRATE: 2 POWDER TOPICAL at 08:04

## 2021-10-20 RX ADMIN — METOPROLOL TARTRATE 25 MG: 25 TABLET, FILM COATED ORAL at 21:25

## 2021-10-20 RX ADMIN — PANTOPRAZOLE SODIUM 40 MG: 40 TABLET, DELAYED RELEASE ORAL at 06:39

## 2021-10-20 RX ADMIN — METOPROLOL TARTRATE 25 MG: 25 TABLET, FILM COATED ORAL at 08:04

## 2021-10-20 ASSESSMENT — PAIN SCALES - GENERAL
PAINLEVEL_OUTOF10: 5
PAINLEVEL_OUTOF10: 6
PAINLEVEL_OUTOF10: 7
PAINLEVEL_OUTOF10: 0
PAINLEVEL_OUTOF10: 0
PAINLEVEL_OUTOF10: 6

## 2021-10-20 ASSESSMENT — PAIN DESCRIPTION - FREQUENCY: FREQUENCY: INTERMITTENT

## 2021-10-20 ASSESSMENT — PAIN DESCRIPTION - PAIN TYPE: TYPE: ACUTE PAIN;SURGICAL PAIN

## 2021-10-20 ASSESSMENT — PAIN DESCRIPTION - LOCATION: LOCATION: HIP

## 2021-10-20 ASSESSMENT — PAIN DESCRIPTION - ONSET: ONSET: ON-GOING

## 2021-10-20 ASSESSMENT — PAIN DESCRIPTION - ORIENTATION: ORIENTATION: RIGHT

## 2021-10-20 ASSESSMENT — PAIN DESCRIPTION - DESCRIPTORS: DESCRIPTORS: ACHING

## 2021-10-20 ASSESSMENT — PAIN - FUNCTIONAL ASSESSMENT: PAIN_FUNCTIONAL_ASSESSMENT: PREVENTS OR INTERFERES SOME ACTIVE ACTIVITIES AND ADLS

## 2021-10-20 NOTE — PROGRESS NOTES
Occupational Therapy  Physical Rehabilitation: OCCUPATIONAL THERAPY     [x] daily progress note       [] discharge       Patient Name:  Martir Young   :  1940 MRN: [id number]  Room:  [address] [place] [address] Date of Admission: 10/11/2021  Rehabilitation Diagnosis:   Fracture of unspecified part of neck of right femur, initial encounter for closed fracture [S72.001A]  Traumatic closed fracture of neck of femur with minimal displacement, right, initial encounter ([place] [id number] [facility]       Date 10/20/2021       Day of ARU Week:  3   Time IN/OUT [id number]- 1400x   Individual Tx Minutes 34   Group Tx Minutes    Co-Treat Minutes    Concurrent Tx Minutes    TOTAL Tx Time Mins 34   Variance Time - 86   Variance Time [x]   Refusal due to:  Pain, decreased motivation  []   Medical hold/reason:    []   Illness   []   Off Unit for test/procedure  []   Extra time needed to complete task  []   Therapeutic need  []   Other (specify):   Restrictions Restrictions/Precautions: Weight Bearing, Fall Risk, ROM Restrictions, General Precautions     Hip Precautions: Posterior hip precautions, No hip flexion > 90 degrees, No ADduction, No hip internal rotation   Communication with other providers: [x]   OK to see per nursing:     []   Spoke with team member regarding:      Subjective observations and cognitive status: Upon entry RN Marni Sommers reports that pt is eager to get out of bed. Noted pt to have eyes closed and required auditory cues for arousal. Pt states, \"I've been here for so long. I just want to get up. \" When Shaji Solis began assisting pt for bed mobility to sit at EOB,  Pt stating, \"I know what I'm supposed to do. \" OTR provided pt c Min A for LE and attempted education with use of reacher. As pt continued to attempt transfer, Pt became very emotional, and demo sounds of crying, but without tears. OTR encouraged pt and redirected, however pt continuously c/o of pain and being \"tired\".  Pt then refused to continue therapy and cried out, \"I just want to sleep, I've been up for hours, I can't do this. \" Pt encouraged on benefits of OT and alternative activities such as bed level ADLs, however pt continued to refuse. Pt assisted back to position of comfort. MYRA Hyman notified. P.M. attempted, pt appears lethargic, moaning, and reports, \"I don't want to do this today. \"   Pain level/location:    /10       Location:    Discharge recommendations  Anticipated discharge date:  10/28/21  Destination: []home alone   []home alone w assist prn   [] home w/ family    [] Continuous supervision       [x]SNF    [] Assisted living     [] Other:   Continued therapy: []HHC OT  []OUTPATIENT  OT   [] No Further OT  Equipment needs:    Bed Mobility:           []   Pt received out of bed   Supine --> Sit:  MOD A for RLE lifting to EOB, pt provided c use of leg  however pt shows poor carry over. Treatment Plan for Next Session: Cont OT POC      Assessment:   The patient is making poor progress toward established goals as evidenced by QI scores. Ongoing deficits are observed in the areas of pain in RLE, increased cognitive deficits/confusion, decreased motivation and continued focus on increasing pt's ADL functional performance is recommended.        Treatment/Activity Tolerance:   [x] Tolerated treatment with no adverse effects    [] Patient limited by fatigue  [] Patient limited by pain   [] Patient limited by medical complications:    [] Adverse reaction to Tx:   [] Significant change in status    Safety:       [x]  bed alarm set    []  chair alarm set    []  Pt refused alarms                []  Telesitter activated      []  Gait belt used during tx session      []other:       Number of Minutes/Billable Intervention  Therapeutic Exercise    ADL Self-care    Neuro Re-Ed    Therapeutic Activity 34   Group    Other:    TOTAL 34       Social History  Social/Functional History  Lives With: Alone (has local family support)  Type of Home: House  Home Layout: Two level, Able to Live on Main level with bedroom/bathroom, Performs ADL's on one level  Home Access: Ramped entrance (bilat rails)  Bathroom Shower/Tub: Walk-in shower  Bathroom Toilet: Handicap height  Bathroom Equipment: Built-in shower seat, Hand-held shower, Grab bars in shower, Grab bars around toilet  Home Equipment: Rolling walker, Herb Sorrow, [place] [employer], [employer], Reacher, Grab bars, Lift chair, Sock aid, Long-handled shoehorn  Receives Help From: Family, Neighbor, Friend(s)  ADL Assistance: Needs assistance (niece supervised pt during ADL (bathing and dressing), otherwise Mod Ind with toileting activity)  Homemaking Responsibilities: Yes  Meal Prep Responsibility: Primary  Laundry Responsibility: Secondary  Cleaning Responsibility: Primary (does not run the sweeper)  Bill Paying/Finance Responsibility: Primary  Shopping Responsibility: Secondary (does grocery list)  [address]: No  Health Care Management: Primary  Ambulation Assistance: Independent (Mod Ind using RW (household and limited community distances))  Active : No  Mode of Transportation: Family, Car  Occupation: Retired  Type of occupation: worked in bank  Additional Comments: has a regular, flat bed (tall height); usually sleeps in lift chair; had 2 falls in the past year with injuries requiring surgery    Objective                                                                                    Goals:  (Update in navigator)  Short term goals  Time Frame for Short term goals: STGs=LTGs:  Long term goals  Time Frame for Long term goals : 14-17 days or until d/c  Long term goal 1: Pt will complete grooming tasks Ind seated  Long term goal 2: Pt will complete total body bathing c min A using AE PRN  Long term goal 3: Pt will complete UB dressing c setup  Long term goal 4: Pt will complete LB dressing c mod A using AE  Long term goal 5: Pt will doff/don footwear c min A using AE  Long term goals 6: Pt will complete toileting c max A  Long term goal 7: Pt will complete functional transfers (bed, chair, toilet, shower) c DME PRN and min A  Long term goal 8: Pt will perform therex/therax to facilitate increased strength/endurance/ax tolerance (c emphasis on dynamic standing balance/tolerance > 5 mins, and BUE endurance) c SBA:        Plan of Care                                                                              Times per week: 5 days per week for a minimum of 60 minutes/day plus group as appropriate for 60 minutes.   Treatment to include Plan  Times per day: Daily  Current Treatment Recommendations: Strengthening, Balance Training, Functional Mobility Training, Endurance Training, Pain Management, Safety Education & Training, Patient/Caregiver Education & Training, Equipment Evaluation, Education, & procurement, Positioning, Self-Care / ADL    Electronically signed by   Jakob Frey OT,  10/20/2021, 9:59 AM

## 2021-10-20 NOTE — PROGRESS NOTES
Kiet Mitchell    : 1940  Acct #: [de-identified]  MRN: [id number]              PM&R Progress Note      Admitting diagnosis: Right femoral neck fracture ( [address])     Comorbid diagnoses impacting rehabilitation: Uncontrolled pain, generalized weakness, gait disturbance, acute blood loss anemia, lower limb MRSA cellulitis, essential hypertension, paroxysmal atrial fibrillation, chronic kidney disease stage IV, acute on chronic diastolic congestive heart failure, morbid obesity (BMI 45.2), GERD, history of left hip fracture in May of 2021.     Chief complaint: Still anxious but also still little confused after stopping lorazepam.    Prior (baseline) level of function: Independent.     Current level of function:         Current  IRF-SARAH and Goals:   Occupational Therapy:    Short term goals  Time Frame for Short term goals: STGs=LTGs :   Long term goals  Time Frame for Long term goals : 14-17 days or until d/c  Long term goal 1: Pt will complete grooming tasks Ind seated  Long term goal 2: Pt will complete total body bathing c min A using AE PRN  Long term goal 3: Pt will complete UB dressing c setup  Long term goal 4: Pt will complete LB dressing c mod A using AE  Long term goal 5: Pt will doff/don footwear c min A using AE  Long term goals 6: Pt will complete toileting c max A  Long term goal 7: Pt will complete functional transfers (bed, chair, toilet, shower) c DME PRN and min A  Long term goal 8: Pt will perform therex/therax to facilitate increased strength/endurance/ax tolerance (c emphasis on dynamic standing balance/tolerance > 5 mins, and BUE endurance) c SBA :                                       Eating: Eating  Assistance Needed: Independent  Comment: able to open packages/containers  CARE Score: 6  Discharge Goal: Independent       Oral Hygiene: Oral Hygiene  Assistance Needed: Independent  Comment: MOD I completed seated sink-side  CARE Score: 6  Discharge Goal: Independent    UB/LB Bathing: Shower/Bathe Self  Assistance Needed: Partial/moderate assistance  Comment: Mod A for LB washing, pt able to wash UB  CARE Score: 3  Discharge Goal: Partial/moderate assistance    UB Dressing: Upper Body Dressing  Assistance Needed: Setup or clean-up assistance  Comment: Pt able to don/doff long sleeve in seated position  CARE Score: 5  Discharge Goal: Supervision or touching assistance         LB Dressing: Lower Body Dressing  Assistance Needed: Substantial/maximal assistance  Comment: Max A  to thread BLE and don over hips 2* increased pain this date. CARE Score: 2  Discharge Goal: Partial/moderate assistance    Donning and [place] Footwear: Putting On/Taking Off Footwear  Assistance Needed: Partial/moderate assistance  Comment: Min A c use of sock-aid, pt able to doff socks c use of reacher. CARE Score: 3  Discharge Goal: Partial/moderate assistance      Toileting: Toileting Hygiene  Assistance Needed: Substantial/maximal assistance  Comment: Max A required for CM and andrew-hygiene  Reason if not Attempted: Not attempted due to medical condition or safety concerns  CARE Score: 2  Discharge Goal: Substantial/maximal assistance      Toilet Transfers: Toilet Transfer  Assistance Needed: Substantial/maximal assistance  Comment: Max A c use of abdullahi stedy; partial sit<>stands ranged from Min-Mod A  Reason if not Attempted: Not attempted due to medical condition or safety concerns  CARE Score: 2  Discharge Goal: Partial/moderate assistance    Physical Therapy:   Short term goals  Time Frame for Short term goals: 12-14 tx days:  Short term goal 1: Pt will complete rolling L/R and sit->sup using leg  to assist RLE and using bed features with Min A following posterior hip precautions on RLE. Short term goal 2: Pt will complete sup->sit using bed features and leg  to assist RLE with SBA-Sup following posterior hip precautions on RLE.   Short term goal 3: Pt will complete OOB transfers using RW with Min A following posterior hip precautions. Short term goal 4: Pt will ambulate 50 ft with turns over level surface and 10 ft of unevem surface using RW with CGA. Short term goal 5: Pt will ascend/descend 2\" curb step using RW with Min A. Short term goal 6: Pt will complete object retrieval from the floor in standing with 1UE support on RW and using reacher with CGA. Short term goal 7: pt will complete 150 ft of w/c propulsion using manual w/c at mod ind            Bed Mobility:   Sit to Lying  Assistance Needed: Dependent  Comment: Total A x 2 with additional assist on mobley catheter and wound drain line management  CARE Score: 1  Discharge Goal: Partial/moderate assistance  Roll Left and Right  Comment: rolling to L using bed rail required 2-person assist today (had poor tolerance to sustain this position due to pain); refused to attempt rolling to R due to aggravation of pain following rolling to L  Reason if not Attempted: Not attempted due to medical condition or safety concerns  CARE Score: 88  Discharge Goal: Partial/moderate assistance  Lying to Sitting on Side of Bed  Assistance Needed: Dependent  Comment: Max A x 2 using bed features; initiated use of leg  to assist RLE however was unsuccessful to significantly move it over EOB and continued to require passive movement due to pain and impaired strength  CARE Score: 1  Discharge Goal: Supervision or touching assistance    Transfers:    Sit to Stand  Assistance Needed: Dependent  Comment:  Mod A + 2nd person assist providing variable physical assist to shift weight forward and to steady pt upon immediate standing; used RW to complete this transfer  CARE Score: 1  Discharge Goal: Partial/moderate assistance  Chair/Bed-to-Chair Transfer  Assistance Needed: Dependent  Comment: Rossi Car with 2-person assist for w/c->recliner transfer  CARE Score: 1  Discharge Goal: Partial/moderate assistance  Toilet Transfer  Assistance Needed: Dependent  CARE Score: 1  Car Transfer  Reason if not Attempted: Not attempted due to medical condition or safety concerns  CARE Score: 88  Discharge Goal: Partial/moderate assistance    Ambulation:    Walking Ability  Does the Patient Walk?: Yes     Walk 10 Feet  Comment: pt with poor standing tolerance and unable to actively perform weight shifting today due to pain and anxiety  Reason if not Attempted: Not attempted due to medical condition or safety concerns  CARE Score: 88  Discharge Goal: Supervision or touching assistance     Walk 50 Feet with Two Turns  Reason if not Attempted: Not attempted due to medical condition or safety concerns  CARE Score: 88  Discharge Goal: Supervision or touching assistance     Walk 150 Feet  Comment: had limited mobility since L hip surgery; was not performing this activity at baseline; limited potential to progressing to this mobility task  Reason if not Attempted: Not applicable  CARE Score: 9  Discharge Goal: Not Applicable     Walking 10 Feet on Uneven Surfaces  Reason if not Attempted: Not attempted due to medical condition or safety concerns  CARE Score: 88  Discharge Goal: Partial/moderate assistance     1 Step (Curb)  Comment: pt was not performing this activity at baseline but anticipating that pt can progress to being able to ascend/descend 2\" curb step while at ARU  Reason if not Attempted: Not attempted due to medical condition or safety concerns  CARE Score: 88  Discharge Goal: Partial/moderate assistance     4 Steps  Comment: had limited mobility since L hip surgery; was not performing this activity at baseline; limited potential to progressing to this mobility task  Reason if not Attempted: Not applicable  CARE Score: 9  Discharge Goal: Not Applicable     12 Steps  Comment: had limited mobility since L hip surgery; was not performing this activity at baseline; limited potential to progressing to this mobility task  Reason if not Attempted: Not applicable  CARE Score: 9  Discharge Goal: Not Applicable       Wheelchair:  w/c Ability: Wheelchair Ability  Uses a Wheelchair and/or Scooter?: Yes  Wheel 50 Feet with Two Turns  Assistance Needed: Partial/moderate assistance (min A for completion of last 5-10 ft 2/2 onset of fatigue)  Comment: per verbal report of OT, pt was only able to propel 45 ft with Min A for turns as observed during co-tx on 10/13/2021  CARE Score: 3  Wheel 150 Feet  Assistance Needed: Substantial/maximal assistance  Comment: per verbal report of OT, pt was only able to propel 45 ft with Min A as observed during co-tx on 10/13/2021  CARE Score: 2  Discharge Goal: Independent          Balance:        Object: Picking Up Object  Comment: pt with poor standing tolerance and required BUE support on RW to maintain static standing  Reason if not Attempted: Not attempted due to medical condition or safety concerns  CARE Score: 88  Discharge Goal: Supervision or touching assistance    I      Exam:    Blood pressure (!) 91/39, pulse 70, temperature 97.8 °F (36.6 °C), temperature source Oral, resp. rate 17, height 5' 0.98\" (1.549 m), weight 232 lb 12.9 oz (105.6 kg), last menstrual period 01/23/1995, SpO2 97 %, not currently breastfeeding. General: Observed during a transfer with staff. The patient was very anxious and uncomfortable. Poor reasoning and insight. HEENT: Symmetric facial expression. Clear speech. MMM. Pulmonary: No coughing or labored breathing noted. Cardiac: Her rate was controlled but there were premature beats. Abdomen: Patient's abdomen is soft and nondistended. Bowel sounds were present throughout. There was no rebound, guarding or masses noted. Upper extremities: Using her arms to pull herself up in bed with the assistance of staff. Lower extremities: Right thigh swelling is the same. Incision is dressed and the bandages dry. No signs of DVT. Her lower limb wounds are drying. Sitting balance was good. Standing balance was poor.     Lab Results   Component Value Date    WBC 12.8 (H) 10/18/2021    HGB 7.0 (L) 10/18/2021    HCT 23.1 (L) 10/18/2021    .7 (H) 10/18/2021     10/18/2021     Lab Results   Component Value Date    INR 0.94 10/08/2021    INR 1.06 05/21/2021    INR 1.03 12/17/2016    PROTIME 12.1 10/08/2021    PROTIME 12.8 05/21/2021    PROTIME 11.8 12/17/2016     Lab Results   Component Value Date    CREATININE 2.4 (H) 10/18/2021    BUN 63 (H) 10/18/2021     (L) 10/18/2021    K 4.6 10/18/2021     10/18/2021    CO2 18 (L) 10/18/2021     Lab Results   Component Value Date    ALT 7 (L) 10/11/2021    AST 35 10/11/2021    ALKPHOS 74 10/11/2021    BILITOT 0.4 10/11/2021       Expected length of stay  prior to a supervised level of function for discharge home with a walker and [address] OT/PT is 10/28/2021. Recommendations:    1. Right femoral neck fracture with hemiarthroplasty:  She remains anxious and requires more verbal cues to problem solve some bed mobility today. Activity tolerance remains poor during the daily occupational and physical therapy.     Verbal cues to following posterior hip precautions.    Ongoing adaptive equipment training, range of motion and strength recovery and aggressive pulmonary hygiene. Adjusting her analgesics down to improve her alertness. Close monitoring of her hemoglobin with an H&H planned for today. It has not been drawn yet. Ongoing DVT prophylaxis.  Continue Simpson for now. Maximum verbal cues and maximum physical assistance needed for mobility today. 2. DVT prophylaxis: Lovenox 30 mg subcu daily.  I must monitor her hemoglobin and platelet count periodically while on this medication.  Weightbearing activities are limited but are attempted during transfers. Bernardine Dakins prophylaxis is available. No new bruising or swelling. Pending H&H today. 3. Acute on chronic kidney disease stage IV: Avoiding nephrotoxic medications when possible.  Encouraging consistent oral hydration.   BMP yesterday shows significant improvement in her creatinine to 2.4.   4. Lower limb cellulitis with MRSA: Infectious disease switch her from vancomycin and to Zyvox 600 mg twice a day through today.  The wound care service is directing her topical care.  Limb elevation. 5. Acute on chronic diastolic congestive heart failure: Lopressor for afterload reduction, intermittent use of diuretics and daily weights. 6. Uncontrolled pain: Scheduled acetaminophen, cryotherapy and narcotics. I will decrease her narcotic load to improve her mentation. Progressive mobilization. 7. Atrial fibrillation: Lopressor for rate control.  Her chronic anemia precludes more aggressive anticoagulation (DOAC).

## 2021-10-20 NOTE — PROGRESS NOTES
Physical Therapy    [x] daily progress note       [] discharge       Patient Name:  Danuta Begum   :  1940 MRN: [id number]  Room:  [address] [place] [address] Date of Admission: 10/11/2021  Rehabilitation Diagnosis:   Fracture of unspecified part of neck of right femur, initial encounter for closed fracture [S72.001A]  Traumatic closed fracture of neck of femur with minimal displacement, right, initial encounter ([facility] [S72.001A]       Date 10/20/2021       Day of ARU Week:  3   Time IN/OUT 1305/1333   Individual Tx Minutes 28   Group Tx Minutes    Co-Treat Minutes    Concurrent Tx Minutes    TOTAL Tx Time Mins 28   Variance Time -32   Variance Time [x]   Refusal due to pain and self-limiting behavior      []   Medical hold/reason:    []   Illness   []   Off Unit for test/procedure  []   Extra time needed to complete task  []   Therapeutic need  []   Other (specify):   Restrictions Restrictions/Precautions  Restrictions/Precautions: Weight Bearing, Fall Risk, ROM Restrictions, General Precautions  Position Activity Restriction  Hip Precautions: Posterior hip precautions, No hip flexion > 90 degrees, No ADduction, No hip internal rotation  Other position/activity restrictions: mobley catheter, wound drain   Interdisciplinary communication [x]   Cleared for therapy per nursing     []   RN notified about issues during session  [x]   RN updated on pt's refusal to participate  []   Spoke with   []   Spoke with OT  []   Spoke with MD  []   Other:    Subjective observations and cognitive status: Pt resting in semi-gonzalez's position, alert, states not sleeping well, aware of having UTI, apologized for being \"cranky\" this AM, not able to eat due to sore mouth, mobley catheter in place, oriented x 4, able to recall 2 out of 3 posterior hip precautions (continues to require prompting to recall no turning of R foot inwards), able to identify niece at bedside by name and relationship to her.     Pain level/location:    /10 Location:    Discharge recommendations  Anticipated discharge date:  10/28/2021  Destination: []???home alone   []? ??home alone with assist PRN     []? ?? home w/ family      []? ?? Continuous supervision  []? ??SNF    []??? Assisted living     []? ?? Other:  Continued therapy: []???C PT  []???OUTPATIENT PT   []??? No Further PT  []???SNF PT  Caregiver training recommended: []? ? ? Yes  []??? No   Equipment needs: has RW and manual w/c      PT IRF-SARAH scores and goals for discharge assessment:     Lying to Sitting on Side of Bed: attempted with use of bed features and with PT providing physical assist on RLE, however with a few passive movements provided on RLE pt started crying and perseverated on saying \"I can't do it. I'm hurting. \" Pt was not receptive to encouragement provided by PT and niece. Pt continued to refuse to attempt transfer even if PT offered a 2nd person assist.     Additional Therapeutic activities/exercises completed this date:     []   Nu-step:  Time:        Level:         #Steps:       []   Rebounder:    []  Seated     []  Standing        []   Balance training         []   Postural training    []   Supine ther ex (reps/sets):     []   Seated ther ex (reps/sets):     []   Standing ther ex (reps/sets):     []   Other:  Toileting activity completed with    [x]   Other: Pt positioning in bed to protect R hip and for comfort     Comments:      Patient/Caregiver Education and Training:   [x]   Role of PT  []   Education about Dx  []   Use of call light for assist   []   HEP provided and explained   []   Treatment plan reviewed  []   Home safety  []   Wheelchair mobility/management   []   Body mechanics  []   Bed Mobility/Transfer technique  []   Gait technique/sequencing  []   Proper use of assistive device/adaptive equipment  []   Stair training/Advanced mobility safety and technique  []   Reinforced patient's precautions/mobility while maintaining precautions  []   Postural awareness  [] Family/caregiver training  []   Progress was updated and reviewed in Rehabtracker with patient and/or family this date. [x]   Other: Reviewed with pt and niece pt's progress in her functional mobility last week versus this week. Discussed her significant impairments and barriers to her progress that would prevent pt from safely returning home. Niece indicated that she cannot provided continuous assist and any kind of lifting to assist pt. PT also reinforced the importance of pt's full participation in the ARU program as an expectation to increase her functional mobility and independence. Treatment Plan for Next Session: transfers training     Assessment: This pt demonstrated a negative response to today's treatment as evidenced by refusal to sit at EOB, poor response to encouragement provided by both PT and her niece, persistent pain, and worsening self-limiting behavior. Pt will continue to decline if she refuses to get out of bed that would require use bedpan for BM and will have more complications of immobility including further skin breakdown. Pt is not tolerating the required 3 hours of therapy at this time and regressing that transition to SNF will be recommended on care conference tomorrow.      Treatment/Activity Tolerance:   [] Tolerated treatment with no adverse effects    [] Patient limited by fatigue  [x] Patient limited by pain and self-limiting behavior    [x] Patient limited by medical complications: UTI   [] Adverse reaction to Tx:   [] Significant change in status    Safety:       [x]  bed alarm set    []  chair alarm set    []  Pt refused alarms                []  Telesitter activated      []  Gait belt used during tx session      [x]other: niece present in room       Number of Minutes/Billable Intervention  Gait Training    Therapeutic Exercise    Neuro Re-Ed    Therapeutic Activity 28   Wheelchair Propulsion    Group    Other:    TOTAL 28     Social History  Social/Functional History  Lives With: Alone (has local family support)  Type of Home: House  Home Layout: Two level, Able to Live on Main level with bedroom/bathroom, Performs ADL's on one level  Home Access: Ramped entrance (bilat rails)  Bathroom Shower/Tub: Walk-in shower  Bathroom Toilet: Handicap height  Bathroom Equipment: Built-in shower seat, Hand-held shower, Grab bars in shower, Grab bars around toilet  Home Equipment: Rolling walker, [employer], [employer], [employer], Reacher, Grab bars, Lift chair, Sock aid, Long-handled shoehorn  Receives Help From: Family, Neighbor, Friend(s)  ADL Assistance: Needs assistance (niece supervised pt during ADL (bathing and dressing), otherwise Mod Ind with toileting activity)  Homemaking Responsibilities: Yes  Meal Prep Responsibility: Primary  Laundry Responsibility: Secondary  Cleaning Responsibility: Primary (does not run the sweeper)  Bill Paying/Finance Responsibility: Primary  Shopping Responsibility: Secondary (does grocery list)  [address] [facility]: No  Health Care Management: Primary  Ambulation Assistance: Independent (Mod Ind using RW (household and limited community distances))  Active : No  Mode of Transportation: Family, Car  Occupation: Retired  Type of occupation: worked in bank  Additional Comments: has a regular, flat bed (tall height); usually sleeps in lift chair; had 2 falls in the past year with injuries requiring surgery    Objective                                                                                    Goals:  (Update in navigator)  Short term goals  Time Frame for Short term goals: 12-14 tx days:  Short term goal 1: Pt will complete rolling L/R and sit->sup using leg  to assist RLE and using bed features with Min A following posterior hip precautions on RLE. Short term goal 2: Pt will complete sup->sit using bed features and leg  to assist RLE with SBA-Sup following posterior hip precautions on RLE.   Short term goal 3: Pt will complete OOB transfers using RW with Min A following posterior hip precautions. Short term goal 4: Pt will ambulate 50 ft with turns over level surface and 10 ft of unevem surface using RW with CGA. Short term goal 5: Pt will ascend/descend 2\" curb step using RW with Min A. Short term goal 6: Pt will complete object retrieval from the floor in standing with 1UE support on RW and using reacher with CGA. Short term goal 7: pt will complete 150 ft of w/c propulsion using manual w/c at mod ind:   :        Plan of Care                                                                              Times per week: 5 days per week for a minimum of 60 minutes/day plus group as appropriate for 60 minutes.   Treatment to include Current Treatment Recommendations: Strengthening, Gait Training, Patient/Caregiver Education & Training, ROM, Equipment Evaluation, Education, & procurement, Balance Training, Pain Management, Modalities, Functional Mobility Training, Endurance Training, Home Exercise Program, Positioning, Transfer Training, Wheelchair Mobility Training, Safety Education & Training    Electronically signed by   Crow Marte, PT  10/20/2021, 8:28 AM

## 2021-10-20 NOTE — CONSULTS
IV Consult complete. Yanthony So 1.75\" Extra Long PIV inserted in right FA x1 attempt. Brisk blood return, flushes without resistance.

## 2021-10-20 NOTE — PATIENT CARE CONFERENCE
ACUTE REHAB TEAM CONFERENCE SUMMARY   [address] [facility]    NAME: Jc Lugo  : 1940 ADMIT DATE: 10/11/2021    Rehab Admitting Dx: Fracture of unspecified part of neck of right femur, initial encounter for closed fracture [S72.001A]  Traumatic closed fracture of neck of femur with minimal displacement, right, initial encounter ([facility] [S72.001A]  Patient Comorbid Conditions: Active Hospital Problems    Diagnosis Date Noted    Uncontrolled pain [R52]     Generalized weakness [R53.1]     Acute blood loss anemia [D62]     Paroxysmal atrial fibrillation (HCC) [I48.0]     Acute on chronic diastolic (congestive) heart failure (HCC) [I50.33]     Traumatic closed fracture of neck of femur with minimal displacement, right, initial encounter ([facility] [S72.001A] 10/11/2021    MRSA cellulitis [L03.90, B95.62] 10/10/2021    Morbid obesity with BMI of 45.0-49.9, adult ([facility] [E66.01, Z68.42] 2020    Essential hypertension [I10] 08/10/2018    Chronic kidney disease, stage IV (severe) ([facility] [N18.4] 2017    Gastroesophageal reflux disease [K21.9] 2017     Date: 10/21/2021    CASE MANAGEMENT  Current issues/needs regarding patient and family discharge status:   Patient plans dc 2-level home with ramped entrance. Patient previously reported that her niece could provide increased support at discharge. Niece was present for PT yesterday and advised PT Ximena that she can't provide current level of assist.    PHYSICAL THERAPY (Updated in QI)  Short term goals  Time Frame for Short term goals: 12-14 tx days:  Short term goal 1: Pt will complete rolling L/R and sit->sup using leg  to assist RLE and using bed features with Min A following posterior hip precautions on RLE. Short term goal 2: Pt will complete sup->sit using bed features and leg  to assist RLE with SBA-Sup following posterior hip precautions on RLE.   Short term goal 3: Pt will complete OOB transfers using RW Partial/moderate assistance    Toilet Transfer  Assistance Needed: Dependent  CARE Score: 1    Car Transfer  Reason if not Attempted: Not attempted due to medical condition or safety concerns  CARE Score: 88  Discharge Goal: Partial/moderate assistance    Ambulation:    Walking Ability  Does the Patient Walk?: Yes     Walk 10 Feet  Comment: pt with poor standing tolerance and unable to actively perform weight shifting today due to pain and anxiety  Reason if not Attempted: Not attempted due to medical condition or safety concerns  CARE Score: 88  Discharge Goal: Supervision or touching assistance     Walk 50 Feet with Two Turns  Reason if not Attempted: Not attempted due to medical condition or safety concerns  CARE Score: 88  Discharge Goal: Supervision or touching assistance     Walk 150 Feet  Comment: had limited mobility since L hip surgery; was not performing this activity at baseline; limited potential to progressing to this mobility task  Reason if not Attempted: Not applicable  CARE Score: 9  Discharge Goal: Not Applicable     Walking 10 Feet on Uneven Surfaces  Reason if not Attempted: Not attempted due to medical condition or safety concerns  CARE Score: 88  Discharge Goal: Partial/moderate assistance     1 Step (Curb)  Comment: pt was not performing this activity at baseline but anticipating that pt can progress to being able to ascend/descend 2\" curb step while at ARU  Reason if not Attempted: Not attempted due to medical condition or safety concerns  CARE Score: 88  Discharge Goal: Partial/moderate assistance     4 Steps  Comment: had limited mobility since L hip surgery; was not performing this activity at baseline; limited potential to progressing to this mobility task  Reason if not Attempted: Not applicable  CARE Score: 9  Discharge Goal: Not Applicable     12 Steps  Comment: had limited mobility since L hip surgery; was not performing this activity at baseline; limited potential to progressing to this mobility task  Reason if not Attempted: Not applicable  CARE Score: 9  Discharge Goal: Not Applicable           Wheelchair:  w/c Ability: Wheelchair Ability  Uses a Wheelchair and/or Scooter?: Yes    Wheel 50 Feet with Two Turns  Assistance Needed: Partial/moderate assistance (min A for completion of last 5-10 ft 2/2 onset of fatigue)  Comment: per verbal report of OT, pt was only able to propel 45 ft with Min A for turns as observed during co-tx on 10/13/2021  CARE Score: 3    Wheel 150 Feet  Assistance Needed: Substantial/maximal assistance  Comment: per verbal report of OT, pt was only able to propel 45 ft with Min A as observed during co-tx on 10/13/2021  CARE Score: 2  Discharge Goal: Independent              Balance:        Object: Picking Up Object  Comment: pt with poor standing tolerance and required BUE support on RW to maintain static standing  Reason if not Attempted: Not attempted due to medical condition or safety concerns  CARE Score: 88  Discharge Goal: Supervision or touching assistance    Fall Risk: []  Yes  []  No    OCCUPATIONAL THERAPY  (Updated in QI)  Short term goals  Time Frame for Short term goals: STGs=LTGs :   Long term goals  Time Frame for Long term goals : 14-17 days or until d/c  Long term goal 1: Pt will complete grooming tasks Ind seated  Long term goal 2: Pt will complete total body bathing c min A using AE PRN  Long term goal 3: Pt will complete UB dressing c setup  Long term goal 4: Pt will complete LB dressing c mod A using AE  Long term goal 5: Pt will doff/don footwear c min A using AE  Long term goals 6: Pt will complete toileting c max A  Long term goal 7: Pt will complete functional transfers (bed, chair, toilet, shower) c DME PRN and min A  Long term goal 8: Pt will perform therex/therax to facilitate increased strength/endurance/ax tolerance (c emphasis on dynamic standing balance/tolerance > 5 mins, and BUE endurance) c SBA : OT IRF-SARAH scores and goals since initial assessment:    ADLs:    Eating: Eating  Assistance Needed: Independent  Comment: able to open packages/containers  CARE Score: 6  Discharge Goal: Independent       Oral Hygiene: Oral Hygiene  Assistance Needed: Independent  Comment: MOD I completed seated sink-side  CARE Score: 6  Discharge Goal: Independent    UB/LB Bathing: Shower/Bathe Self  Assistance Needed: Partial/moderate assistance  Comment: Mod A required for LB washing, pt completed UB washing with increased time. Pt emotional demo crying without tears throughout activity  CARE Score: 3  Discharge Goal: Partial/moderate assistance    UB Dressing: Upper Body Dressing  Assistance Needed: Setup or clean-up assistance  Comment: Pt able to don/doff long sleeve in seated position  CARE Score: 5  Discharge Goal: Supervision or touching assistance         LB Dressing: Lower Body Dressing  Assistance Needed: Dependent  Comment: Total A to thread BLE and don over hips, pt c/o pain and weakness during activity. CARE Score: 1  Discharge Goal: Partial/moderate assistance    Donning and [place] Footwear: Putting On/Taking Off Footwear  Assistance Needed: Partial/moderate assistance  Comment: Min A c use of sock-aid, pt able to doff socks c use of reacher. CARE Score: 3  Discharge Goal: Partial/moderate assistance      Toileting: Toileting Hygiene  Assistance Needed: Dependent  Comment: Total A for andrew-hygiene and CM. Reason if not Attempted: Not attempted due to medical condition or safety concerns  CARE Score: 1  Discharge Goal: Substantial/maximal assistance      Toilet Transfers:    Toilet Transfer  Assistance Needed: Substantial/maximal assistance  Comment: Max A c use of abdullahi stedy; partial sit<>stands Mod A  Reason if not Attempted: Not attempted due to medical condition or safety concerns  CARE Score: 2  Discharge Goal: Partial/moderate assistance      Impairments/deficits, barriers:  Pain, decreased motivation  Assessment  Performance deficits / Impairments: Decreased functional mobility , Decreased ADL status, Decreased strength, Decreased endurance, Decreased balance, Decreased high-level IADLs, Decreased posture  Decision Making: High Complexity  REQUIRES OT FOLLOW UP: Yes  Equipment needed at discharge: drop arm commode pending SNF d/c    COGNITIVE FUNCTION/SPEECH THERAPY (AS INDICATED)  LTG                                        Nursing Current Medical Status:   [x] Is continent of bladder     [x] Is incontinent of bowel- black tarry stool   [] Has had an adequate number of bowel movements   [] Urinates with no urinary retention >300ml in bladder   [] Targeting bladder protocol with mobley removal   [x] Maintaining O2 SATs at 92% or greater   [] Has pain managed while on ARU         [] Has had no skin breakdown while on ARU   [] Has improved skin integrity via wound measurements   [] Has no signs/symptoms of infection at the wound site   [] Pressure wounds Stage/Location:    [] Arrived on unit with pressure wound  [] Has been free from injury during hospitalization   [] Has experienced a fall during hospitalization  [] Ongoing education with patient/family with understanding demonstrated for:  [] Receives IV Fluids  [x] Other: excoriation on buttocks, mepilex applied, black tarry stools, and hemoglobin dropped      NUTRITION  Weight: 227 lb 1.2 oz (103 kg) / Body mass index is 42.93 kg/m². Current diet: ADULT DIET; Regular  Adult Oral Nutrition Supplement; Standard High Calorie/High Protein Oral Supplement  Intake: Varying intake, 25-75% recent meals       Medical improvements/barriers: decline in fx, UTI, level of assist is worse than initial eval, self limiting behaviors, poor participation, ID following        Team goals for next treatment period/Intervention for current barriers:   [x] Pt will increase activity tolerance for daily tasks.   [] Pt will improve bed mobility with reduced assist.  [] Pt will improve safety in fx tasks with reduced cues/assist  [x] Pt will improve transfers with reduced assist  [x] Pt will improve toileting with reduced assist  [x] Pt will improve ADL's with use of adaptive equipment with reduced assist  [] Pt will improve pain mgmt for maximum participation in tx program  [] Pt will improve communication to get basic needs met on unit  [] Pt will improve swallowing for safe diet advancement with use of strategies  []  Plan for discharge to home. Patient Strengths:     Justification for Continued Stay  Based on my medical assessment of the patient and review of information from the interdisciplinary team as part of this weekly team conference, the patient continues to meet the following criteria for IRF level of care:   The patient requires active and ongoing intervention of multiple therapy disciplines   The patient requires and intensive rehabilitation therapy program   The patient requires continued physician supervision by a rehabilitation physician   The patient requires 24 hours rehab nursing care   The patient requires an intensive and coordinated interdisciplinary team approach to the delivery of rehabilitative care.      Assessment/Plan   []  The patient is making good progression towards their long term goals and is actively participating in and has a reasonable expectation to continue to benefit from the intensive rehabilitation therapy program   []  The estimated discharge date has been changed from initial team conference due to:   [x]  The estimated discharge destination has been changed from initial team conference due to decline in fx and care needs         Ongoing tx following discharge: []HHC     []OUTPATIENT     [] No Further Treatment     [] Family/Caregiver Training  []  Transitional Living Arrangement   [] Home Assessment (date  )     [] Family Conference   []  Therapeutic Pass       []  Other: (specify)    Estimated Discharge Date: 10/26/21    Estimated Discharge Destination: []home alone   []home alone with assist prn  []Continuous supervision []Return home with s/o/spouse/family   [] Assisted living    [x]SNF     Team members participating in today's conference. [x] Farooq Keen, Medical Director  [x] Yasmany Brian,    [] Abel Rutherford, Nurse Mgr [x] Mar Jeffrey, Nurse Supervisor   [x]  Moses Boyce, PT   [x] Russell Rogers, OT   []  Kevin Jones, PT  [] Valeriy Rowland, OT      [x]  Rebecca Lund, SLP    []  Handy Lindo, LEE   [] Susan Talley, LEE   []  Violeta De La Cruz, MIKEY     [x] Merlyn Barry,     [x]Laverne Green,     [] Feliberto Gitelman, RN[] Tammi Escobar RN     [] Joselyn Barnett RN    [] Paramjit Robertson RN    [] Corbin Galarza RN  [] Dinseh Estevez RN    []     I have led this Team Conference and agree with the plan, Behzad Charles MD, 10/21/2021, 12:37 PM  Goals have been updated to reflect recent status.     Team conference note transcribed this date by: Debbie Salinas [place], [id number] [facility], Therapy Coordinator'

## 2021-10-21 LAB
AMMONIA: 12 UMOL/L (ref 11–51)
ANION GAP SERPL CALCULATED.3IONS-SCNC: 11 MMOL/L (ref 4–16)
BUN BLDV-MCNC: 65 MG/DL (ref 6–23)
C-REACTIVE PROTEIN, HIGH SENSITIVITY: 295.8 MG/L
CALCIUM SERPL-MCNC: 8 MG/DL (ref 8.3–10.6)
CHLORIDE BLD-SCNC: 105 MMOL/L (ref 99–110)
CO2: 17 MMOL/L (ref 21–32)
CREAT SERPL-MCNC: 2.6 MG/DL (ref 0.6–1.1)
GFR AFRICAN AMERICAN: 21 ML/MIN/1.73M2
GFR NON-AFRICAN AMERICAN: 18 ML/MIN/1.73M2
GLUCOSE BLD-MCNC: 85 MG/DL (ref 70–99)
HCT VFR BLD CALC: 22.7 % (ref 37–47)
HEMOGLOBIN: 7 GM/DL (ref 12.5–16)
MCH RBC QN AUTO: 31.7 PG (ref 27–31)
MCHC RBC AUTO-ENTMCNC: 30.8 % (ref 32–36)
MCV RBC AUTO: 102.7 FL (ref 78–100)
PDW BLD-RTO: 19.4 % (ref 11.7–14.9)
PLATELET # BLD: 228 K/CU MM (ref 140–440)
PMV BLD AUTO: 9.2 FL (ref 7.5–11.1)
POTASSIUM SERPL-SCNC: 4.6 MMOL/L (ref 3.5–5.1)
PROCALCITONIN: 0.96
RBC # BLD: 2.21 M/CU MM (ref 4.2–5.4)
SODIUM BLD-SCNC: 133 MMOL/L (ref 135–145)
WBC # BLD: 12.1 K/CU MM (ref 4–10.5)

## 2021-10-21 PROCEDURE — 94761 N-INVAS EAR/PLS OXIMETRY MLT: CPT

## 2021-10-21 PROCEDURE — 99223 1ST HOSP IP/OBS HIGH 75: CPT | Performed by: INTERNAL MEDICINE

## 2021-10-21 PROCEDURE — 6360000002 HC RX W HCPCS: Performed by: PHYSICAL MEDICINE & REHABILITATION

## 2021-10-21 PROCEDURE — 87150 DNA/RNA AMPLIFIED PROBE: CPT

## 2021-10-21 PROCEDURE — 99213 OFFICE O/P EST LOW 20 MIN: CPT

## 2021-10-21 PROCEDURE — 36415 COLL VENOUS BLD VENIPUNCTURE: CPT

## 2021-10-21 PROCEDURE — 87186 SC STD MICRODIL/AGAR DIL: CPT

## 2021-10-21 PROCEDURE — 80048 BASIC METABOLIC PNL TOTAL CA: CPT

## 2021-10-21 PROCEDURE — 6370000000 HC RX 637 (ALT 250 FOR IP): Performed by: PHYSICAL MEDICINE & REHABILITATION

## 2021-10-21 PROCEDURE — 2500000003 HC RX 250 WO HCPCS: Performed by: PHYSICAL MEDICINE & REHABILITATION

## 2021-10-21 PROCEDURE — 87040 BLOOD CULTURE FOR BACTERIA: CPT

## 2021-10-21 PROCEDURE — 1280000000 HC REHAB R&B

## 2021-10-21 PROCEDURE — 6370000000 HC RX 637 (ALT 250 FOR IP): Performed by: NURSE PRACTITIONER

## 2021-10-21 PROCEDURE — 82140 ASSAY OF AMMONIA: CPT

## 2021-10-21 PROCEDURE — 97535 SELF CARE MNGMENT TRAINING: CPT

## 2021-10-21 PROCEDURE — 2580000003 HC RX 258: Performed by: PHYSICAL MEDICINE & REHABILITATION

## 2021-10-21 PROCEDURE — 85027 COMPLETE CBC AUTOMATED: CPT

## 2021-10-21 PROCEDURE — 84145 PROCALCITONIN (PCT): CPT

## 2021-10-21 PROCEDURE — APPSS60 APP SPLIT SHARED TIME 46-60 MINUTES: Performed by: NURSE PRACTITIONER

## 2021-10-21 PROCEDURE — 86140 C-REACTIVE PROTEIN: CPT

## 2021-10-21 RX ADMIN — MICONAZOLE NITRATE: 2 POWDER TOPICAL at 12:25

## 2021-10-21 RX ADMIN — MICONAZOLE NITRATE: 2 POWDER TOPICAL at 19:53

## 2021-10-21 RX ADMIN — ACETAMINOPHEN 1000 MG: 500 TABLET, FILM COATED ORAL at 17:30

## 2021-10-21 RX ADMIN — SODIUM CHLORIDE, PRESERVATIVE FREE 10 ML: 5 INJECTION INTRAVENOUS at 19:53

## 2021-10-21 RX ADMIN — METOPROLOL TARTRATE 25 MG: 25 TABLET, FILM COATED ORAL at 19:51

## 2021-10-21 RX ADMIN — PIPERACILLIN AND TAZOBACTAM 2250 MG: 2; .25 INJECTION, POWDER, LYOPHILIZED, FOR SOLUTION INTRAVENOUS at 12:21

## 2021-10-21 RX ADMIN — ACETAMINOPHEN 1000 MG: 500 TABLET, FILM COATED ORAL at 19:51

## 2021-10-21 RX ADMIN — PIPERACILLIN AND TAZOBACTAM 2250 MG: 2; .25 INJECTION, POWDER, LYOPHILIZED, FOR SOLUTION INTRAVENOUS at 19:55

## 2021-10-21 RX ADMIN — SODIUM CHLORIDE, PRESERVATIVE FREE 10 ML: 5 INJECTION INTRAVENOUS at 12:24

## 2021-10-21 RX ADMIN — PIPERACILLIN AND TAZOBACTAM 2250 MG: 2; .25 INJECTION, POWDER, LYOPHILIZED, FOR SOLUTION INTRAVENOUS at 01:08

## 2021-10-21 RX ADMIN — ACETAMINOPHEN 1000 MG: 500 TABLET, FILM COATED ORAL at 09:55

## 2021-10-21 RX ADMIN — ALLOPURINOL 50 MG: 100 TABLET ORAL at 12:47

## 2021-10-21 RX ADMIN — LEVOTHYROXINE SODIUM 50 MCG: 0.05 TABLET ORAL at 05:20

## 2021-10-21 RX ADMIN — PANTOPRAZOLE SODIUM 40 MG: 40 TABLET, DELAYED RELEASE ORAL at 05:20

## 2021-10-21 ASSESSMENT — PAIN SCALES - GENERAL
PAINLEVEL_OUTOF10: 2
PAINLEVEL_OUTOF10: 4
PAINLEVEL_OUTOF10: 0
PAINLEVEL_OUTOF10: 5

## 2021-10-21 ASSESSMENT — PAIN DESCRIPTION - DESCRIPTORS
DESCRIPTORS: DISCOMFORT
DESCRIPTORS: BURNING;DISCOMFORT

## 2021-10-21 ASSESSMENT — PAIN DESCRIPTION - PAIN TYPE
TYPE: ACUTE PAIN
TYPE: ACUTE PAIN

## 2021-10-21 ASSESSMENT — PAIN DESCRIPTION - ORIENTATION
ORIENTATION: LOWER
ORIENTATION: RIGHT

## 2021-10-21 ASSESSMENT — PAIN DESCRIPTION - LOCATION
LOCATION: KNEE
LOCATION: ABDOMEN

## 2021-10-21 ASSESSMENT — PAIN DESCRIPTION - ONSET
ONSET: SUDDEN
ONSET: ON-GOING

## 2021-10-21 ASSESSMENT — PAIN DESCRIPTION - FREQUENCY
FREQUENCY: INTERMITTENT
FREQUENCY: INTERMITTENT

## 2021-10-21 NOTE — PROGRESS NOTES
Occupational Therapy    Physical Rehabilitation: OCCUPATIONAL THERAPY     [x] daily progress note       [] discharge       Patient Name:  Yuniel Frye   :  1940 MRN: [id number]  Room:  [address] [place], [place] [address] Date of Admission: 10/11/2021  Rehabilitation Diagnosis:   Fracture of unspecified part of neck of right femur, initial encounter for closed fracture [S72.001A]  Traumatic closed fracture of neck of femur with minimal displacement, right, initial encounter ([facility] [S72.001A]       Date 10/21/2021       Day of ARU Week:  4   Time IN/OUT [id number]   Individual Tx Minutes 60   Group Tx Minutes    Co-Treat Minutes    Concurrent Tx Minutes    TOTAL Tx Time Mins 60   Variance Time -60   Variance Time [x]   Refusal due to: see subjective line below    []   Medical hold/reason:    []   Illness   []   Off Unit for test/procedure  []   Extra time needed to complete task  []   Therapeutic need  []   Other (specify):   Restrictions Restrictions/Precautions: Weight Bearing, Fall Risk, ROM Restrictions, General Precautions     Hip Precautions: Posterior hip precautions, No hip flexion > 90 degrees, No ADduction, No hip internal rotation   Communication with other providers: [x]   OK to see per nursing:     []   Spoke with team member regarding:      Subjective observations and cognitive status: Pt in semi-fowlers position. Pt alert and oriented x4. Pt agreeable to therapy initially, however as pt continued with ADL session pt continuously c/o pain and tiredness. P.M. session: Pt in semi-fowlers position, pt awake and drinking water. Agreeable to OT however pt demo falling asleep and began mumbling words/sentences. Pt awoken by this OTR and pt states, \"I'm okay. \"  Pt encouraged for therapy and OOB activities. Pt then states, \"I'm done, I don't want to do this anymore. I've lived for 80 years, I'm tired of hurting. I'm going to die here and just let me go. Be a good girl and let me go. Just let me go. \" Pt encouraged to not give up and reminded of progress and strength. OTR asked pt if she would like  services, pt states, \"I don't want anyone, don't send anyone here because I'm not doing anything today. \" RN Deonte Rodriguez notified of pt's statements. Pain level/location:   5 /10       Location: [facility]   Discharge recommendations  Anticipated discharge date:  10/26  Destination: []home alone   []home alone w assist prn   [] home w/ family    [] Continuous supervision       [x]SNF    [] Assisted living     [] Other:   Continued therapy: []HHC OT  []OUTPATIENT  OT   [] No Further OT   Equipment needs:        ADLs:    Eating: Eating  Assistance Needed: Independent  Comment: able to open packages/containers  CARE Score: 6  Discharge Goal: Independent       UB/LB Bathing: Shower/Bathe Self  Assistance Needed: Partial/moderate assistance  Comment: Mod A required for LB washing, pt completed UB washing with increased time. Pt emotional demo crying without tears throughout activity  CARE Score: 3  Discharge Goal: Partial/moderate assistance    Toileting: Toileting Hygiene  Assistance Needed: Dependent  Comment: Total A for andrew-hygiene and CM. Reason if not Attempted: Not attempted due to medical condition or safety concerns  CARE Score: 1  Discharge Goal: Substantial/maximal assistance      Toilet Transfers:     Toilet Transfer  Assistance Needed: Substantial/maximal assistance  Comment: Max A c use of abdullahi stedy; partial sit<>stands Mod A  Reason if not Attempted: Not attempted due to medical condition or safety concerns  CARE Score: 2  Discharge Goal: Partial/moderate assistance  Device Used:    []   Standard Toilet         []   Grab Bars           []  Bedside Commode       []   Elevated Toilet          []   Other:        Bed Mobility:           []   Pt received out of bed   Scooting:  MAX A  Supine --> Sit:  MAX  c use of bed features  Sit --> Supine:  TOTAL A 2 person required    Transfers:    Sit--> Stand:  Max c abdullahi of bathroom. Noted incontinence and decreased motivation for participation in ADL session. Ronn pt d/c to SNF as pt continues to require significant assistance c ADLs and pt does not have support required for d/c to home.      Treatment/Activity Tolerance:   [x] Tolerated treatment with no adverse effects    [x] Patient limited by fatigue  [x] Patient limited by pain   [] Patient limited by medical complications:    [] Adverse reaction to Tx:   [] Significant change in status    Safety:       [x]  bed alarm set    []  chair alarm set    []  Pt refused alarms                []  Telesitter activated      [x]  Gait belt used during tx session      []other:       Number of Minutes/Billable Intervention  Therapeutic Exercise    ADL Self-care 60   Neuro Re-Ed    Therapeutic Activity    Group    Other:    TOTAL 60       Social History  Social/Functional History  Lives With: Alone (has local family support)  Type of Home: House  Home Layout: Two level, Able to Live on Main level with bedroom/bathroom, Performs ADL's on one level  Home Access: Ramped entrance (bilat rails)  Bathroom Shower/Tub: Walk-in shower  Bathroom Toilet: Handicap height  Bathroom Equipment: Built-in shower seat, Hand-held shower, Grab bars in shower, Grab bars around toilet  Home Equipment: Rolling walker, [address], [address], [employer], [employer], Reacher, Grab bars, Lift chair, Sock aid, Long-handled shoehorn  Receives Help From: Family, Neighbor, Friend(s)  ADL Assistance: Needs assistance (niece supervised pt during ADL (bathing and dressing), otherwise Mod Ind with toileting activity)  Homemaking Responsibilities: Yes  Meal Prep Responsibility: Primary  Laundry Responsibility: Secondary  Cleaning Responsibility: Primary (does not run the sweeper)  Bill Paying/Finance Responsibility: Primary  Shopping Responsibility: Secondary (does grocery list)  Dependent Care Responsibility: No  Health Care Management: Primary  Ambulation Assistance: Independent (Mod Ind using RW (household and limited community distances))  Active : No  Mode of Transportation: Family, Car  Occupation: Retired  Type of occupation: worked in bank  Additional Comments: has a regular, flat bed (tall height); usually sleeps in lift chair; had 2 falls in the past year with injuries requiring surgery    Objective                                                                                    Goals:  (Update in navigator)  Short term goals  Time Frame for Short term goals: STGs=LTGs:  Long term goals  Time Frame for Long term goals : 14-17 days or until d/c  Long term goal 1: Pt will complete grooming tasks Ind seated  Long term goal 2: Pt will complete total body bathing c min A using AE PRN  Long term goal 3: Pt will complete UB dressing c setup  Long term goal 4: Pt will complete LB dressing c mod A using AE  Long term goal 5: Pt will doff/don footwear c min A using AE  Long term goals 6: Pt will complete toileting c max A  Long term goal 7: Pt will complete functional transfers (bed, chair, toilet, shower) c DME PRN and min A  Long term goal 8: Pt will perform therex/therax to facilitate increased strength/endurance/ax tolerance (c emphasis on dynamic standing balance/tolerance > 5 mins, and BUE endurance) c SBA:        Plan of Care                                                                              Times per week: 5 days per week for a minimum of 60 minutes/day plus group as appropriate for 60 minutes.   Treatment to include Plan  Times per day: Daily  Current Treatment Recommendations: Strengthening, Balance Training, Functional Mobility Training, Endurance Training, Pain Management, Safety Education & Training, Patient/Caregiver Education & Training, Equipment Evaluation, Education, & procurement, Positioning, Self-Care / ADL    Electronically signed by   Kg Hodge OT,  10/21/2021, 1:41 PM None

## 2021-10-21 NOTE — CONSULTS
Infectious Disease Consult Note  10/21/2021   Patient Name: Willetta Bernheim : 1940   Impression  · Sepsis secondary to Complicated Pseudomonas aeruginosa UTI :  · Afebrile  · WBC 12.8  · 10/20-UA ,   · 10/20-Urine Culture Pseudomonas aeruginosa >100,000     Acute Right Femoral Neck Fracture     · Resolving BLE MRSA Cellulitis    · TOBY on CKD4    · Anemia    · Morbid Obesity:  BMI 42.93    · HTN/ AF/ SVT/ PHTN/ VHD/ HFpEF    · Hypothyroidism    · ARON    · Multi-morbidity: per PMHx:  HTN, HFpEF, SVT, GERD, Hypothyroidism, CKD4, left femur fracture s/p repair with intramedullary nail , ventral incisional hernia repair, Morbid Obesity, SVT, AF  Plan:   Continue IV Zosyn 2,250 mg q6h, renal dosing   Trend CRP and Pct, ordered  · Await urine culture sensi   · Check blood cultures 10/21  · Exchange mobley catheter    Thank you for allowing me to consult in the care of this patient.  ------------------------  REASON FOR CONSULT: Infective syndrome     Requested by: Dr. Shailesh Serrano. Lupe Nunez is a [de-identified] y.o.  female who was admitted 10/11/2021 for further evaluation and management of falling at home. She states she was trying to get her slippers from her laundry room floor and fell at home. She was not using her walker, which she always uses. She denies hitting her head or loss of consciousness. She has a medical alert which she used. She c/o severe right hip pain. She was confirmed to have a right femoral neck fracture. She was treated initially for BLE cellulitis with vancomycin, then ID managed her to finish a 2 week course with Zyvox which completed on 10/19/21. She also has had an E faecalis UTI. She was discharged to ARU on 10/11/21. ID has been re-consulted to re-evaluate on 10/21/21 due to becoming more lethargic and anemic. Patient reports generalized malaise with bilateral lower quadrant abdominal discomfort radiating to flank areas.      ?  Infectious diseases service was consulted to evaluate the pt, and recommend further investigative and therapeutic measures. ROS: Other systems reviewed Including eyes, ENT, respiratory, cardiovascular, GI, , dermatologic, neurologic, psych, hem/lymphatic, musculoskeletal and endocrine were negative other than what is mentioned above.      Patient Active Problem List    Diagnosis Date Noted    Acute cystitis without hematuria     Enterococcus faecalis infection     Uncontrolled pain     Generalized weakness     Acute blood loss anemia     Paroxysmal atrial fibrillation (HCC)     Acute on chronic diastolic (congestive) heart failure (HCC)     Traumatic closed fracture of neck of femur with minimal displacement, right, initial encounter ([facility] 10/11/2021    MRSA cellulitis 10/10/2021    Displaced fracture of right femoral neck ([facility] 10/08/2021    Closed transcervical fracture of proximal femur, left, initial encounter ([facility] 05/21/2021    Morbid obesity with BMI of 45.0-49.9, adult ([facility] 07/23/2020    History of ventral hernia repair 10/19/2018    Essential hypertension 08/10/2018    S/P herniorrhaphy 07/11/2018    S/P ventral herniorrhaphy 06/13/2018    H/O ventral hernia 06/06/2018    Chronic kidney disease, stage IV (severe) ([facility] 05/16/2017    Gastroesophageal reflux disease 05/16/2017    CRF (chronic renal failure) 09/20/2016    Hypothyroid 09/20/2016    Ventral hernia without obstruction or gangrene 09/18/2016    SVT (supraventricular tachycardia) ([facility] 09/18/2016    Chronic kidney disease (CKD) stage G4/A2, severely decreased glomerular filtration rate (GFR) between 15-29 mL/min/1.73 square meter and albuminuria creatinine ratio between  mg/g ([address] 06/21/2016     Past Medical History:   Diagnosis Date    Acid reflux     'no recent issue\"    Arthritis     \"Left Index Finger\"    CHF (congestive heart failure) ([facility]     per old chart hx of CHF with admission 12/2016 with pulmonary edema    Chronic kidney disease     Sees Dr. Gideon Vogel have one kidney, dr Jennifer Zelaya told me that in 2011 I think\"    GERD (gastroesophageal reflux disease)     History of blood transfusion 2011 Or 2012    No Reaction To Blood Transfusion Received    Hypertension     ( pt states she is not on any medication for blood pressure)    Hypomagnesemia     Hypothyroidism     MRSA (methicillin resistant staph aureus) culture positive 10/08/2021    Leg    Shortness of breath on exertion     SVT (supraventricular tachycardia) ([address]     per old chart hx of SVT with admission 2016 tx with Adenosine and per notes in 5/2018 hx of SVT- no cardiologist    Teeth missing     Upper And Lower    Wears glasses       Past Surgical History:   Procedure Laterality Date    ABDOMEN SURGERY      DENTAL SURGERY      Teeth Extracted In Past    ENDOSCOPY, COLON, DIAGNOSTIC  2011 Or 2012    EYE SURGERY  ? when    clyde cataract ext    FEMUR FRACTURE SURGERY Left 5/22/2021    FEMUR IM NAIL REGINALDO INSERTION performed by Hansa Orellana MD at [facility]  2011    Abdominal Hernia Repair     HIP SURGERY Right 10/8/2021    RIGHT HIP HEMIARTHROPLASTY performed by Hansa Orellana MD at [address] Right 10/10/2021    HIP HEMIARTHROPLASTY performed by Hansa Orellana MD at [address] [place] [address],[address]  05/27/2018    exp lap . converted to exp laporotomy with ventral hernia repair with mesh    OTHER SURGICAL HISTORY  86/13/5568    umbilical scar excision    VENTRAL HERNIA REPAIR  09/16/2016    Robotic laparoscopic      Family History   Problem Relation Age of Onset    Cancer Father         Lung Cancer    Arthritis Mother     Cancer Brother         Skin Cancer    High Cholesterol Brother       Infectious disease related family history - not contibutory.    SOCIAL HISTORY  Social History     Tobacco Use    Smoking status: Never Smoker    Smokeless tobacco: Never Used   Substance Use Topics    Alcohol use: Not Currently      · Born:[place], [place]  · Lives:[place], [place] alone  · Occupation:  · No recent travel of significance. · No recent unusual exposures. · NO pets   ? ALLERGIES  No Known Allergies   MEDICATIONS  Reviewed and are per the chart/EMR. ? Antibiotics:   Present:  Zosyn 10/20-    Past:?  Ceftriaxone 10/8  Keflex 10/7-  Doxycycline 10/7-? [id number]/9-11  Zyvox 10/11-19   -------------------------------------------------------------------------------------------------------------------    Vital Signs:  Vitals:    10/21/21 0730   BP: (!) 102/56   Pulse: 76   Resp:    Temp: 96.7 °F (35.9 °C)   SpO2:          Exam:    VS: noted; wt 227 lb (103 kg) Height 5 ft  Gen: alert and oriented X3, tearful  Skin: no stigmata of endocarditis  Wounds: BLE with slight erythema, no warmth or tenderness, no drainage  HEMT: AT/NC Oropharynx pink, moist, and without lesions or exudates; dentition in good state of repair  Eyes: PERRLA, EOMI, conjunctiva pink, sclera anicteric. Neck: Supple. Trachea midline. No LAD. Chest: no distress and CTA. Good air movement. Room air. Heart: RRR and no MRG. Abd: soft, non-distended, slight bilateral lower quadrant tenderness to light palpation, no hepatomegaly. Normoactive bowel sounds. Ext: no clubbing, cyanosis, or edema  Catheter Site: without erythema or tenderness draining clear yellow urine. Neuro: Mental status intact. CN 2-12 intact and no focal sensory or motor deficits    ? Diagnostic Studies: reviewed  10/8/21 XR Hip 2-3 VW W Pelvis Right:  Impression   1. Acute fracture of the right femoral neck with mild displacement.    2. Status post ORIF of the left femur.  Redemonstration of the previously   seen fracture of the proximal femur.  No significant callus formation is   present.      10/8/21 XR Chest Portable:  Impression   Mild cardiomegaly.  Mild-to-moderate congestion and/or infiltrates identified   in the lungs.      10/8/21 Limited Echo:  Summary   This is a limited echocardiogram.   Technically difficult study, due to body habitus.   Left ventricular systolic function is normal.   Ejection fraction is visually estimated at 50-55%.   Moderate mitral regurgitation.   Moderate tricuspid regurgitation; RVSP: 45 mmHg.   No evidence of any pericardial effusion.     10/9/21 VL Dup Lower Extremity Venous Bilateral  Impression   The exam is severely limited due to patient body habitus. The distal femoral   veins popliteal veins and calf veins are not well visualized bilaterally.       No gross DVT is identified      10/10/21 XR Hip Right (2-3 Views)  Impression   Right hip replacement in normal alignment.       No acute interval fracture.       Immediate postoperative changes. ??  I have examined this patient and available medical records on this date and have made the above observations, conclusions and recommendations. Electronically signed by: Electronically signed by Gayla Benitez.  SHERYL Ward CNP on 10/21/2021 at 8:33 AM

## 2021-10-21 NOTE — CONSULTS
[address] [id number] Continence Nurse  Consult Note       Yuniel Frye  AGE: [de-identified] y.o. GENDER: female  : 1940  TODAY'S DATE:  10/21/2021    Subjective:     Reason for Evaluation and Assessment: wound care Reassessment. Yuniel Frye is a [de-identified] y.o. female referred by:   [x] Physician  [] Nursing  [] Other:     Wound Identification:  Wound Type: pressure and surgical   Contributing Factors: edema, chronic pressure, decreased mobility and obesity        PAST MEDICAL HISTORY        Diagnosis Date    Acid reflux     'no recent issue\"    Arthritis     \"Left Index Finger\"    CHF (congestive heart failure) ([facility] [address]     per old chart hx of CHF with admission 2016 with pulmonary edema    Chronic kidney disease     Sees Dr. Jan Heard have one kidney, dr Jay Warren told me that in  I think\"    GERD (gastroesophageal reflux disease)     History of blood transfusion  Or     No Reaction To Blood Transfusion Received    Hypertension     ( pt states she is not on any medication for blood pressure)    Hypomagnesemia     Hypothyroidism     MRSA (methicillin resistant staph aureus) culture positive 10/08/2021    Leg    Shortness of breath on exertion     [facility]     per old chart hx of SVT with admission  tx with Adenosine and per notes in 2018 hx of SVT- no cardiologist    Teeth missing     Upper And Lower    Wears glasses        PAST SURGICAL HISTORY    Past Surgical History:   Procedure Laterality Date    ABDOMEN SURGERY      DENTAL SURGERY      Teeth Extracted In Past    ENDOSCOPY, COLON, DIAGNOSTIC   Or     EYE SURGERY  ? when    clyde cataract ext    FEMUR FRACTURE SURGERY Left 2021    FEMUR IM NAIL REGINALDO INSERTION performed by Ashvin Brown MD at [address] [address]    Abdominal Hernia Repair     HIP SURGERY Right 10/8/2021    RIGHT HIP HEMIARTHROPLASTY performed by Ashvin Brown MD at [facility] Right 10/10/2021    HIP HEMIARTHROPLASTY performed by Sherlyn Andre MD at [address] [place]  05/27/2018    exp lap . converted to exp laporotomy with ventral hernia repair with mesh    OTHER SURGICAL HISTORY  52/15/5861    umbilical scar excision    VENTRAL HERNIA REPAIR  09/16/2016    Robotic laparoscopic       FAMILY HISTORY    Family History   Problem Relation Age of Onset    Cancer Father         Lung Cancer    Arthritis Mother     Cancer Brother         Skin Cancer    High Cholesterol Brother        SOCIAL HISTORY    Social History     Tobacco Use    Smoking status: Never Smoker    Smokeless tobacco: Never Used   Vaping Use    Vaping Use: Never used   Substance Use Topics    Alcohol use: Not Currently    Drug use: No       ALLERGIES    No Known Allergies    MEDICATIONS    No current facility-administered medications on file prior to encounter.      Current Outpatient Medications on File Prior to Encounter   Medication Sig Dispense Refill    polyethylene glycol (GLYCOLAX) 17 g packet Take 17 g by mouth daily as needed for Constipation 527 g 1    metoprolol tartrate (LOPRESSOR) 25 MG tablet Take 1 tablet by mouth 2 times daily 180 tablet 1    magnesium oxide (MAG-OX) 400 (241.3 Mg) MG TABS tablet TAKE ONE TABLET BY MOUTH TWICE A  tablet 1    levothyroxine (SYNTHROID) 50 MCG tablet Take 1 tablet by mouth Daily 90 tablet 1    sennosides-docusate sodium (SENOKOT-S) 8.6-50 MG tablet Take 1 tablet by mouth 2 times daily 14 tablet 0    allopurinol (ZYLOPRIM) 100 MG tablet Take 0.5 tablets by mouth daily 90 tablet 1    nystatin (MYCOSTATIN) 930482 UNIT/GM powder Apply 3 times daily x 10 to 14 days 45 g 0    furosemide (LASIX) 20 MG tablet Take 1 tablet by mouth 2 times daily Take 20 mg by mouth 2 times daily 180 tablet 1         Objective:      BP (!) 100/41   Pulse 83   Temp 98.2 °F (36.8 °C) (Oral)   Resp 16   Ht 5' 0.98\" (1.549 m)   Wt 227 lb 1.2 oz (103 kg) LMP 01/23/1995   SpO2 98%   BMI 42.93 kg/m²   Polo Risk Score: Polo Scale Score: 17    LABS    CBC:   Lab Results   Component Value Date    WBC 12.1 10/21/2021    RBC 2.21 10/21/2021    HGB 7.0 10/21/2021    HCT 22.7 10/21/2021    .7 10/21/2021    MCH 31.7 10/21/2021    MCHC 30.8 10/21/2021    RDW 19.4 10/21/2021     10/21/2021    MPV 9.2 10/21/2021     CMP:    Lab Results   Component Value Date     10/21/2021    K 4.6 10/21/2021     10/21/2021    CO2 17 10/21/2021    BUN 65 10/21/2021    CREATININE 2.6 10/21/2021    GFRAA 21 10/21/2021    LABGLOM 18 10/21/2021    GLUCOSE 85 10/21/2021    PROT 6.5 10/11/2021    PROT 5.5 03/24/2012    LABALBU 2.7 10/11/2021    CALCIUM 8.0 10/21/2021    BILITOT 0.4 10/11/2021    ALKPHOS 74 10/11/2021    AST 35 10/11/2021    ALT 7 10/11/2021     Albumin:    Lab Results   Component Value Date    LABALBU 2.7 10/11/2021     PT/INR:    Lab Results   Component Value Date    PROTIME 12.1 10/08/2021    PROTIME 14.5 03/24/2012    INR 0.94 10/08/2021     HgBA1c:  No results found for: LABA1C      Assessment:     Patient Active Problem List   Diagnosis    Chronic kidney disease (CKD) stage G4/A2, severely decreased glomerular filtration rate (GFR) between 15-29 mL/min/1.73 square meter and albuminuria creatinine ratio between  mg/g (HCC)    Ventral hernia without obstruction or gangrene    SVT (supraventricular tachycardia) (HCC)    CRF (chronic renal failure)    Hypothyroid    Chronic kidney disease, stage IV (severe) (HCC)    Gastroesophageal reflux disease    H/O ventral hernia    S/P ventral herniorrhaphy    S/P herniorrhaphy    Essential hypertension    History of ventral hernia repair    Morbid obesity with BMI of 45.0-49.9, adult ([facility] [address]    Closed transcervical fracture of proximal femur, left, initial encounter ([address]    Displaced fracture of right femoral neck ([address]    MRSA cellulitis    Traumatic closed fracture of neck of femur with minimal displacement, right, initial encounter ([facility] [address]    Uncontrolled pain    Generalized weakness    Acute blood loss anemia    Paroxysmal atrial fibrillation (HCC)    Acute on chronic diastolic (congestive) heart failure (HCC)    Acute cystitis without hematuria    Enterococcus faecalis infection       Measurements:  Wound 10/08/21 Pretibial Right; Anterior (Active)   Wound Image   10/08/21 1030   Wound Etiology Other 10/18/21 2219   Dressing Status Other (Comment) 10/11/21 1945   Wound Cleansed Cleansed with saline 10/18/21 0850   Dressing/Treatment Open to air 10/21/21 0921   Wound Length (cm) 0 cm 10/21/21 1450   Wound Width (cm) 0 cm 10/21/21 1450   Wound Depth (cm) 0 cm 10/21/21 1450   Wound Surface Area (cm^2) 0 cm^2 10/21/21 1450   Change in Wound Size % (l*w) 100 10/21/21 1450   Wound Volume (cm^3) 0 cm^3 10/21/21 1450   Wound Healing % 100 10/21/21 1450   Distance Tunneling (cm) 0 cm 10/18/21 0850   Tunneling Position ___ O'Clock 0 10/18/21 0850   Undermining Starts ___ O'Clock 0 10/18/21 0850   Undermining Ends___ O'Clock 0 10/18/21 0850   Undermining Maxium Distance (cm) 0 10/18/21 0850   Wound Assessment Eschar dry 10/14/21 0745   Drainage Amount None 10/21/21 1450   Odor None 10/21/21 0921   Deena-wound Assessment Blanchable erythema;Edematous;Dry/flaky 10/21/21 0921   Margins Attached edges 10/21/21 0921   Number of days: 13       Wound 10/08/21 Pretibial Left (Active)   Wound Image   10/08/21 1030   Wound Etiology Other 10/18/21 2219   Dressing Status Other (Comment) 10/18/21 0850   Wound Cleansed Cleansed with saline 10/18/21 0850   Dressing/Treatment Open to air 10/21/21 0921   Wound Length (cm) 0 cm 10/21/21 1450   Wound Width (cm) 0 cm 10/21/21 1450   Wound Depth (cm) 0 cm 10/21/21 1450   Wound Surface Area (cm^2) 0 cm^2 10/21/21 1450   Change in Wound Size % (l*w) 100 10/21/21 1450   Wound Volume (cm^3) 0 cm^3 10/21/21 1450   Wound Healing % 100 10/21/21 1450   Distance Tunneling (cm) 0 cm 10/18/21 0850   Tunneling Position ___ O'Clock 0 10/18/21 0850   Undermining Starts ___ O'Clock 0 10/18/21 0850   Undermining Ends___ O'Clock 00 10/18/21 0850   Undermining Maxium Distance (cm) 0 10/18/21 0850   Wound Assessment Eschar dry 10/14/21 0745   Drainage Amount None 10/21/21 1450   Odor None 10/21/21 0921   Deena-wound Assessment Blanchable erythema;Dry/flaky;Edematous 10/18/21 0850   Margins Attached edges 10/18/21 0850   Number of days: 13       Wound 10/15/21 Ankle Right;Lateral (Active)   Wound Image   10/21/21 1450   Wound Etiology Deep tissue/Injury 10/21/21 1450   Dressing Status New dressing applied 10/21/21 1450   Wound Cleansed Cleansed with saline 10/21/21 1450   Wound Length (cm) 0.5 cm 10/21/21 1450   Wound Width (cm) 1 cm 10/21/21 1450   Wound Depth (cm) 0 cm 10/21/21 1450   Wound Surface Area (cm^2) 0.5 cm^2 10/21/21 1450   Change in Wound Size % (l*w) 75 10/21/21 1450   Wound Volume (cm^3) 0 cm^3 10/21/21 1450   Distance Tunneling (cm) 0 cm 10/21/21 1450   Tunneling Position ___ O'Clock 0 10/21/21 1450   Undermining Starts ___ O'Clock 0 10/21/21 1450   Undermining Ends___ O'Clock 0 10/21/21 1450   Undermining Maxium Distance (cm) 0 10/21/21 1450   Drainage Amount None 10/21/21 1450   Odor None 10/21/21 0921   Deena-wound Assessment Intact 10/21/21 1450   Margins Attached edges 10/21/21 1450   Number of days: 6       Wound 10/15/21 Thigh Proximal;Right;Posterior (Active)   Wound Etiology Deep tissue/Injury 10/21/21 1255   Dressing Status Clean;Dry; Intact 10/21/21 0921   Wound Cleansed Not Cleansed 10/21/21 1450   Dressing/Treatment Open to air 10/21/21 1450   Wound Length (cm) 0 cm 10/21/21 1450   Wound Width (cm) 0 cm 10/21/21 1450   Wound Depth (cm) 0 cm 10/21/21 1450   Wound Surface Area (cm^2) 0 cm^2 10/21/21 1450   Change in Wound Size % (l*w) 100 10/21/21 1450   Wound Volume (cm^3) 0 cm^3 10/21/21 1450   Distance Tunneling (cm) 0 cm 10/15/21 0830   Tunneling Position ___ O'Clock 0 10/15/21 0830   Undermining Starts ___ O'Clock 0 10/15/21 0830   Undermining Ends___ O'Clock 0 10/15/21 0830   Undermining Maxium Distance (cm) 0 10/15/21 0830   Drainage Amount None 10/21/21 1450   Odor None 10/21/21 0921   Deena-wound Assessment Intact 10/21/21 0921   Number of days: 6       Wound 10/15/21 Thigh Anterior;Right (Active)   Wound Image   10/21/21 1450   Wound Etiology Surgical 10/21/21 1450   Dressing Status New dressing applied 10/21/21 1450   Wound Cleansed Cleansed with saline 10/21/21 1450   Dressing/Treatment Hydrofiber Ag 10/21/21 1450   Wound Length (cm) 0.4 cm 10/21/21 1450   Wound Width (cm) 0.6 cm 10/21/21 1450   Wound Depth (cm) 0.1 cm 10/21/21 1450   Wound Surface Area (cm^2) 0.24 cm^2 10/21/21 1450   Change in Wound Size % (l*w) 42.86 10/21/21 1450   Wound Volume (cm^3) 0.024 cm^3 10/21/21 1450   Wound Healing % 43 10/21/21 1450   Distance Tunneling (cm) 0 cm 10/21/21 1450   Tunneling Position ___ O'Clock 0 10/21/21 1450   Undermining Starts ___ O'Clock 0 10/21/21 1450   Undermining Ends___ O'Clock 0 10/21/21 1450   Undermining Maxium Distance (cm) 0 10/21/21 1450   Drainage Amount Large 10/21/21 1450   Drainage Description Serosanguinous; Serous 10/21/21 1450   Odor None 10/21/21 1450   Deena-wound Assessment Intact 10/21/21 1450   Margins Defined edges 10/21/21 1450   Wound Thickness Description not for Pressure Injury Full thickness 10/21/21 1450   Number of days: 6       Wound 10/15/21 Coccyx cluster (Active)   Wound Image   10/21/21 1450   Wound Etiology Deep tissue/Injury 10/21/21 1450   Dressing Status New dressing applied 10/21/21 1450   Wound Cleansed Cleansed with saline 10/21/21 1450   Dressing/Treatment Hydrofiber;Silicone border 28/96/21 1450   Wound Length (cm) 12 cm 10/21/21 1450   Wound Width (cm) 12 cm 10/21/21 1450   Wound Depth (cm) 0.1 cm 10/21/21 1450   Wound Surface Area (cm^2) 144 cm^2 10/21/21 1450   Change in Wound Size % (l*w) -188 10/21/21 1450   Wound Volume (cm^3) 14.4 cm^3 10/21/21 1450   Wound Healing % -188 10/21/21 1450   Distance Tunneling (cm) 0 cm 10/21/21 1450   Tunneling Position ___ O'Clock 0 10/21/21 1450   Undermining Starts ___ O'Clock 0 10/21/21 1450   Undermining Ends___ O'Clock 0 10/21/21 1450   Undermining Maxium Distance (cm) 0 10/21/21 1450   Drainage Amount Moderate 10/21/21 1450   Drainage Description Serosanguinous; Yellow 10/21/21 1450   Odor None 10/21/21 1450   Deena-wound Assessment Fragile 10/21/21 1450   Margins Defined edges 10/21/21 1450   Wound Thickness Description not for Pressure Injury Full thickness 10/21/21 1450   Number of days: 6       Wound 10/21/21 Heel Right (Active)   Wound Image   10/21/21 1450   Wound Etiology Deep tissue/Injury 10/21/21 1450   Dressing Status New dressing applied 10/21/21 1450   Wound Cleansed Not Cleansed 10/21/21 1450   Wound Length (cm) 0.8 cm 10/21/21 1450   Wound Width (cm) 0.8 cm 10/21/21 1450   Wound Depth (cm) 0 cm 10/21/21 1450   Wound Surface Area (cm^2) 0.64 cm^2 10/21/21 1450   Wound Volume (cm^3) 0 cm^3 10/21/21 1450   Distance Tunneling (cm) 0 cm 10/21/21 1450   Tunneling Position ___ O'Clock 0 10/21/21 1450   Undermining Starts ___ O'Clock 0 10/21/21 1450   Undermining Ends___ O'Clock 0 10/21/21 1450   Undermining Maxium Distance (cm) 0 10/21/21 1450   Drainage Amount None 10/21/21 1450   Odor None 10/21/21 1450   Margins Attached edges 10/21/21 1450   Number of days: 0       Response to treatment:  Poorly tolerated by patient.      Pain Assessment:  Severity:  moderate  Quality of pain: sore  Wound Pain Timing/Severity: when turned and dressing change  Premedicated: no    Plan:     Plan of Care: Wound 10/15/21 Ankle Right;Lateral-Dressing/Treatment:  (optifoam border)  Wound 10/15/21 Thigh Proximal;Right;Posterior-Dressing/Treatment: Open to air  Wound 10/15/21 Thigh Anterior;Right-Dressing/Treatment: Hydrofiber Ag (optifoam border)  Wound 10/15/21 Coccyx cluster-Dressing/Treatment: Hydrofiber, Silicone border  Wound 10/21/21 Heel Right-Dressing/Treatment:  (optifoam border)  [REMOVED] Wound 10/12/21 Toe (Comment  which one) Anterior;Right small dry scab to right great toe-Dressing/Treatment: Open to air  Wound 10/08/21 Pretibial Right; Anterior-Dressing/Treatment: Open to air  Wound 10/08/21 Pretibial Left-Dressing/Treatment: Open to air     Patient in bed agreeable to wound care Reassessment. Pt has rt hip incision with copious amount of drainage and surrounding area red. Cleansed with NS. Pictured and applied dressing as above. Pt turned to lt side and was crying verbalized she doesn't want to do this. Rt and lt anterior legs with dry eschar washed with alphabath. Rt lateral ankle deep tissue injury measured and pictured dressing as above. Rt heel with new deep tissue injury measured and pictured with dressing as above. Rt anterior thigh wound appears worse this week and increased drainage. Cleansed with NS. Measured and pictured. Dressing as above. Recommend santyl. Rt posterior thigh deep tissue injury now red and blanching. Sacrum pressure injury deep tissue/unstageable worse this week. Cleansed with NS. Measured and pictured. Dressing as above. Recommend santyl. Discussed with pt importance of repositioning frequently and floating heels off of bed. Pt turned to lt side. Heels floated with pillow. Pt is at moderate risk for skin breakdown AEB wayne. Follow wayne orders. Atmos air pump on the bed. Specialty Bed Required :  yes  [] Low Air Loss   [x] Pressure Redistribution  [] Fluid Immersion  [] Bariatric  [] Total Pressure Relief  [] Other:     Discharge Plan:  Placement for patient upon discharge: tbd  Hospice Care: no  Patient appropriate for Outpatient [address]:  [facility]    Patient/Caregiver Teaching:  Level of patient/caregiver understanding able to:  Cares explained as given. No evidence of learning. Electronically signed by Vivienne Serrano RN, on 10/21/2021 at 4:28 PM

## 2021-10-21 NOTE — PROGRESS NOTES
Samra Chu    : 1940  Acct #: [de-identified]  MRN: [id number]              PM&R Progress Note      Admitting diagnosis: Right femoral neck fracture ( [address])     Comorbid diagnoses impacting rehabilitation: Uncontrolled pain, generalized weakness, gait disturbance, acute blood loss anemia, lower limb MRSA cellulitis, essential hypertension, paroxysmal atrial fibrillation, chronic kidney disease stage IV, acute on chronic diastolic congestive heart failure, morbid obesity (BMI 45.2), GERD, history of left hip fracture in May of 2021.     Chief complaint: More lethargic today. Declining therapies. Reports hip pain but is very nonspecific in her complaints. Prior (baseline) level of function: Independent.     Current level of function:         Current  IRF-SARAH and Goals:   Occupational Therapy:    Short term goals  Time Frame for Short term goals: STGs=LTGs :   Long term goals  Time Frame for Long term goals : 14-17 days or until d/c  Long term goal 1: Pt will complete grooming tasks Ind seated  Long term goal 2: Pt will complete total body bathing c min A using AE PRN  Long term goal 3: Pt will complete UB dressing c setup  Long term goal 4: Pt will complete LB dressing c mod A using AE  Long term goal 5: Pt will doff/don footwear c min A using AE  Long term goals 6: Pt will complete toileting c max A  Long term goal 7: Pt will complete functional transfers (bed, chair, toilet, shower) c DME PRN and min A  Long term goal 8: Pt will perform therex/therax to facilitate increased strength/endurance/ax tolerance (c emphasis on dynamic standing balance/tolerance > 5 mins, and BUE endurance) c SBA :                                       Eating: Eating  Assistance Needed: Independent  Comment: able to open packages/containers  CARE Score: 6  Discharge Goal: Independent       Oral Hygiene: Oral Hygiene  Assistance Needed: Independent  Comment: MOD I completed seated sink-side  CARE Score: 6  Discharge Goal: Independent    UB/LB Bathing: Shower/Bathe Self  Assistance Needed: Partial/moderate assistance  Comment: Mod A for LB washing, pt able to wash UB  CARE Score: 3  Discharge Goal: Partial/moderate assistance    UB Dressing: Upper Body Dressing  Assistance Needed: Setup or clean-up assistance  Comment: Pt able to don/doff long sleeve in seated position  CARE Score: 5  Discharge Goal: Supervision or touching assistance         LB Dressing: Lower Body Dressing  Assistance Needed: Substantial/maximal assistance  Comment: Max A  to thread BLE and don over hips 2* increased pain this date. CARE Score: 2  Discharge Goal: Partial/moderate assistance    Donning and [place] Footwear: Putting On/Taking Off Footwear  Assistance Needed: Partial/moderate assistance  Comment: Min A c use of sock-aid, pt able to doff socks c use of reacher. CARE Score: 3  Discharge Goal: Partial/moderate assistance      Toileting: Toileting Hygiene  Assistance Needed: Substantial/maximal assistance  Comment: Max A required for CM and andrew-hygiene  Reason if not Attempted: Not attempted due to medical condition or safety concerns  CARE Score: 2  Discharge Goal: Substantial/maximal assistance      Toilet Transfers: Toilet Transfer  Assistance Needed: Substantial/maximal assistance  Comment: Max A c use of abdullahi stedy; partial sit<>stands ranged from Min-Mod A  Reason if not Attempted: Not attempted due to medical condition or safety concerns  CARE Score: 2  Discharge Goal: Partial/moderate assistance    Physical Therapy:   Short term goals  Time Frame for Short term goals: 12-14 tx days:  Short term goal 1: Pt will complete rolling L/R and sit->sup using leg  to assist RLE and using bed features with Min A following posterior hip precautions on RLE. Short term goal 2: Pt will complete sup->sit using bed features and leg  to assist RLE with SBA-Sup following posterior hip precautions on RLE.   Short term goal 3: Pt will complete OOB transfers using RW with Min A following posterior hip precautions. Short term goal 4: Pt will ambulate 50 ft with turns over level surface and 10 ft of unevem surface using RW with CGA. Short term goal 5: Pt will ascend/descend 2\" curb step using RW with Min A. Short term goal 6: Pt will complete object retrieval from the floor in standing with 1UE support on RW and using reacher with CGA. Short term goal 7: pt will complete 150 ft of w/c propulsion using manual w/c at mod ind            Bed Mobility:   Sit to Lying  Assistance Needed: Dependent  Comment: Total A x 2 with additional assist on mobley catheter and wound drain line management  CARE Score: 1  Discharge Goal: Partial/moderate assistance  Roll Left and Right  Comment: rolling to L using bed rail required 2-person assist today (had poor tolerance to sustain this position due to pain); refused to attempt rolling to R due to aggravation of pain following rolling to L  Reason if not Attempted: Not attempted due to medical condition or safety concerns  CARE Score: 88  Discharge Goal: Partial/moderate assistance  Lying to Sitting on Side of Bed  Assistance Needed: Dependent  Comment: Max A x 2 using bed features; initiated use of leg  to assist RLE however was unsuccessful to significantly move it over EOB and continued to require passive movement due to pain and impaired strength  CARE Score: 1  Discharge Goal: Supervision or touching assistance    Transfers:    Sit to Stand  Assistance Needed: Dependent  Comment:  Mod A + 2nd person assist providing variable physical assist to shift weight forward and to steady pt upon immediate standing; used RW to complete this transfer  CARE Score: 1  Discharge Goal: Partial/moderate assistance  Chair/Bed-to-Chair Transfer  Assistance Needed: Dependent  Comment: Shamar Taveras with 2-person assist for w/c->recliner transfer  CARE Score: 1  Discharge Goal: Partial/moderate assistance  Toilet Transfer  Assistance Needed: Dependent  CARE Score: 1  Car Transfer  Reason if not Attempted: Not attempted due to medical condition or safety concerns  CARE Score: 88  Discharge Goal: Partial/moderate assistance    Ambulation:    Walking Ability  Does the Patient Walk?: Yes     Walk 10 Feet  Comment: pt with poor standing tolerance and unable to actively perform weight shifting today due to pain and anxiety  Reason if not Attempted: Not attempted due to medical condition or safety concerns  CARE Score: 88  Discharge Goal: Supervision or touching assistance     Walk 50 Feet with Two Turns  Reason if not Attempted: Not attempted due to medical condition or safety concerns  CARE Score: 88  Discharge Goal: Supervision or touching assistance     Walk 150 Feet  Comment: had limited mobility since L hip surgery; was not performing this activity at baseline; limited potential to progressing to this mobility task  Reason if not Attempted: Not applicable  CARE Score: 9  Discharge Goal: Not Applicable     Walking 10 Feet on Uneven Surfaces  Reason if not Attempted: Not attempted due to medical condition or safety concerns  CARE Score: 88  Discharge Goal: Partial/moderate assistance     1 Step (Curb)  Comment: pt was not performing this activity at baseline but anticipating that pt can progress to being able to ascend/descend 2\" curb step while at ARU  Reason if not Attempted: Not attempted due to medical condition or safety concerns  CARE Score: 88  Discharge Goal: Partial/moderate assistance     4 Steps  Comment: had limited mobility since L hip surgery; was not performing this activity at baseline; limited potential to progressing to this mobility task  Reason if not Attempted: Not applicable  CARE Score: 9  Discharge Goal: Not Applicable     12 Steps  Comment: had limited mobility since L hip surgery; was not performing this activity at baseline; limited potential to progressing to this mobility task  Reason if not Attempted: Not applicable  CARE Score: 9  Discharge Goal: Not Applicable       Wheelchair:  w/c Ability: Wheelchair Ability  Uses a Wheelchair and/or Scooter?: Yes  Wheel 50 Feet with Two Turns  Assistance Needed: Partial/moderate assistance (min A for completion of last 5-10 ft 2/2 onset of fatigue)  Comment: per verbal report of OT, pt was only able to propel 45 ft with Min A for turns as observed during co-tx on 10/13/2021  CARE Score: 3  Wheel 150 Feet  Assistance Needed: Substantial/maximal assistance  Comment: per verbal report of OT, pt was only able to propel 45 ft with Min A as observed during co-tx on 10/13/2021  CARE Score: 2  Discharge Goal: Independent          Balance:        Object: Picking Up Object  Comment: pt with poor standing tolerance and required BUE support on RW to maintain static standing  Reason if not Attempted: Not attempted due to medical condition or safety concerns  CARE Score: 88  Discharge Goal: Supervision or touching assistance    I      Exam:    Blood pressure (!) 115/57, pulse 71, temperature 96.7 °F (35.9 °C), temperature source Oral, resp. rate 18, height 5' 0.98\" (1.549 m), weight 233 lb 7.5 oz (105.9 kg), last menstrual period 01/23/1995, SpO2 95 %, not currently breastfeeding. General: Lying back in bed. Eyes closed. Inconsistent with following commands. HEENT: No JVD or adenopathy. MMM. Pulmonary: Shallow respirations with no wheezes or rales. Cardiac: Occasional premature beat with a controlled rate. Abdomen: Patient's abdomen is soft and nondistended. Bowel sounds were present throughout. There was no rebound, guarding or masses noted. Upper extremities: No new bruising or swelling. Generalized weakness. Lower extremities: Incision is pink no significant drainage. Skin edges are approximated. Thigh is swollen as before tender. Lower limb rash seems drier. Sitting balance was poor. Standing balance was poor.     Lab Results   Component Value Date    WBC 12.8 (H) 10/18/2021    HGB 7.0 (L) 10/18/2021    HCT 23.1 (L) 10/18/2021    .7 (H) 10/18/2021     10/18/2021     Lab Results   Component Value Date    INR 0.94 10/08/2021    INR 1.06 05/21/2021    INR 1.03 12/17/2016    PROTIME 12.1 10/08/2021    PROTIME 12.8 05/21/2021    PROTIME 11.8 12/17/2016     Lab Results   Component Value Date    CREATININE 2.4 (H) 10/18/2021    BUN 63 (H) 10/18/2021     (L) 10/18/2021    K 4.6 10/18/2021     10/18/2021    CO2 18 (L) 10/18/2021     Lab Results   Component Value Date    ALT 7 (L) 10/11/2021    AST 35 10/11/2021    ALKPHOS 74 10/11/2021    BILITOT 0.4 10/11/2021       Expected length of stay  prior to a supervised level of function for discharge home with a walker and [address] OT/PT is 10/28/2021. Recommendations:    1. Right femoral neck fracture with hemiarthroplasty:   Her confusion and lethargy today was investigated with urinalysis and culture. Evidently her H&H was not drawn as ordered yesterday. I need to know what her hemoglobin is. Her blood pressure is low but steady. I will start empiric Zosyn IV. Continue encouraging her to participate in the daily occupational and physical therapy.  Training her to following posterior hip precautions.    Ongoing adaptive equipment training, range of motion and strength recovery and aggressive pulmonary hygiene. I have stopped all narcotics and have scheduled acetaminophen. Close monitoring of her hemoglobin requires lab draws. Ongoing DVT prophylaxis.  Continue Simpson for now.  Maximum verbal cues and maximum physical assistance needed for mobility today.   2. DVT prophylaxis: Lovenox 30 mg subcu daily.  I must monitor her hemoglobin and platelet count periodically while on this medication.  Weightbearing activities are limited but are attempted during transfers. [place] S[place]a prophylaxis is available.  No obvious clinical signs of blood loss but we cannot get lab to draw all her H&H.   3. Acute on chronic kidney disease stage IV: Avoiding nephrotoxic medications when possible.  Encouraging consistent oral hydration. BMP recently shows significant improvement in her creatinine to 2.4.   4. Lower limb cellulitis with MRSA: Infectious disease switch her from vancomycin and to Zyvox 600 mg twice a day which was completed this week.  The wound care service is directing her topical care.  Limb elevation. 5. Acute on chronic diastolic congestive heart failure: Lopressor for afterload reduction, intermittent use of diuretics and daily weights. 6. Uncontrolled pain: Scheduled acetaminophen. No narcotics because of her change in mentation. Progressive mobilization. 7. Atrial fibrillation: Lopressor for rate control.  Her chronic anemia precludes more aggressive anticoagulation (DOAC).

## 2021-10-21 NOTE — PROGRESS NOTES
Physical Therapy    [x] daily progress note       [] discharge       Patient Name:  Margaret Thomason   :  1940 MRN: [id number]  Room:  [address] [place] [address] Date of Admission: 10/11/2021  Rehabilitation Diagnosis:   Fracture of unspecified part of neck of right femur, initial encounter for closed fracture [S72.001A]  Traumatic closed fracture of neck of femur with minimal displacement, right, initial encounter ([facility] [S72.001A]       Date 10/21/2021       Day of ARU Week:  4   Time IN/OUT 1605/1606 (unbillable time)    Individual Tx Minutes    Group Tx Minutes    Co-Treat Minutes    Concurrent Tx Minutes    TOTAL Tx Time Mins 0   Variance Time -60   Variance Time [x]   Refusal due to lack of interest to participate     []   Medical hold/reason:    []   Illness   []   Off Unit for test/procedure  []   Extra time needed to complete task  []   Therapeutic need  []   Other (specify):   Restrictions Restrictions/Precautions  Restrictions/Precautions: Weight Bearing, Fall Risk, ROM Restrictions, General Precautions  Position Activity Restriction  Hip Precautions: Posterior hip precautions, No hip flexion > 90 degrees, No ADduction, No hip internal rotation  Other position/activity restrictions: mobley catheter, wound drain   Interdisciplinary communication [x]   Cleared for therapy per nursing     []   RN notified about issues during session  []   RN updated on pt performance  []   Spoke with   [x]   Per OT, pt expressed concerns related to giving up/desire to die   []   Spoke with MD  []   Other:    Subjective observations and cognitive status: Pt resting in bed, turns head when name is called upon entry to her room, turns head away immediately and closes eyes when pt saw this PT, declined to drink water when offered as pt's lips were observed to be dry, when asked how she feels she says \"I'm okay\" and then proceeded with saying \"You're like a dog with a bone and that's not a compliment. \" PT explained that service would have to continue to be offered since she is not on medical hold, thus the reason for this visit. Pt declined to participate in any activity. Pain level/location:    /10       Location:    Discharge recommendations  Anticipated discharge date:  10/26/2021  Destination: []home alone   []home alone with assist PRN     [] home w/ family      [] Continuous supervision  [x]SNF    [] Assisted living     [] Other:  Continued therapy: []HHC PT  []OUTPATIENT PT   [] No Further PT  [x]SNF PT  Caregiver training recommended: []Yes  [] No   Equipment needs: none        Treatment Plan for Next Session: transfers training       Assessment: This pt continues to have poor participation in therapy and overall the ARU program. Pt continues to have decline in her mobility status.       Treatment/Activity Tolerance:   [] Tolerated treatment with no adverse effects    [] Patient limited by fatigue  [] Patient limited by pain   [x] Patient limited by mood    [] Adverse reaction to Tx:   [] Significant change in status    Safety:       [x]  bed alarm set    []  chair alarm set    []  Pt refused alarms                []  Telesitter activated      []  Gait belt used during tx session      []other:       Number of Minutes/Billable Intervention  Gait Training    Therapeutic Exercise    Neuro Re-Ed    Therapeutic Activity    Wheelchair Propulsion    Group    Other:    TOTAL 0       Social History  Social/Functional History  Lives With: Alone (has local family support)  Type of Home: House  Home Layout: Two level, Able to Live on Main level with bedroom/bathroom, Performs ADL's on one level  Home Access: Ramped entrance (bilat rails)  Bathroom Shower/Tub: Walk-in shower  Bathroom Toilet: Handicap height  Bathroom Equipment: Built-in shower seat, Hand-held shower, Grab bars in shower, Grab bars around toilet  Home Equipment: Rolling walker, [address] [facility], [place], [employer], Alert Button, Reacher, Grab bars, Lift chair, Sock aid, Long-handled shoehorn  Receives Help From: Family, Neighbor, Friend(s)  ADL Assistance: Needs assistance (niece supervised pt during ADL (bathing and dressing), otherwise Mod Ind with toileting activity)  Homemaking Responsibilities: Yes  Meal Prep Responsibility: Primary  Laundry Responsibility: Secondary  Cleaning Responsibility: Primary (does not run the sweeper)  Bill Paying/Finance Responsibility: Primary  Shopping Responsibility: Secondary (does grocery list)  Dependent Care Responsibility: No  Health Care Management: Primary  Ambulation Assistance: Independent (Mod Ind using RW (household and limited community distances))  Active : No  Mode of Transportation: Family, Car  Occupation: Retired  Type of occupation: worked in bank  Additional Comments: has a regular, flat bed (tall height); usually sleeps in lift chair; had 2 falls in the past year with injuries requiring surgery    Objective                                                                                    Goals:  (Update in navigator)  Short term goals  Time Frame for Short term goals: 12-14 tx days:  Short term goal 1: Pt will complete rolling L/R and sit->sup using leg  to assist RLE and using bed features with Min A following posterior hip precautions on RLE. Short term goal 2: Pt will complete sup->sit using bed features and leg  to assist RLE with SBA-Sup following posterior hip precautions on RLE. Short term goal 3: Pt will complete OOB transfers using RW with Min A following posterior hip precautions. Short term goal 4: Pt will ambulate 50 ft with turns over level surface and 10 ft of unevem surface using RW with CGA. Short term goal 5: Pt will ascend/descend 2\" curb step using RW with Min A. Short term goal 6: Pt will complete object retrieval from the floor in standing with 1UE support on RW and using reacher with CGA.   Short term goal 7: pt will complete 150 ft of w/c propulsion using manual w/c at mod ind:   : Plan of Care                                                                              Times per week: 5 days per week for a minimum of 60 minutes/day plus group as appropriate for 60 minutes.   Treatment to include Current Treatment Recommendations: Strengthening, Gait Training, Patient/Caregiver Education & Training, ROM, Equipment Evaluation, Education, & procurement, Balance Training, Pain Management, Modalities, Functional Mobility Training, Endurance Training, Home Exercise Program, Positioning, Transfer Training, Wheelchair Mobility Training, Safety Education & Training    Electronically signed by   Ursula Maria, BETH  10/21/2021, 4:08 PM

## 2021-10-22 ENCOUNTER — HOSPITAL ENCOUNTER (INPATIENT)
Age: 81
LOS: 6 days | Discharge: NURSING, REHAB OR LONG-TERM CARE | DRG: 981 | End: 2021-10-28
Attending: HOSPITALIST | Admitting: INTERNAL MEDICINE
Payer: MEDICARE

## 2021-10-22 VITALS
HEIGHT: 61 IN | WEIGHT: 231.48 LBS | SYSTOLIC BLOOD PRESSURE: 109 MMHG | BODY MASS INDEX: 43.7 KG/M2 | HEART RATE: 141 BPM | DIASTOLIC BLOOD PRESSURE: 55 MMHG | RESPIRATION RATE: 20 BRPM | OXYGEN SATURATION: 96 % | TEMPERATURE: 99.6 F

## 2021-10-22 LAB
ANION GAP SERPL CALCULATED.3IONS-SCNC: 12 MMOL/L (ref 4–16)
BASOPHILS ABSOLUTE: 0 K/CU MM
BASOPHILS RELATIVE PERCENT: 0.3 % (ref 0–1)
BUN BLDV-MCNC: 64 MG/DL (ref 6–23)
CALCIUM SERPL-MCNC: 8.3 MG/DL (ref 8.3–10.6)
CHLORIDE BLD-SCNC: 108 MMOL/L (ref 99–110)
CO2: 17 MMOL/L (ref 21–32)
CREAT SERPL-MCNC: 3.2 MG/DL (ref 0.6–1.1)
DIFFERENTIAL TYPE: ABNORMAL
EKG ATRIAL RATE: 144 BPM
EKG ATRIAL RATE: 70 BPM
EKG DIAGNOSIS: NORMAL
EKG DIAGNOSIS: NORMAL
EKG Q-T INTERVAL: 146 MS
EKG Q-T INTERVAL: 310 MS
EKG QRS DURATION: 68 MS
EKG QRS DURATION: 76 MS
EKG QTC CALCULATION (BAZETT): 227 MS
EKG QTC CALCULATION (BAZETT): 471 MS
EKG R AXIS: 170 DEGREES
EKG R AXIS: 27 DEGREES
EKG T AXIS: -47 DEGREES
EKG T AXIS: 231 DEGREES
EKG VENTRICULAR RATE: 139 BPM
EKG VENTRICULAR RATE: 146 BPM
EOSINOPHILS ABSOLUTE: 0.3 K/CU MM
EOSINOPHILS RELATIVE PERCENT: 2.4 % (ref 0–3)
GFR AFRICAN AMERICAN: 17 ML/MIN/1.73M2
GFR NON-AFRICAN AMERICAN: 14 ML/MIN/1.73M2
GLUCOSE BLD-MCNC: 79 MG/DL (ref 70–99)
HCT VFR BLD CALC: 21.1 % (ref 37–47)
HEMOGLOBIN: 6.6 GM/DL (ref 12.5–16)
HIGH SENSITIVE C-REACTIVE PROTEIN: >300 MG/L
IMMATURE NEUTROPHIL %: 0.5 % (ref 0–0.43)
LACTATE: 2.4 MMOL/L (ref 0.4–2)
LYMPHOCYTES ABSOLUTE: 1.6 K/CU MM
LYMPHOCYTES RELATIVE PERCENT: 13.7 % (ref 24–44)
MCH RBC QN AUTO: 31.6 PG (ref 27–31)
MCHC RBC AUTO-ENTMCNC: 31.3 % (ref 32–36)
MCV RBC AUTO: 101 FL (ref 78–100)
MONOCYTES ABSOLUTE: 0.4 K/CU MM
MONOCYTES RELATIVE PERCENT: 3.8 % (ref 0–4)
NUCLEATED RBC %: 0.3 %
PDW BLD-RTO: 19.4 % (ref 11.7–14.9)
PLATELET # BLD: 153 K/CU MM (ref 140–440)
PMV BLD AUTO: 8.9 FL (ref 7.5–11.1)
POTASSIUM SERPL-SCNC: 4.5 MMOL/L (ref 3.5–5.1)
PROCALCITONIN: 0.98
RBC # BLD: 2.09 M/CU MM (ref 4.2–5.4)
SEGMENTED NEUTROPHILS ABSOLUTE COUNT: 9.1 K/CU MM
SEGMENTED NEUTROPHILS RELATIVE PERCENT: 79.3 % (ref 36–66)
SODIUM BLD-SCNC: 137 MMOL/L (ref 135–145)
TOTAL IMMATURE NEUTOROPHIL: 0.06 K/CU MM
TOTAL NUCLEATED RBC: 0 K/CU MM
TROPONIN T: 0.05 NG/ML
TROPONIN T: 0.06 NG/ML
WBC # BLD: 11.4 K/CU MM (ref 4–10.5)

## 2021-10-22 PROCEDURE — 86922 COMPATIBILITY TEST ANTIGLOB: CPT

## 2021-10-22 PROCEDURE — 76937 US GUIDE VASCULAR ACCESS: CPT

## 2021-10-22 PROCEDURE — 94761 N-INVAS EAR/PLS OXIMETRY MLT: CPT

## 2021-10-22 PROCEDURE — 2500000003 HC RX 250 WO HCPCS: Performed by: INTERNAL MEDICINE

## 2021-10-22 PROCEDURE — 93005 ELECTROCARDIOGRAM TRACING: CPT | Performed by: HOSPITALIST

## 2021-10-22 PROCEDURE — 2500000003 HC RX 250 WO HCPCS: Performed by: PHYSICAL MEDICINE & REHABILITATION

## 2021-10-22 PROCEDURE — 93010 ELECTROCARDIOGRAM REPORT: CPT | Performed by: INTERNAL MEDICINE

## 2021-10-22 PROCEDURE — 6370000000 HC RX 637 (ALT 250 FOR IP): Performed by: INTERNAL MEDICINE

## 2021-10-22 PROCEDURE — 86900 BLOOD TYPING SEROLOGIC ABO: CPT

## 2021-10-22 PROCEDURE — 05HB33Z INSERTION OF INFUSION DEVICE INTO RIGHT BASILIC VEIN, PERCUTANEOUS APPROACH: ICD-10-PCS | Performed by: HOSPITALIST

## 2021-10-22 PROCEDURE — 2580000003 HC RX 258: Performed by: HOSPITALIST

## 2021-10-22 PROCEDURE — 99239 HOSP IP/OBS DSCHRG MGMT >30: CPT | Performed by: PHYSICAL MEDICINE & REHABILITATION

## 2021-10-22 PROCEDURE — 6370000000 HC RX 637 (ALT 250 FOR IP): Performed by: NURSE PRACTITIONER

## 2021-10-22 PROCEDURE — 6370000000 HC RX 637 (ALT 250 FOR IP): Performed by: PHYSICAL MEDICINE & REHABILITATION

## 2021-10-22 PROCEDURE — 36430 TRANSFUSION BLD/BLD COMPNT: CPT

## 2021-10-22 PROCEDURE — P9016 RBC LEUKOCYTES REDUCED: HCPCS

## 2021-10-22 PROCEDURE — C1751 CATH, INF, PER/CENT/MIDLINE: HCPCS

## 2021-10-22 PROCEDURE — 36415 COLL VENOUS BLD VENIPUNCTURE: CPT

## 2021-10-22 PROCEDURE — 6360000002 HC RX W HCPCS: Performed by: HOSPITALIST

## 2021-10-22 PROCEDURE — 93005 ELECTROCARDIOGRAM TRACING: CPT | Performed by: PHYSICAL MEDICINE & REHABILITATION

## 2021-10-22 PROCEDURE — 84484 ASSAY OF TROPONIN QUANT: CPT

## 2021-10-22 PROCEDURE — 87040 BLOOD CULTURE FOR BACTERIA: CPT

## 2021-10-22 PROCEDURE — 85025 COMPLETE CBC W/AUTO DIFF WBC: CPT

## 2021-10-22 PROCEDURE — 80048 BASIC METABOLIC PNL TOTAL CA: CPT

## 2021-10-22 PROCEDURE — 83605 ASSAY OF LACTIC ACID: CPT

## 2021-10-22 PROCEDURE — 86901 BLOOD TYPING SEROLOGIC RH(D): CPT

## 2021-10-22 PROCEDURE — 2140000000 HC CCU INTERMEDIATE R&B

## 2021-10-22 PROCEDURE — 86141 C-REACTIVE PROTEIN HS: CPT

## 2021-10-22 PROCEDURE — 6360000002 HC RX W HCPCS: Performed by: INTERNAL MEDICINE

## 2021-10-22 PROCEDURE — 2580000003 HC RX 258: Performed by: PHYSICAL MEDICINE & REHABILITATION

## 2021-10-22 PROCEDURE — XW033N5 INTRODUCTION OF MEROPENEM-VABORBACTAM ANTI-INFECTIVE INTO PERIPHERAL VEIN, PERCUTANEOUS APPROACH, NEW TECHNOLOGY GROUP 5: ICD-10-PCS | Performed by: HOSPITALIST

## 2021-10-22 PROCEDURE — 85730 THROMBOPLASTIN TIME PARTIAL: CPT

## 2021-10-22 PROCEDURE — 6360000002 HC RX W HCPCS: Performed by: PHYSICAL MEDICINE & REHABILITATION

## 2021-10-22 PROCEDURE — 99232 SBSQ HOSP IP/OBS MODERATE 35: CPT | Performed by: NURSE PRACTITIONER

## 2021-10-22 PROCEDURE — 84145 PROCALCITONIN (PCT): CPT

## 2021-10-22 PROCEDURE — 2580000003 HC RX 258: Performed by: INTERNAL MEDICINE

## 2021-10-22 PROCEDURE — 36569 INSJ PICC 5 YR+ W/O IMAGING: CPT

## 2021-10-22 PROCEDURE — 86850 RBC ANTIBODY SCREEN: CPT

## 2021-10-22 PROCEDURE — 6360000002 HC RX W HCPCS: Performed by: NURSE PRACTITIONER

## 2021-10-22 PROCEDURE — 80076 HEPATIC FUNCTION PANEL: CPT

## 2021-10-22 PROCEDURE — 83880 ASSAY OF NATRIURETIC PEPTIDE: CPT

## 2021-10-22 RX ORDER — SODIUM CHLORIDE 9 MG/ML
INJECTION, SOLUTION INTRAVENOUS CONTINUOUS
Status: CANCELLED | OUTPATIENT
Start: 2021-10-22 | End: 2021-10-26

## 2021-10-22 RX ORDER — CALCIUM CARBONATE 200(500)MG
500 TABLET,CHEWABLE ORAL 3 TIMES DAILY PRN
Status: CANCELLED | OUTPATIENT
Start: 2021-10-22

## 2021-10-22 RX ORDER — LOPERAMIDE HYDROCHLORIDE 2 MG/1
2 CAPSULE ORAL 4 TIMES DAILY PRN
Status: CANCELLED | OUTPATIENT
Start: 2021-10-22

## 2021-10-22 RX ORDER — PANTOPRAZOLE SODIUM 40 MG/1
40 TABLET, DELAYED RELEASE ORAL
Status: CANCELLED | OUTPATIENT
Start: 2021-10-23

## 2021-10-22 RX ORDER — ONDANSETRON 4 MG/1
4 TABLET, ORALLY DISINTEGRATING ORAL EVERY 6 HOURS PRN
Status: DISCONTINUED | OUTPATIENT
Start: 2021-10-22 | End: 2021-10-28 | Stop reason: HOSPADM

## 2021-10-22 RX ORDER — DIGOXIN 0.25 MG/ML
250 INJECTION INTRAMUSCULAR; INTRAVENOUS ONCE
Status: COMPLETED | OUTPATIENT
Start: 2021-10-22 | End: 2021-10-22

## 2021-10-22 RX ORDER — BISACODYL 10 MG
10 SUPPOSITORY, RECTAL RECTAL DAILY PRN
Status: DISCONTINUED | OUTPATIENT
Start: 2021-10-22 | End: 2021-10-28 | Stop reason: HOSPADM

## 2021-10-22 RX ORDER — LOPERAMIDE HYDROCHLORIDE 2 MG/1
2 CAPSULE ORAL 4 TIMES DAILY PRN
Status: DISCONTINUED | OUTPATIENT
Start: 2021-10-22 | End: 2021-10-28 | Stop reason: HOSPADM

## 2021-10-22 RX ORDER — SODIUM CHLORIDE 0.9 % (FLUSH) 0.9 %
10 SYRINGE (ML) INJECTION EVERY 12 HOURS SCHEDULED
Status: DISCONTINUED | OUTPATIENT
Start: 2021-10-22 | End: 2021-10-28 | Stop reason: HOSPADM

## 2021-10-22 RX ORDER — FLUCONAZOLE 100 MG/1
200 TABLET ORAL DAILY
Status: DISCONTINUED | OUTPATIENT
Start: 2021-10-22 | End: 2021-10-22

## 2021-10-22 RX ORDER — SODIUM CHLORIDE 9 MG/ML
INJECTION, SOLUTION INTRAVENOUS PRN
Status: DISCONTINUED | OUTPATIENT
Start: 2021-10-22 | End: 2021-10-28 | Stop reason: HOSPADM

## 2021-10-22 RX ORDER — CIPROFLOXACIN 2 MG/ML
400 INJECTION, SOLUTION INTRAVENOUS ONCE
Status: DISCONTINUED | OUTPATIENT
Start: 2021-10-22 | End: 2021-10-22 | Stop reason: HOSPADM

## 2021-10-22 RX ORDER — MIDODRINE HYDROCHLORIDE 5 MG/1
10 TABLET ORAL
Status: DISCONTINUED | OUTPATIENT
Start: 2021-10-22 | End: 2021-10-22 | Stop reason: HOSPADM

## 2021-10-22 RX ORDER — POLYETHYLENE GLYCOL 3350 17 G/17G
17 POWDER, FOR SOLUTION ORAL DAILY PRN
Status: DISCONTINUED | OUTPATIENT
Start: 2021-10-22 | End: 2021-10-28 | Stop reason: HOSPADM

## 2021-10-22 RX ORDER — SODIUM CHLORIDE 0.9 % (FLUSH) 0.9 %
10 SYRINGE (ML) INJECTION PRN
Status: CANCELLED | OUTPATIENT
Start: 2021-10-22

## 2021-10-22 RX ORDER — FLUCONAZOLE 100 MG/1
200 TABLET ORAL DAILY
Status: CANCELLED | OUTPATIENT
Start: 2021-10-23 | End: 2021-11-05

## 2021-10-22 RX ORDER — SENNA AND DOCUSATE SODIUM 50; 8.6 MG/1; MG/1
1 TABLET, FILM COATED ORAL 2 TIMES DAILY PRN
Status: CANCELLED | OUTPATIENT
Start: 2021-10-22

## 2021-10-22 RX ORDER — ALLOPURINOL 100 MG/1
50 TABLET ORAL DAILY
Status: DISCONTINUED | OUTPATIENT
Start: 2021-10-23 | End: 2021-10-28 | Stop reason: HOSPADM

## 2021-10-22 RX ORDER — SODIUM CHLORIDE 0.9 % (FLUSH) 0.9 %
10 SYRINGE (ML) INJECTION PRN
Status: DISCONTINUED | OUTPATIENT
Start: 2021-10-22 | End: 2021-10-28 | Stop reason: HOSPADM

## 2021-10-22 RX ORDER — SODIUM CHLORIDE 9 MG/ML
25 INJECTION, SOLUTION INTRAVENOUS PRN
Status: DISCONTINUED | OUTPATIENT
Start: 2021-10-22 | End: 2021-10-28 | Stop reason: HOSPADM

## 2021-10-22 RX ORDER — METOPROLOL TARTRATE 5 MG/5ML
2.5 INJECTION INTRAVENOUS ONCE
Status: DISCONTINUED | OUTPATIENT
Start: 2021-10-22 | End: 2021-10-22 | Stop reason: ALTCHOICE

## 2021-10-22 RX ORDER — LEVOTHYROXINE SODIUM 0.05 MG/1
50 TABLET ORAL DAILY
Status: DISCONTINUED | OUTPATIENT
Start: 2021-10-23 | End: 2021-10-28 | Stop reason: HOSPADM

## 2021-10-22 RX ORDER — SENNA AND DOCUSATE SODIUM 50; 8.6 MG/1; MG/1
1 TABLET, FILM COATED ORAL 2 TIMES DAILY PRN
Status: DISCONTINUED | OUTPATIENT
Start: 2021-10-22 | End: 2021-10-28 | Stop reason: HOSPADM

## 2021-10-22 RX ORDER — SODIUM CHLORIDE 9 MG/ML
INJECTION, SOLUTION INTRAVENOUS CONTINUOUS
Status: DISCONTINUED | OUTPATIENT
Start: 2021-10-22 | End: 2021-10-22 | Stop reason: HOSPADM

## 2021-10-22 RX ORDER — METOPROLOL TARTRATE 5 MG/5ML
5 INJECTION INTRAVENOUS EVERY 6 HOURS PRN
Status: DISCONTINUED | OUTPATIENT
Start: 2021-10-22 | End: 2021-10-22

## 2021-10-22 RX ORDER — ALLOPURINOL 100 MG/1
50 TABLET ORAL DAILY
Status: CANCELLED | OUTPATIENT
Start: 2021-10-23

## 2021-10-22 RX ORDER — 0.9 % SODIUM CHLORIDE 0.9 %
1000 INTRAVENOUS SOLUTION INTRAVENOUS ONCE
Status: DISCONTINUED | OUTPATIENT
Start: 2021-10-22 | End: 2021-10-22 | Stop reason: HOSPADM

## 2021-10-22 RX ORDER — SODIUM CHLORIDE 9 MG/ML
25 INJECTION, SOLUTION INTRAVENOUS PRN
Status: CANCELLED | OUTPATIENT
Start: 2021-10-22

## 2021-10-22 RX ORDER — CALCIUM CARBONATE 200(500)MG
500 TABLET,CHEWABLE ORAL 3 TIMES DAILY PRN
Status: DISCONTINUED | OUTPATIENT
Start: 2021-10-22 | End: 2021-10-28 | Stop reason: HOSPADM

## 2021-10-22 RX ORDER — POLYETHYLENE GLYCOL 3350 17 G/17G
17 POWDER, FOR SOLUTION ORAL DAILY PRN
Status: CANCELLED | OUTPATIENT
Start: 2021-10-22

## 2021-10-22 RX ORDER — ACETAMINOPHEN 500 MG
1000 TABLET ORAL
Status: CANCELLED | OUTPATIENT
Start: 2021-10-22

## 2021-10-22 RX ORDER — SODIUM CHLORIDE 0.9 % (FLUSH) 0.9 %
10 SYRINGE (ML) INJECTION EVERY 12 HOURS SCHEDULED
Status: CANCELLED | OUTPATIENT
Start: 2021-10-22

## 2021-10-22 RX ORDER — BISACODYL 10 MG
10 SUPPOSITORY, RECTAL RECTAL DAILY PRN
Status: CANCELLED | OUTPATIENT
Start: 2021-10-22

## 2021-10-22 RX ORDER — SODIUM CHLORIDE 9 MG/ML
INJECTION, SOLUTION INTRAVENOUS CONTINUOUS
Status: DISCONTINUED | OUTPATIENT
Start: 2021-10-22 | End: 2021-10-22

## 2021-10-22 RX ORDER — ACETAMINOPHEN 500 MG
1000 TABLET ORAL
Status: DISCONTINUED | OUTPATIENT
Start: 2021-10-22 | End: 2021-10-28 | Stop reason: HOSPADM

## 2021-10-22 RX ORDER — PANTOPRAZOLE SODIUM 40 MG/1
40 TABLET, DELAYED RELEASE ORAL
Status: DISCONTINUED | OUTPATIENT
Start: 2021-10-23 | End: 2021-10-28 | Stop reason: HOSPADM

## 2021-10-22 RX ORDER — MIDODRINE HYDROCHLORIDE 5 MG/1
10 TABLET ORAL
Status: DISCONTINUED | OUTPATIENT
Start: 2021-10-22 | End: 2021-10-28 | Stop reason: HOSPADM

## 2021-10-22 RX ORDER — ONDANSETRON 4 MG/1
4 TABLET, ORALLY DISINTEGRATING ORAL EVERY 6 HOURS PRN
Status: CANCELLED | OUTPATIENT
Start: 2021-10-22

## 2021-10-22 RX ORDER — LEVOTHYROXINE SODIUM 0.05 MG/1
50 TABLET ORAL DAILY
Status: CANCELLED | OUTPATIENT
Start: 2021-10-23

## 2021-10-22 RX ADMIN — FLUCONAZOLE 200 MG: 200 INJECTION, SOLUTION INTRAVENOUS at 21:01

## 2021-10-22 RX ADMIN — METOPROLOL TARTRATE 2.5 MG: 1 INJECTION, SOLUTION INTRAVENOUS at 14:42

## 2021-10-22 RX ADMIN — SODIUM CHLORIDE 1000 ML: 9 INJECTION, SOLUTION INTRAVENOUS at 14:43

## 2021-10-22 RX ADMIN — MIDODRINE HYDROCHLORIDE 10 MG: 5 TABLET ORAL at 16:26

## 2021-10-22 RX ADMIN — SODIUM CHLORIDE, PRESERVATIVE FREE 10 ML: 5 INJECTION INTRAVENOUS at 10:05

## 2021-10-22 RX ADMIN — LEVOTHYROXINE SODIUM 50 MCG: 0.05 TABLET ORAL at 05:41

## 2021-10-22 RX ADMIN — SODIUM CHLORIDE: 9 INJECTION, SOLUTION INTRAVENOUS at 12:24

## 2021-10-22 RX ADMIN — COLLAGENASE SANTYL: 250 OINTMENT TOPICAL at 09:32

## 2021-10-22 RX ADMIN — SODIUM BICARBONATE: 84 INJECTION, SOLUTION INTRAVENOUS at 21:49

## 2021-10-22 RX ADMIN — DIGOXIN 250 MCG: 250 INJECTION, SOLUTION INTRAMUSCULAR; INTRAVENOUS; PARENTERAL at 16:26

## 2021-10-22 RX ADMIN — PIPERACILLIN AND TAZOBACTAM 2250 MG: 2; .25 INJECTION, POWDER, LYOPHILIZED, FOR SOLUTION INTRAVENOUS at 06:39

## 2021-10-22 RX ADMIN — ENOXAPARIN SODIUM 30 MG: 30 INJECTION SUBCUTANEOUS at 09:15

## 2021-10-22 RX ADMIN — PANTOPRAZOLE SODIUM 40 MG: 40 TABLET, DELAYED RELEASE ORAL at 05:41

## 2021-10-22 RX ADMIN — ACETAMINOPHEN 1000 MG: 500 TABLET ORAL at 18:48

## 2021-10-22 RX ADMIN — MEROPENEM 500 MG: 500 INJECTION, POWDER, FOR SOLUTION INTRAVENOUS at 21:06

## 2021-10-22 RX ADMIN — MICONAZOLE NITRATE: 2 POWDER TOPICAL at 21:01

## 2021-10-22 RX ADMIN — MICONAZOLE NITRATE: 2 POWDER TOPICAL at 09:32

## 2021-10-22 RX ADMIN — SODIUM CHLORIDE: 9 INJECTION, SOLUTION INTRAVENOUS at 16:20

## 2021-10-22 RX ADMIN — PIPERACILLIN AND TAZOBACTAM 2250 MG: 2; .25 INJECTION, POWDER, LYOPHILIZED, FOR SOLUTION INTRAVENOUS at 00:48

## 2021-10-22 RX ADMIN — DILTIAZEM HYDROCHLORIDE 5 MG/HR: 5 INJECTION INTRAVENOUS at 16:22

## 2021-10-22 RX ADMIN — SODIUM CHLORIDE, PRESERVATIVE FREE 10 ML: 5 INJECTION INTRAVENOUS at 21:01

## 2021-10-22 RX ADMIN — IRON SUCROSE 300 MG: 20 INJECTION, SOLUTION INTRAVENOUS at 18:56

## 2021-10-22 RX ADMIN — ACETAMINOPHEN 1000 MG: 500 TABLET ORAL at 23:00

## 2021-10-22 RX ADMIN — DIGOXIN 250 MCG: 250 INJECTION, SOLUTION INTRAMUSCULAR; INTRAVENOUS; PARENTERAL at 20:30

## 2021-10-22 ASSESSMENT — PAIN SCALES - GENERAL
PAINLEVEL_OUTOF10: 7
PAINLEVEL_OUTOF10: 0
PAINLEVEL_OUTOF10: 5
PAINLEVEL_OUTOF10: 0

## 2021-10-22 NOTE — PROGRESS NOTES
Patient lying in bed, constant whine but does not make sense when asking patient what is wrong, patient talking about her parents being the only ones left, pt states she wants to die, states \"take me to rehab\", \"take me to the emergency room\", \"now your going to send me off somewhere to die\" no sentence makes sense. Patient will not drink or eat, will not open mouth for meds, holds own hand over own mouth when attempting to feed or give drink.

## 2021-10-22 NOTE — PROGRESS NOTES
Infectious Disease Progress Note  10/22/2021   Patient Name: Joanna Alcocer : 1940   Impression  ? Sepsis secondary to Pseudomonas aeruginosa Bacteremia from Bilateral Pyelonephritis, Complicated UTI (Probable CAUTI):  § Afebrile  § WBC 12.1  § 10/20-UA ,   § 10/20-Urine Culture Pseudomonas aeruginosa >100,000, Candida albicans >100,000  § 10/21-Blood cultures -Pseudomonas aeruginosa     · Acute Right Femoral Neck Fracture      ? Resolving BLE MRSA Cellulitis     ? TOBY on CKD4     ? Anemia     ? Morbid Obesity:  BMI 42.93     ? HTN/ AF/ SVT/ PHTN/ VHD/ HFpEF     ? Hypothyroidism     ? ARON     · Multi-morbidity: per PMHx:  HTN, HFpEF, SVT, GERD, Hypothyroidism, CKD4, left femur fracture s/p repair with intramedullary nail , ventral incisional hernia repair, Morbid Obesity, SVT, AF    Plan:  · Continue IV Zosyn 2,250 mg q6h, renal dosing, will await urine culture and blood culture sensi before any change in regimen  · Trend CRP and Pct, elevated, will recheck in am  ? Await urine culture sensi   ? Exchanged Simpson 10/21  ? Check CT A&P WO Contrast to evaluate for pyelonephritis, and/or stone involvement, if positive for nephrolithiasis, would then recommend urology consultation. Ongoing Antimicrobial Therapy  Diflucan 10/22-  Zosyn 10/20-  Completed Antimicrobial Therapy  Ceftriaxone 10/8  Keflex 10/7-  Doxycycline 10/7-? [id number]/9-  Zyvox 10/11-   ? History:? Interval history noted  Denies n/v/d/f or untoward effects of antibiotics. States continues to have bilateral lower abdominal discomfort radiating into flank areas, with some improvement.      Physical Exam:  Vital Signs: BP (!) 102/50 Comment: Nurse aware  Pulse 71   Temp 98.7 °F (37.1 °C) (Oral)   Resp 16   Ht 5' 0.98\" (1.549 m)   Wt 231 lb 7.7 oz (105 kg)   LMP 1995   SpO2 95%   BMI 43.76 kg/m²     Gen: alert and oriented X3, tearful  Wounds: C/D/I BLE dried areas with erythema, no warmth, drainage or edema  HEMT: AT/NC Oropharynx pink, moist, and without lesions or exudates; dentition in good state of repair  Eyes: PERRLA, EOMI, conjunctiva pink, sclera anicteric. Neck: Supple. Trachea midline. No LAD. Chest: no distress and CTA. Good air movement. Heart: RRR and no MRG. Abd: soft, bilateral low abdominal tenderness radiating to bilateral flanks, no hepatomegaly. Normoactive bowel sounds. Ext: no clubbing, cyanosis, or edema  Catheter Site: without erythema or tenderness  Neuro: Mental status intact. CN 2-12 intact and no focal sensory or motor deficits     Radiologic / Imaging / TESTING  10/8/21 XR Hip 2-3 VW W Pelvis Right:  Impression   1. Acute fracture of the right femoral neck with mild displacement. 2. Status post ORIF of the left femur.  Redemonstration of the previously   seen fracture of the proximal femur.  No significant callus formation is   present.      10/8/21 XR Chest Portable:  Impression   Mild cardiomegaly.  Mild-to-moderate congestion and/or infiltrates identified   in the lungs.      10/8/21 Limited Echo:  Summary   This is a limited echocardiogram.   Technically difficult study, due to body habitus.   Left ventricular systolic function is normal.   Ejection fraction is visually estimated at 50-55%.   Moderate mitral regurgitation.   Moderate tricuspid regurgitation; RVSP: 45 mmHg.   No evidence of any pericardial effusion.     10/9/21 VL Dup Lower Extremity Venous Bilateral  Impression   The exam is severely limited due to patient body habitus. The distal femoral   veins popliteal veins and calf veins are not well visualized bilaterally.       No gross DVT is identified      10/10/21 XR Hip Right (2-3 Views)  Impression   Right hip replacement in normal alignment.       No acute interval fracture.       Immediate postoperative changes.           Labs:    Recent Results (from the past 24 hour(s))   CBC    Collection Time: 10/21/21 10:52 AM   Result Value Ref Range    WBC 12.1 (H) 4.0 - 10.5 K/CU MM    RBC 2.21 (L) 4.2 - 5.4 M/CU MM    Hemoglobin 7.0 (L) 12.5 - 16.0 GM/DL    Hematocrit 22.7 (L) 37 - 47 %    .7 (H) 78 - 100 FL    MCH 31.7 (H) 27 - 31 PG    MCHC 30.8 (L) 32.0 - 36.0 %    RDW 19.4 (H) 11.7 - 14.9 %    Platelets 566 826 - 570 K/CU MM    MPV 9.2 7.5 - 11.1 FL   Basic Metabolic Panel    Collection Time: 10/21/21 10:52 AM   Result Value Ref Range    Sodium 133 (L) 135 - 145 MMOL/L    Potassium 4.6 3.5 - 5.1 MMOL/L    Chloride 105 99 - 110 mMol/L    CO2 17 (L) 21 - 32 MMOL/L    Anion Gap 11 4 - 16    BUN 65 (H) 6 - 23 MG/DL    CREATININE 2.6 (H) 0.6 - 1.1 MG/DL    Glucose 85 70 - 99 MG/DL    Calcium 8.0 (L) 8.3 - 10.6 MG/DL    GFR Non- 18 (L) >60 mL/min/1.73m2    GFR  21 (L) >60 mL/min/1.73m2   Ammonia    Collection Time: 10/21/21 10:52 AM   Result Value Ref Range    Ammonia 12 11 - 51 UMOL/L   High sensitivity CRP    Collection Time: 10/21/21 10:52 AM   Result Value Ref Range    CRP High Sensitivity 295.8 (H) <5.0 mg/L   Procalcitonin    Collection Time: 10/21/21 10:52 AM   Result Value Ref Range    Procalcitonin 0.957      CULTURE results: Invalid input(s): BLOOD CULTURE,  URINE CULTURE, SURGICAL CULTURE    Diagnosis:  Patient Active Problem List   Diagnosis    Chronic kidney disease (CKD) stage G4/A2, severely decreased glomerular filtration rate (GFR) between 15-29 mL/min/1.73 square meter and albuminuria creatinine ratio between  mg/g ([facility])    Ventral hernia without obstruction or gangrene    SVT (supraventricular tachycardia) ([facility])    CRF (chronic renal failure)    Hypothyroid    Chronic kidney disease, stage IV (severe) ([facility])    Gastroesophageal reflux disease    H/O ventral hernia    S/P ventral herniorrhaphy    S/P herniorrhaphy    Essential hypertension    History of ventral hernia repair    Morbid obesity with BMI of 45.0-49.9, adult ([address]    Closed transcervical fracture of proximal femur, left, initial [facility])    [facility] of [facility]    MRSA cellulitis    Traumatic closed fracture of neck of femur with minimal displacement, right, initial encounter ([facility]    Uncontrolled pain    Generalized weakness    Acute blood loss anemia    Paroxysmal atrial fibrillation (HCC)    Acute on chronic diastolic (congestive) heart failure ([facility])    Acute cystitis without hematuria    Enterococcus faecalis infection       Active Problems  Principal Problem:    Traumatic closed fracture of neck of femur with minimal displacement, right, initial encounter ([facility]  Active Problems:    Chronic kidney disease, stage IV (severe) ([facility])    Gastroesophageal reflux disease    Essential hypertension    Morbid obesity with BMI of 45.0-49.9, adult ([facility])    MRSA cellulitis    Uncontrolled pain    Generalized weakness    Acute blood loss anemia    Paroxysmal atrial fibrillation (HCC)    Acute on chronic diastolic (congestive) heart failure ([facility])  Resolved Problems:    * No resolved hospital problems. *    Electronically signed by: Electronically signed by Mariel Knight.  SHERYL Ward CNP on 10/22/2021 at 9:02 AM

## 2021-10-22 NOTE — CONSULTS
dictated -[id number]  SVT/afib place on cardizem drip for now for rate control  Suggest transfusion of blood-hemoglobin is 7.0   Renal insuffiencey   Her betablockers where held recently due to low BP  Recent right hip surgery for fx    Will follow   discussed with Bhakit Zavala and nurses      Electronically signed by Calli Thomason MD on 10/22/21 at 3:11 PM EDT

## 2021-10-22 NOTE — PROGRESS NOTES
Eyes closed  No palpable nodes  Normal sclera  Tachycardic  Lungs seemed clear  Abdomen soft nt nd  Trace pitting edema  Has surgical wound on right hip  Skin lesions on LE b/l

## 2021-10-22 NOTE — H&P
HOSPITALIST History & Physical      PCP: Terrell Alfaro MD    Date of Admission: 10/22/2021    of Service: Pt seen/examined on 10/22/21 and Admitted to Inpatient    Hx taken from chart and staff    Chief Complaint:  Hypotension and tachycardia  History Of Present Illness: The patient is a [de-identified] y.o. female PMHx CKD, SVT, HTN, CHF, hypothyroidism  Patient comes from ARU. Patient had been admitted at [address] from 10/8-11. She had been there following a fall and had a right femoral neck fracture followed by a right hip hemiarthroplasty on 10/10. During her stay she also had lower extremity cellulitis and was placed on antibiotics. She also had acute on chronic diastolic CHF and was diuresed. Patient declined SNF. She was discharged to ARU. While in ARU, patient was found to be bacteremic with Pseudomonas likely from UTI due to Simpson catheter use. Simpson catheter was exchanged. On day of admission, staff states that patient has been acting confused. She is normally oriented x3. Patient was tachycardic with a heart rate in the 140s. Her blood pressure was running low in the 76S and 57T systolic. She was started on IV fluids. She was given metoprolol and a bolus of IV fluid after discussion with cardiology. Decision was made to transfer from ARU to stepdown.       Past Medical History:        Diagnosis Date    Acid reflux     'no recent issue\"    Arthritis     \"Left Index Finger\"    CHF (congestive heart failure) ([facility]     per old chart hx of CHF with admission 12/2016 with pulmonary edema    Chronic kidney disease     Sees Dr. Irene Ignacio have one kidney, dr Byron Noriega told me that in 2011 I think\"    GERD (gastroesophageal reflux disease)     History of blood transfusion 2011 Or 2012    No Reaction To Blood Transfusion Received    Hypertension     ( pt states she is not on any medication for blood pressure)    Hypomagnesemia     Hypothyroidism     MRSA (methicillin resistant staph aureus) culture positive 10/08/2021    Leg    Shortness of breath on exertion     SVT (supraventricular tachycardia) (HCC)     per old chart hx of SVT with admission 2016 tx with Adenosine and per notes in 5/2018 hx of SVT- no cardiologist    Teeth missing     Upper And Lower    Wears glasses        PastSurgical History:        Procedure Laterality Date    ABDOMEN SURGERY      DENTAL SURGERY      Teeth Extracted In Past    ENDOSCOPY, COLON, DIAGNOSTIC  2011 Or 2012    EYE SURGERY  ? when    clyde cataract ext    FEMUR FRACTURE SURGERY Left 5/22/2021    FEMUR IM NAIL REGINALDO INSERTION performed by Abdoul Martini MD at [address] [address]    Abdominal Hernia Repair     HIP SURGERY Right 10/8/2021    RIGHT HIP HEMIARTHROPLASTY performed by Abdoul Martini MD at [address] [place] [address] Right 10/10/2021    HIP HEMIARTHROPLASTY performed by Abdoul Martini MD at [place] [address]  05/27/2018    exp lap . converted to exp laporotomy with ventral hernia repair with mesh    OTHER SURGICAL HISTORY  50/64/9384    umbilical scar excision    VENTRAL HERNIA REPAIR  09/16/2016    Robotic laparoscopic       Medications Prior to Admission:    Prior to Admission medications    Medication Sig Start Date End Date Taking?  Authorizing Provider   polyethylene glycol (GLYCOLAX) 17 g packet Take 17 g by mouth daily as needed for Constipation 10/11/21 11/10/21  Ruby Munoz MD   sennosides-docusate sodium (SENOKOT-S) 8.6-50 MG tablet Take 1 tablet by mouth 2 times daily 10/11/21   Ruby Munoz MD   allopurinol (ZYLOPRIM) 100 MG tablet Take 0.5 tablets by mouth daily 9/27/21   Joanie Joe MD   nystatin (MYCOSTATIN) 685430 UNIT/GM powder Apply 3 times daily x 10 to 14 days 9/23/21   Isabell PresidentOREN   metoprolol tartrate (LOPRESSOR) 25 MG tablet Take 1 tablet by mouth 2 times daily 9/21/21 10/21/21  Joanie Joe MD   magnesium oxide (MAG-OX) 400 (241.3 Mg) MG TABS tablet TAKE ONE TABLET BY MOUTH TWICE A DAY 9/21/21 3/1/22  Gordon Rodrigez MD   levothyroxine (SYNTHROID) 50 MCG tablet Take 1 tablet by mouth Daily 9/21/21 10/21/21  Gordon Rodrigez MD   furosemide (LASIX) 20 MG tablet Take 1 tablet by mouth 2 times daily Take 20 mg by mouth 2 times daily 9/21/21   Gordon Rodrigez MD       Allergies:    Patient has no known allergies. Social History:    TOBACCO:   reports that she has never smoked. She has never used smokeless tobacco.  ETOH:   reports previous alcohol use. History:  Positive as follows:        Problem Relation Age of Onset    Cancer Father         Lung Cancer    Arthritis Mother     Cancer Brother         Skin Cancer    High Cholesterol Brother        REVIEW OF SYSTEMS:   Pertinent positives and negatives as noted in the HPI and ROS. All other systems reviewed and negative. Review of Systems   Unable to perform ROS: Mental status change       PHYSICAL EXAM:  [facility] 01/23/1995   Physical Exam  Constitutional:       Appearance: She is ill-appearing. HENT:      Head: Normocephalic and atraumatic. Nose: No rhinorrhea. Mouth/Throat:      Mouth: Mucous membranes are moist.      Comments: Not opening mouth  Eyes:      General:         Right eye: No discharge. Left eye: No discharge. Cardiovascular:      Rate and Rhythm: Regular rhythm. Tachycardia present. Pulses: Normal pulses. Heart sounds: Normal heart sounds. No murmur heard. No gallop. Pulmonary:      Effort: Pulmonary effort is normal. No respiratory distress. Breath sounds: Normal breath sounds. No stridor. No wheezing, rhonchi or rales. Abdominal:      General: Abdomen is flat. There is no distension. Palpations: Abdomen is soft. Tenderness: There is no abdominal tenderness. There is no guarding or rebound. Musculoskeletal:      Cervical back: No tenderness. Right lower leg: Edema (trace) present. Left lower leg: Edema (trace) present. Comments: Surgical wound on right hip. Lymphadenopathy:      Cervical: No cervical adenopathy. Skin:     Comments: Lesions in LE b/l    Neurological:      Comments: Not following instructions. Psychiatric:      Comments: Patient is responding to any questions. She does occasionally moan or says something when in pain. Imaging:    Echocardiogram limited    Result Date: 10/8/2021  Transthoracic Echocardiography Report (TTE)  Demographics   Patient Name      Shereen PAPPAS  Date of Study       10/08/2021   Date of Birth     1940          Gender              Female   Age               [de-identified] year(s)          Race                   Patient Number    [id number]          Room Number         SD06   Visit Number      [id number]   Corporate ID      [id number]   Accession Number  [id number]          Lucie Reynolds RVT   Ordering          Mariel Barrios MD    Interpreting        Mariel Barrios MD  Physician                             Physician  Procedure Type of Study   TTE procedure:ECHOCARDIOGRAM LIMITED. Procedure Date Date: 10/08/2021 Start: 12:12 PM Study Location: Portable Technical Quality: Fair visualization Indications:Preop cardiac evaluation. Patient Status: Routine Height: 61 inches Weight: 230 pounds BSA: 2 m2 BMI: 43.46 kg/m2 HR: 67 bpm BP: 141/69 mmHg  Conclusions   Summary  This is a limited echocardiogram.  Technically difficult study, due to body habitus. Left ventricular systolic function is normal.  Ejection fraction is visually estimated at 50-55%. Moderate mitral regurgitation. Moderate tricuspid regurgitation; RVSP: 45 mmHg. No evidence of any pericardial effusion.    Signature   ------------------------------------------------------------------  Electronically signed by Mariel Barrios MD (Interpreting  physician) on 10/08/2021 at 12:49 PM ------------------------------------------------------------------   Findings   Left Ventricle  Left ventricular systolic function is normal.  Ejection fraction is visually estimated at 50-55%. Left Atrium  Essentially normal left atrium. Aortic Valve  Sclerotic, but non-stenotic aortic valve. Mitral Valve  Moderate mitral regurgitation. Tricuspid Valve  Moderate tricuspid regurgitation; RVSP: 45 mmHg. Pulmonic Valve  The pulmonic valve was not well visualized. Pericardial Effusion  No evidence of any pericardial effusion. Pleural Effusion  No evidence of pleural effusion. M-Mode/2D Measurements & Calculations   LV Diastolic Dimension: 4.28 cm LV Systolic Dimension: 5.12 cm  LV FS:30.5 %                    LV Volume Diastolic: 42 ml  LV PW Diastolic: 4.73 cm        LV Volume Systolic: 15 ml  LV PW Systolic: 3.06 cm         LV EDV/LV EDV Index: 42 ml/21 m2LV ESV/LV  Septum Diastolic: 3.56 cm       ESV Index: 15 ml/8 m2  Septum Systolic: 6.70 cm        EF Calculated (A4C): 64.3 %                                  EF Calculated (2D): 58.8 %   LV Area Diastolic: 51.7 cm2     LV Length: 7.79 cm  LV Area Systolic: 00.4 cm2  Doppler Measurements & Calculations                               Estimated RVSP: 45 mmHg                              Estimated RAP:3 mmHg    Estimated PASP: 38.76 mmHg TR Velocity:299 cm/s                              TR Gradient:35.76 mmHg      XR HIP RIGHT (2-3 VIEWS)    Result Date: 10/10/2021  EXAMINATION: TWO XRAY VIEWS OF THE RIGHT HIP 10/10/2021 10:58 am COMPARISON: 10/08/2021 HISTORY: ORDERING SYSTEM PROVIDED HISTORY: Post-op evaluation TECHNOLOGIST PROVIDED HISTORY: Of operative side while in recovery room. Reason for exam:->Post-op evaluation Reason for Exam: Post-op evaluation FINDINGS: A right hip prosthesis projects in normal alignment. There is no acute fracture or dislocation. Multiple cutaneous staples are present, along with a drain and adjacent soft tissue air.  An intramedullary nail in the left femur is partially visualized. Alignment of the proximal left femur is anatomical.     Right hip replacement in normal alignment. No acute interval fracture. Immediate postoperative changes. XR CHEST PORTABLE    Result Date: 10/8/2021  EXAMINATION: ONE XRAY VIEW OF THE CHEST 10/8/2021 9:47 am COMPARISON: May 21, 2021 HISTORY: ORDERING SYSTEM PROVIDED HISTORY: sob-high BNP TECHNOLOGIST PROVIDED HISTORY: Reason for exam:->sob-high BNP Reason for Exam: sob-high BNP FINDINGS: Mild cardiomegaly. Mild-to-moderate diffuse interstitial thickening noted in the lungs suspicious for infiltrates versus congestion. No pneumothorax. Significant osteopenic changes and degenerative changes noted in the bony structures. .     Mild cardiomegaly. Mild-to-moderate congestion and/or infiltrates identified in the lungs. VL DUP LOWER EXTREMITY VENOUS BILATERAL    Result Date: 10/9/2021  EXAMINATION: DUPLEX VENOUS ULTRASOUND OF THE BILATERAL LOWER [id number]/9/2021 5:57 am TECHNIQUE: Duplex ultrasound using B-mode/gray scaled imaging, Doppler spectral analysis and color flow Doppler was obtained of the deep venous structures of the lower bilateral extremities. COMPARISON: None. HISTORY: ORDERING SYSTEM PROVIDED HISTORY: swelling in leg r/o DVT TECHNOLOGIST PROVIDED HISTORY: Reason for exam:->swelling in leg r/o DVT Reason for Exam: swelling Acuity: Acute Type of Exam: Initial FINDINGS: The exam is severely limited due to patient body habitus. The distal femoral veins popliteal veins and calf veins are not well visualized. No gross DVT is identified     The exam is severely limited due to patient body habitus. The distal femoral veins popliteal veins and calf veins are not well visualized bilaterally.  No gross DVT is identified     XR HIP 2-3 VW W PELVIS RIGHT    Result Date: 10/9/2021  EXAMINATION: ONE X-RAY VIEW OF THE PELVIS AND TWO X-RAY VIEWS RIGHT HIP 10/8/2021 2:45 am COMPARISON: May 21, 2021. HISTORY: ORDERING SYSTEM PROVIDED HISTORY: Fall TECHNOLOGIST PROVIDED HISTORY: Reason for exam: Fall Reason for Exam: Fall Acuity: Acute Type of Exam: Initial Mechanism of Injury: Fall Relevant Medical/Surgical History: Fall FINDINGS: Frontal view of the pelvis and dedicated imaging of the right hip demonstrate an acute fracture of the right femoral neck with mild displacement. No hip dislocation. Elsewhere, status post ORIF of the left hip. Fracture of the proximal femoral shaft is seen without significant callus formation. No acute soft tissue injury. 1. Acute fracture of the right femoral neck with mild displacement. 2. Status post ORIF of the left femur. Redemonstration of the previously seen fracture of the proximal femur. No significant callus formation is present. EKG:   SVT    CBC   Recent Labs     10/21/21  1052 10/22/21  1449   WBC 12.1* 11.4*   HGB 7.0* 6.6*   HCT 22.7* 21.1*    153      RENAL  Recent Labs     10/21/21  1052 10/22/21  1449   * 137   K 4.6 4.5    108   CO2 17* 17*   BUN 65* 64*   CREATININE 2.6* 3.2*     LFT'S  No results for input(s): AST, ALT, ALB, BILIDIR, BILITOT, ALKPHOS in the last 72 hours. COAG  No results for input(s): INR in the last 72 hours. CARDIAC ENZYMES  Recent Labs     10/22/21  1449   TROPONINT 0.053*       U/A:    Lab Results   Component Value Date    COLORU YELLOW 10/20/2021    WBCUA 331 10/20/2021    RBCUA 155 10/20/2021    MUCUS RARE 10/08/2021    BACTERIA OCCASIONAL 10/20/2021    CLARITYU CLOUDY 10/20/2021    SPECGRAV 1.018 10/20/2021    LEUKOCYTESUR LARGE 10/20/2021    BLOODU LARGE 10/20/2021       ABG    Lab Results   Component Value Date    BEART 1 08/14/2011    PO2ART 75 08/14/2011           There are no active hospital problems to display for this patient.       CERTIFICATION    I certify that Sabino Mcmullen is expected to be hospitalized for >2 midnights based on the following assessment and plan:    ASSESSMENT/PLAN:    1. Septic shock due to UTIurine culture: Pseudomonas and Candida. ID started meropenem and Diflucan 10/22. Check blood cultures. Bolus fluids and start on IVF. 2. TachycardiaSVT versus A. fib. Cardiology following. Check serial troponins. Started on Cardizem drip and digoxin. 3. Acute metabolic encephalopathydue to sepsis. 4. TOBY on CKD 4likely due to shock. On IVF. Consult nephrology. 5. Anemiano gross bleeding at this time. Check occult stool. Give 1 unit PRBC. Holding Lovenox  6. Recent R femoral neck fracture s/p right total hip arthroplasty 10/10will need to resume DVT prophylaxis once hemoglobin is stable. 7. Recent lower limb cellulitishad been seen by wound care and completed antibiotics. Chronic Illnesses  HTN  CHF    Due to the immediate potential for life-threatening deterioration due to hypotension, I spent 33 minutes providing critical care. This time is excluding time spent performing procedures. DVT Prophylaxis: SCD due to anemia  Diet: ADULT DIET; Regular  ADULT ORAL NUTRITION SUPPLEMENT; Standard High Calorie/High Protein Oral Supplement  Code Status: Full, continuing code status from ARU. Unable to ask patient about POA due to confusion.         Bren Baltazar MD

## 2021-10-22 NOTE — CONSULTS
PHARMACY CONSULT FOR RENAL DOSING OF MERREM PER DR JASMINA SAAMYOA    ORIGINAL ORDER:  Meropenem 1,000mg ivpb q12h    Estimated Creatinine Clearance: 16 mL/min (A) (based on SCr of 3.2 mg/dL (H)). Recent Labs     10/21/21  1052 10/21/21  1052 10/22/21  1449   BUN 65*  --  64*   CREATININE 2.6*   < > 3.2*      < > 153    < > = values in this interval not displayed. NEW RENALLY ADJUSTED ORDER:  MEROPENEM 500MG IVPB Q12H    Horace Cuevas.  Bhakti Bermudez [facility]  10/22/2021 4:23 PM

## 2021-10-22 NOTE — PROGRESS NOTES
Reviewed chart, patient developed sudden hypotension with tachycardia. DC Zosyn. Start meropenem 1 gm q12h IV per extended infusion, escalated IV ABX therapy due to probable septic shock. Will await when patient is more stable for CT of A&P WO Contrast.    Changed Diflucan from oral to IV.

## 2021-10-22 NOTE — PROGRESS NOTES
Comprehensive Nutrition Assessment    Type and Reason for Visit:  Reassess    Nutrition Recommendations/Plan:   Continue regular diet as tolerates  Encourage oral nutrition supplement  Assist with meals as needed  Will continue to follow up during stay    Nutrition Assessment:  Meal intake continues to vary, not much improvment this week. Recent meals 25-50%, smaller meal orders at times. Has c/o sore mouth. Patient not available on visit today receiving care. Will continue to follow at high nutrition risk at this time. Malnutrition Assessment:  Malnutrition Status: At risk for malnutrition (Comment)    Context:  Acute Illness       Estimated Daily Nutrient Needs:  Energy (kcal):  8522-0298; Weight Used for Energy Requirements:  Current     Protein (g):  57-69 (1.2-1.4 g/kg); Weight Used for Protein Requirements:  Ideal        Fluid (ml/day):  1700; Method Used for Fluid Requirements:  1 ml/kcal      Nutrition Related Findings:  Hb low, CRP elevated, rapid response today, patient with reduced participation in therapy      Wounds:  Multiple, Open Wounds       Current Nutrition Therapies:    ADULT DIET;  Regular  Adult Oral Nutrition Supplement; Standard High Calorie/High Protein Oral Supplement    Anthropometric Measures:  · Height: 5' 0.98\" (154.9 cm)  · Current Body Weight: 231 lb 7.7 oz (105 kg)   · Admission Body Weight: 238 lb 15.7 oz (108.4 kg) (first measured weight)    · Usual Body Weight: 250 lb (113.4 kg) (per hx in May)     · Ideal Body Weight: 105 lbs; % Ideal Body Weight 220 %   · BMI: 43.8  · Adjusted Body Weight:  ; No Adjustment   · BMI Categories: Obese Class 3 (BMI 40.0 or greater)       Nutrition Diagnosis:   · Inadequate oral intake related to inadequate protein-energy intake as evidenced by weight loss      Nutrition Interventions:   Food and/or Nutrient Delivery:  Continue Current Diet, Continue Oral Nutrition Supplement  Nutrition Education/Counseling:  No recommendation at this time Coordination of Nutrition Care:  Continue to monitor while inpatient    Goals:  pt will consistently consume greater than 50-75% of meals and supplements       Nutrition Monitoring and Evaluation:   Behavioral-Environmental Outcomes:  None Identified   Food/Nutrient Intake Outcomes:  Diet Advancement/Tolerance, Food and Nutrient Intake, Supplement Intake  Physical Signs/Symptoms Outcomes:  Biochemical Data, Meal Time Behavior, Skin, Weight     Discharge Planning:     Too soon to determine     Electronically signed by Cecy Sifuentes RD, LD on 10/22/21 at 2:43 PM EDT    Contact: [phone]

## 2021-10-22 NOTE — PROGRESS NOTES
Physical Therapy    [x] daily progress note       [] discharge       Patient Name:  Martir Young   :  1940 MRN: [id number]  Room:  [address] [place], [place] [address] Date of Admission: 10/11/2021  Rehabilitation Diagnosis:   Fracture of unspecified part of neck of right femur, initial encounter for closed fracture [S72.001A]  Traumatic closed fracture of neck of femur with minimal displacement, right, initial encounter ([facility] [S72.001A]       Date 10/22/2021       Day of ARU Week:  5   Time IN/OUT 1100/1105 (unbillable time)    Individual Tx Minutes    Group Tx Minutes    Co-Treat Minutes    Concurrent Tx Minutes    TOTAL Tx Time Mins 0   Variance Time -60   Variance Time [x]   Refusal due to not feeling well  []   Medical hold/reason:    []   Illness   []   Off Unit for test/procedure  []   Extra time needed to complete task  []   Therapeutic need  []   Other (specify):   Restrictions Restrictions/Precautions  Restrictions/Precautions: Weight Bearing, Fall Risk, ROM Restrictions, General Precautions  Position Activity Restriction  Hip Precautions: Posterior hip precautions, No hip flexion > 90 degrees, No ADduction, No hip internal rotation  Other position/activity restrictions: mobley catheter, wound drain   Interdisciplinary communication [x]   Cleared for therapy per nursing     [x]   RN notified about issues during session  []   RN updated on pt performance  []   Spoke with   []   Spoke with OT  []   Spoke with MD  []   Other:    Subjective observations and cognitive status: Pt turns head towards door when this PT knocked and called her name, immediately closed her eyes and started mumbling incomprehensible things. When asked how she is, she opened her eyes and said \"I'm okay. \" When asked if she slept at all last night, she turned her head towards the recliner and appeared to be talking to \"someone\" and said [facility] said we didn't. \" Attempted to redirect pt and then she started patting the L side of her blanket and said \"He's here when I need him. \" Pt then started being emotional saying \"This is a terrible life. \" Water was offered to pt as her lips appeared very dry. Pt took a few sips and states her mouth burns. Pt declined to try sitting up or to try bed level exercises and just wanted to be left alone. Pain level/location:    /10       Location: mouth (burning sensation when she sipped some water)   Discharge recommendations  Anticipated discharge date:  10/26/2021  Destination: []?home alone   []?home alone with assist PRN     []? home w/ family      []? Continuous supervision  []? SNF    []? Assisted living     []? Other:  Continued therapy: []?C PT  []? OUTPATIENT PT   []? No Further PT  []? SNF PT  Caregiver training recommended: []? Yes  []? No   Equipment needs: none         Treatment Plan for Next Session: transfers training if able      Assessment: This pt demonstrated hallucinations during this PT visit. Pt continues to have significant decline in function as she continues to refuse to be out of bed.      Treatment/Activity Tolerance:   [] Tolerated treatment with no adverse effects    [] Patient limited by fatigue  [] Patient limited by pain   [x] Patient limited by medical complications: hallucinations and confusion    [] Adverse reaction to Tx:   [] Significant change in status    Safety:       [x]  bed alarm set    []  chair alarm set    []  Pt refused alarms                []  Telesitter activated      []  Gait belt used during tx session      []other:       Number of Minutes/Billable Intervention  Gait Training    Therapeutic Exercise    Neuro Re-Ed    Therapeutic Activity    Wheelchair Propulsion    Group    Other:    TOTAL 0       Social History  Social/Functional History  Lives With: Alone (has local family support)  Type of Home: House  Home Layout: Two level, Able to Live on Main level with bedroom/bathroom, Performs ADL's on one level  Home Access: Ramped entrance (bilat rails)  Bathroom Shower/Tub: Walk-in shower  Bathroom Toilet: Handicap height  Bathroom Equipment: Built-in shower seat, Hand-held shower, Grab bars in shower, Grab bars around toilet  Home Equipment: Rolling walker, [employer], [place], [employer], Reacher, [employer], Lift chair, Sock aid, Long-handled shoehorn  Receives Help From: Family, Neighbor, Friend(s)  ADL Assistance: Needs assistance (niece supervised pt during ADL (bathing and dressing), otherwise Mod Ind with toileting activity)  Homemaking Responsibilities: Yes  Meal Prep Responsibility: Primary  Laundry Responsibility: Secondary  Cleaning Responsibility: Primary (does not run the sweeper)  Bill Paying/Finance Responsibility: Primary  Shopping Responsibility: Secondary (does grocery list)  [address] [facility] [address]: No  Health Care Management: Primary  Ambulation Assistance: Independent (Mod Ind using RW (household and limited community distances))  Active : No  Mode of Transportation: Family, Car  Occupation: Retired  Type of occupation: worked in bank  Additional Comments: has a regular, flat bed (tall height); usually sleeps in lift chair; had 2 falls in the past year with injuries requiring surgery    Objective                                                                                    Goals:  (Update in navigator)  Short term goals  Time Frame for Short term goals: 12-14 tx days:  Short term goal 1: Pt will complete rolling L/R and sit->sup using leg  to assist RLE and using bed features with Min A following posterior hip precautions on RLE. Short term goal 2: Pt will complete sup->sit using bed features and leg  to assist RLE with SBA-Sup following posterior hip precautions on RLE. Short term goal 3: Pt will complete OOB transfers using RW with Min A following posterior hip precautions. Short term goal 4: Pt will ambulate 50 ft with turns over level surface and 10 ft of unevem surface using RW with CGA.   Short term goal 5: Pt will ascend/descend 2\" curb step using RW with Min A. Short term goal 6: Pt will complete object retrieval from the floor in standing with 1UE support on RW and using reacher with CGA. Short term goal 7: pt will complete 150 ft of w/c propulsion using manual w/c at mod ind:   :        Plan of Care                                                                              Times per week: 5 days per week for a minimum of 60 minutes/day plus group as appropriate for 60 minutes.   Treatment to include Current Treatment Recommendations: Strengthening, Gait Training, Patient/Caregiver Education & Training, ROM, Equipment Evaluation, Education, & procurement, Balance Training, Pain Management, Modalities, Functional Mobility Training, Endurance Training, Home Exercise Program, Positioning, Transfer Training, Wheelchair Mobility Training, Safety Education & Training    Electronically signed by   Darion Matias PT  10/22/2021, 11:13 AM

## 2021-10-22 NOTE — CONSULTS
Consult reviewed with primary nurse. Education regarding PICC insertion discussed and risks and benefits assessed with Nurse and Patient. Patient verbalized understanding. Consent given by Medical Necessity. Time out done @ 17:22. PICC inserted in [facility] without difficulty per protocol. Placement verified via VPSG4 monitoring system. Good blood return and flushes easily on all 3 ports. Patient tolerated well. Education pamphlets left at bedside. Ultrasound photos of vein diameter and doppler signature placed in chart. Please consult IV/PICC team if patient's needs change. PICC OK to use.

## 2021-10-22 NOTE — CARE COORDINATION
Patient reviewed at today's care conference. Patient isn't progressing with therapy due to pain & self limiting behavior. Patient won't have continuous assistance at home. SNF discharge recommended. Discharge moved up to 10/26. Patient isn't medically ready to dc sooner than that date. Case mgt alerted that patient states she is \"wanting to die\"    1500:  Case mgt reviewed dc plan with patient. Patient feeling unwell and was visibly upset by recommended dc plan. Case mgt mentioned patient's earlier comment of \"wanting to die\". Patient reports she feels so unwell. No plan in place. Dr Rd Serrano alerted. No further SNF discussion.

## 2021-10-22 NOTE — PROGRESS NOTES
Physical Rehabilitation: OCCUPATIONAL THERAPY     [x] daily progress note       [] discharge       Patient Name:  Ron Alaniz   :  1940 MRN: [id number]  Room:  [address] [place] [address] Date of Admission: 10/11/2021  Rehabilitation Diagnosis:   Fracture of unspecified part of neck of right femur, initial encounter for closed fracture [S72.001A]  Traumatic closed fracture of neck of femur with minimal displacement, right, initial encounter ([facility] [S72.001A]       Date 10/22/2021       Day of ARU Week:  5   Time IN/OUT    Individual Tx Minutes    Group Tx Minutes    Co-Treat Minutes    Concurrent Tx Minutes    TOTAL Tx Time Mins    Variance Time    Variance Time []   Refusal due to:     []   Medical hold/reason:    []   Illness   []   Off Unit for test/procedure  []   Extra time needed to complete task  []   Therapeutic need  []   Other (specify):   Restrictions Restrictions/Precautions: Weight Bearing, Fall Risk, ROM Restrictions, General Precautions     Hip Precautions: Posterior hip precautions, No hip flexion > 90 degrees, No ADduction, No hip internal rotation   Communication with other providers: []   OK to see per nursing:     []   Spoke with team member regarding:      Subjective observations and cognitive status:      Pain level/location:    /10       Location:    Discharge recommendations  Anticipated discharge date:  10/26  Destination: []home alone   []home alone w assist prn   [] home w/ family    [] Continuous supervision       [x]SNF    [] Assisted living     [] Other:   Continued therapy: []HHC OT  []OUTPATIENT  OT   [] No Further OT  Equipment needs: None      Toileting:   ***       Toilet Transfers:   ***  Device Used:    []   Standard Toilet         []   Grab Bars           []  Bedside Commode       []   Elevated Toilet          []   Other:        Bed Mobility:           []   Pt received out of bed   Rolling R/L:  ***  Scooting:  ***  Supine --> Sit:  ***  Sit --> Supine:  ***    Transfers:    Sit--> Stand:  ***  Stand --> Sit:   ***  Stand-Pivot:   ***  Other:    Assistive device required for transfer:   ***      Functional Mobility:    Assistance:  ***  Device:   []   Rolling Walker     []   Standard Walker []   Wheelchair        []   Paula Yang       []   4-Wheeled [facility]         []   Cardiac Walker       []   Other:        Homemaking Tasks:   ***      Additional Therapeutic activities/exercises completed this date:     []   ADL Training   []   Balance/Postural training     []   Bed/Transfer Training   []   Endurance Training   []   Neuromuscular Re-ed   []   Nu-step:  Time:        Level:         #Steps:       []   Rebounder:    []  Seated     []  Standing        []   Supine Ther Ex (reps/sets):     []   Seated Ther Ex (reps/sets):     []   Standing Ther Ex (reps/sets):     []   Other:      Comments:      Patient/Caregiver Education and Training:   []   [employer] Equipment Use  []   Bed Mobility/Transfer Technique/Safety  []   Energy Conservation Tips  []   Family training  []   Postural Awareness  []   Safety During Functional Activities  []   Reinforced Patient's Precautions   []   Progress was updated and reviewed in Rehabtracker with patient and/or family this         date.     Treatment Plan for Next Session: POC to continue as tolerated         Treatment/Activity Tolerance:   [] Tolerated treatment with no adverse effects    [] Patient limited by fatigue  [] Patient limited by pain   [] Patient limited by medical complications:    [] Adverse reaction to Tx:   [] Significant change in status    Safety:       []  bed alarm set    []  chair alarm set    []  Pt refused alarms                []  Telesitter activated      []  Gait belt used during tx session      []other:       Number of Minutes/Billable Intervention  Therapeutic Exercise    ADL Self-care    Neuro Re-Ed    Therapeutic Activity    Group    Other:    TOTAL        Social History  Social/Functional History  Lives With: Alone (has local family support)  Type of Home: House  Home Layout: Two level, Able to Live on Main level with bedroom/bathroom, Performs ADL's on one level  Home Access: Ramped entrance (bilat rails)  Bathroom Shower/Tub: Walk-in shower  Bathroom Toilet: Handicap height  Bathroom Equipment: Built-in shower seat, Hand-held shower, Grab bars in shower, Grab bars around toilet  Home Equipment: Rolling walker, Uyen Dent, [place], [employer], Reacher, Grab bars, Lift chair, Sock aid, Long-handled shoehorn  Receives Help From: Family, Neighbor, Friend(s)  ADL Assistance: Needs assistance (niece supervised pt during ADL (bathing and dressing), otherwise Mod Ind with toileting activity)  Homemaking Responsibilities: Yes  Meal Prep Responsibility: Primary  Laundry Responsibility: Secondary  Cleaning Responsibility: Primary (does not run the sweeper)  Bill Paying/Finance Responsibility: Primary  Shopping Responsibility: Secondary (does grocery list)  [address] [facility] [address]: No  Health Care Management: Primary  Ambulation Assistance: Independent (Mod Ind using RW (household and limited community distances))  Active : No  Mode of Transportation: Family, Car  Occupation: Retired  Type of occupation: worked in bank  Additional Comments: has a regular, flat bed (tall height); usually sleeps in lift chair; had 2 falls in the past year with injuries requiring surgery    Objective                                                                                    Goals:  (Update in navigator)  Short term goals  Time Frame for Short term goals: STGs=LTGs:  Long term goals  Time Frame for Long term goals : 14-17 days or until d/c  Long term goal 1: Pt will complete grooming tasks Ind seated  Long term goal 2: Pt will complete total body bathing c min A using AE PRN  Long term goal 3: Pt will complete UB dressing c setup  Long term goal 4: Pt will complete LB dressing c mod A using AE  Long term goal 5: Pt will doff/don footwear c min A using AE  Long term goals 6: Pt will complete toileting c max A  Long term goal 7: Pt will complete functional transfers (bed, chair, toilet, shower) c DME PRN and min A  Long term goal 8: Pt will perform therex/therax to facilitate increased strength/endurance/ax tolerance (c emphasis on dynamic standing balance/tolerance > 5 mins, and BUE endurance) c SBA:        Plan of Care                                                                              Times per week: 5 days per week for a minimum of 60 minutes/day plus group as appropriate for 60 minutes.   Treatment to include Plan  Times per day: Daily  Current Treatment Recommendations: Strengthening, Balance Training, Functional Mobility Training, Endurance Training, Pain Management, Safety Education & Training, Patient/Caregiver Education & Training, Equipment Evaluation, Education, & procurement, Positioning, Self-Care / ADL    Electronically signed by   JUNI Kennedy,  10/22/2021, 8:20 AM

## 2021-10-22 NOTE — PROGRESS NOTES
Family member Emmett Carpio patient's niece notified of changes in LOC and increased heart beat and the transfer to Room 3119

## 2021-10-23 ENCOUNTER — APPOINTMENT (OUTPATIENT)
Dept: GENERAL RADIOLOGY | Age: 81
DRG: 981 | End: 2021-10-23
Attending: HOSPITALIST
Payer: MEDICARE

## 2021-10-23 ENCOUNTER — APPOINTMENT (OUTPATIENT)
Dept: CT IMAGING | Age: 81
DRG: 981 | End: 2021-10-23
Attending: HOSPITALIST
Payer: MEDICARE

## 2021-10-23 LAB
ABO/RH: NORMAL
ANTIBODY SCREEN: NEGATIVE
COMPONENT: NORMAL
CROSSMATCH RESULT: NORMAL
CULTURE: ABNORMAL
Lab: ABNORMAL
SPECIMEN: ABNORMAL
STATUS: NORMAL
TRANSFUSION STATUS: NORMAL
UNIT DIVISION: 0
UNIT NUMBER: NORMAL

## 2021-10-23 PROCEDURE — 6370000000 HC RX 637 (ALT 250 FOR IP): Performed by: PHYSICAL MEDICINE & REHABILITATION

## 2021-10-23 PROCEDURE — 71045 X-RAY EXAM CHEST 1 VIEW: CPT

## 2021-10-23 PROCEDURE — 94761 N-INVAS EAR/PLS OXIMETRY MLT: CPT

## 2021-10-23 PROCEDURE — 86141 C-REACTIVE PROTEIN HS: CPT

## 2021-10-23 PROCEDURE — 85018 HEMOGLOBIN: CPT

## 2021-10-23 PROCEDURE — 70450 CT HEAD/BRAIN W/O DYE: CPT

## 2021-10-23 PROCEDURE — 85014 HEMATOCRIT: CPT

## 2021-10-23 PROCEDURE — 74176 CT ABD & PELVIS W/O CONTRAST: CPT

## 2021-10-23 PROCEDURE — 80069 RENAL FUNCTION PANEL: CPT

## 2021-10-23 PROCEDURE — 84443 ASSAY THYROID STIM HORMONE: CPT

## 2021-10-23 PROCEDURE — 6370000000 HC RX 637 (ALT 250 FOR IP): Performed by: INTERNAL MEDICINE

## 2021-10-23 PROCEDURE — 6360000002 HC RX W HCPCS: Performed by: HOSPITALIST

## 2021-10-23 PROCEDURE — 2580000003 HC RX 258: Performed by: PHYSICAL MEDICINE & REHABILITATION

## 2021-10-23 PROCEDURE — 6360000002 HC RX W HCPCS: Performed by: PHYSICAL MEDICINE & REHABILITATION

## 2021-10-23 PROCEDURE — 85025 COMPLETE CBC W/AUTO DIFF WBC: CPT

## 2021-10-23 PROCEDURE — 2140000000 HC CCU INTERMEDIATE R&B

## 2021-10-23 RX ORDER — ALBUMIN (HUMAN) 12.5 G/50ML
25 SOLUTION INTRAVENOUS ONCE
Status: DISCONTINUED | OUTPATIENT
Start: 2021-10-23 | End: 2021-10-23

## 2021-10-23 RX ORDER — ALBUMIN (HUMAN) 12.5 G/50ML
12.5 SOLUTION INTRAVENOUS ONCE
Status: COMPLETED | OUTPATIENT
Start: 2021-10-23 | End: 2021-10-24

## 2021-10-23 RX ORDER — DIGOXIN 125 MCG
125 TABLET ORAL
Status: DISCONTINUED | OUTPATIENT
Start: 2021-10-23 | End: 2021-10-28 | Stop reason: HOSPADM

## 2021-10-23 RX ADMIN — MIDODRINE HYDROCHLORIDE 10 MG: 5 TABLET ORAL at 19:20

## 2021-10-23 RX ADMIN — ACETAMINOPHEN 1000 MG: 500 TABLET ORAL at 10:56

## 2021-10-23 RX ADMIN — LEVOTHYROXINE SODIUM 50 MCG: 0.05 TABLET ORAL at 06:51

## 2021-10-23 RX ADMIN — PANTOPRAZOLE SODIUM 40 MG: 40 TABLET, DELAYED RELEASE ORAL at 06:51

## 2021-10-23 RX ADMIN — ENOXAPARIN SODIUM 30 MG: 30 INJECTION SUBCUTANEOUS at 10:56

## 2021-10-23 RX ADMIN — COLLAGENASE SANTYL: 250 OINTMENT TOPICAL at 11:05

## 2021-10-23 RX ADMIN — MIDODRINE HYDROCHLORIDE 10 MG: 5 TABLET ORAL at 10:57

## 2021-10-23 RX ADMIN — FLUCONAZOLE 200 MG: 200 INJECTION, SOLUTION INTRAVENOUS at 19:20

## 2021-10-23 RX ADMIN — SODIUM CHLORIDE, PRESERVATIVE FREE 10 ML: 5 INJECTION INTRAVENOUS at 10:56

## 2021-10-23 RX ADMIN — DIGOXIN 125 MCG: 125 TABLET ORAL at 19:20

## 2021-10-23 RX ADMIN — ALLOPURINOL 50 MG: 100 TABLET ORAL at 11:09

## 2021-10-23 RX ADMIN — MICONAZOLE NITRATE: 2 POWDER TOPICAL at 11:02

## 2021-10-23 RX ADMIN — METOPROLOL TARTRATE 25 MG: 25 TABLET, FILM COATED ORAL at 10:57

## 2021-10-23 ASSESSMENT — PAIN SCALES - WONG BAKER: WONGBAKER_NUMERICALRESPONSE: 0

## 2021-10-23 ASSESSMENT — PAIN SCALES - GENERAL
PAINLEVEL_OUTOF10: 0
PAINLEVEL_OUTOF10: 6
PAINLEVEL_OUTOF10: 0

## 2021-10-23 NOTE — PROGRESS NOTES
Arturo Winters MD, [address]                Internal Medicine Hospitalist             Daily Progress  Note   Subjective:   No chief complaint on file. Ms. Tre Hernandez cries out in discomfort, but confused at this time will to make sense when she is talking about. Objective:    BP (!) 113/56   Pulse 82   Temp 98.2 °F (36.8 °C) (Oral)   Resp 14   LMP 01/23/1995   SpO2 100%      Intake/Output Summary (Last 24 hours) at 10/23/2021 1102  Last data filed at 10/23/2021 0445  Gross per 24 hour   Intake 1565.64 ml   Output 775 ml   Net 790.64 ml      Physical Exam:  Heart: Distant heart sounds, irregular  Lungs: Mostly clear to auscultation, decreased breath sounds at bases. No wheezes appreciated no crackles heard. Abdomen: Soft, non distended. Bowel sounds not appreciated. No obvious liver or spleen enlargement. Non tender, no rebound noted. Extremities: Non tender, some swelling noted, some erythema stasis dermatitis   CNS: Unable to evaluate properly patient is confused.     Labs:                                                                                                                                                                                                                                                                                                                                                                                                                                                         CBC with Differential:    Lab Results   Component Value Date    WBC 11.4 10/22/2021    RBC 2.09 10/22/2021    HGB 6.6 10/22/2021    HCT 21.1 10/22/2021     10/22/2021    .0 10/22/2021    MCH 31.6 10/22/2021    MCHC 31.3 10/22/2021    RDW 19.4 10/22/2021    SEGSPCT 79.3 10/22/2021    BANDSPCT 3 10/08/2021    LYMPHOPCT 13.7 10/22/2021    MONOPCT 3.8 10/22/2021    MYELOPCT 1 08/18/2011    EOSPCT 4.1 04/10/2012    BASOPCT 0.3 10/22/2021    MONOSABS 0.4 10/22/2021    LYMPHSABS 1.6 10/22/2021 EOSABS 0.3 10/22/2021    BASOSABS 0.0 10/22/2021    DIFFTYPE AUTOMATED DIFFERENTIAL 10/22/2021     CMP:    Lab Results   Component Value Date     10/22/2021    K 4.5 10/22/2021     10/22/2021    CO2 17 10/22/2021    BUN 64 10/22/2021    CREATININE 3.2 10/22/2021    GFRAA 17 10/22/2021    LABGLOM 14 10/22/2021    GLUCOSE 79 10/22/2021    PROT 6.5 10/11/2021    PROT 5.5 03/24/2012    LABALBU 2.7 10/11/2021    CALCIUM 8.3 10/22/2021    BILITOT 0.4 10/11/2021    ALKPHOS 74 10/11/2021    AST 35 10/11/2021    ALT 7 10/11/2021     Recent Labs     10/22/21  1449 10/22/21  2020   TROPONINT 0.053* 0.057*     Lab Results   Component Value Date    TSHHS 1.210 05/21/2021         sodium chloride      sodium chloride      sodium chloride      dilTIAZem (CARDIZEM) 125 mg in dextrose 5% 125 mL infusion Stopped (10/23/21 0214)    IV infusion builder Stopped (10/23/21 0445)      enoxaparin  30 mg SubCUTAneous Daily    acetaminophen  1,000 mg Oral 4x Daily WC    allopurinol  50 mg Oral Daily    collagenase   Topical Daily    levothyroxine  50 mcg Oral Daily    metoprolol tartrate  25 mg Oral BID    miconazole   Topical BID    pantoprazole  40 mg Oral QAM AC    sodium chloride flush  10 mL IntraVENous 2 times per day    midodrine  10 mg Oral TID WC    meropenem  500 mg IntraVENous Q12H    fluconazole  200 mg IntraVENous Q24H         Assessment:       Patient Active Problem List    Diagnosis Date Noted    Acute cystitis without hematuria     Enterococcus faecalis infection     Uncontrolled pain     Generalized weakness     Acute blood loss anemia     Paroxysmal atrial fibrillation (HCC)     Acute on chronic diastolic (congestive) heart failure (HCC)     Traumatic closed fracture of neck of femur with minimal displacement, right, initial encounter ([facility] 10/11/2021    MRSA cellulitis 10/10/2021    Displaced fracture of right femoral neck ([facility] 10/08/2021    Closed transcervical fracture of proximal femur, left, initial encounter ([facility] 05/21/2021    Morbid obesity with BMI of 45.0-49.9, adult ([facility] 07/23/2020    History of ventral hernia repair 10/19/2018    Essential hypertension 08/10/2018    S/P herniorrhaphy 07/11/2018    S/P ventral herniorrhaphy 06/13/2018    H/O ventral hernia 06/06/2018    Chronic kidney disease, stage IV (severe) ([facility] 05/16/2017    Gastroesophageal reflux disease 05/16/2017    CRF (chronic renal failure) 09/20/2016    Hypothyroid 09/20/2016    Ventral hernia without obstruction or gangrene 09/18/2016    SVT (supraventricular tachycardia) ([facility] 09/18/2016    Chronic kidney disease (CKD) stage G4/A2, severely decreased glomerular filtration rate (GFR) between 15-29 mL/min/1.73 square meter and albuminuria creatinine ratio between  mg/g ([facility] 06/21/2016       Plan:     Problems being addressed this admission:   Septic shock /acute metabolic encephalopathy /  UTI / blood & urine culture pos Pseudomonas  10/23/2021-her white count was 11.4 yesterday have labs from today. Not having any fevers at this time. On meropenem 500 mg every 12 hours and Diflucan 200 mg IV daily as per ID nurse. Blood pressure is better, her confusion is no no better. I will get a CT of the head as well. Anemia rule out GIB  10/23/2021-hemoglobin is 6.6 from yesterday given a unit of blood holding Lovenox. I will consult GI, meanwhile continue to monitor H&H every 8 hours. Get labs from today as well. Atrial fibrillation with RVR  10/23/2021-audiology has seen her started her on Cardizem drip. Heart rate is better controlled now. Unable to be on anticoagulation due to anemia stable GI bleed. TOBY on CKD 4  10/23/2021-nephrology also following patient seems like she does not want dialysis.   Continue to monitor    Stasis dermatitis both lower limbs  10/23/2021-unchanged from before    Recent right femoral neck fracture s/p arthoplasty 10/10/2021  10/23/2021-continue as

## 2021-10-23 NOTE — CONSULTS
Consult completed. IV access ordered per Yoli Ward CNP r/t pt pulling out PICC placed yesterday. Pt has Hx of pulling out multiple lines recently 2/2 confusion. PICC/MidLine contraindicated r/t extensive renal Hx for preservation of vasculature for fistula sites. Two PIV's obtained and pt's therapeutic needs are met at this time.

## 2021-10-23 NOTE — PROGRESS NOTES
Daily Progress Note     patient moved to floor yesterday due to tachycardia  She is in afib rate stable off cardiazem drip  She pulled her PICC line out  She did receive blood yesterday  Recent hip surgery  afib rate control   Acute on chronic renal insuffiencey  Elevated trop no ACS    Check labs this am   Keep on lopressor and ProAmatine and dig low dose       Echo --Summary---10/21   This is a limited echocardiogram.   Technically difficult study, due to body habitus. Left ventricular systolic function is normal.   Ejection fraction is visually estimated at 50-55%. Moderate mitral regurgitation. Moderate tricuspid regurgitation; RVSP: 45 mmHg. No evidence of any pericardial effusion.            Objective:   BP (!) 113/56   Pulse 82   Temp 98.2 °F (36.8 °C) (Oral)   Resp 14   LMP 01/23/1995   SpO2 100%     Intake/Output Summary (Last 24 hours) at 10/23/2021 1235  Last data filed at 10/23/2021 0445  Gross per 24 hour   Intake 1565.64 ml   Output 775 ml   Net 790.64 ml       Medications:   Scheduled Meds:   enoxaparin  30 mg SubCUTAneous Daily    acetaminophen  1,000 mg Oral 4x Daily WC    allopurinol  50 mg Oral Daily    collagenase   Topical Daily    levothyroxine  50 mcg Oral Daily    metoprolol tartrate  25 mg Oral BID    miconazole   Topical BID    pantoprazole  40 mg Oral QAM AC    sodium chloride flush  10 mL IntraVENous 2 times per day    midodrine  10 mg Oral TID WC    meropenem  500 mg IntraVENous Q12H    fluconazole  200 mg IntraVENous Q24H      Infusions:   sodium chloride      sodium chloride      sodium chloride      dilTIAZem (CARDIZEM) 125 mg in dextrose 5% 125 mL infusion Stopped (10/23/21 0214)    IV infusion builder Stopped (10/23/21 3363)      PRN Meds:  sodium chloride, magnesium hydroxide, sennosides-docusate sodium, bisacodyl, polyethylene glycol, sodium chloride, calcium carbonate, loperamide, ondansetron, sodium chloride flush, sodium chloride       Physical Exam:  Vitals:    10/23/21 1056   BP: (!) 113/56   Pulse: 82   Resp:    Temp:    SpO2:         General: awake   Chest: Nontender  Cardiac: afib   Lungs:Clear to auscultation and percussion. Abdomen: Soft, NT, ND, +BS  Extremities: no edema   Vascular:  Equal 2+ peripheral pulses. Lab Data:  CBC:   Recent Labs     10/21/21  1052 10/22/21  1449   WBC 12.1* 11.4*   HGB 7.0* 6.6*   HCT 22.7* 21.1*   .7* 101.0*    153     BMP:   Recent Labs     10/21/21  1052 10/22/21  1449   * 137   K 4.6 4.5    108   CO2 17* 17*   BUN 65* 64*   CREATININE 2.6* 3.2*     LIVER PROFILE: No results for input(s): AST, ALT, LIPASE, BILIDIR, BILITOT, ALKPHOS in the last 72 hours. Invalid input(s): AMYLASE,  ALB  PT/INR: No results for input(s): PROTIME, INR in the last 72 hours. APTT: No results for input(s): APTT in the last 72 hours. BNP:  No results for input(s): BNP in the last 72 hours.       Assessment:  Patient Active Problem List    Diagnosis Date Noted    Acute cystitis without hematuria     Enterococcus faecalis infection     Uncontrolled pain     Generalized weakness     Acute blood loss anemia     Paroxysmal atrial fibrillation (HCC)     Acute on chronic diastolic (congestive) heart failure (HCC)     Traumatic closed fracture of neck of femur with minimal displacement, right, initial encounter ([facility] 10/11/2021    MRSA cellulitis 10/10/2021    Displaced fracture of right femoral neck ([facility] 10/08/2021    Closed transcervical fracture of proximal femur, left, initial encounter [facility] 05/21/2021    Morbid obesity with BMI of 45.0-49.9, adult ([facility] 07/23/2020    History of ventral hernia repair 10/19/2018    Essential hypertension 08/10/2018    S/P herniorrhaphy 07/11/2018    S/P ventral herniorrhaphy 06/13/2018    H/O ventral hernia 06/06/2018    Chronic kidney disease, stage IV (severe) ([facility] 05/16/2017    Gastroesophageal reflux disease 05/16/2017    CRF (chronic renal failure) 09/20/2016    Hypothyroid 09/20/2016    Ventral hernia without obstruction or gangrene 09/18/2016    SVT (supraventricular tachycardia) (HCC) 09/18/2016    Chronic kidney disease (CKD) stage G4/A2, severely decreased glomerular filtration rate (GFR) between 15-29 mL/min/1.73 square meter and albuminuria creatinine ratio between  mg/g ([facility] 06/21/2016       Matt Torre MD, MD 10/23/2021 12:35 PM

## 2021-10-23 NOTE — CONSULTS
[address], [facility]                                  CONSULTATION    PATIENT NAME: Aleshia Mcmullen                 :        1940  MED REC NO:   [id number]                          ROOM:       [id number]  ACCOUNT NO:   [de-identified]                           ADMIT DATE: 10/22/2021  PROVIDER:     Mari Quiros MD    CONSULT DATE:  10/22/2021    INDICATION: Cardiac arrhythmia. HISTORY OF PRESENT ILLNESS:  This is an 81-year-old female patient, I  have known her. She was in rehab today. The nurse found that the  patient has become tachycardic and hypotensive and rapid response was  called, and Dr. Sixto Hurley contacted me. The patient was in SVT with a  rate of 140 present. The patient was given a five minutes of IV  Lopressor infusion at this time. Her blood pressure was positive for  pseudomonas present and ID is also following the patient. The patient  be will be transferred to regular floor for hypotension and tachycardia. The patient has pseudomonas UTI present. She had been on antibiotics. The patient had an echo done in 2021, LV function was preserved. Moderate carotid disease noted. Moderate mitral regurgitation was  noted. EF is around 50-55% present. The patient was in the hospital just recently because she was found to  have bilateral lower extremities venous ulcer present. She was treated  with antibiotics. She had fallen down, the patient had a right hip  fracture also present. She has a history of anemia, post surgery and  history of atrial fibrillation postsurgery. History of having  hypertension, diastolic heart failure, renal insufficiency and chronic  venous ulcer present, and moderate history of SVT is also present. History of moderate mitral valve regurgitation. Moderate tricuspid  regurgitation, and pulmonary hypertension.   The patient follows with Dr. Terrance Kovacs for renal insufficiency. PAST SURGICAL HISTORY:  Hernia repair and recent ultrasounds were  negative for DVT. She had a hip fracture and she was also placed on  CPAP on last admission, but I do not think she tolerated the CPAP well. She had her right hip surgery done. SOCIAL HISTORY:  She does not smoke, does not drink. ALLERGIES: NKDA. MEDICATIONS: She is on oxycodone, Lopressor 25 b.i.d., and Lasix. PHYSICAL EXAMINATION:  GENERAL:  At this time, the patient does not answer much questions. VITAL SIGNS:  Temperature is afebrile and pulse is 140 and blood  pressure is one-teens. HEENT:  Head is atraumatic and decreased breath  sounds. HEART:  Tachycardic. ABDOMEN:  Soft and nontender. Bowel sounds are present. EXTREMITIES:  No cyanosis or clubbing noted. LABORATORY DATA:  Were done yesterday. Her creatinine was 2.6. CBC, hemoglobin 7, platelet count is 157,678. An EKG shows a narrow complex tachycardia present. IMPRESSION:  This is an 81-year female patient with multiple  comorbidities present. She had a hip fracture done, she underwent  surgery, and she is in rehab, and she developed tachycardia. She does  have a history of SVT present and atrial fibrillation present. We will  place her on a Cardizem drip at this time and the patient will be moved  to the floor. I will follow the patient along with you and I discussed  with Dr. Lee Shirley and I will recommend the patient beginning transfuse, her  hemoglobin is 7.0, and I think that would also help with the cardiac  perfusion.         Jerzy Espinal MD    D: 10/22/2021 15:09:20       T: 10/22/2021 15:13:08     [email or web address]  Job#: [id number]     Doc#: [id number]    CC:

## 2021-10-23 NOTE — PROGRESS NOTES
Nephrology Progress Note  10/23/2021 10:32 AM  Subjective: Interval History: Margaret Thomason is a [de-identified] y.o. female with increased pain in legs and more morning today. Data:   Scheduled Meds:   enoxaparin  30 mg SubCUTAneous Daily    acetaminophen  1,000 mg Oral 4x Daily WC    allopurinol  50 mg Oral Daily    collagenase   Topical Daily    levothyroxine  50 mcg Oral Daily    metoprolol tartrate  25 mg Oral BID    miconazole   Topical BID    pantoprazole  40 mg Oral QAM AC    sodium chloride flush  10 mL IntraVENous 2 times per day    midodrine  10 mg Oral TID WC    meropenem  500 mg IntraVENous Q12H    fluconazole  200 mg IntraVENous Q24H     Continuous Infusions:   sodium chloride      sodium chloride      sodium chloride      dilTIAZem (CARDIZEM) 125 mg in dextrose 5% 125 mL infusion Stopped (10/23/21 0214)    IV infusion builder Stopped (10/23/21 4415)         CBC   Recent Labs     10/21/21  1052 10/22/21  1449   WBC 12.1* 11.4*   HGB 7.0* 6.6*   HCT 22.7* 21.1*    153      BMP   Recent Labs     10/21/21  1052 10/22/21  1449   * 137   K 4.6 4.5    108   CO2 17* 17*   BUN 65* 64*   CREATININE 2.6* 3.2*     Hepatic: No results for input(s): AST, ALT, ALB, BILITOT, ALKPHOS in the last 72 hours. Troponin: No results for input(s): TROPONINI in the last 72 hours. BNP: No results for input(s): BNP in the last 72 hours. Lipids: No results for input(s): CHOL, HDL in the last 72 hours. Invalid input(s): LDLCALCU  ABGs:   Lab Results   Component Value Date    PO2ART 75 08/14/2011     INR: No results for input(s): INR in the last 72 hours.   Renal Labs  Albumin:    Lab Results   Component Value Date    LABALBU 2.7 10/11/2021     Calcium:    Lab Results   Component Value Date    CALCIUM 8.3 10/22/2021     Phosphorus:    Lab Results   Component Value Date    PHOS 4.4 10/11/2021     U/A:    Lab Results   Component Value Date    NITRU NEGATIVE 10/20/2021    COLORU YELLOW 10/20/2021    WBCUA 331 10/20/2021    RBCUA 155 10/20/2021    MUCUS RARE 10/08/2021    TRICHOMONAS NONE SEEN 10/20/2021    YEAST OCCASIONAL 10/20/2021    BACTERIA OCCASIONAL 10/20/2021    CLARITYU CLOUDY 10/20/2021    SPECGRAV 1.018 10/20/2021    UROBILINOGEN NEGATIVE 10/20/2021    BILIRUBINUR NEGATIVE 10/20/2021    BLOODU LARGE 10/20/2021    KETUA NEGATIVE 10/20/2021     ABG:    Lab Results   Component Value Date    PO2ART 75 08/14/2011    BEART 1 08/14/2011     HgBA1c:  No results found for: LABA1C  Microalbumen/Creatinine ratio:  No components found for: RUCREAT  TSH:    Lab Results   Component Value Date    TSH 3.260 09/06/2018     IRON:    Lab Results   Component Value Date    IRON 204 05/24/2021     Iron Saturation:  No components found for: PERCENTFE  TIBC:    Lab Results   Component Value Date    TIBC 206 05/24/2021     FERRITIN:  No results found for: FERRITIN  RPR:  No results found for: RPR  SHADE:  No results found for: ANATITER, SHADE  24 Hour Urine for Creatinine Clearance:  No components found for: CREAT4, UHRS10, UTV10      Objective:   I/O: 10/22 0701 - 10/23 0700  In: 1565.6 [P.O.:240; I.V.:948.6]  Out: 775 [Urine:775]  I/O last 3 completed shifts: In: 1565.6 [P.O.:240; I.V.:948.6; Blood:291.7; IV Piggyback:85.4]  Out: 775 [Urine:775]  No intake/output data recorded. Vitals: /62   Pulse 87   Temp 98.2 °F (36.8 °C) (Oral)   Resp 14   LMP 01/23/1995   SpO2 100%  {  General appearance: awake weak legally blind  HEENT: Head: Normal, normocephalic, atraumatic. Neck: supple, symmetrical, trachea midline  Lungs: diminished breath sounds bilaterally  Heart: S1, S2 normal  Abdomen: abnormal findings:  soft nt  Extremities: edema trace with erythema lower extremities  Neurologic: Mental status: alertness: alert but anxious and in pain        Assessment and Plan:      IMP:  1. Acute renal failure from ATN on CKD 4  2. Right hip fracture status post surgery  3. Cellulitis with IV antibiotics  4. A. fib rapid rate  5. Anemia  6.  hypotension    Plan     #1 follow-up repeat labs today stable urine output agreeable to medical management does not want dialysis  2. Was in rehab monitor pain control  3. Maintain antibiotics in setting of cellulitis no fever  4. Heart rate holding stable   #5 monitor follow-up repeat labs especially hemoglobin  6.   Pressure low stable monitor and will follow           Delmy Noriega MD, MD

## 2021-10-23 NOTE — PROGRESS NOTES
Patient observed to be off telemetry monitoring, this nurse into check on patient at this time. Patient removed gown and removed triple lumen PICC from right upper arm (Basilic). Moderate amount of blood noted on gown, bed linens, upper chest and right arm. Care provided by this nurse and charge nurse. Gown and bed linens changed and patient repositioned  PICC cath noted to be intact. Patient noted to be slightly confused at present time. Appears to have intermittent confusion this morning. Patient denies pain when asked, patient very teary at present time, and requests to be left alone. [employer] message sent to Javid De La Torre NP at this time. Informed of patient removing PICC line. Aware that patient has no IV access and Sodium Bicarbonate is not infusing. Informed of need for another consult to be placed. Also aware that patient has not had morning labs drawn. Informed this nurse that order was placed for PICC consult. Patient resting in bed with eyes closed. Call light in reach.

## 2021-10-24 PROBLEM — A41.9 SEPTIC SHOCK (HCC): Status: ACTIVE | Noted: 2021-10-24

## 2021-10-24 PROBLEM — R65.21 SEPTIC SHOCK (HCC): Status: ACTIVE | Noted: 2021-10-24

## 2021-10-24 LAB
ALBUMIN SERPL-MCNC: 2.1 GM/DL (ref 3.4–5)
ANION GAP SERPL CALCULATED.3IONS-SCNC: 9 MMOL/L (ref 4–16)
BASOPHILS ABSOLUTE: 0 K/CU MM
BASOPHILS RELATIVE PERCENT: 0.4 % (ref 0–1)
BUN BLDV-MCNC: 56 MG/DL (ref 6–23)
CALCIUM SERPL-MCNC: 7.8 MG/DL (ref 8.3–10.6)
CHLORIDE BLD-SCNC: 110 MMOL/L (ref 99–110)
CO2: 20 MMOL/L (ref 21–32)
CREAT SERPL-MCNC: 2.6 MG/DL (ref 0.6–1.1)
CULTURE: ABNORMAL
DIFFERENTIAL TYPE: ABNORMAL
EOSINOPHILS ABSOLUTE: 0.4 K/CU MM
EOSINOPHILS RELATIVE PERCENT: 4.4 % (ref 0–3)
GFR AFRICAN AMERICAN: 21 ML/MIN/1.73M2
GFR NON-AFRICAN AMERICAN: 18 ML/MIN/1.73M2
GLUCOSE BLD-MCNC: 75 MG/DL (ref 70–99)
HCT VFR BLD CALC: 22 % (ref 37–47)
HEMOGLOBIN: 7.2 GM/DL (ref 12.5–16)
HIGH SENSITIVE C-REACTIVE PROTEIN: 281.9 MG/L
IMMATURE NEUTROPHIL %: 0.9 % (ref 0–0.43)
LYMPHOCYTES ABSOLUTE: 1.5 K/CU MM
LYMPHOCYTES RELATIVE PERCENT: 18.5 % (ref 24–44)
Lab: ABNORMAL
MCH RBC QN AUTO: 31.6 PG (ref 27–31)
MCHC RBC AUTO-ENTMCNC: 32.7 % (ref 32–36)
MCV RBC AUTO: 96.5 FL (ref 78–100)
MONOCYTES ABSOLUTE: 0.7 K/CU MM
MONOCYTES RELATIVE PERCENT: 9 % (ref 0–4)
NUCLEATED RBC %: 0.2 %
PDW BLD-RTO: 20.1 % (ref 11.7–14.9)
PHOSPHORUS: 3.6 MG/DL (ref 2.5–4.9)
PLATELET # BLD: 130 K/CU MM (ref 140–440)
PMV BLD AUTO: 9.5 FL (ref 7.5–11.1)
POTASSIUM SERPL-SCNC: 4.2 MMOL/L (ref 3.5–5.1)
PROCALCITONIN: 0.95
RBC # BLD: 2.28 M/CU MM (ref 4.2–5.4)
SEGMENTED NEUTROPHILS ABSOLUTE COUNT: 5.5 K/CU MM
SEGMENTED NEUTROPHILS RELATIVE PERCENT: 66.8 % (ref 36–66)
SODIUM BLD-SCNC: 139 MMOL/L (ref 135–145)
SPECIMEN: ABNORMAL
TOTAL IMMATURE NEUTOROPHIL: 0.07 K/CU MM
TOTAL NUCLEATED RBC: 0 K/CU MM
WBC # BLD: 8.2 K/CU MM (ref 4–10.5)

## 2021-10-24 PROCEDURE — 6360000002 HC RX W HCPCS: Performed by: NURSE PRACTITIONER

## 2021-10-24 PROCEDURE — 84145 PROCALCITONIN (PCT): CPT

## 2021-10-24 PROCEDURE — 2500000003 HC RX 250 WO HCPCS: Performed by: INTERNAL MEDICINE

## 2021-10-24 PROCEDURE — 6360000002 HC RX W HCPCS: Performed by: HOSPITALIST

## 2021-10-24 PROCEDURE — 6370000000 HC RX 637 (ALT 250 FOR IP): Performed by: PHYSICAL MEDICINE & REHABILITATION

## 2021-10-24 PROCEDURE — 85025 COMPLETE CBC W/AUTO DIFF WBC: CPT

## 2021-10-24 PROCEDURE — 2580000003 HC RX 258: Performed by: HOSPITALIST

## 2021-10-24 PROCEDURE — 36415 COLL VENOUS BLD VENIPUNCTURE: CPT

## 2021-10-24 PROCEDURE — 2580000003 HC RX 258: Performed by: PHYSICAL MEDICINE & REHABILITATION

## 2021-10-24 PROCEDURE — 6370000000 HC RX 637 (ALT 250 FOR IP): Performed by: INTERNAL MEDICINE

## 2021-10-24 PROCEDURE — 6360000002 HC RX W HCPCS: Performed by: PHYSICAL MEDICINE & REHABILITATION

## 2021-10-24 PROCEDURE — 94761 N-INVAS EAR/PLS OXIMETRY MLT: CPT

## 2021-10-24 PROCEDURE — 85014 HEMATOCRIT: CPT

## 2021-10-24 PROCEDURE — 86141 C-REACTIVE PROTEIN HS: CPT

## 2021-10-24 PROCEDURE — 2580000003 HC RX 258: Performed by: INTERNAL MEDICINE

## 2021-10-24 PROCEDURE — P9047 ALBUMIN (HUMAN), 25%, 50ML: HCPCS | Performed by: NURSE PRACTITIONER

## 2021-10-24 PROCEDURE — 94150 VITAL CAPACITY TEST: CPT

## 2021-10-24 PROCEDURE — 2500000003 HC RX 250 WO HCPCS: Performed by: PHYSICAL MEDICINE & REHABILITATION

## 2021-10-24 PROCEDURE — 85018 HEMOGLOBIN: CPT

## 2021-10-24 PROCEDURE — 80069 RENAL FUNCTION PANEL: CPT

## 2021-10-24 PROCEDURE — 2140000000 HC CCU INTERMEDIATE R&B

## 2021-10-24 RX ADMIN — MEROPENEM 500 MG: 500 INJECTION, POWDER, FOR SOLUTION INTRAVENOUS at 19:44

## 2021-10-24 RX ADMIN — ALLOPURINOL 50 MG: 100 TABLET ORAL at 09:08

## 2021-10-24 RX ADMIN — MIDODRINE HYDROCHLORIDE 10 MG: 5 TABLET ORAL at 09:07

## 2021-10-24 RX ADMIN — MIDODRINE HYDROCHLORIDE 10 MG: 5 TABLET ORAL at 16:14

## 2021-10-24 RX ADMIN — MIDODRINE HYDROCHLORIDE 10 MG: 5 TABLET ORAL at 20:00

## 2021-10-24 RX ADMIN — SODIUM CHLORIDE, PRESERVATIVE FREE 10 ML: 5 INJECTION INTRAVENOUS at 20:28

## 2021-10-24 RX ADMIN — ACETAMINOPHEN 1000 MG: 500 TABLET ORAL at 09:07

## 2021-10-24 RX ADMIN — MICONAZOLE NITRATE: 2 POWDER TOPICAL at 16:44

## 2021-10-24 RX ADMIN — ALBUMIN (HUMAN) 12.5 G: 0.25 INJECTION, SOLUTION INTRAVENOUS at 01:56

## 2021-10-24 RX ADMIN — MEROPENEM 500 MG: 500 INJECTION, POWDER, FOR SOLUTION INTRAVENOUS at 06:26

## 2021-10-24 RX ADMIN — METOPROLOL TARTRATE 25 MG: 25 TABLET, FILM COATED ORAL at 09:14

## 2021-10-24 RX ADMIN — LEVOTHYROXINE SODIUM 50 MCG: 0.05 TABLET ORAL at 06:23

## 2021-10-24 RX ADMIN — PANTOPRAZOLE SODIUM 40 MG: 40 TABLET, DELAYED RELEASE ORAL at 06:23

## 2021-10-24 RX ADMIN — SODIUM CHLORIDE, PRESERVATIVE FREE 10 ML: 5 INJECTION INTRAVENOUS at 09:08

## 2021-10-24 RX ADMIN — ACETAMINOPHEN 1000 MG: 500 TABLET ORAL at 20:23

## 2021-10-24 RX ADMIN — FLUCONAZOLE 200 MG: 200 INJECTION, SOLUTION INTRAVENOUS at 19:39

## 2021-10-24 RX ADMIN — ALBUMIN (HUMAN) 12.5 G: 0.25 INJECTION, SOLUTION INTRAVENOUS at 02:44

## 2021-10-24 RX ADMIN — ENOXAPARIN SODIUM 30 MG: 30 INJECTION SUBCUTANEOUS at 09:08

## 2021-10-24 RX ADMIN — SODIUM BICARBONATE: 84 INJECTION, SOLUTION INTRAVENOUS at 20:07

## 2021-10-24 ASSESSMENT — PAIN SCALES - WONG BAKER: WONGBAKER_NUMERICALRESPONSE: 0

## 2021-10-24 ASSESSMENT — PAIN SCALES - GENERAL
PAINLEVEL_OUTOF10: 0

## 2021-10-24 NOTE — PROGRESS NOTES
Moni Anderson MD, [address]                Internal Medicine Hospitalist             Daily Progress  Note   Subjective:   No chief complaint on file. Ms. Valeriy Rowland Complains of nil, awake and alert. Objective:    BP (!) 120/47   Pulse 60   Temp 98.1 °F (36.7 °C) (Oral)   Resp 20   LMP 01/23/1995   SpO2 98%      Intake/Output Summary (Last 24 hours) at 10/24/2021 1011  Last data filed at 10/24/2021 0504  Gross per 24 hour   Intake    Output 625 ml   Net -625 ml      Physical Exam:  Heart:  Regular rate and rhythm, normal S1 and S2 in all 4 auscultatory areas. No rubs  Murmurs or gallops heard. Lungs: Mostly clear to auscultation, decreased breath sounds at bases. No wheezes appreciated no crackles heard. Abdomen: Soft, non distended. Bowel sounds appreciated. No obvious liver or spleen enlargement. Non tender, no rebound noted. Extremities: Non tender, no swelling noted, can move all 4, stasis dermatitis unchanged. CNS: Grossly intact.     Labs:  CBC with Differential:    Lab Results   Component Value Date    WBC 8.2 10/24/2021    RBC 2.28 10/24/2021    HGB 7.2 10/24/2021    HCT 22.0 10/24/2021     10/24/2021    MCV 96.5 10/24/2021    MCH 31.6 10/24/2021    MCHC 32.7 10/24/2021    RDW 20.1 10/24/2021    SEGSPCT 66.8 10/24/2021    BANDSPCT 3 10/08/2021    LYMPHOPCT 18.5 10/24/2021    MONOPCT 9.0 10/24/2021    MYELOPCT 1 08/18/2011    EOSPCT 4.1 04/10/2012    BASOPCT 0.4 10/24/2021    MONOSABS 0.7 10/24/2021    LYMPHSABS 1.5 10/24/2021    EOSABS 0.4 10/24/2021    BASOSABS 0.0 10/24/2021    DIFFTYPE AUTOMATED DIFFERENTIAL 10/24/2021     CMP:    Lab Results   Component Value Date     10/24/2021    K 4.2 10/24/2021     10/24/2021    CO2 20 10/24/2021    BUN 56 10/24/2021    CREATININE 2.6 10/24/2021    GFRAA 21 10/24/2021    LABGLOM 18 10/24/2021    GLUCOSE 75 10/24/2021    PROT 6.5 10/11/2021    PROT 5.5 03/24/2012    LABALBU 2.1 10/24/2021    CALCIUM 7.8 10/24/2021    BILITOT 0.4 10/11/2021    ALKPHOS 74 10/11/2021    AST 35 10/11/2021    ALT 7 10/11/2021     Recent Labs     10/22/21  1449 10/22/21  2020   TROPONINT 0.053* 0.057*     Lab Results   Component Value Date    TSHHS 1.210 05/21/2021         sodium chloride      sodium chloride      sodium chloride      IV infusion builder 75 mL/hr at 10/23/21 1526      digoxin  125 mcg Oral Q48H    enoxaparin  30 mg SubCUTAneous Daily    acetaminophen  1,000 mg Oral 4x Daily WC    allopurinol  50 mg Oral Daily    collagenase   Topical Daily    levothyroxine  50 mcg Oral Daily    metoprolol tartrate  25 mg Oral BID    miconazole   Topical BID    pantoprazole  40 mg Oral QAM AC    sodium chloride flush  10 mL IntraVENous 2 times per day    midodrine  10 mg Oral TID WC    meropenem  500 mg IntraVENous Q12H    fluconazole  200 mg IntraVENous Q24H         Assessment:       Patient Active Problem List    Diagnosis Date Noted    Acute cystitis without hematuria     Enterococcus faecalis infection     Uncontrolled pain     Generalized weakness     Acute blood loss anemia     Paroxysmal atrial fibrillation (HCC)     Acute on chronic diastolic (congestive) heart failure (HCC)     Traumatic closed fracture of neck of femur with minimal displacement, right, initial encounter ([facility] 10/11/2021    MRSA cellulitis 10/10/2021    Displaced fracture of right femoral neck ([facility] 10/08/2021    Closed transcervical fracture of proximal femur, left, initial encounter ([facility] 05/21/2021    Morbid obesity with BMI of 45.0-49.9, adult ([facility] 07/23/2020    History of ventral hernia repair 10/19/2018    Essential hypertension 08/10/2018    S/P herniorrhaphy 07/11/2018    S/P ventral herniorrhaphy 06/13/2018    H/O ventral hernia 06/06/2018    Chronic kidney disease, stage IV (severe) ([facility] 05/16/2017    Gastroesophageal reflux disease 05/16/2017    CRF (chronic renal failure) 09/20/2016    Hypothyroid 09/20/2016    Ventral hernia without obstruction or gangrene 09/18/2016    SVT (supraventricular tachycardia) (HCC) 09/18/2016    Chronic kidney disease (CKD) stage G4/A2, severely decreased glomerular filtration rate (GFR) between 15-29 mL/min/1.73 square meter and albuminuria creatinine ratio between  mg/g ([facility] 06/21/2016       Plan:     Problems being addressed this admission:   Septic shock /acute metabolic encephalopathy /  UTI / blood & urine culture pos Pseudomonas  10/23/2021-her white count was 11.4 yesterday have labs from today. Not having any fevers at this time. On meropenem 500 mg every 12 hours and Diflucan 200 mg IV daily as per ID nurse. Blood pressure is better, her confusion is no no better. I will get a CT of the head as well. 10/24/2021-she seems to be responding to the treatment plan continue with this for now. No fevers blood pressure has been stable.     Anemia rule out GIB  10/23/2021-hemoglobin is 6.6 from yesterday given a unit of blood holding Lovenox. I will consult GI, meanwhile continue to monitor H&H every 8 hours. Get labs from today as well. 10/24/2021-hemoglobin 7.2 today continue to monitor. Awaiting GI consult.     Atrial fibrillation with RVR  10/23/2021-audiology has seen her started her on Cardizem drip. Heart rate is better controlled now. Unable to be on anticoagulation due to anemia stable GI bleed. 10/24/2021-no change     TOBY on CKD 4  10/23/2021-nephrology also following patient seems like she does not want dialysis. Continue to monitor  10/24/2021-BUN is 56 creatinine 2.6 continue to monitor.     Stasis dermatitis both lower limbs  10/23/2021-unchanged from before     Recent right femoral neck fracture s/p arthoplasty 10/10/2021  10/23/2021-continue as before.     Disposition  10/23/2021-she was transferred from ARU patient.   Would return when medically ready.      Consultants:  Nephrology  Cardiology  GI  ID    General Orders:  Repeat basic labs again in am.  I have explained to the patient and discussed with him/her the treatment plan. The above chart was generated partly using Dragon dictation system, it may contain dictation errors given the limitations of this technology.      Moni Anderson MD, [address]

## 2021-10-24 NOTE — PROGRESS NOTES
NPN, focus: status   D: patient having pauses up to 4 seconds  A: RN and Charge RN assessed patient. Patient didn't have any complaints. Patient continuing to have short pauses and Dr. Deana Gupta notified. Orders for NPO at midnight were given for possible procedure next day. R: RN continues to monitor patient. Call light in reach and bed in low position.

## 2021-10-24 NOTE — PROGRESS NOTES
Daily Progress Note     Pt. Awake, alert and feeling ok  HR stable, NSR, BP stable  No CP, SOB today  She is in sinus this am  Converted from afib to sinus --had a long pause 4.8 sec  Renal insuffiencey    Keep current cardiac meds  Watch for now   Keep on low dose lopressor  If she remains kathy she may need a pacer  Anemia improved with transfusion  Not  Sure if she is a good candidate for [facility]   Recent hip sx      AF with RVR    On lopressor and Dig    On midodrine d/t borderline HOTN    Rate and rhythm stable today    On lovenox- need to be careful as she is anemic    Anemia    S/p transfusion    Also given iron    7.2 today    Need to watch closely and transfuse if less than 7.0    S/p recent hip surgery  Will follow    Echo --Summary---10/21   This is a limited echocardiogram.   Technically difficult study, due to body habitus.   Left ventricular systolic function is normal.   Ejection fraction is visually estimated at 50-55%.   Moderate mitral regurgitation.   Moderate tricuspid regurgitation; RVSP: 45 mmHg.   No evidence of any pericardial effusion. The patient was in the hospital just recently because she was found to  have bilateral lower extremities venous ulcer present. She was treated  with antibiotics. She had fallen down, the patient had a right hip  fracture also present. She has a history of anemia, post surgery and  history of atrial fibrillation postsurgery. History of having  hypertension, diastolic heart failure, renal insufficiency and chronic  venous ulcer present, and moderate history of SVT is also present. History of moderate mitral valve regurgitation. Moderate tricuspid  regurgitation, and pulmonary hypertension. The patient follows with Dr. Neela John for renal insufficiency.     PAST SURGICAL HISTORY:  Hernia repair and recent ultrasounds were  negative for DVT.   She had a hip fracture and she was also placed on  CPAP on last admission, but I do not think she tolerated the CPAP well.   She had her right hip surgery done.     SOCIAL HISTORY:  She does not smoke, does not drink.     ALLERGIES: NKDA.     MEDICATIONS: She is on oxycodone, Lopressor 25 b.i.d., and Lasix. Objective:   BP (!) 120/47   Pulse 60   Temp 98.1 °F (36.7 °C) (Oral)   Resp 20   LMP 01/23/1995   SpO2 98%     Intake/Output Summary (Last 24 hours) at 10/24/2021 1146  Last data filed at 10/24/2021 0504  Gross per 24 hour   Intake    Output 625 ml   Net -625 ml       Medications:   Scheduled Meds:   digoxin  125 mcg Oral Q48H    enoxaparin  30 mg SubCUTAneous Daily    acetaminophen  1,000 mg Oral 4x Daily WC    allopurinol  50 mg Oral Daily    collagenase   Topical Daily    levothyroxine  50 mcg Oral Daily    metoprolol tartrate  25 mg Oral BID    miconazole   Topical BID    pantoprazole  40 mg Oral QAM AC    sodium chloride flush  10 mL IntraVENous 2 times per day    midodrine  10 mg Oral TID WC    meropenem  500 mg IntraVENous Q12H    fluconazole  200 mg IntraVENous Q24H      Infusions:   sodium chloride      sodium chloride      sodium chloride      IV infusion builder 75 mL/hr at 10/23/21 1526      PRN Meds:  sodium chloride, magnesium hydroxide, sennosides-docusate sodium, bisacodyl, polyethylene glycol, sodium chloride, calcium carbonate, loperamide, ondansetron, sodium chloride flush, sodium chloride       Physical Exam:  Vitals:    10/24/21 0800   BP: (!) 120/47   Pulse: 60   Resp: 20   Temp: 98.1 °F (36.7 °C)   SpO2: 98%        General: AAO, NAD  Chest: Nontender  Cardiac: First and Second Heart Sounds are Normal, No Murmurs or Gallops noted  Lungs:Clear to auscultation and percussion. Abdomen: Soft, NT, ND, +BS  Extremities: No clubbing, no edema  Vascular:  Equal 2+ peripheral pulses.         Lab Data:  CBC:   Recent Labs     10/22/21  1449 10/24/21  0019   WBC 11.4* 8.2   HGB 6.6* 7.2*   HCT 21.1* 22.0*   .0* 96.5    130*     BMP:   Recent Labs     10/22/21  1449 10/24/21  0019    139   K 4.5 4.2    110   CO2 17* 20*   PHOS  --  3.6   BUN 64* 56*   CREATININE 3.2* 2.6*     LIVER PROFILE: No results for input(s): AST, ALT, LIPASE, BILIDIR, BILITOT, ALKPHOS in the last 72 hours. Invalid input(s): AMYLASE,  ALB  PT/INR: No results for input(s): PROTIME, INR in the last 72 hours. APTT: No results for input(s): APTT in the last 72 hours. BNP:  No results for input(s): BNP in the last 72 hours.       Assessment:  Patient Active Problem List    Diagnosis Date Noted    Acute cystitis without hematuria     Enterococcus faecalis infection     Uncontrolled pain     Generalized weakness     Acute blood loss anemia     Paroxysmal atrial fibrillation (HCC)     Acute on chronic diastolic (congestive) heart failure ([address]     Traumatic closed fracture of neck of femur with minimal displacement, right, initial encounter ([facility] 10/11/2021    MRSA cellulitis 10/10/2021    Displaced fracture of right femoral neck ([address] 10/08/2021    Closed transcervical fracture of proximal femur, left, initial encounter ([facility] 05/21/2021    Morbid obesity with BMI of 45.0-49.9, adult ([address] 07/23/2020    History of ventral hernia repair 10/19/2018    Essential hypertension 08/10/2018    S/P herniorrhaphy 07/11/2018    S/P ventral herniorrhaphy 06/13/2018    H/O ventral hernia 06/06/2018    Chronic kidney disease, stage IV (severe) ([address] 05/16/2017    Gastroesophageal reflux disease 05/16/2017    CRF (chronic renal failure) 09/20/2016    Hypothyroid 09/20/2016    Ventral hernia without obstruction or gangrene 09/18/2016    SVT (supraventricular tachycardia) ([address] 09/18/2016    Chronic kidney disease (CKD) stage G4/A2, severely decreased glomerular filtration rate (GFR) between 15-29 mL/min/1.73 square meter and albuminuria creatinine ratio between  mg/g ([address] 06/21/2016       Electronically signed by Yamil Smith PA-C on 10/24/2021 at 11:47 AM     Electronically signed by Yves Giordano MD on 10/24/21 at 2:41 PM EDT

## 2021-10-24 NOTE — PROGRESS NOTES
Nephrology Progress Note  10/24/2021 11:44 AM  Subjective: Interval History: Janice Pump is a [de-identified] y.o. female appears comfortable in bed today still agitated though      Data:   Scheduled Meds:   digoxin  125 mcg Oral Q48H    enoxaparin  30 mg SubCUTAneous Daily    acetaminophen  1,000 mg Oral 4x Daily WC    allopurinol  50 mg Oral Daily    collagenase   Topical Daily    levothyroxine  50 mcg Oral Daily    metoprolol tartrate  25 mg Oral BID    miconazole   Topical BID    pantoprazole  40 mg Oral QAM AC    sodium chloride flush  10 mL IntraVENous 2 times per day    midodrine  10 mg Oral TID WC    meropenem  500 mg IntraVENous Q12H    fluconazole  200 mg IntraVENous Q24H     Continuous Infusions:   sodium chloride      sodium chloride      sodium chloride      IV infusion builder 75 mL/hr at 10/23/21 1526         CBC   Recent Labs     10/22/21  1449 10/24/21  0019   WBC 11.4* 8.2   HGB 6.6* 7.2*   HCT 21.1* 22.0*    130*      BMP   Recent Labs     10/22/21  1449 10/24/21  0019    139   K 4.5 4.2    110   CO2 17* 20*   PHOS  --  3.6   BUN 64* 56*   CREATININE 3.2* 2.6*     Hepatic: No results for input(s): AST, ALT, ALB, BILITOT, ALKPHOS in the last 72 hours. Troponin: No results for input(s): TROPONINI in the last 72 hours. BNP: No results for input(s): BNP in the last 72 hours. Lipids: No results for input(s): CHOL, HDL in the last 72 hours. Invalid input(s): LDLCALCU  ABGs:   Lab Results   Component Value Date    PO2ART 75 08/14/2011     INR: No results for input(s): INR in the last 72 hours.   Renal Labs  Albumin:    Lab Results   Component Value Date    LABALBU 2.1 10/24/2021     Calcium:    Lab Results   Component Value Date    CALCIUM 7.8 10/24/2021     Phosphorus:    Lab Results   Component Value Date    PHOS 3.6 10/24/2021     U/A:    Lab Results   Component Value Date    NITRU NEGATIVE 10/20/2021    COLORU YELLOW 10/20/2021    WBCUA 331 10/20/2021    RBCUA 155 10/20/2021    MUCUS RARE 10/08/2021    TRICHOMONAS NONE SEEN 10/20/2021    YEAST OCCASIONAL 10/20/2021    BACTERIA OCCASIONAL 10/20/2021    CLARITYU CLOUDY 10/20/2021    SPECGRAV 1.018 10/20/2021    UROBILINOGEN NEGATIVE 10/20/2021    BILIRUBINUR NEGATIVE 10/20/2021    BLOODU LARGE 10/20/2021    KETUA NEGATIVE 10/20/2021     ABG:    Lab Results   Component Value Date    PO2ART 75 08/14/2011    BEART 1 08/14/2011     HgBA1c:  No results found for: LABA1C  Microalbumen/Creatinine ratio:  No components found for: RUCREAT  TSH:    Lab Results   Component Value Date    TSH 3.260 09/06/2018     IRON:    Lab Results   Component Value Date    IRON 204 05/24/2021     Iron Saturation:  No components found for: PERCENTFE  TIBC:    Lab Results   Component Value Date    TIBC 206 05/24/2021     FERRITIN:  No results found for: FERRITIN  RPR:  No results found for: RPR  SHADE:  No results found for: ANATITER, SHADE  24 Hour Urine for Creatinine Clearance:  No components found for: CREAT4, UHRS10, UTV10      Objective:   I/O: 10/23 0701 - 10/24 0700  In: -   Out: 825 [Urine:825]  I/O last 3 completed shifts:  In: -   Out: 825 [Urine:825]  No intake/output data recorded. Vitals: BP (!) 120/47   Pulse 60   Temp 98.1 °F (36.7 °C) (Oral)   Resp 20   LMP 01/23/1995   SpO2 98%  {  General appearance: awake weak legally blind  HEENT: Head: Normal, normocephalic, atraumatic. Neck: supple, symmetrical, trachea midline  Lungs: diminished breath sounds bilaterally  Heart: S1, S2 normal  Abdomen: abnormal findings:  soft nt  Extremities: edema trace with erythema lower extremities but better  Neurologic: Mental status: alertness: alert         Assessment and Plan:      IMP:  1. Acute renal failure from ATN on CKD 4  2. Right hip fracture status post surgery  3. Cellulitis with IV antibiotics  4. A. fib rapid rate  5. Anemia  6.  hypotension    Plan     #1 renal function slowly better creatinine 2.6  2.   Post surgery monitor for healing and rehab  3. Lower extremity wounds appear improved  4. Heart rate holding stable next   #5 hemoglobin low monitor recommend PRBCs if less than 7  6.   Blood pressure holding stable next   #7 we will monitor  above setting           Renu Matos MD, MD

## 2021-10-25 ENCOUNTER — APPOINTMENT (OUTPATIENT)
Dept: GENERAL RADIOLOGY | Age: 81
DRG: 981 | End: 2021-10-25
Attending: HOSPITALIST
Payer: MEDICARE

## 2021-10-25 ENCOUNTER — ANESTHESIA (OUTPATIENT)
Dept: OPERATING ROOM | Age: 81
DRG: 981 | End: 2021-10-25
Payer: MEDICARE

## 2021-10-25 ENCOUNTER — ANESTHESIA EVENT (OUTPATIENT)
Dept: OPERATING ROOM | Age: 81
DRG: 981 | End: 2021-10-25
Payer: MEDICARE

## 2021-10-25 VITALS
OXYGEN SATURATION: 100 % | DIASTOLIC BLOOD PRESSURE: 56 MMHG | TEMPERATURE: 97.7 F | SYSTOLIC BLOOD PRESSURE: 110 MMHG | RESPIRATION RATE: 18 BRPM

## 2021-10-25 LAB
ALBUMIN SERPL-MCNC: 2.4 GM/DL (ref 3.4–5)
ANION GAP SERPL CALCULATED.3IONS-SCNC: 12 MMOL/L (ref 4–16)
ANISOCYTOSIS: ABNORMAL
BANDED NEUTROPHILS ABSOLUTE COUNT: 0.78 K/CU MM
BANDED NEUTROPHILS RELATIVE PERCENT: 8 % (ref 5–11)
BASOPHILS ABSOLUTE: 0.2 K/CU MM
BASOPHILS RELATIVE PERCENT: 2 % (ref 0–1)
BUN BLDV-MCNC: 54 MG/DL (ref 6–23)
CALCIUM SERPL-MCNC: 8.1 MG/DL (ref 8.3–10.6)
CHLORIDE BLD-SCNC: 109 MMOL/L (ref 99–110)
CO2: 19 MMOL/L (ref 21–32)
CREAT SERPL-MCNC: 2.8 MG/DL (ref 0.6–1.1)
DIFFERENTIAL TYPE: ABNORMAL
EOSINOPHILS ABSOLUTE: 0.5 K/CU MM
EOSINOPHILS RELATIVE PERCENT: 5 % (ref 0–3)
GFR AFRICAN AMERICAN: 20 ML/MIN/1.73M2
GFR NON-AFRICAN AMERICAN: 16 ML/MIN/1.73M2
GLUCOSE BLD-MCNC: 77 MG/DL (ref 70–99)
HCT VFR BLD CALC: 22.9 % (ref 37–47)
HEMOGLOBIN: 7.1 GM/DL (ref 12.5–16)
HIGH SENSITIVE C-REACTIVE PROTEIN: 206.7 MG/L
LYMPHOCYTES ABSOLUTE: 2 K/CU MM
LYMPHOCYTES RELATIVE PERCENT: 20 % (ref 24–44)
MCH RBC QN AUTO: 30.2 PG (ref 27–31)
MCHC RBC AUTO-ENTMCNC: 31 % (ref 32–36)
MCV RBC AUTO: 97.4 FL (ref 78–100)
METAMYELOCYTES ABSOLUTE COUNT: 0.1 K/CU MM
METAMYELOCYTES PERCENT: 1 %
MONOCYTES ABSOLUTE: 0.8 K/CU MM
MONOCYTES RELATIVE PERCENT: 8 % (ref 0–4)
PDW BLD-RTO: 20.2 % (ref 11.7–14.9)
PHOSPHORUS: 2.8 MG/DL (ref 2.5–4.9)
PLATELET # BLD: 140 K/CU MM (ref 140–440)
PLT MORPHOLOGY: ABNORMAL
PMV BLD AUTO: 9.8 FL (ref 7.5–11.1)
POLYCHROMASIA: ABNORMAL
POTASSIUM SERPL-SCNC: 4.2 MMOL/L (ref 3.5–5.1)
RBC # BLD: 2.35 M/CU MM (ref 4.2–5.4)
SARS-COV-2, NAAT: NOT DETECTED
SEGMENTED NEUTROPHILS ABSOLUTE COUNT: 5.4 K/CU MM
SEGMENTED NEUTROPHILS RELATIVE PERCENT: 56 % (ref 36–66)
SODIUM BLD-SCNC: 140 MMOL/L (ref 135–145)
SOURCE: NORMAL
WBC # BLD: 9.8 K/CU MM (ref 4–10.5)

## 2021-10-25 PROCEDURE — 2500000003 HC RX 250 WO HCPCS: Performed by: PHYSICAL MEDICINE & REHABILITATION

## 2021-10-25 PROCEDURE — 2780000010 HC IMPLANT OTHER: Performed by: SURGERY

## 2021-10-25 PROCEDURE — 6370000000 HC RX 637 (ALT 250 FOR IP): Performed by: PHYSICAL MEDICINE & REHABILITATION

## 2021-10-25 PROCEDURE — 76000 FLUOROSCOPY <1 HR PHYS/QHP: CPT

## 2021-10-25 PROCEDURE — 2580000003 HC RX 258

## 2021-10-25 PROCEDURE — C1898 LEAD, PMKR, OTHER THAN TRANS: HCPCS | Performed by: SURGERY

## 2021-10-25 PROCEDURE — C1785 PMKR, DUAL, RATE-RESP: HCPCS | Performed by: SURGERY

## 2021-10-25 PROCEDURE — 6370000000 HC RX 637 (ALT 250 FOR IP): Performed by: INTERNAL MEDICINE

## 2021-10-25 PROCEDURE — 2580000003 HC RX 258: Performed by: INTERNAL MEDICINE

## 2021-10-25 PROCEDURE — 2580000003 HC RX 258: Performed by: HOSPITALIST

## 2021-10-25 PROCEDURE — 87635 SARS-COV-2 COVID-19 AMP PRB: CPT

## 2021-10-25 PROCEDURE — 2580000003 HC RX 258: Performed by: NURSE PRACTITIONER

## 2021-10-25 PROCEDURE — 6360000002 HC RX W HCPCS: Performed by: NURSE PRACTITIONER

## 2021-10-25 PROCEDURE — 6360000002 HC RX W HCPCS

## 2021-10-25 PROCEDURE — 2580000003 HC RX 258: Performed by: PHYSICAL MEDICINE & REHABILITATION

## 2021-10-25 PROCEDURE — 2709999900 HC NON-CHARGEABLE SUPPLY: Performed by: SURGERY

## 2021-10-25 PROCEDURE — 2500000003 HC RX 250 WO HCPCS

## 2021-10-25 PROCEDURE — 3600000013 HC SURGERY LEVEL 3 ADDTL 15MIN: Performed by: SURGERY

## 2021-10-25 PROCEDURE — 3700000001 HC ADD 15 MINUTES (ANESTHESIA): Performed by: SURGERY

## 2021-10-25 PROCEDURE — 94761 N-INVAS EAR/PLS OXIMETRY MLT: CPT

## 2021-10-25 PROCEDURE — 99232 SBSQ HOSP IP/OBS MODERATE 35: CPT | Performed by: NURSE PRACTITIONER

## 2021-10-25 PROCEDURE — 86141 C-REACTIVE PROTEIN HS: CPT

## 2021-10-25 PROCEDURE — 2500000003 HC RX 250 WO HCPCS: Performed by: SURGERY

## 2021-10-25 PROCEDURE — 2500000003 HC RX 250 WO HCPCS: Performed by: INTERNAL MEDICINE

## 2021-10-25 PROCEDURE — 36415 COLL VENOUS BLD VENIPUNCTURE: CPT

## 2021-10-25 PROCEDURE — 33208 INSRT HEART PM ATRIAL & VENT: CPT | Performed by: SURGERY

## 2021-10-25 PROCEDURE — 02H60JZ INSERTION OF PACEMAKER LEAD INTO RIGHT ATRIUM, OPEN APPROACH: ICD-10-PCS | Performed by: SURGERY

## 2021-10-25 PROCEDURE — 6360000002 HC RX W HCPCS: Performed by: HOSPITALIST

## 2021-10-25 PROCEDURE — 0JH606Z INSERTION OF PACEMAKER, DUAL CHAMBER INTO CHEST SUBCUTANEOUS TISSUE AND FASCIA, OPEN APPROACH: ICD-10-PCS | Performed by: SURGERY

## 2021-10-25 PROCEDURE — 2140000000 HC CCU INTERMEDIATE R&B

## 2021-10-25 PROCEDURE — 80069 RENAL FUNCTION PANEL: CPT

## 2021-10-25 PROCEDURE — 85027 COMPLETE CBC AUTOMATED: CPT

## 2021-10-25 PROCEDURE — 3600000003 HC SURGERY LEVEL 3 BASE: Performed by: SURGERY

## 2021-10-25 PROCEDURE — 83735 ASSAY OF MAGNESIUM: CPT

## 2021-10-25 PROCEDURE — 85007 BL SMEAR W/DIFF WBC COUNT: CPT

## 2021-10-25 PROCEDURE — 2720000010 HC SURG SUPPLY STERILE: Performed by: SURGERY

## 2021-10-25 PROCEDURE — 3700000000 HC ANESTHESIA ATTENDED CARE: Performed by: SURGERY

## 2021-10-25 DEVICE — PACEMAKER CARD 22.5GM W50.8XH46.6MM D7.4MM TI POLYUR SIL: Type: IMPLANTABLE DEVICE | Status: FUNCTIONAL

## 2021-10-25 DEVICE — LEAD PACE AD 6FR L58CM VENT SIL PLAT INSUL BPLR PLATINIZED 507658] MEDTRONIC CARDIAC RTHYM MGT]: Type: IMPLANTABLE DEVICE | Status: FUNCTIONAL

## 2021-10-25 DEVICE — ENVELOPE PACEMKR L W2.9XL3.3IN ABSRB ANTIBACT TYRX: Type: IMPLANTABLE DEVICE | Status: FUNCTIONAL

## 2021-10-25 DEVICE — LEAD PACE 6FR L53CM PLAT ALLOY/POROUS TI NITRIDE PERM R ATR: Type: IMPLANTABLE DEVICE | Status: FUNCTIONAL

## 2021-10-25 RX ORDER — LIDOCAINE HYDROCHLORIDE 10 MG/ML
INJECTION, SOLUTION INFILTRATION; PERINEURAL
Status: COMPLETED | OUTPATIENT
Start: 2021-10-25 | End: 2021-10-25

## 2021-10-25 RX ORDER — LIDOCAINE HYDROCHLORIDE 20 MG/ML
INJECTION, SOLUTION INTRAVENOUS PRN
Status: DISCONTINUED | OUTPATIENT
Start: 2021-10-25 | End: 2021-10-25 | Stop reason: SDUPTHER

## 2021-10-25 RX ORDER — GLYCOPYRROLATE 1 MG/5 ML
SYRINGE (ML) INTRAVENOUS PRN
Status: DISCONTINUED | OUTPATIENT
Start: 2021-10-25 | End: 2021-10-25 | Stop reason: SDUPTHER

## 2021-10-25 RX ORDER — MAGNESIUM SULFATE IN WATER 40 MG/ML
2000 INJECTION, SOLUTION INTRAVENOUS ONCE
Status: DISCONTINUED | OUTPATIENT
Start: 2021-10-25 | End: 2021-10-25

## 2021-10-25 RX ORDER — SODIUM CHLORIDE, SODIUM LACTATE, POTASSIUM CHLORIDE, CALCIUM CHLORIDE 600; 310; 30; 20 MG/100ML; MG/100ML; MG/100ML; MG/100ML
INJECTION, SOLUTION INTRAVENOUS CONTINUOUS PRN
Status: DISCONTINUED | OUTPATIENT
Start: 2021-10-25 | End: 2021-10-25 | Stop reason: SDUPTHER

## 2021-10-25 RX ORDER — MAGNESIUM SULFATE IN WATER 40 MG/ML
2000 INJECTION, SOLUTION INTRAVENOUS
Status: ACTIVE | OUTPATIENT
Start: 2021-10-25 | End: 2021-10-25

## 2021-10-25 RX ORDER — PROPOFOL 10 MG/ML
INJECTION, EMULSION INTRAVENOUS PRN
Status: DISCONTINUED | OUTPATIENT
Start: 2021-10-25 | End: 2021-10-25 | Stop reason: SDUPTHER

## 2021-10-25 RX ADMIN — SODIUM CHLORIDE, PRESERVATIVE FREE 10 ML: 5 INJECTION INTRAVENOUS at 20:15

## 2021-10-25 RX ADMIN — SODIUM BICARBONATE: 84 INJECTION, SOLUTION INTRAVENOUS at 17:14

## 2021-10-25 RX ADMIN — Medication 0.2 MG: at 13:38

## 2021-10-25 RX ADMIN — LIDOCAINE HYDROCHLORIDE 100 MG: 20 INJECTION, SOLUTION INTRAVENOUS at 13:31

## 2021-10-25 RX ADMIN — PANTOPRAZOLE SODIUM 40 MG: 40 TABLET, DELAYED RELEASE ORAL at 06:45

## 2021-10-25 RX ADMIN — CEFEPIME HYDROCHLORIDE 1000 MG: 1 INJECTION, POWDER, FOR SOLUTION INTRAMUSCULAR; INTRAVENOUS at 23:43

## 2021-10-25 RX ADMIN — ALLOPURINOL 50 MG: 100 TABLET ORAL at 17:11

## 2021-10-25 RX ADMIN — PROPOFOL 100 MG: 10 INJECTION, EMULSION INTRAVENOUS at 13:32

## 2021-10-25 RX ADMIN — MEROPENEM 500 MG: 500 INJECTION, POWDER, FOR SOLUTION INTRAVENOUS at 06:45

## 2021-10-25 RX ADMIN — DIGOXIN 125 MCG: 125 TABLET ORAL at 17:12

## 2021-10-25 RX ADMIN — SODIUM CHLORIDE, POTASSIUM CHLORIDE, SODIUM LACTATE AND CALCIUM CHLORIDE: 600; 310; 30; 20 INJECTION, SOLUTION INTRAVENOUS at 13:19

## 2021-10-25 RX ADMIN — PROPOFOL 240 MG: 10 INJECTION, EMULSION INTRAVENOUS at 14:02

## 2021-10-25 RX ADMIN — FLUCONAZOLE 200 MG: 200 INJECTION, SOLUTION INTRAVENOUS at 17:17

## 2021-10-25 RX ADMIN — LEVOTHYROXINE SODIUM 50 MCG: 0.05 TABLET ORAL at 06:44

## 2021-10-25 RX ADMIN — MICONAZOLE NITRATE: 2 POWDER TOPICAL at 20:15

## 2021-10-25 RX ADMIN — CEFEPIME HYDROCHLORIDE 1000 MG: 1 INJECTION, POWDER, FOR SOLUTION INTRAMUSCULAR; INTRAVENOUS at 13:34

## 2021-10-25 RX ADMIN — ACETAMINOPHEN 1000 MG: 500 TABLET ORAL at 17:12

## 2021-10-25 RX ADMIN — MIDODRINE HYDROCHLORIDE 10 MG: 5 TABLET ORAL at 17:12

## 2021-10-25 ASSESSMENT — PULMONARY FUNCTION TESTS
PIF_VALUE: 1
PIF_VALUE: 2
PIF_VALUE: 1
PIF_VALUE: 2
PIF_VALUE: 1
PIF_VALUE: 2
PIF_VALUE: 6
PIF_VALUE: 2
PIF_VALUE: 2
PIF_VALUE: 1
PIF_VALUE: 2
PIF_VALUE: 1
PIF_VALUE: 1
PIF_VALUE: 2
PIF_VALUE: 1
PIF_VALUE: 0
PIF_VALUE: 1
PIF_VALUE: 2
PIF_VALUE: 1
PIF_VALUE: 2
PIF_VALUE: 1
PIF_VALUE: 2
PIF_VALUE: 1
PIF_VALUE: 2
PIF_VALUE: 1
PIF_VALUE: 2
PIF_VALUE: 1
PIF_VALUE: 6
PIF_VALUE: 1
PIF_VALUE: 0
PIF_VALUE: 1
PIF_VALUE: 1
PIF_VALUE: 2
PIF_VALUE: 1
PIF_VALUE: 3
PIF_VALUE: 1
PIF_VALUE: 41
PIF_VALUE: 1
PIF_VALUE: 2

## 2021-10-25 ASSESSMENT — PAIN SCALES - GENERAL
PAINLEVEL_OUTOF10: 0
PAINLEVEL_OUTOF10: 0
PAINLEVEL_OUTOF10: 8

## 2021-10-25 ASSESSMENT — PAIN - FUNCTIONAL ASSESSMENT: PAIN_FUNCTIONAL_ASSESSMENT: 0-10

## 2021-10-25 NOTE — PROGRESS NOTES
Pt in OR with vascular and not able to see. Renal improving.  Will monitor fu labs and vital and will fu with patient in am

## 2021-10-25 NOTE — CARE COORDINATION
Attempted to see pt for d/c planning. Pt is not in room, she is in the OR. Pt admitted from ARU. Pt cannot return to ARU per Fara/ELINA d/t pt was not progressing. They were going to send pt to a SNF. Will discuss d/c plan with pt at a later time. PT/OT ordered.   TE Quality 431: Preventive Care And Screening: Unhealthy Alcohol Use - Screening: Patient screened for unhealthy alcohol use using a single question and scores less than 2 times per year Quality 402: Tobacco Use And Help With Quitting Among Adolescents: Patient screened for tobacco and is a smoker AND received Cessation Counseling Detail Level: Detailed Quality 226: Preventive Care And Screening: Tobacco Use: Screening And Cessation Intervention: Patient screened for tobacco use, is a smoker AND did not received Cessation Counseling for Medical Reasons

## 2021-10-25 NOTE — PROGRESS NOTES
Infectious Disease Progress Note  10/25/2021   Patient Name: Gatito Contreras : 1940   Impression  ? Sepsis secondary to Pseudomonas aeruginosa Bacteremia from Bilateral Pyelonephritis, Complicated UTI (Probable CAUTI):  § Afebrile  § WBC 12.1  § 10/20-UA ,   § 10/20-Urine Culture Pseudomonas aeruginosa >100,000, Candida albicans >100,000  § 10/21-Blood cultures -Pseudomonas aeruginosa  § 10/23-CT A&P WO Contrast: No acute abdominopelvic abnormality. New subacute to chronic incompletely healed L5 superior endplate fracture. Bilateral renal atrophy. Mild bibasilar atelectasis. Cholelithiasis.       · Acute Right Femoral Neck Fracture     · Bradycardia:  · 10/25-S/p per Dr. Ramirez Heading: Pacemaker dual chamber insertion. DX: SSS.    ? Resolving BLE MRSA Cellulitis     ? TOBY on CKD4:  ? Dr. Neno Velez onboard     ? Anemia, possible GIB:  ? Dr. Tremayne Mayers onboard  ? Rec Protonix  ? Rec EGD, colonoscopy and small bowel eval when recovers from hip fracture     ? Morbid Obesity:  BMI 42.93     ? HTN/ AF/ SVT/ PHTN/ VHD/ HFpEF     ? Hypothyroidism     ? ARON     · Multi-morbidity: per PMHx:  HTN, HFpEF, SVT, GERD, Hypothyroidism, CKD4, left femur fracture s/p repair with intramedullary nail , ventral incisional hernia repair, Morbid Obesity, SVT, AF    Plan:  · DC IV meropenem  · Start IV cefepime 1 gm q12h, renal dosing, plan for 2 weeks of IV therapy (end date 21)   · Continue IV fluconazole 200 mg q24h (end date 10/29/21)  · Trend CRP and Pct, elevated, will recheck in am  ? Exchanged Simpson 10/21  ? Reviewed CT A&P, no signs of nephrolithiasis     Ongoing Antimicrobial Therapy  Diflucan 10/22-  Cefepime 10/25-    Completed Antimicrobial Therapy  Ceftriaxone 10/8  Keflex 10/7-  Doxycycline 10/7-? [id number]-  Zyvox 10/11-   Zosyn 10/20-  Meropenem 10/22-  ? History:? Interval history noted  Denies n/v/d/f or untoward effects of antibiotics.   Resting quietly postop PPM insertion. Physical Exam:  Vital Signs: BP (!) 108/47   Pulse 58   Temp 97.2 °F (36.2 °C) (Temporal)   Resp 16   Wt 230 lb (104.3 kg)   LMP 01/23/1995   SpO2 98%   BMI 43.48 kg/m²     Gen: resting quietly in bed postop PPM insertion. Wounds: C/D/I BLE dried areas with erythema, no warmth, drainage or edema  HEMT: AT/NC Oropharynx pink, moist, and without lesions or exudates; dentition in good state of repair  Eyes: PERRLA, EOMI, conjunctiva pink, sclera anicteric. Neck: Supple. Trachea midline. No LAD. Chest: no distress and CTA. Good air movement. Heart: RRR and no MRG. Abd: soft, bilateral low abdominal tenderness radiating to bilateral flanks, no hepatomegaly. Normoactive bowel sounds. Ext: no clubbing, cyanosis, or edema  Catheter Site: without erythema or tenderness  Neuro: Mental status intact. CN 2-12 intact and no focal sensory or motor deficits     Radiologic / Imaging / TESTING  10/8/21 XR Hip 2-3 VW W Pelvis Right:  Impression   1. Acute fracture of the right femoral neck with mild displacement. 2. Status post ORIF of the left femur.  Redemonstration of the previously   seen fracture of the proximal femur.  No significant callus formation is   present.      10/8/21 XR Chest Portable:  Impression   Mild cardiomegaly.  Mild-to-moderate congestion and/or infiltrates identified   in the lungs.      10/8/21 Limited Echo:  Summary   This is a limited echocardiogram.   Technically difficult study, due to body habitus.   Left ventricular systolic function is normal.   Ejection fraction is visually estimated at 50-55%.   Moderate mitral regurgitation.   Moderate tricuspid regurgitation; RVSP: 45 mmHg.   No evidence of any pericardial effusion.     10/9/21 VL Dup Lower Extremity Venous Bilateral  Impression   The exam is severely limited due to patient body habitus.  The distal femoral   veins popliteal veins and calf veins are not well visualized bilaterally.       No gross DVT is identified    10/10/21 XR Hip Right (2-3 Views)  Impression   Right hip replacement in normal alignment.       No acute interval fracture.       Immediate postoperative changes.           Labs:    Recent Results (from the past 24 hour(s))   C-Reactive Protein    Collection Time: 10/25/21  7:35 AM   Result Value Ref Range    CRP, High Sensitivity 206.7 mg/L   Renal Function Panel    Collection Time: 10/25/21  7:35 AM   Result Value Ref Range    Sodium 140 135 - 145 MMOL/L    Potassium 4.2 3.5 - 5.1 MMOL/L    Chloride 109 99 - 110 mMol/L    CO2 19 (L) 21 - 32 MMOL/L    Anion Gap 12 4 - 16    BUN 54 (H) 6 - 23 MG/DL    CREATININE 2.8 (H) 0.6 - 1.1 MG/DL    Glucose 77 70 - 99 MG/DL    Calcium 8.1 (L) 8.3 - 10.6 MG/DL    GFR Non- 16 (L) >60 mL/min/1.73m2    GFR  20 (L) >60 mL/min/1.73m2    Albumin 2.4 (L) 3.4 - 5.0 GM/DL    Phosphorus 2.8 2.5 - 4.9 MG/DL   CBC Auto Differential    Collection Time: 10/25/21  7:35 AM   Result Value Ref Range    WBC 9.8 4.0 - 10.5 K/CU MM    RBC 2.35 (L) 4.2 - 5.4 M/CU MM    Hemoglobin 7.1 (L) 12.5 - 16.0 GM/DL    Hematocrit 22.9 (L) 37 - 47 %    MCV 97.4 78 - 100 FL    MCH 30.2 27 - 31 PG    MCHC 31.0 (L) 32.0 - 36.0 %    RDW 20.2 (H) 11.7 - 14.9 %    Platelets 780 695 - 942 K/CU MM    MPV 9.8 7.5 - 11.1 FL    Metamyelocytes Relative 1 (H) 0.0 %    Bands Relative 8 5 - 11 %    Segs Relative 56.0 36 - 66 %    Eosinophils % 5.0 (H) 0 - 3 %    Basophils % 2.0 (H) 0 - 1 %    Lymphocytes % 20.0 (L) 24 - 44 %    Monocytes % 8.0 (H) 0 - 4 %    Metamyelocytes Absolute 0.10 K/CU MM    Bands Absolute 0.78 K/CU MM    Segs Absolute 5.4 K/CU MM    Eosinophils Absolute 0.5 K/CU MM    Basophils Absolute 0.2 K/CU MM    Lymphocytes Absolute 2.0 K/CU MM    Monocytes Absolute 0.8 K/CU MM    Differential Type MANUAL DIFFERENTIAL     Anisocytosis 1+     Polychromasia 1+     PLT Morphology FEW    COVID-19, Rapid    Collection Time: 10/25/21  9:10 AM    Specimen: Nasopharyngeal   Result Value Ref Range    Source THROAT     SARS-CoV-2, NAAT NOT DETECTED NOT DETECTED     CULTURE results: Invalid input(s): BLOOD CULTURE,  URINE CULTURE, SURGICAL CULTURE    Diagnosis:  Patient Active Problem List   Diagnosis    Chronic kidney disease (CKD) stage G4/A2, severely decreased glomerular filtration rate (GFR) between 15-29 mL/min/1.73 square meter and albuminuria creatinine ratio between  mg/g ([facility])    Ventral hernia without obstruction or gangrene    SVT (supraventricular tachycardia) ([facility])    CRF (chronic renal failure)    Hypothyroid    Chronic kidney disease, stage IV (severe) ([facility])    Gastroesophageal reflux disease    H/O ventral hernia    S/P ventral herniorrhaphy    S/P herniorrhaphy    Essential hypertension    History of ventral hernia repair    Morbid obesity with BMI of 45.0-49.9, adult ([address]    Closed transcervical fracture of proximal femur, left, initial encounter ([address]    Displaced fracture of right femoral neck ([address]    MRSA cellulitis    Traumatic closed fracture of neck of femur with minimal displacement, right, initial encounter ([address]    Uncontrolled pain    Generalized weakness    Acute blood loss anemia    Paroxysmal atrial fibrillation ([facility])    Acute on chronic diastolic (congestive) heart failure ([facility])    Acute cystitis without hematuria    Enterococcus faecalis infection    Septic shock ([address]       Active Problems  Active Problems:    Septic shock ([address]  Resolved Problems:    * No resolved hospital problems. *    Electronically signed by: Electronically signed by Sharan Alan.  SHERYL Ward CNP on 10/25/2021 at 11:21 AM

## 2021-10-25 NOTE — PROGRESS NOTES
William Hensley MD, [address]                Internal Medicine Hospitalist             Daily Progress  Note   Subjective:   No chief complaint on file. Ms. Quintanilla Sick of nil, feels much better today. Increase is positive yesterday seen by cardiology wants to have a pacemaker put in for her sick sinus. Objective:    BP (!) 120/41   Pulse 82   Temp 98 °F (36.7 °C)   Resp 16   Wt 230 lb (104.3 kg)   LMP 01/23/1995   SpO2 98%   BMI 43.48 kg/m²      Intake/Output Summary (Last 24 hours) at 10/25/2021 0911  Last data filed at 10/25/2021 0553  Gross per 24 hour   Intake    Output 250 ml   Net -250 ml      Physical Exam:  Heart: Bradycardia, normal S1 and S2 in all 4 auscultatory areas. No rubs  Murmurs or gallops heard. Lungs: Mostly clear to auscultation, decreased breath sounds at bases. No wheezes appreciated no crackles heard. Abdomen: Soft, non distended. Bowel sounds appreciated. No obvious liver or spleen enlargement. Non tender, no rebound noted. Extremities: Erythema slightly improved. CNS: Grossly intact.     Labs:  CBC with Differential:    Lab Results   Component Value Date    WBC 9.8 10/25/2021    RBC 2.35 10/25/2021    HGB 7.1 10/25/2021    HCT 22.9 10/25/2021     10/25/2021    MCV 97.4 10/25/2021    MCH 30.2 10/25/2021    MCHC 31.0 10/25/2021    RDW 20.2 10/25/2021    SEGSPCT 66.8 10/24/2021    BANDSPCT 3 10/08/2021    LYMPHOPCT 18.5 10/24/2021    MONOPCT 9.0 10/24/2021    MYELOPCT 1 08/18/2011    EOSPCT 4.1 04/10/2012    BASOPCT 0.4 10/24/2021    MONOSABS 0.7 10/24/2021    LYMPHSABS 1.5 10/24/2021    EOSABS 0.4 10/24/2021    BASOSABS 0.0 10/24/2021    DIFFTYPE AUTOMATED DIFFERENTIAL 10/24/2021     CMP:    Lab Results   Component Value Date     10/25/2021    K 4.2 10/25/2021     10/25/2021    CO2 19 10/25/2021    BUN 54 10/25/2021    CREATININE 2.8 10/25/2021    GFRAA 20 10/25/2021    LABGLOM 16 10/25/2021    GLUCOSE 77 10/25/2021    PROT 6.5 10/11/2021    PROT 5.5 03/24/2012    LABALBU 2.4 10/25/2021    CALCIUM 8.1 10/25/2021    BILITOT 0.4 10/11/2021    ALKPHOS 74 10/11/2021    AST 35 10/11/2021    ALT 7 10/11/2021     Recent Labs     10/22/21  1449 10/22/21  2020   TROPONINT 0.053* 0.057*     Lab Results   Component Value Date    TSHHS 1.210 05/21/2021         sodium chloride      sodium chloride      sodium chloride      IV infusion builder 75 mL/hr at 10/24/21 2007      digoxin  125 mcg Oral Q48H    enoxaparin  30 mg SubCUTAneous Daily    acetaminophen  1,000 mg Oral 4x Daily WC    allopurinol  50 mg Oral Daily    collagenase   Topical Daily    levothyroxine  50 mcg Oral Daily    metoprolol tartrate  25 mg Oral BID    miconazole   Topical BID    pantoprazole  40 mg Oral QAM AC    sodium chloride flush  10 mL IntraVENous 2 times per day    midodrine  10 mg Oral TID WC    meropenem  500 mg IntraVENous Q12H    fluconazole  200 mg IntraVENous Q24H         Assessment:       Patient Active Problem List    Diagnosis Date Noted    Septic shock ([facility] [address] 10/24/2021    Acute cystitis without hematuria     Enterococcus faecalis infection     Uncontrolled pain     Generalized weakness     Acute blood loss anemia     Paroxysmal atrial fibrillation (HCC)     Acute on chronic diastolic (congestive) heart failure (HCC)     Traumatic closed fracture of neck of femur with minimal displacement, right, initial encounter ([facility] [address] 10/11/2021    MRSA cellulitis 10/10/2021    Displaced fracture of right femoral neck ([facility] [address] 10/08/2021    Closed transcervical fracture of proximal femur, left, initial encounter ([facility] [address] 05/21/2021    Morbid obesity with BMI of 45.0-49.9, adult ([address] 07/23/2020    History of ventral hernia repair 10/19/2018    Essential hypertension 08/10/2018    S/P herniorrhaphy 07/11/2018    S/P ventral herniorrhaphy 06/13/2018    H/O ventral hernia 06/06/2018    Chronic kidney disease, stage IV (severe) ([facility] [address] 05/16/2017    Gastroesophageal reflux disease 05/16/2017    CRF (chronic renal failure) 09/20/2016    Hypothyroid 09/20/2016    Ventral hernia without obstruction or gangrene 09/18/2016    SVT (supraventricular tachycardia) (HCC) 09/18/2016    Chronic kidney disease (CKD) stage G4/A2, severely decreased glomerular filtration rate (GFR) between 15-29 mL/min/1.73 square meter and albuminuria creatinine ratio between  mg/g ([facility] [address] 06/21/2016       Plan:     Problems being addressed this admission:   Septic shock /acute metabolic encephalopathy /  UTI / blood & urine culture pos Pseudomonas  10/23/2021-her white count was 11.4 yesterday have labs from . [address] having any fevers at this time.  On meropenem 500 mg every 12 hours and Diflucan 200 mg IV daily as per ID nurse.  Blood pressure is better, her confusion is no no better.  I will get a CT of the head as well.   10/24/2021-she seems to be responding to the treatment plan continue with this for now. No fevers blood pressure has been stable. 10/25/2021-seems to be responding to treatment plan will continue for now. Cardiothoracic surgery has been made aware about her septic shock and being on antibiotics and blood culture positive for Pseudomonas.     Anemia rule out GIB  10/23/2021-hemoglobin is 6.6 from yesterday given a unit of blood holding Lovenox.  I will consult GI, meanwhile continue to monitor H&H every 8 hours.  Get labs from today as well. 10/24/2021-hemoglobin 7.2 today continue to monitor. Awaiting GI consult. 10/25/2021-her hemoglobin is 7.1 today awaiting GI consult will transfuse if less than 7.     Atrial fibrillation with RVR /sick sinus syndrome  10/23/2021-audiology has seen her started her on Cardizem drip.  Heart rate is better controlled now.  Unable to be on anticoagulation due to anemia stable GI bleed.   10/24/2021-no change  10/25/2021-was seen by cardiology recommended pacemaker to be inserted, cardiothoracic surgery has been consulted discussed with the surgeon regarding her blood cultures being positive for Pseudomonas. Replace magnesium. RN tells me her magnesium is 1.7 today.      TOBY on CKD 4  10/23/2021-nephrology also following patient seems like she does not want dialysis.  Continue to monitor  10/24/2021-BUN is 56 creatinine 2.6 continue to monitor. 10/25/2021-BUN 54 creatinine 2.8 today no new recommendations from nephrology     Stasis dermatitis both lower limbs  10/23/2021-unchanged from before  10/25/2021-some improvement     Recent right femoral neck fracture s/p arthoplasty 10/10/2021  10/23/2021-continue as before. 10/25/2021-Sla Gamble was her surgeon was initially referred to SNF.     Disposition  10/23/2021-she was transferred from ARU patient.  Would return when medically ready.      Consultants:  Nephrology  Cardiology  GI  ID  CT surgery    General Orders:  Repeat basic labs again in am.  I have explained to the patient and discussed with him/her the treatment plan. The above chart was generated partly using Dragon dictation system, it may contain dictation errors given the limitations of this technology.      Kim Cota MD, [address]

## 2021-10-25 NOTE — PROGRESS NOTES
Cardiology Progress Note       Particclau Petersen is a [de-identified] y.o. female   1940     SUBJECTIVE:   Patient seen and examined no complaint heart rate 48 bpm had episodes of a pause of 3.8-second last night about 10;41 p.m. OBJECTIVE:    Review of Systems:  General appearance: alert, appears stated age and cooperative  Skin: Skin color, texture, normal. No rashes or lesions  HEENT: No nose bleed, headache, vision problems  CV: C/O chest pain, tightness, pressure,   Respiratory: C/o no SOB, RUBY, Orthopnea, PND  GI: No abdominal pain, black stool, bloating  Limbs: No c/o edema, pain, swelling, intermittent claudication, joint pains  Neuro: No dizziness, lightheadedness, syncope, gait problems, memory problems  Psych: grossly normal. No SI/depression. Vitals:   Blood pressure (!) 120/41, pulse 82, temperature 98 °F (36.7 °C), resp. rate 16, weight 230 lb (104.3 kg), last menstrual period 01/23/1995, SpO2 98 %, not currently breastfeeding.     HEENT: AT, NC, PERRLA  Neck: No JVD  Heart: S1 S2 audible, no murmur   Lungs: CTA   Abdomen: Nontender   Limbs: No edema   CNS: no focal deficit      Past Medical History:   Diagnosis Date    Acid reflux     'no recent issue\"    Arthritis     \"Left Index Finger\"    CHF (congestive heart failure) ([facility] [address]     per old chart hx of CHF with admission 12/2016 with pulmonary edema    Chronic kidney disease     Sees Dr. Irene Ignacio have one kidney, dr Byron Noriega told me that in 2011 I think\"    GERD (gastroesophageal reflux disease)     History of blood transfusion 2011 Or 2012    No Reaction To Blood Transfusion Received    Hypertension     ( pt states she is not on any medication for blood pressure)    Hypomagnesemia     Hypothyroidism     MRSA (methicillin resistant staph aureus) culture positive 10/08/2021    Leg    Shortness of breath on exertion     SVT (supraventricular tachycardia) ([facility] [address]     per old chart hx of SVT with admission 2016 tx with Adenosine and per notes in 5/2018 Oral BID PRN SKYE Walton MD        acetaminophen (TYLENOL) tablet 1,000 mg  1,000 mg Oral 4x Daily WC SKYE Walton MD   1,000 mg at 10/24/21 2023    bisacodyl (DULCOLAX) suppository 10 mg  10 mg Rectal Daily PRN SKYE Walton MD        polyethylene glycol [facility]) packet 17 g  17 g Oral Daily PRN SKYE Walton MD        0.9 % sodium chloride infusion  25 mL IntraVENous PRN SKYE Walton MD        allopurinol (ZYLOPRIM) tablet 50 mg  50 mg Oral Daily SKYE Walton MD   50 mg at 10/24/21 0908    calcium carbonate (TUMS) chewable tablet 500 mg  500 mg Oral TID PRN SKYE Walton MD        collagenase ointment   Topical Daily SKYE Walton MD   Given at 10/23/21 1105    levothyroxine (SYNTHROID) tablet 50 mcg  50 mcg Oral Daily SKYE Walton MD   50 mcg at 10/25/21 8389    loperamide (IMODIUM) capsule 2 mg  2 mg Oral 4x Daily PRN SKYE Walton MD        metoprolol tartrate (LOPRESSOR) tablet 25 mg  25 mg Oral BID SKYE Walton MD   25 mg at 10/24/21 0914    miconazole (MICOTIN) 2 % powder   Topical BID SKYE Walton MD   Given at 10/24/21 1644    ondansetron (ZOFRAN-ODT) disintegrating tablet 4 mg  4 mg Oral Q6H PRN SKYE Walton MD        pantoprazole (PROTONIX) tablet 40 mg  40 mg Oral QAM AC SKYE Walton MD   40 mg at 10/25/21 0645    sodium chloride flush 0.9 % injection 10 mL  10 mL IntraVENous 2 times per day Toshia Orta MD   10 mL at 10/24/21 2028    sodium chloride flush 0.9 % injection 10 mL  10 mL IntraVENous PRN SKYE Walton MD        midodrine (PROAMATINE) tablet 10 mg  10 mg Oral TID JESÚS Giordano MD   10 mg at 10/24/21 2000    0.9 % sodium chloride infusion   IntraVENous PRN Yobani Clarke MD        meropenem (MERREM) 500 mg IVPB (mini-bag)  500 mg IntraVENous Q12H Yobani Clarke MD   Stopped at 10/25/21 0715    sodium bicarbonate 75 mEq in sodium chloride 0.45 % 1,000 mL infusion   IntraVENous Continuous Najeeb 10/20/2021    RBCUA 155 10/20/2021    MUCUS RARE 10/08/2021    TRICHOMONAS NONE SEEN 10/20/2021    YEAST OCCASIONAL 10/20/2021    BACTERIA OCCASIONAL 10/20/2021    CLARITYU CLOUDY 10/20/2021    SPECGRAV 1.018 10/20/2021    LEUKOCYTESUR LARGE 10/20/2021    UROBILINOGEN NEGATIVE 10/20/2021    BILIRUBINUR NEGATIVE 10/20/2021    BLOODU LARGE 10/20/2021     ABG:    Lab Results   Component Value Date    PO2ART 75 08/14/2011    BEART 1 08/14/2011     FLP:    Lab Results   Component Value Date    TRIG 155 12/17/2016    HDL 30 12/17/2016    LDLDIRECT 125 12/17/2016     TSH:    Lab Results   Component Value Date    TSH 3.260 09/06/2018      DATA:   ECG: Sinus Rhythm       ASSESSMENT:   1 atrial fibrillation with RVR patient was on Cardizem drip up to the 23rd  Which was discontinued currently in sinus bradycardia and episode of 3.8-second pause patient very likely has sick sinus syndrome and will need a pacemaker  2 acute kidney injury due to ATN  Nephrology following  3 right hip fracture status post surgery recently  4 metabolic encephalopathy due to UTI  Clinically improved  5 anemia  Plan  Discontinue digoxin  Consult CT surgery for pacemaker evaluation

## 2021-10-25 NOTE — CONSULTS
Teeth Extracted In Past    ENDOSCOPY, COLON, DIAGNOSTIC  2011 Or 2012    EYE SURGERY  ? when    clyde cataract ext    FEMUR FRACTURE SURGERY Left 5/22/2021    FEMUR IM NAIL REGINALDO INSERTION performed by Payal Taylor MD at [address] [place] [address]    Abdominal Hernia Repair     HIP SURGERY Right 10/8/2021    RIGHT HIP HEMIARTHROPLASTY performed by Payal Taylor MD at [address] [place] [address] Right 10/10/2021    HIP HEMIARTHROPLASTY performed by Payal Taylor MD at [place] [address]  05/27/2018    exp lap . converted to exp laporotomy with ventral hernia repair with mesh    OTHER SURGICAL HISTORY  75/63/5055    umbilical scar excision    VENTRAL HERNIA REPAIR  09/16/2016    Robotic laparoscopic     Current Medications:   Current Facility-Administered Medications: digoxin (LANOXIN) tablet 125 mcg, 125 mcg, Oral, Q48H  0.9 % sodium chloride infusion, 25 mL, IntraVENous, PRN  enoxaparin (LOVENOX) injection 30 mg, 30 mg, SubCUTAneous, Daily  magnesium hydroxide (MILK OF MAGNESIA) 400 MG/5ML suspension 30 mL, 30 mL, Oral, Daily PRN  sennosides-docusate sodium (SENOKOT-S) 8.6-50 MG tablet 1 tablet, 1 tablet, Oral, BID PRN  acetaminophen (TYLENOL) tablet 1,000 mg, 1,000 mg, Oral, 4x Daily WC  bisacodyl (DULCOLAX) suppository 10 mg, 10 mg, Rectal, Daily PRN  polyethylene glycol (GLYCOLAX) packet 17 g, 17 g, Oral, Daily PRN  0.9 % sodium chloride infusion, 25 mL, IntraVENous, PRN  allopurinol (ZYLOPRIM) tablet 50 mg, 50 mg, Oral, Daily  calcium carbonate (TUMS) chewable tablet 500 mg, 500 mg, Oral, TID PRN  collagenase ointment, , Topical, Daily  levothyroxine (SYNTHROID) tablet 50 mcg, 50 mcg, Oral, Daily  loperamide (IMODIUM) capsule 2 mg, 2 mg, Oral, 4x Daily PRN  metoprolol tartrate (LOPRESSOR) tablet 25 mg, 25 mg, Oral, BID  miconazole (MICOTIN) 2 % powder, , Topical, BID  ondansetron (ZOFRAN-ODT) disintegrating tablet 4 mg, 4 mg, Oral, Q6H PRN  pantoprazole (PROTONIX) tablet 40 mg, 40 mg, Oral, QAM AC  sodium chloride flush 0.9 % injection 10 mL, 10 mL, IntraVENous, 2 times per day  sodium chloride flush 0.9 % injection 10 mL, 10 mL, IntraVENous, PRN  midodrine (PROAMATINE) tablet 10 mg, 10 mg, Oral, TID WC  0.9 % sodium chloride infusion, , IntraVENous, PRN  meropenem (MERREM) 500 mg IVPB (mini-bag), 500 mg, IntraVENous, Q12H  sodium bicarbonate 75 mEq in sodium chloride 0.45 % 1,000 mL infusion, , IntraVENous, Continuous  fluconazole (DIFLUCAN) 200 mg IVPB, 200 mg, IntraVENous, Q24H  Allergies:  No Known Allergies    Social History:   Social History     Socioeconomic History    Marital status:      Spouse name: Not on file    Number of children: Not on file    Years of education: Not on file    Highest education level: Not on file   Occupational History    Not on file   Tobacco Use    Smoking status: Never Smoker    Smokeless tobacco: Never Used   Vaping Use    Vaping Use: Never used   Substance and Sexual Activity    Alcohol use: Not Currently    Drug use: No    Sexual activity: Not Currently   Other Topics Concern    Not on file   Social History Narrative    Not on file     Social Determinants of Health     Financial Resource Strain:     Difficulty of Paying Living Expenses:    Food Insecurity:     Worried About Running Out of Food in the Last Year:     [address] in the Last Year:    Transportation Needs:     Lack of Transportation (Medical):      Lack of Transportation (Non-Medical):    Physical Activity:     Days of Exercise per Week:     Minutes of Exercise per Session:    Stress:     Feeling of Stress :    Social Connections:     Frequency of Communication with Friends and Family:     Frequency of Social Gatherings with Friends and Family:     Attends Judaism Services:     Active Member of Clubs or Organizations:     Attends Club or Organization Meetings:     Marital Status:    Intimate Partner Violence:     Fear of Current or Ex-Partner:     Emotionally Abused:     Physically Abused:     Sexually Abused:        Family History:        Problem Relation Age of Onset    Cancer Father         Lung Cancer    Arthritis Mother     Cancer Brother         Skin Cancer    High Cholesterol Brother        REVIEW OF SYSTEMS:  Constitutional: - fatigue, - fever, - chills, - night sweats  Eyes: - vision loss  Cardiovascular: -  chest pain, - palpitations, + leg swelling, - leg pain   Respiratory: - cough, - shortness of breath, - wheezing   GI: - nausea, - vomiting, - abdominal pain, - constipation, - diarrhea   : - dysuria   MSK: - joint pain, - muscle pain  Integument: - rash, - skin color change   Heme: - easy bruising or bleeding  Neurologic: - headache, - weakness, - dizziness, - paresthesias       EXAM:  Constitutional: Blood pressure (!) 120/41, pulse ranging frequently from below 40s to 120s 82, temperature 98 °F (36.7 °C), resp. rate 16, weight 230 lb (104.3 kg), last menstrual period 01/23/1995, SpO2 98 %, not currently breastfeeding. No apparent distress, appears stated age and cooperative. Neurologic: follows commands, no focal weakness noted   Lungs: Good respiratory effort.  Clear to auscultation,   CV: Regular rate/ rhythm , no peripheral edema, feet warm and well perfused  GI: Soft, non-tender in all four quadrants, non-distended, + bowel sounds, liver and spleen no palpable masses  : bladder nondistended   MSK: no obvious deformity   Skin: warm, pink and dry       DATA:  EKG monitor data  Chest x-ray  Labs  IMPRESSION  Patient Active Problem List   Diagnosis    Chronic kidney disease (CKD) stage G4/A2, severely decreased glomerular filtration rate (GFR) between 15-29 mL/min/1.73 square meter and albuminuria creatinine ratio between  mg/g (HCC)    Ventral hernia without obstruction or gangrene    SVT (supraventricular tachycardia) (HCC)    CRF (chronic renal failure)    Hypothyroid    Chronic kidney disease, stage IV (severe) (HCC)    Gastroesophageal reflux disease    H/O ventral hernia    S/P ventral herniorrhaphy    S/P herniorrhaphy    Essential hypertension    History of ventral hernia repair    Morbid obesity with BMI of 45.0-49.9, adult ([address]    Closed transcervical fracture of proximal femur, left, initial encounter ([facility] [address] [facility]    Displaced fracture of right femoral neck ([facility] [address]    MRSA cellulitis    Traumatic closed fracture of neck of femur with minimal displacement, right, initial encounter ([facility] [address]    Uncontrolled pain    Generalized weakness    Acute blood loss anemia    Paroxysmal atrial fibrillation (HCC)    Acute on chronic diastolic (congestive) heart failure (HCC)    Acute cystitis without hematuria    Enterococcus faecalis infection    Septic shock (HCC)       Sick sinus syndrome, tachybradycardia syndrome with atrial fibrillation and bradycardia with heart rates in the 40s and prolonged pauses with confusional episodes      RECOMMENDATIONS:  Agree with the request for permanent pacemaker from cardiology  Discussed the procedure expectation the patient  Patient verbalized understanding of the options and the plan of permanent pacemaker  Surgery time is requested

## 2021-10-25 NOTE — OP NOTE
Operative Note      Patient: Bob Ramírez  YOB: 1940  MRN: [id number]    Date of Procedure: 10/25/2021    Pre-Op Diagnosis: Sick sinus syndrome   Bradycardia  Atrial fib with RVR  syncope  Post-Op Diagnosis: Same       Procedure(s):  PACEMAKER DUAL CHAMBER  INSERTION PERMANENT(DDI)    Surgeon(s):  Herb Fernández MD    Assistant:   * No surgical staff found *    Anesthesia: Monitor Anesthesia Care    Estimated Blood Loss (mL): less than 50     Complications: None    Specimens:   * No specimens in log *    Implants:  Implant Name Type Inv.  Item Serial No.  Lot No. LRB No. Used Action   LEAD PACE AD 6FR L58CM VENT VENESSA PLAT INSUL BPLR PLATINIZED [242607] [[facility]] - [id number]  LEAD PACE AD 6FR L58CM VENT VENESSA PLAT INSUL BPLR PLATINIZED [397718] [[facility]] [id number] [facility]  N/A 1 Implanted   LEAD PACE 6FR L53CM PLAT ALLOY/POROUS TI NITRIDE PERM R ATR - [id number] Cardiac pacing leads or electrodes LEAD PACE 6FR L53CM PLAT ALLOY/POROUS TI NITRIDE PERM R ATR [id number] [facility]  N/A 1 Implanted   PACEMAKER CARD 22.5GM W50.8XH46.6MM D7.4MM TI POLYUR VENESSA - [id number]  PACEMAKER CARD 22.5GM W50.8XH46.6MM D7.4MM TI POLYUR VENESSA [id number] [facility]  N/A 1 Implanted   ENVELOPE PACEMKR L W2.9XL3.3IN ABSRB ANTIBACT TYRX  ENVELOPE PACEMKR L W2.9XL3.3IN ABSRB ANTIBACT TYRX  [facility] [id number] N/A 1 Implanted         Drains:   Urethral Catheter Non-latex 16 fr (Active)   Catheter Indications Need for fluid volume management of the critically ill patient in a critical care setting 10/22/21 2023   Site Assessment Flush 10/22/21 2023   Urine Color Yellow 10/23/21 2148   Urine Appearance Clear 10/23/21 2148   Urine Odor Other (Comment) 10/23/21 2148   Output (mL) 250 mL 10/25/21 0553       [REMOVED] Closed/Suction Drain Right;Ventral Hip Accordion 10 Turkish (Removed) Rick Gooden MD on 10/25/2021 at 2:51 PM

## 2021-10-25 NOTE — CONSULTS
[address] [place] [address], [address] [facility]                                  CONSULTATION    PATIENT NAME: Catherine Yadav                 :        1940  MED REC NO:   [id number]                          ROOM:  ACCOUNT NO:   [de-identified]                           ADMIT DATE: 10/22/2021  PROVIDER:     Yusuf Reeder MD    CONSULT DATE:  10/25/2021    CHIEF COMPLAINT:  History of anemia; rule out GI bleeding. HISTORY OF PRESENT ILLNESSES:  The patient is an 60-year-old white  female patient known to me from 2012 with history of bleeding  duodenal ulcer, hypertension, coronary artery disease/cardiac  arrhythmias, gastroesophageal reflux disease, chronic kidney disease,  hypothyroidism, gastroesophageal reflux disease, and recent hip  fracture, in rehab, who was admitted to the hospital on 10/22/2021 after  the patient had cardiac arrhythmia and was hypotensive. The patient's  hemoglobin on 10/08/2021 was 12 gm% and then on 10/22/2021, it dropped  to 6.1 gm%. She was transfused with packed RBCs; repeat hemoglobin  yesterday was 7.1 gm%. The patient is hemodynamically stable and has  been seen by the cardiothoracic surgeon, Dr. Aries Barreto, and pacemaker  placement is in order for today. The patient denies abdominal pain, nausea, vomiting, hematemesis, melena  or hematochezia. The patient did have two EGDs done by me for bleeding  duodenal ulcer on 2012 and then again on 2012. The patient  has not had a recent colonoscopy done. The patient is hemodynamically  stable and is on Protonix. REVIEW OF SYSTEMS:  CENTRAL NERVOUS SYSTEM EXAMINATION:  The patient denies headache or  focal sensorimotor symptoms. CARDIOVASCULAR SYSTEM:  No history of chest pain or shortness of breath. The patient, however, does complain of leg swelling. GENITOURINARY SYSTEM:  No history of dysuria, pyuria or hematuria.   MUSCULOSKELETAL SYSTEM:  The patient complains of generalized weakness. RESPIRATORY SYSTEM:  No history of cough, hemoptysis, fever or chills. PAST MEDICAL HISTORY:  Significant for history of hypertension, coronary  artery disease/cardiac arrhythmia/congestive heart failure,  gastroesophageal reflux disease, osteoarthritis, chronic kidney disease,  hypothyroidism, and history of bleeding duodenal ulcer in 03/2012. FAMILY HISTORY:  The patient's father was diagnosed with lung carcinoma. MEDICATIONS:  Please refer to chart (the patient denies taking NSAIDs or  anticoagulants). SOCIOECONOMIC HISTORY:  No history of EtOH abuse. The patient does not  smoke cigarettes. SURGICAL HISTORY:  The patient has had surgery done on her hip, also had  a femur fracture, eye surgery, abdominal wall herniorrhaphy done, and  dental surgery done. ALLERGIES:  No known drug allergies. PHYSICAL EXAMINATION:  GENERAL:  Shows an 61-year-old white female who is obese, lying flat in  bed, in no acute distress. She is awake, alert, and oriented and  pleasant to talk with. VITAL SIGNS:  Stable. HEENT EXAMINATION:  Shows conjunctivae to be pale. NECK:  Supple. CHEST:  Clear. HEART:  S1, S2 is normal.  ABDOMEN:  Soft, nontender, nondistended. Liver and spleen are not  palpable. Bowel sounds are present. RECTAL:  Exam is deferred. CNS:  Exam shows the patient to be awake, alert, and oriented. There is  no focal sensorimotor sign. MUSCULOSKELETAL SYSTEM:  Exam shows evidence of degenerative joint  disease changes. LABORATORY DATA:  The labs drawn during the present hospitalization  comprised of chem profile today which is remarkable for a BUN of 54,  creatinine 2.8. The patient's LFTs have been within normal limits. CBC  shows a WBC count of 9.8, hemoglobin 7.1, platelet count 282,131. CAT  scan of the abdomen and pelvis was done on 10/23/2021 and no acute  pathology was noted; however, gallstones were noted.     IMPRESSION: An 20-year-old white female with multiple comorbidities  with recent hip fracture, undergoing rehab, is noted to be hypotensive  with cardiac arrhythmia, to have pacemaker placed and also is anemic  with no evidence of gross GI bleeding; however, focal GI bleeding lesion  cannot be ruled out. RECOMMENDATIONS:  1. Agree with present management with Protonix. 2.  Monitor the patient's serial H and H and transfuse on a p.r.n. basis  to keep hemoglobin above 7 gm%. 3.  In fact, once the patient recuperates from her hip fracture, will  like to do EGD, colonoscopy, and small bowel evaluation later if needed  for workup of her anemia. 4.  The case and plan has been discussed in detail with the patient.         Franklyn Canales MD    D: 10/25/2021 11:15:41       T: 10/25/2021 11:18:36     [id number]  Job#: [id number]     Doc#: [id number]    CC:

## 2021-10-26 ENCOUNTER — APPOINTMENT (OUTPATIENT)
Dept: GENERAL RADIOLOGY | Age: 81
DRG: 981 | End: 2021-10-26
Attending: HOSPITALIST
Payer: MEDICARE

## 2021-10-26 LAB
CULTURE: NORMAL
Lab: NORMAL
MAGNESIUM: 2.3 MG/DL (ref 1.8–2.4)
SPECIMEN: NORMAL

## 2021-10-26 PROCEDURE — 97530 THERAPEUTIC ACTIVITIES: CPT

## 2021-10-26 PROCEDURE — 85025 COMPLETE CBC W/AUTO DIFF WBC: CPT

## 2021-10-26 PROCEDURE — 2580000003 HC RX 258: Performed by: PHYSICAL MEDICINE & REHABILITATION

## 2021-10-26 PROCEDURE — 85610 PROTHROMBIN TIME: CPT

## 2021-10-26 PROCEDURE — 97112 NEUROMUSCULAR REEDUCATION: CPT

## 2021-10-26 PROCEDURE — 80069 RENAL FUNCTION PANEL: CPT

## 2021-10-26 PROCEDURE — 2500000003 HC RX 250 WO HCPCS: Performed by: PHYSICAL MEDICINE & REHABILITATION

## 2021-10-26 PROCEDURE — 6370000000 HC RX 637 (ALT 250 FOR IP): Performed by: INTERNAL MEDICINE

## 2021-10-26 PROCEDURE — 99232 SBSQ HOSP IP/OBS MODERATE 35: CPT | Performed by: NURSE PRACTITIONER

## 2021-10-26 PROCEDURE — 97162 PT EVAL MOD COMPLEX 30 MIN: CPT

## 2021-10-26 PROCEDURE — 6360000002 HC RX W HCPCS: Performed by: PHYSICAL MEDICINE & REHABILITATION

## 2021-10-26 PROCEDURE — 97535 SELF CARE MNGMENT TRAINING: CPT

## 2021-10-26 PROCEDURE — 2140000000 HC CCU INTERMEDIATE R&B

## 2021-10-26 PROCEDURE — 99233 SBSQ HOSP IP/OBS HIGH 50: CPT | Performed by: INTERNAL MEDICINE

## 2021-10-26 PROCEDURE — 2500000003 HC RX 250 WO HCPCS: Performed by: INTERNAL MEDICINE

## 2021-10-26 PROCEDURE — 2580000003 HC RX 258: Performed by: INTERNAL MEDICINE

## 2021-10-26 PROCEDURE — 84145 PROCALCITONIN (PCT): CPT

## 2021-10-26 PROCEDURE — 83690 ASSAY OF LIPASE: CPT

## 2021-10-26 PROCEDURE — 6360000002 HC RX W HCPCS: Performed by: NURSE PRACTITIONER

## 2021-10-26 PROCEDURE — 97167 OT EVAL HIGH COMPLEX 60 MIN: CPT

## 2021-10-26 PROCEDURE — 6370000000 HC RX 637 (ALT 250 FOR IP): Performed by: PHYSICAL MEDICINE & REHABILITATION

## 2021-10-26 PROCEDURE — 86141 C-REACTIVE PROTEIN HS: CPT

## 2021-10-26 PROCEDURE — 71045 X-RAY EXAM CHEST 1 VIEW: CPT

## 2021-10-26 PROCEDURE — 85730 THROMBOPLASTIN TIME PARTIAL: CPT

## 2021-10-26 PROCEDURE — 80048 BASIC METABOLIC PNL TOTAL CA: CPT

## 2021-10-26 PROCEDURE — 2580000003 HC RX 258: Performed by: NURSE PRACTITIONER

## 2021-10-26 RX ADMIN — FLUCONAZOLE 200 MG: 200 INJECTION, SOLUTION INTRAVENOUS at 17:10

## 2021-10-26 RX ADMIN — ACETAMINOPHEN 1000 MG: 500 TABLET ORAL at 17:07

## 2021-10-26 RX ADMIN — SODIUM BICARBONATE: 84 INJECTION, SOLUTION INTRAVENOUS at 23:45

## 2021-10-26 RX ADMIN — CEFEPIME HYDROCHLORIDE 1000 MG: 1 INJECTION, POWDER, FOR SOLUTION INTRAMUSCULAR; INTRAVENOUS at 12:49

## 2021-10-26 RX ADMIN — METOPROLOL TARTRATE 25 MG: 25 TABLET, FILM COATED ORAL at 20:20

## 2021-10-26 RX ADMIN — SODIUM BICARBONATE: 84 INJECTION, SOLUTION INTRAVENOUS at 07:05

## 2021-10-26 RX ADMIN — METOPROLOL TARTRATE 25 MG: 25 TABLET, FILM COATED ORAL at 09:35

## 2021-10-26 RX ADMIN — MIDODRINE HYDROCHLORIDE 10 MG: 5 TABLET ORAL at 12:45

## 2021-10-26 RX ADMIN — LEVOTHYROXINE SODIUM 50 MCG: 0.05 TABLET ORAL at 05:33

## 2021-10-26 RX ADMIN — MIDODRINE HYDROCHLORIDE 10 MG: 5 TABLET ORAL at 09:34

## 2021-10-26 RX ADMIN — SODIUM CHLORIDE, PRESERVATIVE FREE 10 ML: 5 INJECTION INTRAVENOUS at 20:21

## 2021-10-26 RX ADMIN — PANTOPRAZOLE SODIUM 40 MG: 40 TABLET, DELAYED RELEASE ORAL at 05:33

## 2021-10-26 RX ADMIN — ACETAMINOPHEN 1000 MG: 500 TABLET ORAL at 09:34

## 2021-10-26 RX ADMIN — CEFEPIME HYDROCHLORIDE 1000 MG: 1 INJECTION, POWDER, FOR SOLUTION INTRAMUSCULAR; INTRAVENOUS at 23:45

## 2021-10-26 RX ADMIN — ENOXAPARIN SODIUM 30 MG: 30 INJECTION SUBCUTANEOUS at 09:34

## 2021-10-26 RX ADMIN — ACETAMINOPHEN 1000 MG: 500 TABLET ORAL at 12:45

## 2021-10-26 RX ADMIN — ALLOPURINOL 50 MG: 100 TABLET ORAL at 09:35

## 2021-10-26 RX ADMIN — MIDODRINE HYDROCHLORIDE 10 MG: 5 TABLET ORAL at 17:07

## 2021-10-26 RX ADMIN — SODIUM CHLORIDE, PRESERVATIVE FREE 10 ML: 5 INJECTION INTRAVENOUS at 09:36

## 2021-10-26 RX ADMIN — MICONAZOLE NITRATE: 2 POWDER TOPICAL at 20:21

## 2021-10-26 RX ADMIN — MICONAZOLE NITRATE: 2 POWDER TOPICAL at 12:52

## 2021-10-26 RX ADMIN — ACETAMINOPHEN 1000 MG: 500 TABLET ORAL at 20:20

## 2021-10-26 ASSESSMENT — PAIN SCALES - GENERAL
PAINLEVEL_OUTOF10: 3
PAINLEVEL_OUTOF10: 0
PAINLEVEL_OUTOF10: 3
PAINLEVEL_OUTOF10: 0

## 2021-10-26 ASSESSMENT — PAIN SCALES - WONG BAKER: WONGBAKER_NUMERICALRESPONSE: 0

## 2021-10-26 NOTE — PROGRESS NOTES
Infectious Disease Progress Note  10/26/2021   Patient Name: Joanna Alcocer : 1940   Impression  ? Sepsis secondary to Pseudomonas aeruginosa Bacteremia from Bilateral Pyelonephritis, Complicated UTI (Probable CAUTI):  § Afebrile, no leukocytosis  § 10/20-UA ,   § 10/20-Urine Culture Pseudomonas aeruginosa >100,000, Candida albicans >100,000  § 10/21-Blood cultures -Pseudomonas aeruginosa  § 10/23-CT A&P WO Contrast: No acute abdominopelvic abnormality. New subacute to chronic incompletely healed L5 superior endplate fracture. Bilateral renal atrophy. Mild bibasilar atelectasis. Cholelithiasis.       · Acute Right Femoral Neck Fracture     · Bradycardia:  · 10/25-S/p per Dr. Marine Velarde: Pacemaker dual chamber insertion. DX: SSS.    ? Resolving BLE MRSA Cellulitis     ? TOBY on CKD4:  ? Dr. Sherri Guillory onboard     ? Anemia, possible GIB:  ? Dr. Juan Forget onboard  ? Rec Protonix  ? Rec EGD, colonoscopy and small bowel eval when recovers from hip fracture     ? Morbid Obesity:  BMI 42.93     ? HTN/ AF/ SVT/ PHTN/ VHD/ HFpEF     ? Hypothyroidism     ? ARON     · Multi-morbidity: per PMHx:  HTN, HFpEF, SVT, GERD, Hypothyroidism, CKD4, left femur fracture s/p repair with intramedullary nail , ventral incisional hernia repair, Morbid Obesity, SVT, AF    Plan:  · Continue IV cefepime 1 gm q12h, renal dosing, plan for 2 weeks of IV therapy (end date 21)   · Continue IV fluconazole 200 mg q24h (end date 10/29/21)  · Trend CRP and Pct, on DWT, recheck in am  · Will DW Dr. Sherri Guillory on his preference for IV Access to finish IV ABX regimen for cefepime   · Patient has improved, states is feeling better, inflammatory markers on DWT, afebrile and no leukocytosis    Ongoing Antimicrobial Therapy  Diflucan 10/22-  Cefepime 10/25-    Completed Antimicrobial Therapy  Ceftriaxone 10/8  Keflex 10/7-  Doxycycline 10/7-?   Dgnwzrujdu07/9-  Zyvox 10/11-   Zosyn 10/20-  Meropenem 10/22-25  ? History:? Interval history noted  Denies n/v/d/f or untoward effects of antibiotics. Resting quietly in bed, states in general is feeling better. Physical Exam:  Vital Signs: BP (!) 125/45   Pulse 75   Temp 98 °F (36.7 °C) (Oral)   Resp 20   Wt 233 lb 11 oz (106 kg)   LMP 01/23/1995   SpO2 95%   BMI 44.18 kg/m²     Gen: resting quietly in bed, smiling. Wounds: C/D/I BLE dried areas with erythema, no warmth, drainage or edema. Left chest PPM dressing intact. Chest: no distress and CTA. Good air movement. Room air. Heart: RRR and no MRG. Abd: soft, bilateral low abdominal tenderness radiating to bilateral flanks, no hepatomegaly. Normoactive bowel sounds. Ext: no clubbing, cyanosis, or edema  Catheter Site: without erythema or tenderness  Neuro: Mental status intact. CN 2-12 intact and no focal sensory or motor deficits     Radiologic / Imaging / TESTING  10/8/21 XR Hip 2-3 VW W Pelvis Right:  Impression   1. Acute fracture of the right femoral neck with mild displacement. 2. Status post ORIF of the left femur.  Redemonstration of the previously   seen fracture of the proximal femur.  No significant callus formation is   present.      10/8/21 XR Chest Portable:  Impression   Mild cardiomegaly.  Mild-to-moderate congestion and/or infiltrates identified   in the lungs.      10/8/21 Limited Echo:  Summary   This is a limited echocardiogram.   Technically difficult study, due to body habitus.   Left ventricular systolic function is normal.   Ejection fraction is visually estimated at 50-55%.   Moderate mitral regurgitation.   Moderate tricuspid regurgitation; RVSP: 45 mmHg.   No evidence of any pericardial effusion.     10/9/21 VL Dup Lower Extremity Venous Bilateral  Impression   The exam is severely limited due to patient body habitus.  The distal femoral   veins popliteal veins and calf veins are not well visualized bilaterally.       No gross DVT is identified      10/10/21 XR Hip Right (2-3 Views)  Impression   Right hip replacement in normal alignment.       No acute interval fracture.       Immediate postoperative changes. 10/26/21 XR Chest Portable:  Impression   1. Left cardiac pacer device in place with no pneumothorax identified. 2. Mild left basilar atelectasis with trace left effusion not excluded. Labs:    No results found for this or any previous visit (from the past 24 hour(s)). CULTURE results: Invalid input(s): BLOOD CULTURE,  URINE CULTURE, SURGICAL CULTURE    Diagnosis:  Patient Active Problem List   Diagnosis    Chronic kidney disease (CKD) stage G4/A2, severely decreased glomerular filtration rate (GFR) between 15-29 mL/min/1.73 square meter and albuminuria creatinine ratio between  mg/g ([facility])    Ventral hernia without obstruction or gangrene    SVT (supraventricular tachycardia) ([facility])    CRF (chronic renal failure)    Hypothyroid    Chronic kidney disease, stage IV (severe) ([facility])    Gastroesophageal reflux disease    H/O ventral hernia    S/P ventral herniorrhaphy    S/P herniorrhaphy    Essential hypertension    History of ventral hernia repair    Morbid obesity with BMI of 45.0-49.9, adult ([address]    Closed transcervical fracture of proximal femur, left, initial encounter ([address]    Displaced fracture of right femoral neck ([address]    MRSA cellulitis    Traumatic closed fracture of neck of femur with minimal displacement, right, initial encounter ([address]    Uncontrolled pain    Generalized weakness    Acute blood loss anemia    Paroxysmal atrial fibrillation (HCC)    Acute on chronic diastolic (congestive) heart failure ([facility])    Acute cystitis without hematuria    Enterococcus faecalis infection    Septic shock ([facility])    SSS (sick sinus syndrome) ([address]       Active Problems  Active Problems:    Septic shock (HCC)    SSS (sick sinus syndrome) ([address]  Resolved Problems:    * No resolved hospital problems.  *    Electronically signed by: Electronically signed by Jaciel Martins.  SHERYL Ward CNP on 10/26/2021 at 3:50 PM

## 2021-10-26 NOTE — CONSULTS
[address] [facility] PHYSICAL THERAPY EVALUATION  Ashkan Medellin, 1940, 3110/3110-A, 10/26/2021    History  [place]:  There were no encounter diagnoses. Patient  has a past medical history of Acid reflux, Arthritis, CHF (congestive heart failure) ([facility] Chronic kidney disease, GERD (gastroesophageal reflux disease), History of blood transfusion, Hypertension, Hypomagnesemia, Hypothyroidism, MRSA (methicillin resistant staph aureus) culture positive, Shortness of breath on exertion, SVT (supraventricular tachycardia) ([facility] Teeth missing, and Wears glasses. Patient  has a past surgical history that includes Dental surgery; ventral hernia repair (09/16/2016); Abdomen surgery; other surgical history (05/27/2018); hernia repair (2011); Endoscopy, colon, diagnostic (2011 Or 2012); eye surgery (?when); other surgical history (08/27/2018); Femur fracture surgery (Left, 5/22/2021); hip surgery (Right, 10/8/2021); and hip surgery (Right, 10/10/2021). Subjective:  Patient states:  \"I don't know when the last time I've stood is. \"    Pain:  Does not rate pain but states pain in R hip and pain at pacemaker site.     Communication with other providers:  Handoff to RN, OT  Restrictions: general precautions, fall risk, WBAT R LE with posterior hip precautions, pacemaker precautions    Home Setup/Prior level of function  Social/Functional History  Lives With: Alone  Type of Home: House  Home Layout: Able to Live on Main level with bedroom/bathroom, Two level  Home Access: Ramped entrance  Bathroom Shower/Tub: Walk-in shower  Bathroom Toilet: Standard  Bathroom Equipment: Tub transfer bench, Grab bars in shower, Grab bars around toilet  Home Equipment: Rolling walker, [employer], [place], Alert Button, Reacher, Grab bars, Lift chair, Sock aid, Long-handled shoehorn (typically uses RW)  ADL Assistance: Needs assistance (niece supervises/assists some with bathing and dressing)  Homemaking Assistance: Needs assistance (friend grocery shops)  Active : No  Mode of Transportation: Family, Car  Occupation: Retired  Type of occupation: worked in bank  Additional Comments: has a regular, flat bed (tall height); usually sleeps in lift chair; had 2 falls in the past year with injuries requiring surgery; recently at [facility]U then re-admitted to hospital for pacemaker sx    Examination of body systems (includes body structures/functions, activity/participation limitations):  · Observation:  Pt supine in bed upon arrival and agreeable to therapy  · Vision:  Wears glasses at baseline  · Hearing:  Slightly [place]  · Cardiopulmonary: no O2 needed  · Cognition: WFL, see OT/SLP note for further evaluation. Musculoskeletal  · ROM R/L:  WFL. · Strength R/L:  3/5, significant impairment in function and endurance. · Neuro:  WFL      Mobility:  · Rolling L/R:  Max A with cues for sequencing, performed bilaterally with increased time to complete  · Supine to sit:  Pt completed supine <-> sitting EOB max A x2 with assist at bilateral LEs, hips, and trunk with increased time and effort to complete and sequencing throughout  · Transfers: Pt completed STS to/from EOB mod-max A with cues for hand placement and sequencing. RW used  · Sitting balance:  Fair, pt sat EOB ~20 minutes min A progressing to SBA with no LOB throughout. · Standing balance:  Pt stood at [employer] mod A each trial 15 seconds, 25 seconds, and 35 seconds respectively. · Gait: deferred due to safety concerns    [facility] Clicks Inpatient Mobility:  AM-PAC Inpatient Mobility Raw Score : 9    Safety: patient left supine in bed with alarm on, call light within reach, RN notified, gait belt used. Assessment:  Pt is an [de-identified] y.o. female admitted to the hospital from ARU and underwent pacemaker insertion on 10/25/2021. Pt also underwent R hemiarthroplasty on 10/10/2021 and has posterior hip precautions with WBAT on the R hip.  Pt is typically independent with all ambulation and transfers without a device. Pt is currently performing bed mobility mod A x2, transferring max A, and unable to ambulate at this time. Pt is presenting with decreased endurance, impaired balance, increased pain, impaired bed mobility, impaired transfers, impaired gait. Pt would benefit from continued acute care PT as well as SNF placement upon discharge to continue to address impairments. Complexity: moderate    Prognosis: Good, no significant barriers to participation at this time.      Plan Times per week: 4+/week     Equipment: TBD at next level of care    Goals:  Short term goals  Time Frame for Short term goals: 1 week  Short term goal 1: Pt to complete all bed mobility mod A x1  Short term goal 2: Pt to complete all STS transfers to/from bed, commode, and chair mod A x1  Short term goal 3: Pt to ambulate 13' with LRAD mod A  Short term goal 4: Pt to complete stand pivot transfer with LRAD mod A       Treatment plan:  Bed mobility, transfers, balance, gait, TA, TX    Recommendations for NURSING mobility: [place]    Time:   Time in: 1014  Time out: 1047  Timed treatment minutes: 23  Total time: 33    Electronically signed by:    Dale Dasilva PT  10/26/2021, 12:51 PM

## 2021-10-26 NOTE — PROGRESS NOTES
Occupational Therapy    [facility] ACUTE CARE OCCUPATIONAL THERAPY EVALUATION  Ashkan Med[place]in, 1940, 3110/3110-A, 10/26/2021    History  [place]:  There were no encounter diagnoses. Patient  has a past medical history of Acid reflux, Arthritis, CHF (congestive heart failure) ([address] Chronic kidney disease, GERD (gastroesophageal reflux disease), History of blood transfusion, Hypertension, Hypomagnesemia, Hypothyroidism, MRSA (methicillin resistant staph aureus) culture positive, Shortness of breath on exertion, SVT (supraventricular tachycardia) ([facility] Teeth missing, and Wears glasses. Patient  has a past surgical history that includes Dental surgery; ventral hernia repair (09/16/2016); Abdomen surgery; other surgical history (05/27/2018); hernia repair (2011); Endoscopy, colon, diagnostic (2011 Or 2012); eye surgery (?when); other surgical history (08/27/2018); Femur fracture surgery (Left, 5/22/2021); hip surgery (Right, 10/8/2021); and hip surgery (Right, 10/10/2021). Subjective:  Patient states:  \"I'll try to stand\". Pain:  3/10 pain in pacemaker, surgical  Communication with other providers:  Handoff to RN, co-eval with PT.    Restrictions: Pacemaker precautions on LUE, WBAT RLE w/ posterior hip precautions, General Precautions, Fall Risk    Home Setup/Prior level of function  Social/Functional History  Lives With: Alone  Type of Home: House  Home Layout: Able to Live on Main level with bedroom/bathroom, Two level  Home Access: Ramped entrance  Bathroom Shower/Tub: Walk-in shower  Bathroom Toilet: Standard  Bathroom Equipment: Tub transfer bench, Grab bars in shower, Grab bars around toilet  Home Equipment: Rolling walker, [place], [place], Alert Button, Reacher, Grab bars, Lift chair, Sock aid, Long-handled shoehorn (typically uses RW)  ADL Assistance: Needs assistance (niece supervises/assists some with bathing and dressing)  Homemaking Assistance: Needs assistance (friend grocery shops)  Active : No  Mode of Transportation: Family, Car  Occupation: Retired  Type of occupation: worked in bank  Additional Comments: has a regular, flat bed (tall height); usually sleeps in lift chair; had 2 falls in the past year with injuries requiring surgery; recently at ARU then re-admitted to hospital for pacemaker sx    Examination of body systems (includes body structures/functions, activity/participation limitations):  · Observation:  Supine in bed upon arrival, agreeable to therapy  · Vision:  Glasses  · Hearing:  [place]  · Cardiopulmonary:  No 02 needs      Body Systems and functions:  · ROM R/L:  LUE limited due to pacemaker at shoulder, distal WFL; RUE: deferred ROM due to achiness    · Strength R/L:  3/5,   · Sensation: Intermittent numbness/tingling in bilateral hands/feet  · Tone: Normal  · Coordination: WFL  · Perception: WNL    Activities of Daily Living (ADLs):  · Feeding: Melissa  · Grooming: CGA (washing hands/face w/ warm washcloth)  · UB bathing: SBA  · LB bathing: maxA (washing buttocks in stand at RW)  · UB dressing: SBA  · LB dressing: maxA  · Toileting: maxA (pt soiled self, assistance to wash buttocks in stand)    Cognitive and Psychosocial Functioning:  · Overall cognitive status: WFL  · Affect: Teary       Mobility:  · Supine to sit:  maxA x 2  · Transfers: modA/maxA STS from EOB up to RW w/ 3 trials  · Sitting balance:  SBA  · Standing balance:  modA w/ RW ~1 minute max.   · Toilet/Shower Transfers: DNT  · Sit to supine: maxA x 2  · Scooting to HOB: maxA x 2             AM-PAC Daily Activity Inpatient   How much help for putting on and taking off regular lower body clothing?: Total  How much help for Bathing?: A Lot  How much help for Toileting?: Total  How much help for putting on and taking off regular upper body clothing?: A Little  How much help for taking care of personal grooming?: A Little  How much help for eating meals?: None  AM-PAC Inpatient Daily Activity Raw Score: 14  AM-PAC Inpatient ADL T-Scale Score : 33.39  ADL Inpatient CMS 0-100% Score: 59.67  ADL Inpatient CMS G-Code Modifier : CK    Treatment:  Self Care Training:   Cues were given for safety, sequence, UE/LE placement, visual cues, and balance. Activities performed today included LB bathing, grooming, toileting    Therapeutic Activity Training:   Therapeutic activity training was instructed today. Cues were given for safety, sequence, UE/LE placement, awareness, and balance. Activities performed today included bed mobility training, sup-sit, sit-stand, stand to sit, sit to supine, scooting to HOB. Safety: patient left in chair with chair alarm, call light within reach, RN notified, gait belt used. Assessment:  Pt is a [de-identified] yo female admitted from home for septic shock. . Pt currently presents w/ deficits in ADL and high level IADL independence, functional activity tolerance, dynamic sitting and standing balance and tolerance and functional transfers, BUE strength/ROM and OOB activity. Pt would benefit from continued acute care OT services w/ discharge to SNF  Complexity: High  Prognosis: Good, no significant barriers to participation at this time.    Plan  Times per week: 3x+  Times per day: Daily  Current Treatment Recommendations: Strengthening, Positioning, ROM, Balance Training, Functional Mobility Training, Pain Management, Self-Care / ADL, Endurance Training, Safety Education & Training, Home Management Training, Patient/Caregiver Education & Training, Equipment Evaluation, Education, & procurement     Equipment: defer    Goals:  Pt goal: go home  Time Frame for STGs: discharge  Goal 1: Pt will perform UE ADLs  Independent  Goal 2: Pt will perform LE ADLs Supervision w/ AD  Goal 3: Pt will perform toileting Supervision  Goal 4: Pt will perform functional transfer w/ AD Supervision  Goal 5: Pt will perform functional mobility w/ AD Supervision  Goal 6: Pt will perform therex/theract in order to increase functional activity tolerance and dynamic standing balance    Treatment plan:  Pt will perform functional transfer to reclining chair to prepare for ADL task Panda    Recommendations for NURSING activity: [place] EOB needs    Time:   Time in: 1014  Time out: 1047  Timed treatment minutes: 23 minutes  Total time: 33 minutes    Electronically signed by:    Rory VALENZUELA/L [id number]  1:07 PM,10/26/2021

## 2021-10-26 NOTE — PROGRESS NOTES
PATIENT NAME: Faith Landaverde    TODAY'S DATE: 10/26/21    SUBJECTIVE:    Pt is POD # 1 s/p PPM placment. Pt feeling well, minimal pain and swelling. OBJECTIVE:   VITALS:    Vitals:    10/26/21 0934   BP: (!) 125/45   Pulse: 75   Resp:    Temp:    SpO2:      INTAKE/OUTPUT:    Date 10/26/21 0000 - 10/26/21 2359   Shift 1116-4249 5735-1466 6079-1163 24 Hour Total   INTAKE   Shift Total(mL/kg)       OUTPUT   Urine(mL/kg/hr) 400(0.5) 200  600   Shift Total(mL/kg) 400(3.8) 200(1.9)  600(5.7)   Weight (kg) 106 106 106 106      Patient Vitals for the past 96 hrs (Last 3 readings):   Weight   10/26/21 0500 233 lb 11 oz (106 kg)   10/25/21 0600 230 lb (104.3 kg)       EXAM:  Blood pressure (!) 125/45, pulse 75, temperature 98 °F (36.7 °C), temperature source Oral, resp. rate 20, weight 233 lb 11 oz (106 kg), last menstrual period 01/23/1995, SpO2 95 %, not currently breastfeeding. General appearance: No apparent distress, appears stated age and cooperative. Skin: unremarkable  HEENT Normocephalic, atraumatic without obvious deformity. Neck: Supple, Trachea midline   Lungs: Good respiratory effort. Clear to auscultation, bilaterally  Heart: Regular rate/ rhythm inc c/d/i mild soft hematoma noted  Abdomen: Soft, non-tender or non-distended   Extremities: min edema warm well perfused  Neurologic: Alert, grossly intact  Mental status: normal affect      Data:  CBC:   Recent Labs     10/24/21  0019 10/25/21  0735   WBC 8.2 9.8   HGB 7.2* 7.1*   HCT 22.0* 22.9*   * 140     BMP:    Recent Labs     10/24/21  0019 10/25/21  0735    140   K 4.2 4.2    109   CO2 20* 19*   BUN 56* 54*   CREATININE 2.6* 2.8*   GLUCOSE 75 77     Hepatic:   No results for input(s): AST, ALT, ALB, BILITOT, ALKPHOS in the last 72 hours.   Mag:      Recent Labs     10/25/21  0735   MG 2.3      Phos:     Recent Labs     10/24/21  0019 10/25/21  0735   PHOS 3.6 2.8      INR:   No results for input(s): INR in the last 72 hours. Radiology Review:  CXR leads in good position, no PTX      ASSESSMENT AND PLAN:    Patient Active Problem List   Diagnosis    Chronic kidney disease (CKD) stage G4/A2, severely decreased glomerular filtration rate (GFR) between 15-29 mL/min/1.73 square meter and albuminuria creatinine ratio between  mg/g ([facility])    Ventral hernia without obstruction or gangrene    SVT (supraventricular tachycardia) ([facility])    CRF (chronic renal failure)    Hypothyroid    Chronic kidney disease, stage IV (severe) ([facility])    Gastroesophageal reflux disease    H/O ventral hernia    S/P ventral herniorrhaphy    S/P herniorrhaphy    Essential hypertension    History of ventral hernia repair    Morbid obesity with BMI of 45.0-49.9, adult ([address]    Closed transcervical fracture of proximal femur, left, initial encounter ([address]    Displaced fracture of right femoral neck ([address]    MRSA cellulitis    Traumatic closed fracture of neck of femur with minimal displacement, right, initial encounter ([address]    Uncontrolled pain    Generalized weakness    Acute blood loss anemia    Paroxysmal atrial fibrillation ([facility])    Acute on chronic diastolic (congestive) heart failure ([facility])    Acute cystitis without hematuria    Enterococcus faecalis infection    Septic shock ([facility])    SSS (sick sinus syndrome) ([address]       S/P PPM placement     Pacer check okay. Incision C/D/I with minimal hematoma. BC on 10/21 positive for pseudomonas. She has been on abx. Follow up in office in 1 mo for wound check.      [facility]

## 2021-10-26 NOTE — CARE COORDINATION
Met with pt to discuss d/c plan. Pt is aware that she cannot return to ARU. CM asked pt if she knows which SNF that she wants to go to. Pt states that she wants to go home. CM discussed concerns of pt going home d/t she is weak and she lives alone. Discussed Swing Bed with pt and she is agreeable. SNF list provided for back up. Referral made to [address] [place] [address] Bed coordinator. She will review clinicals. Requested PT/OT evals asap via WB. Will await decision from [facility] to accept or not. Will not require a pre-cert.    TE

## 2021-10-26 NOTE — PROGRESS NOTES
DOING WELL NO ABD COMPLAINTS OR GROSS GIT BLEEDING HAD BM BROWNISH IN COLOR  VITALS STABLE   LABS FROM TODAY PENDING  WILL CPM PT TO CALL OFFICE FOR  EGD / COLONOSCOPY AFTER SHE RECUPERATES FROM HER FX SURGERY

## 2021-10-26 NOTE — PROGRESS NOTES
Nephrology Progress Note  10/26/2021 4:17 PM  Subjective: Interval History: Alondra Cramer is a [de-identified] y.o. female patient status post pacemaker appears better      Data:   Scheduled Meds:   cefepime  1,000 mg IntraVENous Q12H    digoxin  125 mcg Oral Q48H    enoxaparin  30 mg SubCUTAneous Daily    acetaminophen  1,000 mg Oral 4x Daily WC    allopurinol  50 mg Oral Daily    collagenase   Topical Daily    levothyroxine  50 mcg Oral Daily    metoprolol tartrate  25 mg Oral BID    miconazole   Topical BID    pantoprazole  40 mg Oral QAM AC    sodium chloride flush  10 mL IntraVENous 2 times per day    midodrine  10 mg Oral TID WC    fluconazole  200 mg IntraVENous Q24H     Continuous Infusions:   sodium chloride      sodium chloride      sodium chloride      IV infusion builder 75 mL/hr at 10/26/21 0705         CBC   Recent Labs     10/24/21  0019 10/25/21  0735   WBC 8.2 9.8   HGB 7.2* 7.1*   HCT 22.0* 22.9*   * 140      BMP   Recent Labs     10/24/21  0019 10/25/21  0735    140   K 4.2 4.2    109   CO2 20* 19*   PHOS 3.6 2.8   BUN 56* 54*   CREATININE 2.6* 2.8*     Hepatic: No results for input(s): AST, ALT, ALB, BILITOT, ALKPHOS in the last 72 hours. Troponin: No results for input(s): TROPONINI in the last 72 hours. BNP: No results for input(s): BNP in the last 72 hours. Lipids: No results for input(s): CHOL, HDL in the last 72 hours. Invalid input(s): LDLCALCU  ABGs:   Lab Results   Component Value Date    PO2ART 75 08/14/2011     INR: No results for input(s): INR in the last 72 hours.   Renal Labs  Albumin:    Lab Results   Component Value Date    LABALBU 2.4 10/25/2021     Calcium:    Lab Results   Component Value Date    CALCIUM 8.1 10/25/2021     Phosphorus:    Lab Results   Component Value Date    PHOS 2.8 10/25/2021     U/A:    Lab Results   Component Value Date    NITRU NEGATIVE 10/20/2021    COLORU YELLOW 10/20/2021    WBCUA 331 10/20/2021    RBCUA 155 10/20/2021    MUCUS RARE 10/08/2021    TRICHOMONAS NONE SEEN 10/20/2021    YEAST OCCASIONAL 10/20/2021    BACTERIA OCCASIONAL 10/20/2021    CLARITYU CLOUDY 10/20/2021    SPECGRAV 1.018 10/20/2021    UROBILINOGEN NEGATIVE 10/20/2021    BILIRUBINUR NEGATIVE 10/20/2021    BLOODU LARGE 10/20/2021    KETUA NEGATIVE 10/20/2021     ABG:    Lab Results   Component Value Date    PO2ART 75 08/14/2011    BEART 1 08/14/2011     HgBA1c:  No results found for: LABA1C  Microalbumen/Creatinine ratio:  No components found for: RUCREAT  TSH:    Lab Results   Component Value Date    TSH 3.260 09/06/2018     IRON:    Lab Results   Component Value Date    IRON 204 05/24/2021     Iron Saturation:  No components found for: PERCENTFE  TIBC:    Lab Results   Component Value Date    TIBC 206 05/24/2021     FERRITIN:  No results found for: FERRITIN  RPR:  No results found for: RPR  SHADE:  No results found for: ANATITER, SHADE  24 Hour Urine for Creatinine Clearance:  No components found for: CREAT4, UHRS10, UTV10      Objective:   I/O: 10/25 0701 - 10/26 0700  In: 2066.2 [I.V.:2016.2]  Out: 775 [Urine:750]  I/O last 3 completed shifts: In: 182.8 [I.V.:182.8]  Out: 950 [Urine:950]  No intake/output data recorded. Vitals: BP (!) 125/45   Pulse 75   Temp 98 °F (36.7 °C) (Oral)   Resp 20   Wt 233 lb 11 oz (106 kg)   LMP 01/23/1995   SpO2 95%   BMI 44.18 kg/m²  {  General appearance: awake weak legally blind  HEENT: Head: Normal, normocephalic, atraumatic. Neck: supple, symmetrical, trachea midline  Lungs: diminished breath sounds bilaterally  Heart: S1, S2 status post pacemaker  Abdomen: abnormal findings:  soft nt  Extremities: edema trace with erythema lower extremities   Neurologic: Mental status: alertness: alert         Assessment and Plan:      IMP:  1. Acute renal failure from ATN on CKD 4  2. Right hip fracture status post surgery  3. Cellulitis with IV antibiotics  4. A. fib rapid rate  5.   Anemia  6.  hypotension    Plan #1 renal function worsening and monitor the above situation. Patient still adamant would not want dialysis if worse  #2 post surgery monitor with rehab plans  #3 maintain antibiotic therapy  #4 heart rate stable  #5 recommend PRBCs if hemoglobin less than seven  #6 blood pressure holding stable status post pacemaker   #7.  We'll monitor #7           Ju Calix MD, MD

## 2021-10-26 NOTE — PROGRESS NOTES
Cardiology Progress Note       Samra Chu is a [de-identified] y.o. female   1940     SUBJECTIVE:   Patient seen and examined no complaint heart rate 48 bpm had episodes of a pause of 3.8-second last night about 10;41 p.m.  10/26  Patient seen and examined had a pacemaker inserted yesterday interrogated and is working fine no complication  OBJECTIVE:    Review of Systems:  General appearance: alert, appears stated age and cooperative  Skin: Skin color, texture, normal. No rashes or lesions  HEENT: No nose bleed, headache, vision problems  CV: C/O chest pain, tightness, pressure,   Respiratory: C/o no SOB, RUBY, Orthopnea, PND  GI: No abdominal pain, black stool, bloating  Limbs: No c/o edema, pain, swelling, intermittent claudication, joint pains  Neuro: No dizziness, lightheadedness, syncope, gait problems, memory problems  Psych: grossly normal. No SI/depression. Vitals:   Blood pressure (!) 124/45, pulse 60, temperature 98 °F (36.7 °C), temperature source Oral, resp. rate 20, weight 233 lb 11 oz (106 kg), last menstrual period 01/23/1995, SpO2 95 %, not currently breastfeeding.     HEENT: AT, NC, PERRLA  Neck: No JVD  Heart: S1 S2 audible, no murmur   Lungs: CTA   Abdomen: Nontender   Limbs: No edema   CNS: no focal deficit      Past Medical History:   Diagnosis Date    Acid reflux     'no recent issue\"    Arthritis     \"Left Index Finger\"    CHF ([facility]estive heart failure) [facility]     per old chart hx of CHF with admission 12/2016 with pulmonary edema    Chronic kidney disease     Sees Dr. Claudia Roy have one kidney, dr Floridalma Vital told me that in 2011 I think\"    GERD (gastroesophageal reflux disease)     History of blood transfusion 2011 Or 2012    No Reaction To Blood Transfusion Received    Hypertension     ( pt states she is not on any medication for blood pressure)    Hypomagnesemia     Hypothyroidism     MRSA (methicillin resistant staph aureus) culture positive 10/08/2021    Leg    Shortness of breath on exertion     SVT (supraventricular tachycardia) ([facility])     per old chart hx of SVT with admission 2016 tx with Adenosine and per notes in 5/2018 hx of SVT- no cardiologist    Teeth missing     Upper And Lower    Wears glasses         Patient Active Problem List   Diagnosis    Chronic kidney disease (CKD) stage G4/A2, severely decreased glomerular filtration rate (GFR) between 15-29 mL/min/1.73 square meter and albuminuria creatinine ratio between  mg/g ([facility])    Ventral hernia without obstruction or gangrene    SVT (supraventricular tachycardia) ([facility])    CRF (chronic renal failure)    Hypothyroid    Chronic kidney disease, stage IV (severe) ([facility])    Gastroesophageal reflux disease    H/O ventral hernia    S/P ventral herniorrhaphy    S/P herniorrhaphy    Essential hypertension    History of ventral hernia repair    Morbid obesity with BMI of 45.0-49.9, adult ([address]    Closed transcervical fracture of proximal femur, left, initial encounter ([address]    Displaced fracture of right femoral neck ([address]    MRSA cellulitis    Traumatic closed fracture of neck of femur with minimal displacement, right, initial encounter ([address]    Uncontrolled pain    Generalized weakness    Acute blood loss anemia    Paroxysmal atrial fibrillation ([facility])    Acute on chronic diastolic (congestive) heart failure ([facility])    Acute cystitis without hematuria    Enterococcus faecalis infection    Septic shock ([facility])    SSS (sick sinus syndrome) ([facility])        No Known Allergies     Current Inpatient Medications:    Current Facility-Administered Medications   Medication Dose Route Frequency Provider Last Rate Last Admin    cefepime (MAXIPIME) 1,000 mg in dextrose 5 % 50 mL IVPB  1,000 mg IntraVENous Q12H SHERYL Wilson - CNP   Stopped at 10/26/21 0034    digoxin (LANOXIN) tablet 125 mcg  125 mcg Oral Q48H Cookie Jones MD   125 mcg at 10/25/21 1712    0.9 % sodium chloride infusion  25 mL IntraVENous PRN C Rebecca Carlos MD        enoxaparin (LOVENOX) injection 30 mg  30 mg SubCUTAneous Daily SKYE Carlos MD   30 mg at 10/24/21 0908    magnesium hydroxide (MILK OF MAGNESIA) 400 MG/5ML suspension 30 mL  30 mL Oral Daily PRN SKYE Carlos MD        sennosides-docusate sodium (SENOKOT-S) 8.6-50 MG tablet 1 tablet  1 tablet Oral BID PRN SKYE Carlos MD        acetaminophen (TYLENOL) tablet 1,000 mg  1,000 mg Oral 4x Daily WC SKYE Carlos MD   1,000 mg at 10/25/21 1712    bisacodyl (DULCOLAX) suppository 10 mg  10 mg Rectal Daily PRN SKYE Carlos MD        polyethylene glycol [facility]) packet 17 g  17 g Oral Daily PRN SKYE Carlos MD        0.9 % sodium chloride infusion  25 mL IntraVENous PRN SKYE Carlos MD        allopurinol (ZYLOPRIM) tablet 50 mg  50 mg Oral Daily SKYE Carlos MD   50 mg at 10/25/21 1711    calcium carbonate (TUMS) chewable tablet 500 mg  500 mg Oral TID PRN SKYE Carlos MD        collagenase ointment   Topical Daily SKYE Carlos MD   Given at 10/23/21 1105    levothyroxine (SYNTHROID) tablet 50 mcg  50 mcg Oral Daily SKYE Carlos MD   50 mcg at 10/26/21 0533    loperamide (IMODIUM) capsule 2 mg  2 mg Oral 4x Daily PRN SKYE Carlos MD        metoprolol tartrate (LOPRESSOR) tablet 25 mg  25 mg Oral BID SKYE Carlos MD   25 mg at 10/24/21 0914    miconazole (MICOTIN) 2 % powder   Topical BID SKYE Carlos MD   Given at 10/25/21 2015    ondansetron (ZOFRAN-ODT) disintegrating tablet 4 mg  4 mg Oral Q6H PRN SKYE Carlos MD        pantoprazole (PROTONIX) tablet 40 mg  40 mg Oral QAM AC SKYE Carlos MD   40 mg at 10/26/21 0533    sodium chloride flush 0.9 % injection 10 mL  10 mL IntraVENous 2 times per day Delia Rai MD   10 mL at 10/25/21 2015    sodium chloride flush 0.9 % injection 10 mL  10 mL IntraVENous PRN C Rebecca Carlos MD        midodrine (PROAMATINE) tablet 10 mg  10 mg Oral TID  Cookie MD Karen   10 mg at 10/25/21 1712    0.9 % sodium chloride infusion   IntraVENous PRN Jose Moore MD        sodium bicarbonate 75 mEq in sodium chloride 0.45 % 1,000 mL infusion   IntraVENous Continuous Edwin Kamara MD 75 mL/hr at 10/26/21 0705 New Bag at 10/26/21 0705    fluconazole (DIFLUCAN) 200 mg IVPB  200 mg IntraVENous Q24H Dahlia Ward, APRN -  mL/hr at 10/25/21 1717 200 mg at 10/25/21 1717           Labs:  CBC with Differential:    Lab Results   Component Value Date    WBC 9.8 10/25/2021    RBC 2.35 10/25/2021    HGB 7.1 10/25/2021    HCT 22.9 10/25/2021     10/25/2021    MCV 97.4 10/25/2021    MCH 30.2 10/25/2021    MCHC 31.0 10/25/2021    RDW 20.2 10/25/2021    SEGSPCT 56.0 10/25/2021    BANDSPCT 8 10/25/2021    LYMPHOPCT 20.0 10/25/2021    MONOPCT 8.0 10/25/2021    MYELOPCT 1 08/18/2011    EOSPCT 4.1 04/10/2012    BASOPCT 2.0 10/25/2021    MONOSABS 0.8 10/25/2021    LYMPHSABS 2.0 10/25/2021    EOSABS 0.5 10/25/2021    BASOSABS 0.2 10/25/2021    DIFFTYPE MANUAL DIFFERENTIAL 10/25/2021     CMP:    Lab Results   Component Value Date     10/25/2021    K 4.2 10/25/2021     10/25/2021    CO2 19 10/25/2021    BUN 54 10/25/2021    CREATININE 2.8 10/25/2021    GFRAA 20 10/25/2021    LABGLOM 16 10/25/2021    GLUCOSE 77 10/25/2021    PROT 6.5 10/11/2021    PROT 5.5 03/24/2012    LABALBU 2.4 10/25/2021    CALCIUM 8.1 10/25/2021    BILITOT 0.4 10/11/2021    ALKPHOS 74 10/11/2021    AST 35 10/11/2021    ALT 7 10/11/2021     Hepatic Function Panel:    Lab Results   Component Value Date    ALKPHOS 74 10/11/2021    ALT 7 10/11/2021    AST 35 10/11/2021    PROT 6.5 10/11/2021    PROT 5.5 03/24/2012    BILITOT 0.4 10/11/2021    BILIDIR 0.1 08/08/2011    IBILI 0.2 08/08/2011    LABALBU 2.4 10/25/2021     Magnesium:    Lab Results   Component Value Date    MG 2.3 10/25/2021     PT/INR:    Lab Results   Component Value Date    PROTIME 12.1 10/08/2021    PROTIME 14.5 03/24/2012    INR 0.94 10/08/2021     Last 3 Troponin:    Lab Results   Component Value Date    TROPONINI 0.019 09/30/2011    TROPONINI <0.006 08/08/2011     U/A:    Lab Results   Component Value Date    COLORU YELLOW 10/20/2021    WBCUA 331 10/20/2021    RBCUA 155 10/20/2021    MUCUS RARE 10/08/2021    TRICHOMONAS NONE SEEN 10/20/2021    YEAST OCCASIONAL 10/20/2021    BACTERIA OCCASIONAL 10/20/2021    CLARITYU CLOUDY 10/20/2021    SPECGRAV 1.018 10/20/2021    LEUKOCYTESUR LARGE 10/20/2021    UROBILINOGEN NEGATIVE 10/20/2021    BILIRUBINUR NEGATIVE 10/20/2021    BLOODU LARGE 10/20/2021     ABG:    Lab Results   Component Value Date    PO2ART 75 08/14/2011    BEART 1 08/14/2011     FLP:    Lab Results   Component Value Date    TRIG 155 12/17/2016    HDL 30 12/17/2016    LDLDIRECT 125 12/17/2016     TSH:    Lab Results   Component Value Date    TSH 3.260 09/06/2018      DATA:   ECG: Sinus Rhythm       ASSESSMENT:   1 atrial fibrillation with RVR currently in sinus rhythm  Which was discontinued currently in sinus bradycardia and episode of 3.8-second pause patient very likely has sick sinus syndrome had a pacemaker inserted yesterday interrogated and no complication    #Pacemaker functioning properly    2 acute kidney injury due to ATN  Nephrology following    3 right hip fracture status post surgery recently    4 metabolic encephalopathy due to UTI  Clinically improved  5 anemia  Plan  Discontinue digoxin  Patient stable from cardiology standpoint

## 2021-10-26 NOTE — PROGRESS NOTES
Internal Medicine Hospitalist             Daily Progress  Note   Subjective:   CC: f/u hypotension    Ms. Halina Acharya denies any acute complaints. Still not back to her baseline. Had pacemaker last night. Objective:    BP (!) 125/45   Pulse 75   Temp 98 °F (36.7 °C) (Oral)   Resp 20   Wt 233 lb 11 oz (106 kg)   LMP 01/23/1995   SpO2 95%   BMI 44.18 kg/m²      Intake/Output Summary (Last 24 hours) at 10/26/2021 1134  Last data filed at 10/26/2021 0941  Gross per 24 hour   Intake 932.83 ml   Output 975 ml   Net -42.17 ml      Physical Exam:  Heart: Bradycardia, normal S1 and S2 in all 4 auscultatory areas. No rubs  Murmurs or gallops heard. Pacemaker site noted  Lungs: Mostly clear to auscultation, decreased breath sounds at bases. No wheezes appreciated no crackles heard. Abdomen: Soft, non distended. Bowel sounds appreciated. No obvious liver or spleen enlargement. Non tender, no rebound noted. Extremities: Erythema slightly improved. CNS: Grossly intact.     Labs:  CBC with Differential:    Lab Results   Component Value Date    WBC 9.8 10/25/2021    RBC 2.35 10/25/2021    HGB 7.1 10/25/2021    HCT 22.9 10/25/2021     10/25/2021    MCV 97.4 10/25/2021    MCH 30.2 10/25/2021    MCHC 31.0 10/25/2021    RDW 20.2 10/25/2021    SEGSPCT 56.0 10/25/2021    BANDSPCT 8 10/25/2021    LYMPHOPCT 20.0 10/25/2021    MONOPCT 8.0 10/25/2021    MYELOPCT 1 08/18/2011    EOSPCT 4.1 04/10/2012    BASOPCT 2.0 10/25/2021    MONOSABS 0.8 10/25/2021    LYMPHSABS 2.0 10/25/2021    EOSABS 0.5 10/25/2021    BASOSABS 0.2 10/25/2021    DIFFTYPE MANUAL DIFFERENTIAL 10/25/2021     CMP:    Lab Results   Component Value Date     10/25/2021    K 4.2 10/25/2021     10/25/2021    CO2 19 10/25/2021    BUN 54 10/25/2021    CREATININE 2.8 10/25/2021    GFRAA 20 10/25/2021    LABGLOM 16 10/25/2021    GLUCOSE 77 10/25/2021    PROT 6.5 10/11/2021    PROT 5.5 03/24/2012    LABALBU 2.4 10/25/2021    CALCIUM 8.1 10/25/2021    BILITOT 0.4 10/11/2021    ALKPHOS 74 10/11/2021    AST 35 10/11/2021    ALT 7 10/11/2021     No results for input(s): TROPONINT in the last 72 hours.   Lab Results   Component Value Date    [facility] 1.210 05/21/2021         sodium chloride      sodium chloride      sodium chloride      IV infusion builder 75 mL/hr at 10/26/21 0705      cefepime  1,000 mg IntraVENous Q12H    digoxin  125 mcg Oral Q48H    enoxaparin  30 mg SubCUTAneous Daily    acetaminophen  1,000 mg Oral 4x Daily WC    allopurinol  50 mg Oral Daily    collagenase   Topical Daily    levothyroxine  50 mcg Oral Daily    metoprolol tartrate  25 mg Oral BID    miconazole   Topical BID    pantoprazole  40 mg Oral QAM AC    sodium chloride flush  10 mL IntraVENous 2 times per day    midodrine  10 mg Oral TID WC    fluconazole  200 mg IntraVENous Q24H         Assessment:       Patient Active Problem List    Diagnosis Date Noted    SSS [facility]sick [facility] syndrome) ([facility])     Septic shock ([facility] 10/24/2021    Acute cystitis without hematuria     Enterococcus faecalis infection     Uncontrolled pain     Generalized weakness     Acute blood loss anemia     Paroxysmal atrial fibrillation ([facility])     Acute on chronic diastolic (congestive) heart failure ([facility]CC)     Traumatic closed fracture of neck of femur with minimal displacement, right, initial encounter ([facility] 10/11/2021    MRSA cellulitis 10/10/2021    Displaced fracture of right femoral neck ([facility] 10/08/2021    Closed transcervical fracture of proximal femur, left, initial encounter ([facility] 05/21/2021    Morbid obesity with BMI of 45.0-49.9, adult ([address] 07/23/2020    History of ventral hernia repair 10/19/2018    Essential hypertension 08/10/2018    S/P herniorrhaphy 07/11/2018    S/P ventral herniorrhaphy 06/13/2018    H/O ventral hernia 06/06/2018    Chronic kidney disease, stage IV (severe) ([address] 05/16/2017    Gastroesophageal reflux disease 05/16/2017    CRF (chronic renal failure) 09/20/2016    Hypothyroid 09/20/2016    Ventral hernia without obstruction or gangrene 09/18/2016    SVT (supraventricular tachycardia) ([facility]) 09/18/2016    Chronic kidney disease (CKD) stage G4/A2, severely decreased glomerular filtration rate (GFR) between 15-29 mL/min/1.73 square meter and albuminuria creatinine ratio between  mg/g ([facility] 06/21/2016       Plan:     Problems being addressed this admission:   Septic shock /acute metabolic encephalopathy /  UTI / blood & urine culture pos Pseudomonas  ID on board. Meropenem switched to cefepime. Also on Diflucan plan for 2 weeks  Repeat blood cultures 10/22 no growth to date       Anemia rule out GIB  GI on board, continue to trend and transfuse as needed  Eventually EGD/colonoscopy     Atrial fibrillation with RVR /sick sinus syndrome  Was on Cardizem drip but since weaned off. Also started on digoxin  Status post pacemaker last night for sinus pauses     TOBY on CKD 4  Resolved, creatinine now on her baseline  Nephrology on board     Stasis dermatitis both lower limbs     Recent right femoral neck fracture s/p arthoplasty 10/10/2021       Disposition  10/23/2021-she was transferred from ARU patient.  Would return when medically ready.      The above chart was generated partly using Dragon dictation system, it may contain dictation errors given the limitations of this technology.

## 2021-10-26 NOTE — PROGRESS NOTES
Physician Progress Note      PATIENTJosie Lucero  CSN #:                  [id number]  :                       1940  ADMIT DATE:       10/22/2021 3:45 PM  [address] [place] DATE:  RESPONDING  PROVIDER #:        Sven Mclaughlin MD          QUERY TEXT:    Attending,    Pt admitted with UTI. Per H&P, \"While in ARU, patient was found to be   bacteremic with Pseudomonas likely from UTI due to Mobley catheter use. Mobley   catheter was exchanged. \" If possible, please document in the progress notes   and discharge summary if you are evaluating and/or treating any of the   following: The medical record reflects the following:  Risk Factors: UTI, indwelling mobley catheter  Clinical Indicators: Mobley catheter placed on 10/8, exchanged on 10/22. HR   140's, BP 80's. AMS. + pseudomonas UTI  Treatment: IVF, IV Merrem, IV Diflucan    MANNY KaurN, RN  Clinical   [phone]  Options provided:  -- UTI due to indwelling urinary catheter  -- UTI not due to indwelling urinary catheter  -- Other - I will add my own diagnosis  -- Disagree - Not applicable / Not valid  -- Disagree - Clinically unable to determine / Unknown  -- Refer to Clinical Documentation Reviewer    PROVIDER RESPONSE TEXT:    UTI is due to the indwelling urinary catheter.     Query created by: Deanne Green on 10/23/2021 10:35 AM      Electronically signed by:  Sven Mclaughlin MD 10/26/2021 12:53 PM

## 2021-10-27 LAB
ALBUMIN SERPL-MCNC: 2.2 GM/DL (ref 3.4–5)
ANION GAP SERPL CALCULATED.3IONS-SCNC: 10 MMOL/L (ref 4–16)
ANISOCYTOSIS: ABNORMAL
BANDED NEUTROPHILS ABSOLUTE COUNT: 2.24 K/CU MM
BANDED NEUTROPHILS RELATIVE PERCENT: 16 % (ref 5–11)
BUN BLDV-MCNC: 40 MG/DL (ref 6–23)
CALCIUM SERPL-MCNC: 7.6 MG/DL (ref 8.3–10.6)
CHLORIDE BLD-SCNC: 105 MMOL/L (ref 99–110)
CO2: 21 MMOL/L (ref 21–32)
CREAT SERPL-MCNC: 2.5 MG/DL (ref 0.6–1.1)
CULTURE: NORMAL
CULTURE: NORMAL
DIFFERENTIAL TYPE: ABNORMAL
EOSINOPHILS ABSOLUTE: 0.4 K/CU MM
EOSINOPHILS RELATIVE PERCENT: 3 % (ref 0–3)
GFR AFRICAN AMERICAN: 22 ML/MIN/1.73M2
GFR NON-AFRICAN AMERICAN: 19 ML/MIN/1.73M2
GLUCOSE BLD-MCNC: 84 MG/DL (ref 70–99)
HCT VFR BLD CALC: 22.4 % (ref 37–47)
HEMOGLOBIN: 7.2 GM/DL (ref 12.5–16)
HIGH SENSITIVE C-REACTIVE PROTEIN: 148.1 MG/L
LYMPHOCYTES ABSOLUTE: 2.1 K/CU MM
LYMPHOCYTES RELATIVE PERCENT: 15 % (ref 24–44)
Lab: NORMAL
Lab: NORMAL
MCH RBC QN AUTO: 30.5 PG (ref 27–31)
MCHC RBC AUTO-ENTMCNC: 32.1 % (ref 32–36)
MCV RBC AUTO: 94.9 FL (ref 78–100)
METAMYELOCYTES ABSOLUTE COUNT: 0.56 K/CU MM
METAMYELOCYTES PERCENT: 4 %
MONOCYTES ABSOLUTE: 1.1 K/CU MM
MONOCYTES RELATIVE PERCENT: 8 % (ref 0–4)
NUCLEATED RED BLOOD CELLS: 5
PDW BLD-RTO: 20.1 % (ref 11.7–14.9)
PHOSPHORUS: 2 MG/DL (ref 2.5–4.9)
PLATELET # BLD: 143 K/CU MM (ref 140–440)
PMV BLD AUTO: 9.9 FL (ref 7.5–11.1)
POTASSIUM SERPL-SCNC: 3.7 MMOL/L (ref 3.5–5.1)
PROCALCITONIN: 0.86
RBC # BLD: 2.36 M/CU MM (ref 4.2–5.4)
RBC # BLD: ABNORMAL 10*6/UL
SEGMENTED NEUTROPHILS ABSOLUTE COUNT: 7.6 K/CU MM
SEGMENTED NEUTROPHILS RELATIVE PERCENT: 54 % (ref 36–66)
SODIUM BLD-SCNC: 136 MMOL/L (ref 135–145)
SPECIMEN: NORMAL
SPECIMEN: NORMAL
WBC # BLD: 14 K/CU MM (ref 4–10.5)

## 2021-10-27 PROCEDURE — 6360000002 HC RX W HCPCS: Performed by: NURSE PRACTITIONER

## 2021-10-27 PROCEDURE — 6360000002 HC RX W HCPCS: Performed by: PHYSICAL MEDICINE & REHABILITATION

## 2021-10-27 PROCEDURE — 2140000000 HC CCU INTERMEDIATE R&B

## 2021-10-27 PROCEDURE — 84145 PROCALCITONIN (PCT): CPT

## 2021-10-27 PROCEDURE — 6370000000 HC RX 637 (ALT 250 FOR IP): Performed by: PHYSICAL MEDICINE & REHABILITATION

## 2021-10-27 PROCEDURE — 99213 OFFICE O/P EST LOW 20 MIN: CPT

## 2021-10-27 PROCEDURE — 94761 N-INVAS EAR/PLS OXIMETRY MLT: CPT

## 2021-10-27 PROCEDURE — 80069 RENAL FUNCTION PANEL: CPT

## 2021-10-27 PROCEDURE — 6370000000 HC RX 637 (ALT 250 FOR IP): Performed by: INTERNAL MEDICINE

## 2021-10-27 PROCEDURE — 94150 VITAL CAPACITY TEST: CPT

## 2021-10-27 PROCEDURE — 86141 C-REACTIVE PROTEIN HS: CPT

## 2021-10-27 PROCEDURE — 85007 BL SMEAR W/DIFF WBC COUNT: CPT

## 2021-10-27 PROCEDURE — 85027 COMPLETE CBC AUTOMATED: CPT

## 2021-10-27 PROCEDURE — 36415 COLL VENOUS BLD VENIPUNCTURE: CPT

## 2021-10-27 PROCEDURE — 80048 BASIC METABOLIC PNL TOTAL CA: CPT

## 2021-10-27 PROCEDURE — 2580000003 HC RX 258: Performed by: NURSE PRACTITIONER

## 2021-10-27 PROCEDURE — 2500000003 HC RX 250 WO HCPCS: Performed by: INTERNAL MEDICINE

## 2021-10-27 PROCEDURE — 99233 SBSQ HOSP IP/OBS HIGH 50: CPT | Performed by: INTERNAL MEDICINE

## 2021-10-27 PROCEDURE — 2580000003 HC RX 258: Performed by: INTERNAL MEDICINE

## 2021-10-27 RX ADMIN — ENOXAPARIN SODIUM 30 MG: 30 INJECTION SUBCUTANEOUS at 08:04

## 2021-10-27 RX ADMIN — MIDODRINE HYDROCHLORIDE 10 MG: 5 TABLET ORAL at 08:02

## 2021-10-27 RX ADMIN — CEFEPIME HYDROCHLORIDE 1000 MG: 1 INJECTION, POWDER, FOR SOLUTION INTRAMUSCULAR; INTRAVENOUS at 23:59

## 2021-10-27 RX ADMIN — SODIUM BICARBONATE: 84 INJECTION, SOLUTION INTRAVENOUS at 21:41

## 2021-10-27 RX ADMIN — MIDODRINE HYDROCHLORIDE 10 MG: 5 TABLET ORAL at 14:44

## 2021-10-27 RX ADMIN — CEFEPIME HYDROCHLORIDE 1000 MG: 1 INJECTION, POWDER, FOR SOLUTION INTRAMUSCULAR; INTRAVENOUS at 14:54

## 2021-10-27 RX ADMIN — FLUCONAZOLE 200 MG: 200 INJECTION, SOLUTION INTRAVENOUS at 18:54

## 2021-10-27 RX ADMIN — COLLAGENASE SANTYL: 250 OINTMENT TOPICAL at 14:42

## 2021-10-27 RX ADMIN — ACETAMINOPHEN 1000 MG: 500 TABLET ORAL at 18:42

## 2021-10-27 RX ADMIN — PANTOPRAZOLE SODIUM 40 MG: 40 TABLET, DELAYED RELEASE ORAL at 05:10

## 2021-10-27 RX ADMIN — MIDODRINE HYDROCHLORIDE 10 MG: 5 TABLET ORAL at 18:42

## 2021-10-27 RX ADMIN — DIGOXIN 125 MCG: 125 TABLET ORAL at 14:44

## 2021-10-27 RX ADMIN — ALLOPURINOL 50 MG: 100 TABLET ORAL at 08:01

## 2021-10-27 RX ADMIN — METOPROLOL TARTRATE 25 MG: 25 TABLET, FILM COATED ORAL at 08:01

## 2021-10-27 RX ADMIN — ACETAMINOPHEN 1000 MG: 500 TABLET ORAL at 09:35

## 2021-10-27 RX ADMIN — LEVOTHYROXINE SODIUM 50 MCG: 0.05 TABLET ORAL at 05:10

## 2021-10-27 RX ADMIN — ACETAMINOPHEN 1000 MG: 500 TABLET ORAL at 21:39

## 2021-10-27 RX ADMIN — METOPROLOL TARTRATE 25 MG: 25 TABLET, FILM COATED ORAL at 21:38

## 2021-10-27 ASSESSMENT — PAIN SCALES - GENERAL
PAINLEVEL_OUTOF10: 9
PAINLEVEL_OUTOF10: 6
PAINLEVEL_OUTOF10: 0

## 2021-10-27 ASSESSMENT — PAIN DESCRIPTION - PAIN TYPE: TYPE: ACUTE PAIN

## 2021-10-27 ASSESSMENT — PAIN DESCRIPTION - FREQUENCY: FREQUENCY: CONTINUOUS

## 2021-10-27 ASSESSMENT — PAIN DESCRIPTION - ORIENTATION: ORIENTATION: RIGHT

## 2021-10-27 ASSESSMENT — PAIN DESCRIPTION - DESCRIPTORS: DESCRIPTORS: THROBBING

## 2021-10-27 ASSESSMENT — PAIN DESCRIPTION - LOCATION: LOCATION: HIP

## 2021-10-27 ASSESSMENT — PAIN DESCRIPTION - PROGRESSION: CLINICAL_PROGRESSION: GRADUALLY IMPROVING

## 2021-10-27 ASSESSMENT — PAIN SCALES - WONG BAKER: WONGBAKER_NUMERICALRESPONSE: 0

## 2021-10-27 NOTE — CARE COORDINATION
Pt denied for [facility] per Margi Patel. Notified pt. Another SNF list provided. Pt to discuss with her niece this evening. CM to f/u in am for SNF choice.   TE

## 2021-10-27 NOTE — CARE COORDINATION
Called Donna/[facility] to for decision to accept or not and informed her that  is ready to d/c pt. She states that nursing is reviewing at this time. She will notify CM of decision asap.   TE

## 2021-10-27 NOTE — PROGRESS NOTES
Internal Medicine Hospitalist             Daily Progress  Note   Subjective:   CC: f/u hypotension    Ms. Collins  denies any acute complaints. Objective:    BP (!) 120/47   Pulse 60   Temp 97.9 °F (36.6 °C) (Oral)   Resp 16   Wt 235 lb 0.2 oz (106.6 kg)   LMP 01/23/1995   SpO2 98%   BMI 44.43 kg/m²    No intake or output data in the 24 hours ending 10/27/21 1226   Physical Exam:  Heart: Bradycardia, normal S1 and S2 in all 4 auscultatory areas. No rubs  Murmurs or gallops heard. Pacemaker site noted  Lungs: Mostly clear to auscultation, decreased breath sounds at bases. No wheezes appreciated no crackles heard. Abdomen: Soft, non distended. Bowel sounds appreciated. No obvious liver or spleen enlargement. Non tender, no rebound noted. Extremities: Erythema slightly improved. CNS: Grossly intact.     Labs:  CBC with Differential:    Lab Results   Component Value Date    WBC 14.0 10/27/2021    RBC 2.36 10/27/2021    HGB 7.2 10/27/2021    HCT 22.4 10/27/2021     10/27/2021    MCV 94.9 10/27/2021    MCH 30.5 10/27/2021    MCHC 32.1 10/27/2021    RDW 20.1 10/27/2021    NRBC 5 10/27/2021    SEGSPCT 54.0 10/27/2021    BANDSPCT 16 10/27/2021    LYMPHOPCT 15.0 10/27/2021    MONOPCT 8.0 10/27/2021    MYELOPCT 1 08/18/2011    EOSPCT 4.1 04/10/2012    BASOPCT 2.0 10/25/2021    MONOSABS 1.1 10/27/2021    LYMPHSABS 2.1 10/27/2021    EOSABS 0.4 10/27/2021    BASOSABS 0.2 10/25/2021    DIFFTYPE MANUAL DIFFERENTIAL 10/27/2021     CMP:    Lab Results   Component Value Date     10/27/2021    K 3.7 10/27/2021     10/27/2021    CO2 21 10/27/2021    BUN 40 10/27/2021    CREATININE 2.5 10/27/2021    GFRAA 22 10/27/2021    LABGLOM 19 10/27/2021    GLUCOSE 84 10/27/2021    PROT 6.5 10/11/2021    PROT 5.5 03/24/2012    LABALBU 2.2 10/27/2021    CALCIUM 7.6 10/27/2021    BILITOT 0.4 10/11/2021    ALKPHOS 74 10/11/2021    AST 35 10/11/2021    ALT 7 10/11/2021     No results for input(s): TROPONINT in the last 72 hours.   Lab Results   Component Value Date    [facility] 1.210 05/21/2021         sodium chloride      sodium chloride      sodium chloride      IV infusion builder 75 mL/hr at 10/26/21 6545      cefepime  1,000 mg IntraVENous Q12H    digoxin  125 mcg Oral Q48H    enoxaparin  30 mg SubCUTAneous Daily    acetaminophen  1,000 mg Oral 4x Daily WC    allopurinol  50 mg Oral Daily    collagenase   Topical Daily    levothyroxine  50 mcg Oral Daily    metoprolol tartrate  25 mg Oral BID    miconazole   Topical BID    pantoprazole  40 mg Oral QAM AC    sodium chloride flush  10 mL IntraVENous 2 times per day    midodrine  10 mg Oral TID WC    fluconazole  200 mg IntraVENous Q24H         Assessment:       Patient Active Problem List    Diagnosis Date Noted    Pseudomonas aeruginosa infection     SSS [facility]sick [facility] syndrome) ([facility])     Septic shock ([facility] [address] 10/24/2021    Acute cystitis without hematuria     Enterococcus faecalis infection     Uncontrolled pain     Generalized weakness     Acute blood loss anemia     Paroxysmal atrial fibrillation (HCC)     Acute on chronic diastolic (congestive) heart failure ([facility])     Traumatic closed fracture of neck of femur with minimal displacement, right, initial encounter ([facility] [address] 10/11/2021    MRSA cellulitis 10/10/2021    Displaced fracture of right femoral neck ([address] 10/08/2021    Closed transcervical fracture of proximal femur, left, initial encounter ([address] 05/21/2021    Morbid obesity with BMI of 45.0-49.9, adult ([address] 07/23/2020    History of ventral hernia repair 10/19/2018    Essential hypertension 08/10/2018    S/P herniorrhaphy 07/11/2018    S/P ventral herniorrhaphy 06/13/2018    H/O ventral hernia 06/06/2018    Chronic kidney disease, stage IV (severe) ([address] 05/16/2017    Gastroesophageal reflux disease 05/16/2017    CRF (chronic renal failure) 09/20/2016    Hypothyroid 09/20/2016    Ventral hernia without obstruction or gangrene 09/18/2016    SVT (supraventricular tachycardia) ([facility]) 09/18/2016    Chronic kidney disease (CKD) stage G4/A2, severely decreased glomerular filtration rate (GFR) between 15-29 mL/min/1.73 square meter and albuminuria creatinine ratio between  mg/g ([facility] 34/12/7785    Complicated UTI (urinary tract infection) 03/24/2012       Plan:   81-year-old female who was in the ARU after a fall leading to hip fracture. Found to have positive blood cultures for Pseudomonas attributed to CAUTI. This led to sepsis and metabolic encephalopathy. She was seen by ID. Started on meropenem and was subsequently switched to cefepime and is also on Diflucan. Plan to continue until 11/5. Has worsening of her kidney function. She refuses dialysis. Seen by nephrology. Improved with IV fluids. Had anemia. Seen by GI, will need eventual endoscopy. Hemoglobin has been stable. Problems being addressed this admission:   Septic shock /acute metabolic encephalopathy /  UTI / blood & urine culture pos Pseudomonas  ID on board. Meropenem switched to cefepime. Also on Diflucan plan for 2 weeks  Repeat blood cultures 10/22 no growth to date       Anemia rule out GIB  GI on board, continue to trend and transfuse as needed  Eventually EGD/colonoscopy     Atrial fibrillation with RVR /sick sinus syndrome  Was on Cardizem drip but since weaned off. Also started on digoxin  Status post pacemaker for sinus pauses     TOBY on CKD 4  Resolved, creatinine now on her baseline  Nephrology on board   -remains on bicarb drip, await nephrology recs    Stasis dermatitis both lower limbs     Recent right femoral neck fracture s/p arthoplasty 10/10/2021       Disposition  To snf. Medically ready     The above chart was generated partly using Dragon dictation system, it may contain dictation errors given the limitations of this technology.

## 2021-10-27 NOTE — PROGRESS NOTES
Nephrology Progress Note  10/27/2021 2:28 PM  Subjective: Interval History: Kiet Mitchell is a [de-identified] y.o. female weak in bed appears more awake    Data:   Scheduled Meds:   cefepime  1,000 mg IntraVENous Q12H    digoxin  125 mcg Oral Q48H    enoxaparin  30 mg SubCUTAneous Daily    acetaminophen  1,000 mg Oral 4x Daily WC    allopurinol  50 mg Oral Daily    collagenase   Topical Daily    levothyroxine  50 mcg Oral Daily    metoprolol tartrate  25 mg Oral BID    miconazole   Topical BID    pantoprazole  40 mg Oral QAM AC    sodium chloride flush  10 mL IntraVENous 2 times per day    midodrine  10 mg Oral TID WC    fluconazole  200 mg IntraVENous Q24H     Continuous Infusions:   sodium chloride      sodium chloride      sodium chloride      IV infusion builder 75 mL/hr at 10/26/21 2345         CBC   Recent Labs     10/25/21  0735 10/27/21  0840   WBC 9.8 14.0*   HGB 7.1* 7.2*   HCT 22.9* 22.4*    143      BMP   Recent Labs     10/25/21  0735 10/27/21  0840    136   K 4.2 3.7    105   CO2 19* 21   PHOS 2.8 2.0*   BUN 54* 40*   CREATININE 2.8* 2.5*     Hepatic: No results for input(s): AST, ALT, ALB, BILITOT, ALKPHOS in the last 72 hours. Troponin: No results for input(s): TROPONINI in the last 72 hours. BNP: No results for input(s): BNP in the last 72 hours. Lipids: No results for input(s): CHOL, HDL in the last 72 hours. Invalid input(s): LDLCALCU  ABGs:   Lab Results   Component Value Date    PO2ART 75 08/14/2011     INR: No results for input(s): INR in the last 72 hours.   Renal Labs  Albumin:    Lab Results   Component Value Date    LABALBU 2.2 10/27/2021     Calcium:    Lab Results   Component Value Date    CALCIUM 7.6 10/27/2021     Phosphorus:    Lab Results   Component Value Date    PHOS 2.0 10/27/2021     U/A:    Lab Results   Component Value Date    NITRU NEGATIVE 10/20/2021    COLORU YELLOW 10/20/2021    WBCUA 331 10/20/2021    RBCUA 155 10/20/2021    MUCUS RARE 10/08/2021    TRICHOMONAS NONE SEEN 10/20/2021    YEAST OCCASIONAL 10/20/2021    BACTERIA OCCASIONAL 10/20/2021    CLARITYU CLOUDY 10/20/2021    SPECGRAV 1.018 10/20/2021    UROBILINOGEN NEGATIVE 10/20/2021    BILIRUBINUR NEGATIVE 10/20/2021    BLOODU LARGE 10/20/2021    KETUA NEGATIVE 10/20/2021     ABG:    Lab Results   Component Value Date    PO2ART 75 08/14/2011    BEART 1 08/14/2011     HgBA1c:  No results found for: LABA1C  Microalbumen/Creatinine ratio:  No components found for: RUCREAT  TSH:    Lab Results   Component Value Date    TSH 3.260 09/06/2018     IRON:    Lab Results   Component Value Date    IRON 204 05/24/2021     Iron Saturation:  No components found for: PERCENTFE  TIBC:    Lab Results   Component Value Date    TIBC 206 05/24/2021     FERRITIN:  No results found for: FERRITIN  RPR:  No results found for: RPR  SHADE:  No results found for: ANATITER, SHADE  24 Hour Urine for Creatinine Clearance:  No components found for: CREAT4, UHRS10, UTV10      Objective:   I/O: 10/26 0701 - 10/27 0700  In: -   Out: 200 [Urine:200]  I/O last 3 completed shifts:  In: -   Out: 200 [Urine:200]  No intake/output data recorded. Vitals: BP (!) 120/47   Pulse 60   Temp 97.9 °F (36.6 °C) (Oral)   Resp 16   Wt 235 lb 0.2 oz (106.6 kg)   LMP 01/23/1995   SpO2 98%   BMI 44.43 kg/m²  {  General appearance: awake weak legally blind  HEENT: Head: Normal, normocephalic, atraumatic. Neck: supple, symmetrical, trachea midline  Lungs: diminished breath sounds bilaterally  Heart: S1, S2 status post pacemaker  Abdomen: abnormal findings:  soft nt  Extremities: edema trace with erythema lower extremities   Neurologic: Mental status: alertness: alert         Assessment and Plan:      IMP:  1. Acute renal failure from ATN on CKD 4  2. Right hip fracture status post surgery  3. Cellulitis with IV antibiotics  4. A. fib rapid rate  5.   Anemia  6.  hypotension    Plan     #1 renal function slightly improved we will monitor  2.   Await rehab plans in the above setting and has had already  #3 treated wounds on legs and appears improved  #4 heart rate controlled  #5 hemoglobin low monitor recommend PRBCs if less than 7  #6 blood pressure holding stable status post pacemaker  #7 we will follow monitor             Kevin Miner MD, MD

## 2021-10-27 NOTE — CONSULTS
[address] [id number] Continence Nurse  Consult Note       Joanna Alcocer  AGE: [de-identified] y.o. GENDER: female  : 1940  TODAY'S DATE:  10/27/2021    Subjective:     Reason for  Evaluation and Assessment: wound care Reassessment. Joanna Alcocer is a [de-identified] y.o. female referred by:   [x] Physician  [] Nursing  [] Other:     Wound Identification:  Wound Type: pressure, non-healing surgical and undetermined  Contributing Factors: edema, chronic pressure, decreased mobility and obesity        PAST MEDICAL HISTORY        Diagnosis Date    Acid reflux     'no recent issue\"    Arthritis     \"Left Index Finger\"    CHF (congestive heart failure) ([facility]     per old chart hx of CHF with admission 2016 with pulmonary edema    Chronic kidney disease     Sees Dr. Zakiya Lucio have one kidney, dr Kaela Herzog told me that in  I think\"    GERD (gastroesophageal reflux disease)     History of blood transfusion  Or     No Reaction To Blood Transfusion Received    Hypertension     ( pt states she is not on any medication for blood pressure)    Hypomagnesemia     Hypothyroidism     MRSA (methicillin resistant staph aureus) culture positive 10/08/2021    Leg    Shortness of breath on exertion     SVT (supraventricular tachycardia) ([facility]     per old chart hx of SVT with admission  tx with Adenosine and per notes in 2018 hx of SVT- no cardiologist    Teeth missing     Upper And Lower    Wears glasses        PAST SURGICAL HISTORY    Past Surgical History:   Procedure Laterality Date    ABDOMEN SURGERY      DENTAL SURGERY      Teeth Extracted In Past    ENDOSCOPY, COLON, DIAGNOSTIC   Or     EYE SURGERY  ? when    clyde cataract ext    FEMUR FRACTURE SURGERY Left 2021    FEMUR IM NAIL REGINALDO INSERTION performed by Tanya Aguero MD at [address] [address]    Abdominal Hernia Repair     HIP SURGERY Right 10/8/2021    RIGHT HIP HEMIARTHROPLASTY performed by Tanya Aguero MD at [facility] Right 10/10/2021    HIP HEMIARTHROPLASTY performed by Joao Beckman MD at [facility]  05/27/2018    exp lap . converted to exp laporotomy with ventral hernia repair with mesh    OTHER SURGICAL HISTORY  12/85/6402    umbilical scar excision    PACEMAKER INSERTION N/A 10/25/2021    PACEMAKER INSERTION PERMANENT performed by Lacinda Brittle, MD at [address] [place] [address]  09/16/2016    Robotic laparoscopic       FAMILY HISTORY    Family History   Problem Relation Age of Onset    Cancer Father         Lung Cancer    Arthritis Mother     Cancer Brother         Skin Cancer    High Cholesterol Brother        SOCIAL HISTORY    Social History     Tobacco Use    Smoking status: Never Smoker    Smokeless tobacco: Never Used   Vaping Use    Vaping Use: Never used   Substance Use Topics    Alcohol use: Not Currently    Drug use: No       ALLERGIES    No Known Allergies    MEDICATIONS    No current facility-administered medications on file prior to encounter.      Current Outpatient Medications on File Prior to Encounter   Medication Sig Dispense Refill    polyethylene glycol (GLYCOLAX) 17 g packet Take 17 g by mouth daily as needed for Constipation 527 g 1    sennosides-docusate sodium (SENOKOT-S) 8.6-50 MG tablet Take 1 tablet by mouth 2 times daily 14 tablet 0    allopurinol (ZYLOPRIM) 100 MG tablet Take 0.5 tablets by mouth daily 90 tablet 1    nystatin (MYCOSTATIN) 768711 UNIT/GM powder Apply 3 times daily x 10 to 14 days 45 g 0    metoprolol tartrate (LOPRESSOR) 25 MG tablet Take 1 tablet by mouth 2 times daily 180 tablet 1    magnesium oxide (MAG-OX) 400 (241.3 Mg) MG TABS tablet TAKE ONE TABLET BY MOUTH TWICE A  tablet 1    levothyroxine (SYNTHROID) 50 MCG tablet Take 1 tablet by mouth Daily 90 tablet 1    furosemide (LASIX) 20 MG tablet Take 1 tablet by mouth 2 times daily Take 20 mg by mouth 2 times daily 180 tablet 1 Objective:      BP (!) 120/47   Pulse 61   Temp 97.9 °F (36.6 °C) (Oral)   Resp 19   Wt 235 lb 0.2 oz (106.6 kg)   LMP 01/23/1995   SpO2 97%   BMI 44.43 kg/m²   Polo Risk Score: Polo Scale Score: 16    LABS    CBC:   Lab Results   Component Value Date    WBC 14.0 10/27/2021    RBC 2.36 10/27/2021    HGB 7.2 10/27/2021    HCT 22.4 10/27/2021    MCV 94.9 10/27/2021    MCH 30.5 10/27/2021    MCHC 32.1 10/27/2021    RDW 20.1 10/27/2021     10/27/2021    MPV 9.9 10/27/2021     CMP:    Lab Results   Component Value Date     10/27/2021    K 3.7 10/27/2021     10/27/2021    CO2 21 10/27/2021    BUN 40 10/27/2021    CREATININE 2.5 10/27/2021    GFRAA 22 10/27/2021    LABGLOM 19 10/27/2021    GLUCOSE 84 10/27/2021    PROT 6.5 10/11/2021    PROT 5.5 03/24/2012    LABALBU 2.2 10/27/2021    CALCIUM 7.6 10/27/2021    BILITOT 0.4 10/11/2021    ALKPHOS 74 10/11/2021    AST 35 10/11/2021    ALT 7 10/11/2021     Albumin:    Lab Results   Component Value Date    LABALBU 2.2 10/27/2021     PT/INR:    Lab Results   Component Value Date    PROTIME 12.1 10/08/2021    PROTIME 14.5 03/24/2012    INR 0.94 10/08/2021     HgBA1c:  No results found for: LABA1C      Assessment:     Patient Active Problem List   Diagnosis    Complicated UTI (urinary tract infection)    Chronic kidney disease (CKD) stage G4/A2, severely decreased glomerular filtration rate (GFR) between 15-29 mL/min/1.73 square meter and albuminuria creatinine ratio between  mg/g (HCC)    Ventral hernia without obstruction or gangrene    SVT (supraventricular tachycardia) (HCC)    CRF (chronic renal failure)    Hypothyroid    Chronic kidney disease, stage IV (severe) (HCC)    Gastroesophageal reflux disease    H/O ventral hernia    S/P ventral herniorrhaphy    S/P herniorrhaphy    Essential hypertension    History of ventral hernia repair    Morbid obesity with BMI of 45.0-49.9, adult ([facility]    Closed transcervical fracture of proximal femur, left, initial encounter ([facility]    Displaced fracture of right femoral neck ([facility]    MRSA cellulitis    Traumatic closed fracture of neck of femur with minimal displacement, right, initial encounter ([facility]    Uncontrolled pain    Generalized weakness    Acute blood loss anemia    Paroxysmal atrial fibrillation (HCC)    Acute on chronic diastolic (congestive) heart failure (HCC)    Acute cystitis without hematuria    Enterococcus faecalis infection    Septic shock (HCC)    SSS (sick sinus syndrome) ([facility]    Pseudomonas aeruginosa infection       Measurements:  Wound 10/08/21 Pretibial Right; Anterior (Active)   Wound Image   10/27/21 1500   Wound Etiology Other 10/27/21 1500   Dressing Status Other (Comment) 10/11/21 1945   Wound Cleansed Cleansed with saline 10/18/21 0850   Dressing/Treatment Open to air 10/27/21 1500   Wound Length (cm) 0 cm 10/27/21 1500   Wound Width (cm) 0 cm 10/27/21 1500   Wound Depth (cm) 0 cm 10/27/21 1500   Wound Surface Area (cm^2) 0 cm^2 10/27/21 1500   Change in Wound Size % (l*w) 100 10/27/21 1500   Wound Volume (cm^3) 0 cm^3 10/27/21 1500   Wound Healing % 100 10/27/21 1500   Distance Tunneling (cm) 0 cm 10/26/21 0941   Tunneling Position ___ O'Clock 0 10/26/21 0941   Undermining Starts ___ O'Clock 0 10/26/21 0941   Undermining Ends___ O'Clock 0 10/26/21 0941   Undermining Maxium Distance (cm) 0 10/26/21 0941   Wound Assessment Eschar dry 10/14/21 0745   Drainage Amount None 10/27/21 1500   Odor None 10/27/21 0750   Deena-wound Assessment Blanchable erythema;Edematous;Dry/flaky 10/27/21 0750   Margins Attached edges 10/27/21 0750   Number of days: 19       Wound 10/08/21 Pretibial Left (Active)   Wound Image   10/27/21 1500   Wound Etiology Other 10/27/21 1500   Dressing Status Other (Comment) 10/27/21 0750   Wound Cleansed Cleansed with saline 10/18/21 0850   Dressing/Treatment Open to air 10/27/21 1500   Wound Length (cm) 0 cm 10/27/21 1500   Wound Width (cm) 0 cm 10/27/21 1500   Wound Depth (cm) 0 cm 10/27/21 1500   Wound Surface Area (cm^2) 0 cm^2 10/27/21 1500   Change in Wound Size % (l*w) 100 10/27/21 1500   Wound Volume (cm^3) 0 cm^3 10/27/21 1500   Wound Healing % 100 10/27/21 1500   Distance Tunneling (cm) 0 cm 10/26/21 0941   Tunneling Position ___ O'Clock 0 10/26/21 0941   Undermining Starts ___ O'Clock 0 10/26/21 0941   Undermining Ends___ O'Clock 00 10/26/21 0941   Undermining Maxium Distance (cm) 0 10/26/21 0941   Wound Assessment Eschar dry 10/14/21 0745   Drainage Amount None 10/27/21 1500   Odor None 10/27/21 0750   Deena-wound Assessment Blanchable erythema;Dry/flaky;Edematous 10/27/21 0750   Margins Attached edges 10/27/21 0750   Number of days: 19       Wound 10/15/21 Ankle Right;Lateral (Active)   Wound Image   10/27/21 1500   Wound Etiology Deep tissue/Injury 10/27/21 1500   Dressing Status New dressing applied 10/27/21 1500   Wound Cleansed Cleansed with saline 10/27/21 1500   Wound Length (cm) 0.3 cm 10/27/21 1500   Wound Width (cm) 0.3 cm 10/27/21 1500   Wound Depth (cm) 0 cm 10/27/21 1500   Wound Surface Area (cm^2) 0.09 cm^2 10/27/21 1500   Change in Wound Size % (l*w) 95.5 10/27/21 1500   Wound Volume (cm^3) 0 cm^3 10/27/21 1500   Distance Tunneling (cm) 0 cm 10/27/21 1500   Tunneling Position ___ O'Clock 0 10/27/21 1500   Undermining Starts ___ O'Clock 0 10/27/21 1500   Undermining Ends___ O'Clock 0 10/27/21 1500   Undermining Maxium Distance (cm) 0 10/27/21 1500   Wound Assessment Pink/red;Purple/maroon 10/27/21 0750   Drainage Amount None 10/27/21 1500   Odor None 10/27/21 1500   Deena-wound Assessment Intact 10/27/21 1500   Margins Attached edges 10/27/21 1500   Number of days: 12       Wound 10/15/21 Thigh Anterior;Right (Active)   Wound Image   10/27/21 1500   Wound Etiology Surgical 10/27/21 1500   Dressing Status New dressing applied 10/27/21 1500   Wound Cleansed Cleansed with saline 10/27/21 1500   Dressing/Treatment Hydrofiber Ag;ABD 10/27/21 0750   Wound Length (cm) 0.5 cm 10/27/21 1500   Wound Width (cm) 0.5 cm 10/27/21 1500   Wound Depth (cm) 0.1 cm 10/27/21 1500   Wound Surface Area (cm^2) 0.25 cm^2 10/27/21 1500   Change in Wound Size % (l*w) 40.48 10/27/21 1500   Wound Volume (cm^3) 0.025 cm^3 10/27/21 1500   Wound Healing % 40 10/27/21 1500   Distance Tunneling (cm) 0 cm 10/27/21 1500   Tunneling Position ___ O'Clock 0 10/27/21 1500   Undermining Starts ___ O'Clock 0 10/27/21 1500   Undermining Ends___ O'Clock 0 10/27/21 1500   Undermining Maxium Distance (cm) 0 10/27/21 1500   Wound Assessment Other (Comment) 10/27/21 0750   Drainage Amount Moderate 10/27/21 1500   Drainage Description Serosanguinous; Yellow 10/27/21 1500   Odor None 10/27/21 1500   Deena-wound Assessment Intact 10/27/21 1500   Margins Defined edges 10/27/21 1500   Wound Thickness Description not for Pressure Injury Full thickness 10/27/21 1500   Number of days: 12       Wound 10/15/21 Coccyx cluster (Active)   Wound Image   10/27/21 1500   Wound Etiology Pressure Unstageable 10/27/21 1500   Dressing Status New dressing applied 10/27/21 1500   Wound Cleansed Cleansed with saline 10/27/21 1500   Dressing/Treatment Hydrofiber;Silicone border 79/88/53 0750   Wound Length (cm) 8 cm 10/27/21 1500   Wound Width (cm) 10 cm 10/27/21 1500   Wound Depth (cm) 0.1 cm 10/27/21 1500   Wound Surface Area (cm^2) 80 cm^2 10/27/21 1500   Change in Wound Size % (l*w) -60 10/27/21 1500   Wound Volume (cm^3) 8 cm^3 10/27/21 1500   Wound Healing % -60 10/27/21 1500   Distance Tunneling (cm) 0 cm 10/27/21 1500   Tunneling Position ___ O'Clock 0 10/27/21 1500   Undermining Starts ___ O'Clock 0 10/27/21 1500   Undermining Ends___ O'Clock 0 10/27/21 1500   Undermining Maxium Distance (cm) 0 10/27/21 1500   Wound Assessment Other (Comment) 10/27/21 0750   Drainage Amount Moderate 10/27/21 1500   Drainage Description Serosanguinous; Yellow 10/27/21 1500   Odor None 10/27/21 1500   Deena-wound Assessment Intact 10/27/21 1500   Margins Defined edges 10/27/21 1500   Wound Thickness Description not for Pressure Injury Full thickness 10/27/21 1500   Number of days: 12       Wound 10/21/21 Heel Right (Active)   Wound Image   10/27/21 1500   Wound Etiology Deep tissue/Injury 10/27/21 1500   Dressing Status New dressing applied 10/27/21 1500   Wound Cleansed Not Cleansed 10/26/21 0941   Dressing/Treatment Other (comment) 10/22/21 2023   Wound Length (cm) 0.1 cm 10/27/21 1500   Wound Width (cm) 0.2 cm 10/27/21 1500   Wound Depth (cm) 0.1 cm 10/27/21 1500   Wound Surface Area (cm^2) 0.02 cm^2 10/27/21 1500   Change in Wound Size % (l*w) 96.88 10/27/21 1500   Wound Volume (cm^3) 0.002 cm^3 10/27/21 1500   Distance Tunneling (cm) 0 cm 10/27/21 1500   Tunneling Position ___ O'Clock 0 10/27/21 1500   Undermining Starts ___ O'Clock 0 10/27/21 1500   Undermining Ends___ O'Clock 0 10/27/21 1500   Undermining Maxium Distance (cm) 0 10/27/21 1500   Wound Assessment Other (Comment) 10/27/21 0750   Drainage Amount None 10/27/21 1500   Odor None 10/27/21 1500   Deena-wound Assessment Intact 10/27/21 1500   Margins Attached edges 10/27/21 1500   Number of days: 5       Response to treatment:  Well tolerated by patient. Pain Assessment:  Severity:  none  Quality of pain:   Wound Pain Timing/Severity:   Premedicated:     Plan:     Plan of Care: Wound 10/08/21 Pretibial Right; Anterior-Dressing/Treatment: Open to air  Wound 10/08/21 Pretibial Left-Dressing/Treatment: Open to air  Wound 10/15/21 Ankle Right;Lateral-Dressing/Treatment:  (optifoam border)  [REMOVED] Wound 10/15/21 Thigh Proximal;Right;Posterior-Dressing/Treatment: Open to air  Wound 10/15/21 Thigh Anterior;Right-Dressing/Treatment:  (santyl opticell optifoam border)  Wound 10/15/21 Coccyx cluster-Dressing/Treatment:  (santyl opticell silicone border)  Wound 10/21/21 Heel Right-Dressing/Treatment:  (optifoam border)       Patient in bed agreeable to wound care for Reassessment of wounds. Pt has sacrum pressure injury unstageable. Cleansed with NS. Measured and pictured. Slightly better from last week. Dressing applied as above continue with santyl with dressings. Rt and lt anterior legs with dry eschar intact washed legs with bath oil and open to air. Rt lateral ankle with deep tissue injury measured and pictured dressing as above. Rt heel pressure injury with dry eschar unstageable cleansed with NS measured and pictured applied new dressing as above. Rt anterior thigh wound from drain removed. Cleansed with NS measured and pictured. Dressing applied with santyl as above. Pt is at moderate risk for skin breakdown AEB wayne. Follow wayne orders. Pt turned to lt side. Heels floated. Atmos air pump on the bed. Specialty Bed Required : yes  [] Low Air Loss   [x] Pressure Redistribution  [] Fluid Immersion  [] Bariatric  [] Total Pressure Relief  [] Other:     Discharge Plan:  Placement for patient upon discharge:  tbd  Hospice Care: no  Patient appropriate for Outpatient [address]: yes     Patient/Caregiver Teaching:  Level of patient/caregiver understanding able to: cares explained as given. No evidence of learning. Electronically signed by Melissa Beckman.  MYRA Serrano,  on 10/27/2021 at 3:32 PM

## 2021-10-27 NOTE — PROGRESS NOTES
Daily Progress Note      Awake and alert, feeling fair this Am  Denies any CP or SOB  Pacer site sore but tolerable  BP and HR stable  Hgb 7.2 today    Feeling better more awake alert  Remain in atrial paced-rate is stable  Hx of SVT keep on betablockers  Not sure if she would be a candidate for [facility] due to anemia  Recent infection --watch with new device   Recent hip fx doing ok  Chronic renal insuffiencey stable  Keep on dig and lopressor for now    PPM insertion    POD #2    Site clean dry and intact. Slight pain at site    A paced on tele    Hx of SSS    AF with RVR    On lopressor and digoxin    PPM inserted    On midodrine d/t borderline HOTN    Rate well controlled    On lovenox- need to be careful as she is anemic     Anemia    PRBC transfusion this admission    Hgb. 7.2 today    Need to watch closely and transfuse if less than 7.0     S/p recent hip surgery  Will follow     Echo --Summary---10/21   This is a limited echocardiogram.   Technically difficult study, due to body habitus.   Left ventricular systolic function is normal.   Ejection fraction is visually estimated at 50-55%.   Moderate mitral regurgitation.   Moderate tricuspid regurgitation; RVSP: 45 mmHg.   No evidence of any pericardial effusion.     The patient was in the hospital just recently because she was found to  have bilateral lower extremities venous ulcer present. Moraima Nicole was treated  with antibiotics.  She had fallen down, the patient had a right hip  fracture also present.  She has a history of anemia, post surgery and  history of atrial fibrillation postsurgery.  History of having  hypertension, diastolic heart failure, renal insufficiency and chronic  venous ulcer present, and moderate history of SVT is also present.    History of moderate mitral valve regurgitation.  Moderate tricuspid  regurgitation, and pulmonary hypertension.  The patient follows with Dr. Roland Priest for renal insufficiency.     PAST SURGICAL HISTORY: Katie Simona repair and recent ultrasounds were  negative for DVT.  She had a hip fracture and she was also placed on  CPAP on last admission, but I do not think she tolerated the CPAP well. She had her right hip surgery done.     SOCIAL HISTORY:  She does not smoke, does not drink.     ALLERGIES: NKDA.     MEDICATIONS: She is on oxycodone, Lopressor 25 b.i.d., and Lasix. Objective:   BP (!) 98/42   Pulse 63   Temp 97.9 °F (36.6 °C) (Oral)   Resp 13   Wt 235 lb 0.2 oz (106.6 kg)   LMP 01/23/1995   SpO2 98%   BMI 44.43 kg/m²   No intake or output data in the 24 hours ending 10/27/21 1044    Medications:   Scheduled Meds:   cefepime  1,000 mg IntraVENous Q12H    digoxin  125 mcg Oral Q48H    enoxaparin  30 mg SubCUTAneous Daily    acetaminophen  1,000 mg Oral 4x Daily WC    allopurinol  50 mg Oral Daily    collagenase   Topical Daily    levothyroxine  50 mcg Oral Daily    metoprolol tartrate  25 mg Oral BID    miconazole   Topical BID    pantoprazole  40 mg Oral QAM AC    sodium chloride flush  10 mL IntraVENous 2 times per day    midodrine  10 mg Oral TID WC    fluconazole  200 mg IntraVENous Q24H      Infusions:   sodium chloride      sodium chloride      sodium chloride      IV infusion builder 75 mL/hr at 10/26/21 2345      PRN Meds:  sodium chloride, magnesium hydroxide, sennosides-docusate sodium, bisacodyl, polyethylene glycol, sodium chloride, calcium carbonate, loperamide, ondansetron, sodium chloride flush, sodium chloride       Physical Exam:  Vitals:    10/27/21 0750   BP: (!) 98/42   Pulse: 63   Resp: 13   Temp:    SpO2:         General: AAO, NAD  Chest: Nontender  Cardiac: First and Second Heart Sounds are Normal, No Murmurs or Gallops noted  Lungs:Clear to auscultation and percussion. Abdomen: Soft, NT, ND, +BS  Extremities: No clubbing, no edema  Vascular:  Equal 2+ peripheral pulses.         Lab Data:  CBC:   Recent Labs     10/25/21  0735 10/27/21  0840   WBC 9.8 14.0*   HGB 7.1* 7.2*   HCT 22.9* 22.4*   MCV 97.4 94.9    143     BMP:   Recent Labs     10/25/21  0735 10/27/21  0840    136   K 4.2 3.7    105   CO2 19* 21   PHOS 2.8 2.0*   BUN 54* 40*   CREATININE 2.8* 2.5*     LIVER PROFILE: No results for input(s): AST, ALT, LIPASE, BILIDIR, BILITOT, ALKPHOS in the last 72 hours. Invalid input(s): AMYLASE,  ALB  PT/INR: No results for input(s): PROTIME, INR in the last 72 hours. APTT: No results for input(s): APTT in the last 72 hours. BNP:  No results for input(s): BNP in the last 72 hours.       Assessment:  Patient Active Problem List    Diagnosis Date Noted    Pseudomonas aeruginosa infection     [facility]     Septic shock ([facility] 10/24/2021    Acute cystitis without hematuria     Enterococcus faecalis infection     Uncontrolled pain     Generalized weakness     Acute blood loss anemia     Paroxysmal atrial fibrillation (HCC)     Acute on chronic diastolic (congestive) heart failure (HCC)     Traumatic closed fracture of neck of femur with minimal displacement, right, initial encounter ([facility] 10/11/2021    MRSA cellulitis 10/10/2021    Displaced fracture of right femoral neck ([facility] 10/08/2021    Closed transcervical fracture of proximal femur, left, initial encounter ([facility] 05/21/2021    Morbid obesity with BMI of 45.0-49.9, adult ([facility] 07/23/2020    History of ventral hernia repair 10/19/2018    Essential hypertension 08/10/2018    S/P herniorrhaphy 07/11/2018    S/P ventral herniorrhaphy 06/13/2018    H/O ventral hernia 06/06/2018    Chronic kidney disease, stage IV (severe) ([facility] 05/16/2017    Gastroesophageal reflux disease 05/16/2017    CRF (chronic renal failure) 09/20/2016    Hypothyroid 09/20/2016    Ventral hernia without obstruction or gangrene 09/18/2016    SVT (supraventricular tachycardia) ([facility] 09/18/2016    Chronic kidney disease (CKD) stage G4/A2, severely decreased glomerular filtration rate (GFR) between 15-29 mL/min/1.73 square meter and albuminuria creatinine ratio between  mg/g ([facility] 45/13/0621    Complicated UTI (urinary tract infection) 03/24/2012       Electronically signed by SHERYL Templeton CNP on 10/27/2021 at 10:44 AM     Electronically signed by Blake Lantigua MD on 10/27/21 at 12:43 PM EDT

## 2021-10-28 VITALS
DIASTOLIC BLOOD PRESSURE: 61 MMHG | HEART RATE: 61 BPM | TEMPERATURE: 98.2 F | WEIGHT: 235.01 LBS | OXYGEN SATURATION: 100 % | BODY MASS INDEX: 44.43 KG/M2 | RESPIRATION RATE: 14 BRPM | SYSTOLIC BLOOD PRESSURE: 123 MMHG

## 2021-10-28 LAB
ALBUMIN SERPL-MCNC: 2.2 GM/DL (ref 3.4–5)
ANION GAP SERPL CALCULATED.3IONS-SCNC: 11 MMOL/L (ref 4–16)
BUN BLDV-MCNC: 40 MG/DL (ref 6–23)
CALCIUM SERPL-MCNC: 7.9 MG/DL (ref 8.3–10.6)
CHLORIDE BLD-SCNC: 106 MMOL/L (ref 99–110)
CO2: 22 MMOL/L (ref 21–32)
CREAT SERPL-MCNC: 2.5 MG/DL (ref 0.6–1.1)
GFR AFRICAN AMERICAN: 22 ML/MIN/1.73M2
GFR NON-AFRICAN AMERICAN: 19 ML/MIN/1.73M2
GLUCOSE BLD-MCNC: 77 MG/DL (ref 70–99)
HIGH SENSITIVE C-REACTIVE PROTEIN: 162.3 MG/L
PHOSPHORUS: 2.3 MG/DL (ref 2.5–4.9)
POTASSIUM SERPL-SCNC: 3.8 MMOL/L (ref 3.5–5.1)
PRO-BNP: ABNORMAL PG/ML
PROCALCITONIN: 1.2
SODIUM BLD-SCNC: 139 MMOL/L (ref 135–145)

## 2021-10-28 PROCEDURE — 36415 COLL VENOUS BLD VENIPUNCTURE: CPT

## 2021-10-28 PROCEDURE — 83880 ASSAY OF NATRIURETIC PEPTIDE: CPT

## 2021-10-28 PROCEDURE — 6370000000 HC RX 637 (ALT 250 FOR IP): Performed by: PHYSICAL MEDICINE & REHABILITATION

## 2021-10-28 PROCEDURE — 99232 SBSQ HOSP IP/OBS MODERATE 35: CPT | Performed by: INTERNAL MEDICINE

## 2021-10-28 PROCEDURE — 6360000002 HC RX W HCPCS: Performed by: NURSE PRACTITIONER

## 2021-10-28 PROCEDURE — 2580000003 HC RX 258: Performed by: PHYSICAL MEDICINE & REHABILITATION

## 2021-10-28 PROCEDURE — 2500000003 HC RX 250 WO HCPCS: Performed by: PHYSICAL MEDICINE & REHABILITATION

## 2021-10-28 PROCEDURE — 97110 THERAPEUTIC EXERCISES: CPT

## 2021-10-28 PROCEDURE — 6370000000 HC RX 637 (ALT 250 FOR IP): Performed by: INTERNAL MEDICINE

## 2021-10-28 PROCEDURE — 86141 C-REACTIVE PROTEIN HS: CPT

## 2021-10-28 PROCEDURE — 99232 SBSQ HOSP IP/OBS MODERATE 35: CPT | Performed by: NURSE PRACTITIONER

## 2021-10-28 PROCEDURE — 97530 THERAPEUTIC ACTIVITIES: CPT

## 2021-10-28 PROCEDURE — 6360000002 HC RX W HCPCS: Performed by: PHYSICAL MEDICINE & REHABILITATION

## 2021-10-28 PROCEDURE — 80069 RENAL FUNCTION PANEL: CPT

## 2021-10-28 PROCEDURE — 84145 PROCALCITONIN (PCT): CPT

## 2021-10-28 PROCEDURE — 2580000003 HC RX 258: Performed by: NURSE PRACTITIONER

## 2021-10-28 PROCEDURE — 76937 US GUIDE VASCULAR ACCESS: CPT

## 2021-10-28 RX ORDER — FLUCONAZOLE 2 MG/ML
200 INJECTION, SOLUTION INTRAVENOUS EVERY 24 HOURS
Qty: 4 ML | Refills: 0
Start: 2021-10-28 | End: 2021-11-05

## 2021-10-28 RX ORDER — CEFEPIME 1 G/50ML
1000 INJECTION, SOLUTION INTRAVENOUS EVERY 12 HOURS
Qty: 1000 ML | Refills: 0 | Status: SHIPPED | OUTPATIENT
Start: 2021-10-28 | End: 2021-11-05

## 2021-10-28 RX ORDER — DIGOXIN 125 MCG
125 TABLET ORAL
Qty: 30 TABLET | Refills: 3 | Status: SHIPPED | OUTPATIENT
Start: 2021-10-29

## 2021-10-28 RX ORDER — PANTOPRAZOLE SODIUM 40 MG/1
40 TABLET, DELAYED RELEASE ORAL
Qty: 30 TABLET | Refills: 3 | Status: SHIPPED | OUTPATIENT
Start: 2021-10-29

## 2021-10-28 RX ORDER — CALCIUM CARBONATE 200(500)MG
500 TABLET,CHEWABLE ORAL 3 TIMES DAILY PRN
Qty: 30 TABLET | Refills: 0
Start: 2021-10-28 | End: 2021-11-27

## 2021-10-28 RX ORDER — FUROSEMIDE 20 MG/1
20 TABLET ORAL DAILY
Qty: 180 TABLET | Refills: 1 | Status: SHIPPED | OUTPATIENT
Start: 2021-10-28

## 2021-10-28 RX ADMIN — COLLAGENASE SANTYL: 250 OINTMENT TOPICAL at 15:38

## 2021-10-28 RX ADMIN — ENOXAPARIN SODIUM 30 MG: 30 INJECTION SUBCUTANEOUS at 15:30

## 2021-10-28 RX ADMIN — PANTOPRAZOLE SODIUM 40 MG: 40 TABLET, DELAYED RELEASE ORAL at 06:28

## 2021-10-28 RX ADMIN — SODIUM CHLORIDE, PRESERVATIVE FREE 10 ML: 5 INJECTION INTRAVENOUS at 15:35

## 2021-10-28 RX ADMIN — METOPROLOL TARTRATE 25 MG: 25 TABLET, FILM COATED ORAL at 15:30

## 2021-10-28 RX ADMIN — ALLOPURINOL 50 MG: 100 TABLET ORAL at 10:20

## 2021-10-28 RX ADMIN — LEVOTHYROXINE SODIUM 50 MCG: 0.05 TABLET ORAL at 06:28

## 2021-10-28 RX ADMIN — MIDODRINE HYDROCHLORIDE 10 MG: 5 TABLET ORAL at 15:30

## 2021-10-28 RX ADMIN — MICONAZOLE NITRATE: 2 POWDER TOPICAL at 15:38

## 2021-10-28 RX ADMIN — CEFEPIME HYDROCHLORIDE 1000 MG: 1 INJECTION, POWDER, FOR SOLUTION INTRAMUSCULAR; INTRAVENOUS at 15:34

## 2021-10-28 RX ADMIN — SODIUM CHLORIDE, PRESERVATIVE FREE 10 ML: 5 INJECTION INTRAVENOUS at 01:40

## 2021-10-28 RX ADMIN — ACETAMINOPHEN 1000 MG: 500 TABLET ORAL at 15:30

## 2021-10-28 ASSESSMENT — PAIN SCALES - WONG BAKER
WONGBAKER_NUMERICALRESPONSE: 0

## 2021-10-28 ASSESSMENT — PAIN SCALES - GENERAL
PAINLEVEL_OUTOF10: 0
PAINLEVEL_OUTOF10: 4
PAINLEVEL_OUTOF10: 0
PAINLEVEL_OUTOF10: 4

## 2021-10-28 NOTE — PROGRESS NOTES
Patient and family member requested for information on setting up medical . Will pass along to case manger.

## 2021-10-28 NOTE — PROGRESS NOTES
Internal Medicine Hospitalist             Daily Progress  Note   Subjective:   CC: f/u hypotension    Ms. Cheo Leal denies any acute complaints. Objective:    BP (!) 112/45   Pulse 65   Temp 98.6 °F (37 °C) (Oral)   Resp 18   Wt 235 lb 0.2 oz (106.6 kg)   LMP 01/23/1995   SpO2 96%   BMI 44.43 kg/m²      Intake/Output Summary (Last 24 hours) at 10/28/2021 1136  Last data filed at 10/27/2021 2138  Gross per 24 hour   Intake    Output 600 ml   Net -600 ml      Physical Exam:  Heart: Bradycardia, normal S1 and S2 in all 4 auscultatory areas. No rubs  Murmurs or gallops heard. Pacemaker site noted  Lungs: Mostly clear to auscultation, decreased breath sounds at bases. No wheezes appreciated no crackles heard. Abdomen: Soft, non distended. Bowel sounds appreciated. No obvious liver or spleen enlargement. Non tender, no rebound noted. Extremities: Erythema slightly improved. CNS: Grossly intact.     Labs:  CBC with Differential:    Lab Results   Component Value Date    WBC 14.0 10/27/2021    RBC 2.36 10/27/2021    HGB 7.2 10/27/2021    HCT 22.4 10/27/2021     10/27/2021    MCV 94.9 10/27/2021    MCH 30.5 10/27/2021    MCHC 32.1 10/27/2021    RDW 20.1 10/27/2021    NRBC 5 10/27/2021    SEGSPCT 54.0 10/27/2021    BANDSPCT 16 10/27/2021    LYMPHOPCT 15.0 10/27/2021    MONOPCT 8.0 10/27/2021    MYELOPCT 1 08/18/2011    EOSPCT 4.1 04/10/2012    BASOPCT 2.0 10/25/2021    MONOSABS 1.1 10/27/2021    LYMPHSABS 2.1 10/27/2021    EOSABS 0.4 10/27/2021    BASOSABS 0.2 10/25/2021    DIFFTYPE MANUAL DIFFERENTIAL 10/27/2021     CMP:    Lab Results   Component Value Date     10/28/2021    K 3.8 10/28/2021     10/28/2021    CO2 22 10/28/2021    BUN 40 10/28/2021    CREATININE 2.5 10/28/2021    GFRAA 22 10/28/2021    LABGLOM 19 10/28/2021    GLUCOSE 77 10/28/2021    PROT 6.5 10/11/2021    PROT 5.5 03/24/2012    LABALBU 2.2 10/28/2021    CALCIUM 7.9 10/28/2021    BILITOT 0.4 10/11/2021    ALKPHOS 74 10/11/2021    AST 35 10/11/2021    ALT 7 10/11/2021     No results for input(s): TROPONINT in the last 72 hours.   Lab Results   Component Value Date    [facility] 1.210 05/21/2021         sodium chloride      sodium chloride      sodium chloride        cefepime  1,000 mg IntraVENous Q12H    digoxin  125 mcg Oral Q48H    enoxaparin  30 mg SubCUTAneous Daily    acetaminophen  1,000 mg Oral 4x Daily WC    allopurinol  50 mg Oral Daily    collagenase   Topical Daily    levothyroxine  50 mcg Oral Daily    metoprolol tartrate  25 mg Oral BID    miconazole   Topical BID    pantoprazole  40 mg Oral QAM AC    sodium chloride flush  10 mL IntraVENous 2 times per day    midodrine  10 mg Oral TID WC    fluconazole  200 mg IntraVENous Q24H         Assessment:       Patient Active Problem List    Diagnosis Date Noted    Pseudomonas aeruginosa infection     SSS (sick sinus syndrome) ([facility])     Septic shock ([facility] 10/24/2021    Acute cystitis without hematuria     Enterococcus faecalis infection     Uncontrolled pain     Generalized weakness     Acute blood loss anemia     Paroxysmal atrial fibrillation ([facility])     Acute on chronic diastolic (congestive) heart failure ([facility])     Traumatic closed fracture of neck of femur with minimal displacement, right, initial encounter ([facility] 10/11/2021    MRSA cellulitis 10/10/2021    Displaced fracture of right femoral neck ([address] 10/08/2021    Closed transcervical fracture of proximal femur, left, initial encounter ([address] 05/21/2021    Morbid obesity with BMI of 45.0-49.9, adult ([facility] 07/23/2020    History of ventral hernia repair 10/19/2018    Essential hypertension 08/10/2018    S/P herniorrhaphy 07/11/2018    S/P ventral herniorrhaphy 06/13/2018    H/O ventral hernia 06/06/2018    Chronic kidney disease, stage IV (severe) ([address] 05/16/2017    Gastroesophageal reflux disease 05/16/2017    CRF (chronic renal failure) 09/20/2016    Hypothyroid 09/20/2016    Ventral hernia without obstruction or gangrene 09/18/2016    SVT (supraventricular tachycardia) ([facility]) 09/18/2016    Chronic kidney disease (CKD) stage G4/A2, severely decreased glomerular filtration rate (GFR) between 15-29 mL/min/1.73 square meter and albuminuria creatinine ratio between  mg/g ([facility] 68/80/0247    Complicated UTI (urinary tract infection) 03/24/2012       Plan:   50-year-old female who was in the ARU after a fall leading to hip fracture. Found to have positive blood cultures for Pseudomonas attributed to CAUTI. This led to sepsis and metabolic encephalopathy. She was seen by ID. Started on meropenem and was subsequently switched to cefepime and is also on Diflucan. Plan to continue until 11/5. Has worsening of her kidney function. She refuses dialysis. Seen by nephrology. Improved with IV fluids. Had anemia. Seen by GI, will need eventual endoscopy. Hemoglobin has been stable. Problems being addressed this admission:   Septic shock /acute metabolic encephalopathy /  UTI / blood & urine culture pos Pseudomonas  ID on board. Meropenem switched to cefepime. Also on Diflucan plan for 2 weeks  Repeat blood cultures 10/22 no growth to date       Anemia rule out GIB  GI on board, continue to trend and transfuse as needed  Eventually EGD/colonoscopy     Atrial fibrillation with RVR /sick sinus syndrome  Was on Cardizem drip but since weaned off. Also started on digoxin  Status post pacemaker for sinus pauses     TOBY on CKD 4  Resolved, creatinine now on her baseline  Nephrology on board   -remains on bicarb drip, await nephrology recs    Stasis dermatitis both lower limbs     Recent right femoral neck fracture s/p arthoplasty 10/10/2021       Disposition  To snf. Medically ready     The above chart was generated partly using Dragon dictation system, it may contain dictation errors given the limitations of this technology.

## 2021-10-28 NOTE — PROGRESS NOTES
NPN, focus: status  D: patient resting in bed on monitor  A: RN assessed patient. Patient alert to self and place, but pleasantly confused on situation and time. Patient refused meds this am and gave them back to RN and said have a nice day. RN sent Attending Dr. Junaid Bahena a message informing him of patient status. VSS. R: RN continues to monitor patient. Call light in reach and bed in low position.

## 2021-10-28 NOTE — PROGRESS NOTES
Nephrology Progress Note  10/28/2021 9:56 AM        Subjective:   Admit Date: 10/22/2021  PCP: Aayush Calix MD    Interval History: Events leading up to and since admission briefly reviewed    Diet: Reasonable    ROS: No shortness of breath  Urine output recorded 600 cc for the last shift  No fever        Data:     Current meds:    cefepime  1,000 mg IntraVENous Q12H    digoxin  125 mcg Oral Q48H    enoxaparin  30 mg SubCUTAneous Daily    acetaminophen  1,000 mg Oral 4x Daily WC    allopurinol  50 mg Oral Daily    collagenase   Topical Daily    levothyroxine  50 mcg Oral Daily    metoprolol tartrate  25 mg Oral BID    miconazole   Topical BID    pantoprazole  40 mg Oral QAM AC    sodium chloride flush  10 mL IntraVENous 2 times per day    midodrine  10 mg Oral TID WC    fluconazole  200 mg IntraVENous Q24H      sodium chloride      sodium chloride      sodium chloride      IV infusion builder 75 mL/hr at 10/27/21 2141         I/O last 3 completed shifts:  In: -   Out: 600 [Urine:600]    CBC:   Recent Labs     10/27/21  0840   WBC 14.0*   HGB 7.2*             Recent Labs     10/27/21  0840 10/28/21  0648    139   K 3.7 3.8    106   CO2 21 22   BUN 40* 40*   CREATININE 2.5* 2.5*   GLUCOSE 84 77       Lab Results   Component Value Date    CALCIUM 7.9 (L) 10/28/2021    PHOS 2.3 (L) 10/28/2021       Objective:     Vitals: BP (!) 125/42   Pulse 69   Temp 98.6 °F (37 °C) (Oral)   Resp 23   Wt 235 lb 0.2 oz (106.6 kg)   LMP 01/23/1995   SpO2 97%   BMI 44.43 kg/m²     General appearance: No acute distress  HEENT: At least 2+ conjunctival pallor  Neck: Supple-she has left chest wall cardiac device  Lungs: No gross crackles  Heart: Regular rate and rhythm  Abdomen: Soft, nontender on superficial palpation  Extremities: Bilateral thigh edema-also healing bilateral lower leg wound  She does have a Simpson      Problem List :         Impression :     1.  Acute kidney injury-with underlying CKD stage IV-likely had recurrent acute tubular injury-from various kidney insult-seems stable now  2. Sepsis with Pseudomonas-upper urinary tract was suspected-also had fungal infection-multiple antimicrobial agent  3. Likely has at least mild to moderate fluid overload-recent hip fracture-of course making her not to ambulate-she obviously does not help    Recommendation/Plan  :     1. Definitely stop IV fluid  2. Add proBNP level tomorrow  3. She is on antimicrobial agents watch for toxicity  4. Watch for any iatrogenic nosocomial complication  5.  Follow clinically and biochemically      Earline Clarke MD MD

## 2021-10-28 NOTE — DISCHARGE INSTR - COC
Continuity of Care Form    Patient Name: Yolanda Reyes   :  1940  MRN:  [id number]    Admit date:  10/22/2021  Discharge date:  ***    Code Status Order: Full Code   Advance Directives:   [facility] Documentation       Date/Time Healthcare Directive Type of Healthcare Directive Copy in [address] [facility] [address] Agent's Name Healthcare Agent's Phone Number    10/25/21 1028  No, patient does not have an advance directive for healthcare treatment                      Admitting Physician:  Duglas Encarnacion MD  PCP: Wilda Ballard MD    Discharging Nurse: [facility] Unit/Room#: 3110/3110-A  Discharging Unit Phone Number: ***    Emergency Contact:   Extended Emergency Contact Information  Primary Emergency Contact: Liyah Carrera, [address] Phone: [phone]  Work Phone: [phone]  Mobile Phone: [phone]  Relation: Other  Secondary Emergency Contact: Kristian M[address] Phone: [phone]  Work Phone: [phone]  Mobile Phone: [phone]  Relation: Niece/Nephew    Past Surgical History:  Past Surgical History:   Procedure Laterality Date    ABDOMEN SURGERY      DENTAL SURGERY      Teeth Extracted In Past    ENDOSCOPY, COLON, DIAGNOSTIC   Or     EYE SURGERY  ? when    clyde cataract ext    FEMUR FRACTURE SURGERY Left 2021    FEMUR IM NAIL REGINALDO INSERTION performed by Yamileth Ram MD at [address]    Abdominal Hernia Repair     HIP SURGERY Right 10/8/2021    RIGHT HIP HEMIARTHROPLASTY performed by Yamileth Ram MD at [address] [place] [address] Right 10/10/2021    HIP HEMIARTHROPLASTY performed by Yamileth Ram MD at [address] [place] [address]  2018    exp lap . converted to exp laporotomy with ventral hernia repair with mesh    OTHER SURGICAL HISTORY      umbilical scar excision    PACEMAKER INSERTION N/A 10/25/2021    PACEMAKER INSERTION PERMANENT performed by Xavi Vogel MD at [address]  09/16/2016    Robotic laparoscopic       Immunization History:   Immunization History   Administered Date(s) Administered    COVID-19, Pfizer, PF, 30mcg/0.3mL 06/02/2021, 07/22/2021    Influenza Vaccine, unspecified formulation 08/30/2017, 09/06/2018    Influenza Virus Vaccine 12/01/2015, 12/01/2016, 09/28/2021    Influenza, High Dose (Fluzone 65 yrs and older) 10/12/2018, 09/10/2019, 10/11/2020    Influenza, Quadv, adjuvanted, 65 yrs +, IM, PF (Fluad) 10/11/2020, 09/21/2021    Pneumococcal Conjugate 13-valent ([id number]) 12/21/2015    Pneumococcal Polysaccharide ([id number]) 08/01/2017    Tdap (Boostrix, Adacel) 12/21/2015    Zoster Recombinant (Shingrix) 09/18/2019, 11/25/2019       Active Problems:  Patient Active Problem List   Diagnosis Code    Complicated UTI (urinary tract infection) N39.0    Chronic kidney disease (CKD) stage G4/A2, severely decreased glomerular filtration rate (GFR) between 15-29 mL/min/1.73 square meter and albuminuria creatinine ratio between  mg/g ([facility]) N18.4    Ventral hernia without obstruction or gangrene K43.9    SVT (supraventricular tachycardia) ([facility]) I47.1    CRF (chronic renal failure) N18.9    Hypothyroid E03.9    Chronic kidney disease, stage IV (severe) ([facility]) N18.4    Gastroesophageal reflux disease K21.9    H/O ventral hernia Z87.19    S/P ventral herniorrhaphy Z98.890, Z87.19    S/P herniorrhaphy Z98.890, Z87.19    Essential hypertension I10    History of ventral hernia repair Z98.890, Z87.19    Morbid obesity with BMI of 45.0-49.9, adult ([facility] E66.01, Z68.42    Closed transcervical fracture of proximal femur, left, initial encounter ([facility] S72.032A    Displaced fracture of right femoral neck ([facility] S72.001A    MRSA cellulitis L03.90, B95.62    Traumatic closed fracture of neck of femur with minimal displacement, right, initial encounter ([facility] S72.001A    Uncontrolled pain R52 Generalized weakness R53.1    Acute blood loss anemia D62    Paroxysmal atrial fibrillation ([facility]) I48.0    Acute on chronic diastolic (congestive) heart failure ([facility]) I50.33    Acute cystitis without hematuria N30.00    Enterococcus faecalis infection B95.2    Septic shock ([facility]) A41.9, R65.21    SSS (sick sinus syndrome) ([facility]) I49.5    Pseudomonas aeruginosa infection A49.8       Isolation/Infection:   Isolation            Contact          Patient Infection Status       Infection Onset Added Last Indicated Last Indicated By Review Planned Expiration Resolved Resolved By    MRSA 10/08/21 10/09/21 10/08/21 Culture, Wound                Nurse Assessment:  Last Vital Signs: BP (!) 112/45   Pulse 65   Temp 98.6 °F (37 °C) (Oral)   Resp 18   Wt 235 lb 0.2 oz (106.6 kg)   LMP 01/23/1995   SpO2 96%   BMI 44.43 kg/m²     Last documented pain score (0-10 scale): Pain Level: 4  Last Weight:   Wt Readings from Last 1 Encounters:   10/27/21 235 lb 0.2 oz (106.6 kg)     Mental Status:  {IP PT MENTAL STATUS:53483}    IV Access:  { OLESYA IV ACCESS:357253885}    Nursing Mobility/ADLs:  Walking   {P DME YZAT:140830159}  Transfer  {P DME PTZS:040226866}  Bathing  {CHP DME LMXJ:435117975}  Dressing  {CHP DME OMPS:386208665}  Toileting  {P DME EBNU:419833725}  Feeding  {[facility] DME IAPQ:049937866}  Med Admin  {P DME LGCR:515463241}  Med Delivery   {[facility] [place]:558653793}    Wound Care Documentation and Therapy:  Wound 10/08/21 Pretibial Right; Anterior (Active)   Wound Image   10/27/21 1500   Wound Etiology Other 10/27/21 2138   Dressing Status Other (Comment) 10/11/21 1945   Wound Cleansed Cleansed with saline 10/18/21 0850   Dressing/Treatment Open to air 10/27/21 2138   Wound Length (cm) 0 cm 10/27/21 1500   Wound Width (cm) 0 cm 10/27/21 1500   Wound Depth (cm) 0 cm 10/27/21 1500   Wound Surface Area (cm^2) 0 cm^2 10/27/21 1500   Change in Wound Size % (l*w) 100 10/27/21 1500   Wound Volume (cm^3) 0 cm^3 10/27/21 1500   Wound Healing % 100 10/27/21 1500   Distance Tunneling (cm) 0 cm 10/27/21 2138   Tunneling Position ___ O'Clock 0 10/27/21 2138   Undermining Starts ___ O'Clock 0 10/27/21 2138   Undermining Ends___ O'Clock 0 10/27/21 2138   Undermining Maxium Distance (cm) 0 10/27/21 2138   Wound Assessment Eschar dry 10/14/21 0745   Drainage Amount None 10/27/21 2138   Odor None 10/27/21 2138   Deena-wound Assessment Blanchable erythema;Edematous;Dry/flaky 10/27/21 2138   Margins Attached edges 10/27/21 2138   Number of days: 20       Wound 10/08/21 Pretibial Left (Active)   Wound Image   10/27/21 1500   Wound Etiology Other 10/27/21 2138   Dressing Status Other (Comment) 10/27/21 2138   Wound Cleansed Cleansed with saline 10/18/21 0850   Dressing/Treatment Open to air 10/27/21 2138   Wound Length (cm) 0 cm 10/27/21 1500   Wound Width (cm) 0 cm 10/27/21 1500   Wound Depth (cm) 0 cm 10/27/21 1500   Wound Surface Area (cm^2) 0 cm^2 10/27/21 1500   Change in Wound Size % (l*w) 100 10/27/21 1500   Wound Volume (cm^3) 0 cm^3 10/27/21 1500   Wound Healing % 100 10/27/21 1500   Distance Tunneling (cm) 0 cm 10/27/21 2138   Tunneling Position ___ O'Clock 0 10/27/21 2138   Undermining Starts ___ O'Clock 0 10/27/21 2138   Undermining Ends___ O'Clock 00 10/27/21 2138   Undermining Maxium Distance (cm) 0 10/27/21 2138   Wound Assessment Eschar dry 10/14/21 0745   Drainage Amount None 10/27/21 2138   Odor None 10/27/21 2138   Deena-wound Assessment Blanchable erythema;Dry/flaky;Edematous 10/27/21 2138   Margins Attached edges 10/27/21 2138   Number of days: 20       Wound 10/15/21 Ankle Right;Lateral (Active)   Wound Image   10/27/21 1500   Wound Etiology Deep tissue/Injury 10/27/21 2138   Dressing Status New dressing applied 10/27/21 2138   Wound Cleansed Cleansed with saline 10/27/21 2138   Wound Length (cm) 0.3 cm 10/27/21 1500   Wound Width (cm) 0.3 cm 10/27/21 1500   Wound Depth (cm) 0 cm 10/27/21 1500   Wound Surface Area (cm^2) 0.09 cm^2 10/27/21 1500   Change in Wound Size % (l*w) 95.5 10/27/21 1500   Wound Volume (cm^3) 0 cm^3 10/27/21 1500   Distance Tunneling (cm) 0 cm 10/27/21 2138   Tunneling Position ___ O'Clock 0 10/27/21 2138   Undermining Starts ___ O'Clock 0 10/27/21 2138   Undermining Ends___ O'Clock 0 10/27/21 2138   Undermining Maxium Distance (cm) 0 10/27/21 2138   Wound Assessment Pink/red;Purple/maroon 10/27/21 0750   Drainage Amount None 10/27/21 2138   Odor None 10/27/21 2138   Deena-wound Assessment Intact 10/27/21 2138   Margins Attached edges 10/27/21 2138   Number of days: 13       Wound 10/15/21 Thigh Anterior;Right (Active)   Wound Image   10/27/21 1500   Wound Etiology Surgical 10/27/21 2138   Dressing Status New dressing applied 10/27/21 2138   Wound Cleansed Cleansed with saline 10/27/21 2138   Dressing/Treatment Hydrofiber Ag;ABD 10/27/21 0750   Wound Length (cm) 0.5 cm 10/27/21 1500   Wound Width (cm) 0.5 cm 10/27/21 1500   Wound Depth (cm) 0.1 cm 10/27/21 1500   Wound Surface Area (cm^2) 0.25 cm^2 10/27/21 1500   Change in Wound Size % (l*w) 40.48 10/27/21 1500   Wound Volume (cm^3) 0.025 cm^3 10/27/21 1500   Wound Healing % 40 10/27/21 1500   Distance Tunneling (cm) 0 cm 10/27/21 2138   Tunneling Position ___ O'Clock 0 10/27/21 2138   Undermining Starts ___ O'Clock 0 10/27/21 2138   Undermining Ends___ O'Clock 0 10/27/21 2138   Undermining Maxium Distance (cm) 0 10/27/21 2138   Wound Assessment Other (Comment) 10/27/21 0750   Drainage Amount Moderate 10/27/21 2138   Drainage Description Serosanguinous; Yellow 10/27/21 2138   Odor None 10/27/21 2138   Deena-wound Assessment Intact 10/27/21 2138   Margins Defined edges 10/27/21 2138   Wound Thickness Description not for Pressure Injury Full thickness 10/27/21 2138   Number of days: 13       Wound 10/15/21 Coccyx cluster (Active)   Wound Image   10/27/21 1500   Wound Etiology Pressure Unstageable 10/27/21 2138   Dressing Status New dressing applied 10/27/21 2138 Wound Cleansed Cleansed with saline 10/27/21 2138   Dressing/Treatment Hydrofiber;Silicone border 72/55/59 0750   Wound Length (cm) 8 cm 10/27/21 1500   Wound Width (cm) 10 cm 10/27/21 1500   Wound Depth (cm) 0.1 cm 10/27/21 1500   Wound Surface Area (cm^2) 80 cm^2 10/27/21 1500   Change in Wound Size % (l*w) -60 10/27/21 1500   Wound Volume (cm^3) 8 cm^3 10/27/21 1500   Wound Healing % -60 10/27/21 1500   Distance Tunneling (cm) 0 cm 10/27/21 2138   Tunneling Position ___ O'Clock 0 10/27/21 2138   Undermining Starts ___ O'Clock 0 10/27/21 2138   Undermining Ends___ O'Clock 0 10/27/21 2138   Undermining Maxium Distance (cm) 0 10/27/21 2138   Wound Assessment Other (Comment) 10/27/21 0750   Drainage Amount Moderate 10/27/21 2138   Drainage Description Serosanguinous; Yellow 10/27/21 2138   Odor None 10/27/21 2138   Deena-wound Assessment Intact 10/27/21 2138   Margins Defined edges 10/27/21 2138   Wound Thickness Description not for Pressure Injury Full thickness 10/27/21 2138   Number of days: 13       Wound 10/21/21 Heel Right (Active)   Wound Image   10/27/21 1500   Wound Etiology Deep tissue/Injury 10/27/21 2138   Dressing Status New dressing applied 10/27/21 2138   Wound Cleansed Not Cleansed 10/26/21 0941   Dressing/Treatment Other (comment) 10/22/21 2023   Wound Length (cm) 0.1 cm 10/27/21 1500   Wound Width (cm) 0.2 cm 10/27/21 1500   Wound Depth (cm) 0.1 cm 10/27/21 1500   Wound Surface Area (cm^2) 0.02 cm^2 10/27/21 1500   Change in Wound Size % (l*w) 96.88 10/27/21 1500   Wound Volume (cm^3) 0.002 cm^3 10/27/21 1500   Distance Tunneling (cm) 0 cm 10/27/21 2138   Tunneling Position ___ O'Clock 0 10/27/21 2138   Undermining Starts ___ O'Clock 0 10/27/21 2138   Undermining Ends___ O'Clock 0 10/27/21 2138   Undermining Maxium Distance (cm) 0 10/27/21 2138   Wound Assessment Other (Comment) 10/27/21 0750   Drainage Amount None 10/27/21 2138   Odor None 10/27/21 2138   Deena-wound Assessment Intact 10/27/21 2138 Margins Attached edges 10/27/21 2138   Number of days: 6        Elimination:  Continence:    Bowel: {YES / EO:16656}  Bladder: {YES / ZP:25474}  Urinary Catheter: {Urinary Catheter:872379041}   Colostomy/Ileostomy/Ileal Conduit: {YES / ES:82308}       Date of Last BM: ***    Intake/Output Summary (Last 24 hours) at 10/28/2021 1141  Last data filed at 10/27/2021 2138  Gross per 24 hour   Intake    Output 600 ml   Net -600 ml     I/O last 3 completed shifts:  In: -   Out: 600 [Urine:600]    Safety Concerns:     [email or web address] Safety Concerns:163099833}    Impairments/Disabilities:      [email or web address] Impairments/Disabilities:733837900}      Patient's personal belongings (please select all that are sent with patient):  {CHP DME Belongings:959385284}    RN SIGNATURE:  {Esignature:509998161}    CASE MANAGEMENT/SOCIAL WORK SECTION    Inpatient Status Date: ***    Readmission Risk Assessment Score:  Readmission Risk              Risk of Unplanned Readmission:  28           Discharging to Facility/ Agency   Name:   Address:  Phone:  Fax:    Dialysis Facility (if applicable)   Name:  Address:  Dialysis Schedule:  Phone:  Fax:    / signature: {Esignature:538753686}    PHYSICIAN SECTION      Nutrition Therapy:  Current Nutrition Therapy:   [email or web address] Diet List:203558556}    Routes of Feeding: {CHP DME Other Feedings:867913828}  Liquids: {Slp liquid thickness:26086}  Daily Fluid Restriction: {CHP DME Yes amt example:887659542}  Last Modified Barium Swallow with Video (Video Swallowing Test): {Done Not Done TIBI:475826083}    Treatments at the Time of Hospital Discharge:   Respiratory Treatments: ***  Oxygen Therapy:  {Therapy; copd oxygen:30618}  Ventilator:    { AWA Vent VBEU:154849445}    Rehab Therapies: {THERAPEUTIC INTERVENTION:4580724467}  Weight Bearing Status/Restrictions: { CC Weight Bearin}  Other Medical Equipment (for information only, NOT a DME order):  {EQUIPMENT:095315449}  Other Treatments: cefepime 1 g daily and fluconazole 200 mg daily IV until 11/5    Prognosis: Good    Condition at Discharge: Stable    Rehab Potential (if transferring to Rehab): Good    Recommended Labs or Other Treatments After Discharge: renal function panel in a week    Physician Certification: I certify the above information and transfer of Martir Young  is necessary for the continuing treatment of the diagnosis listed and that she requires [facility] for greater 30 days.      Update Admission H&P: No change in H&P    PHYSICIAN SIGNATURE:  Electronically signed by Cj Anderson MD on 10/28/21 at 2:20 PM EDT

## 2021-10-28 NOTE — PROGRESS NOTES
Daily Progress Note      Awake and alert  Denies any CP or SOB  Apaced on tele  Cr. At 2.5 again today  Continue Betablocker and Digoxin  Overall doing better  Atrial paced rate is stable-60-  BP is stable on current meds  Do not think she is a good candidate for [facility]  She had anemia needing transfusion   Renal insuffiencey   Hx of SVT with rate I 140--keep on betablockers and dig     PPM insertion    POD #3    Site clean dry and intact. Slight pain at site    A paced on tele    Hx of SSS     AF with RVR    On lopressor and digoxin    PPM inserted    On midodrine d/t borderline HOTN    Rate well controlled    On lovenox- need to be careful as she is anemic    Poor candidate for long term AC d/t anemia and falls     Anemia    PRBC transfusion this admission    Hgb. 7.2 yesterday    Need to watch closely and transfuse if less than 7.0    Hx of SVT, Continue Lopressor     S/p recent hip surgery  Will follow     Echo --Summary---10/21   This is a limited echocardiogram.   Technically difficult study, due to body habitus.   Left ventricular systolic function is normal.   Ejection fraction is visually estimated at 50-55%.   Moderate mitral regurgitation.   Moderate tricuspid regurgitation; RVSP: 45 mmHg.   No evidence of any pericardial effusion.     The patient was in the hospital just recently because she was found to  have bilateral lower extremities venous ulcer present. Vasu Back was treated  with antibiotics.  She had fallen down, the patient had a right hip  fracture also present.  She has a history of anemia, post surgery and  history of atrial fibrillation postsurgery.  History of having  hypertension, diastolic heart failure, renal insufficiency and chronic  venous ulcer present, and moderate history of SVT is also present.    History of moderate mitral valve regurgitation.  Moderate tricuspid  regurgitation, and pulmonary hypertension.  The patient follows with Dr. Patricia Butcher for renal insufficiency.     PAST SURGICAL HISTORY:  Hernia repair and recent ultrasounds were  negative for DVT.  She had a hip fracture and she was also placed on  CPAP on last admission, but I do not think she tolerated the CPAP well. She had her right hip surgery done.     SOCIAL HISTORY:  She does not smoke, does not drink.     ALLERGIES: NKDA.     MEDICATIONS: She is on oxycodone, Lopressor 25 b.i.d., and Lasix. Objective:   BP (!) 125/42   Pulse 69   Temp 98.6 °F (37 °C) (Oral)   Resp 23   Wt 235 lb 0.2 oz (106.6 kg)   LMP 01/23/1995   SpO2 97%   BMI 44.43 kg/m²     Intake/Output Summary (Last 24 hours) at 10/28/2021 9152  Last data filed at 10/27/2021 2138  Gross per 24 hour   Intake    Output 600 ml   Net -600 ml       Medications:   Scheduled Meds:   cefepime  1,000 mg IntraVENous Q12H    digoxin  125 mcg Oral Q48H    enoxaparin  30 mg SubCUTAneous Daily    acetaminophen  1,000 mg Oral 4x Daily WC    allopurinol  50 mg Oral Daily    collagenase   Topical Daily    levothyroxine  50 mcg Oral Daily    metoprolol tartrate  25 mg Oral BID    miconazole   Topical BID    pantoprazole  40 mg Oral QAM AC    sodium chloride flush  10 mL IntraVENous 2 times per day    midodrine  10 mg Oral TID WC    fluconazole  200 mg IntraVENous Q24H      Infusions:   sodium chloride      sodium chloride      sodium chloride      IV infusion builder 75 mL/hr at 10/27/21 2141      PRN Meds:  sodium chloride, magnesium hydroxide, sennosides-docusate sodium, bisacodyl, polyethylene glycol, sodium chloride, calcium carbonate, loperamide, ondansetron, sodium chloride flush, sodium chloride       Physical Exam:  Vitals:    10/28/21 0800   BP: (!) 125/42   Pulse: 69   Resp: 23   Temp: 98.6 °F (37 °C)   SpO2: 97%        General: AAO, NAD  Chest: Nontender  Cardiac: First and Second Heart Sounds are Normal, No Murmurs or Gallops noted  Lungs:Clear to auscultation and percussion.   Abdomen: Soft, NT, ND, +BS  Extremities: No clubbing, no edema  Vascular:  Equal 2+ peripheral pulses. Lab Data:  CBC:   Recent Labs     10/27/21  0840   WBC 14.0*   HGB 7.2*   HCT 22.4*   MCV 94.9        BMP:   Recent Labs     10/27/21  0840 10/28/21  0648    139   K 3.7 3.8    106   CO2 21 22   PHOS 2.0* 2.3*   BUN 40* 40*   CREATININE 2.5* 2.5*     LIVER PROFILE: No results for input(s): AST, ALT, LIPASE, BILIDIR, BILITOT, ALKPHOS in the last 72 hours. Invalid input(s): AMYLASE,  ALB  PT/INR: No results for input(s): PROTIME, INR in the last 72 hours. APTT: No results for input(s): APTT in the last 72 hours. BNP:  No results for input(s): BNP in the last 72 hours.       Assessment:  Patient Active Problem List    Diagnosis Date Noted    Pseudomonas aeruginosa infection     [facility]) [facility]     Septic shock ([facility] 10/24/2021    Acute cystitis without hematuria     Enterococcus faecalis infection     Uncontrolled pain     Generalized weakness     Acute blood loss anemia     Paroxysmal atrial fibrillation (HCC)     Acute on chronic diastolic (congestive) heart failure (HCC)     Traumatic closed fracture of neck of femur with minimal displacement, right, initial encounter ([facility] 10/11/2021    MRSA cellulitis 10/10/2021    Displaced fracture of right femoral neck ([facility] 10/08/2021    Closed transcervical fracture of proximal femur, left, initial encounter ([facility] 05/21/2021    Morbid obesity with BMI of 45.0-49.9, adult ([facility] 07/23/2020    History of ventral hernia repair 10/19/2018    Essential hypertension 08/10/2018    S/P herniorrhaphy 07/11/2018    S/P ventral herniorrhaphy 06/13/2018    H/O ventral hernia 06/06/2018    Chronic kidney disease, stage IV (severe) ([facility] 05/16/2017    Gastroesophageal reflux disease 05/16/2017    CRF (chronic renal failure) 09/20/2016    Hypothyroid 09/20/2016    Ventral hernia without obstruction or gangrene 09/18/2016    SVT (supraventricular tachycardia) ([facility] 09/18/2016

## 2021-10-28 NOTE — CARE COORDINATION
CM into see pt regarding SNF options. Pt requested that CM call Olivia Bridger for the options. CM called [facility]. 1st option is [facility], 2nd option is [facility]. CM called [facility] and left a message for [facility]. Pending the determination from [facility]. 1135 CM received call from . lauren at [facility]. They are unable to accept. CM messaged You and provided the referral. CM faxed the face sheet. 1145 CM received message from [facility] and they are evaluating. [address]  1355 CM informed that [facility] will accept pt. CM sent a PS to Dr Bethanne Olszewski and updated. Upon discharge pt will be going to SNF at [facility]  Please notify family  Please fax H/P, D/S. Scripts, AVS, and Completed OLESYA to [phone]  Please call report to [phone]  Please call pts choice for transportation. [facility] [phone] or [phone]  Pt will need a rapid covid prior to discharge. CM called [facility] and pt transport set up for 630,  1410 CM called Yovani to  update her of the approval for [facility], Unable to leave a VM. CM updated nursing and need for Covid. CM placed envelope in soft chart. 1435 CM completed E pasr on line. LH  1510 CM called dtr [facility] and updated. . she is in agreement with plan for discharge.  [address]

## 2021-10-28 NOTE — PROGRESS NOTES
Occupational Therapy  . Occupational Therapy Treatment Note      Name: Bossman Quevedo MRN: [id number] :   1940   Date:  10/28/2021   Admission Date: 10/22/2021 Room:  [facility]     Primary Problem:      Restrictions/Precautions:    Pacemaker precautions on LUE, WBAT RLE w/ posterior hip precautions, General Precautions, Fall Risk     Communication with other providers:  Per chart review, patient ok for tx. Subjective:  Patient states:  I know what youre trying to do. Call my dr. He knows me  Pain:  (location, type, intensity)    Objective:    Observation:   Patient in high fowlers. pleasantly confused. upon walking in patient taking off wires and o2 pulse ox, BP cuff. Explained to patient the need for items to stay on. Patient A&O to self, was able to state date but wrong year, patient thought she was at home. Objective Measures:  tele    Treatment, including education:    ADL activity training was instructed today. Cues were given for safety, sequence, UE/LE placement, visual cues, and balance. Activities performed today included dressing, toileting, hand hygiene, and grooming. Facial hygiene- SBA with extra encouragement to initiate  Feeding- SET UP for small packages. (opening ensure)  LB dressing- DEP to doff B socks    Patient educated on role of OT , benefits of OT and rationale for therapeutic intervention. Patient unaware of posterior hip precautions, as well as pacemaker precautions. Education given. Pt Sup + vc's for technique to perform BUE AROM exercises/streches  to include: shoulder flex/extension,chest presses,  and supination/pronation. Max cues to initiate stretching. Cues for redirection. All therapeutic intervention performed c emphasis on BUE strengthening and endurance to  increase strength for functional tasks / transfers. Patient left safely in bed at end of session, with call light in reach, alarm on and nursing aware.  Gait belt was used for func transfers / mobility.           Assessment / Impression:    Patient's tolerance of treatment: poor  Adverse Reaction: none  Significant change in status and impact:  none  Barriers to improvement: confusion      Plan for Next Session:    Continue with OT POC      Time in:  1130  Time out:  1155  Timed treatment minutes:  25  Total treatment time:  25      Electronically signed by:    JUNI Barnes COTA/L [id number]  10/28/2021, 11:46 AM

## 2021-10-28 NOTE — PROGRESS NOTES
Infectious Disease Progress Note  10/28/2021   Patient Name: Sabino Mcmullen : 1940   Impression  ? Sepsis secondary to Pseudomonas aeruginosa Bacteremia from Bilateral Pyelonephritis, Complicated UTI (Probable CAUTI):  § Afebrile, no leukocytosis  § 10/20-UA ,   § 10/20-Urine Culture Pseudomonas aeruginosa >100,000, Candida albicans >100,000  § 10/21-Blood cultures -Pseudomonas aeruginosa  § 10/23-CT A&P WO Contrast: No acute abdominopelvic abnormality. New subacute to chronic incompletely healed L5 superior endplate fracture. Bilateral renal atrophy. Mild bibasilar atelectasis. Cholelithiasis.       · Acute Right Femoral Neck Fracture     · Bradycardia:  · 10/25-S/p per Dr. Sandeep Lopez: Pacemaker dual chamber insertion. DX: SSS.    ? Resolving BLE MRSA Cellulitis     ? TOBY on CKD4:  ? Dr. Wil Berger onboard  ? Patient adamant does not want HD if needed     ? Anemia, possible GIB:  ? Dr. Ruiz Check onboard  ? Rec Protonix  ?  Rec EGD, colonoscopy and small bowel eval when recovers from hip fracture     ? Morbid Obesity:  BMI 42.93     ? HTN/ AF/ SVT/ PHTN/ VHD/ HFpEF     ? Hypothyroidism     ? ARON     · Multi-morbidity: per PMHx:  HTN, HFpEF, SVT, GERD, Hypothyroidism, CKD4, left femur fracture s/p repair with intramedullary nail , ventral incisional hernia repair, Morbid Obesity, SVT, AF    Plan:  · Continue IV cefepime 1 gm q12h, renal dosing, plan for 2 weeks of IV therapy (end date 21)   · Place midline to finish IV ABX regimen,  patient is in TOBY with CKD4, patient is adamant will not want hemodialysis, may use midline  · Continue IV fluconazole 200 mg q24h (end date 10/29/21)  · Trend CRP and Pct, on DWT, recheck in am  · Follow up with PCP after DC  · OK from ID standpoint to DC when ready    Ongoing Antimicrobial Therapy  Diflucan 10/22-  Cefepime 10/25-    Completed Antimicrobial Therapy  Ceftriaxone 10/8  Keflex 10/7-  Doxycycline 10/7-? [id number]  Zyvox 10/11-19   Zosyn 10/20-22  Meropenem 10/22-25  ? History:? Interval history noted  Denies n/v/d/f or untoward effects of antibiotics. Resting quietly in bed, states in general is feeling better. Physical Exam:  Vital Signs: BP (!) 121/46   Pulse 68   Temp 98.7 °F (37.1 °C) (Oral)   Resp 21   Wt 235 lb 0.2 oz (106.6 kg)   LMP 01/23/1995   SpO2 97%   BMI 44.43 kg/m²     Gen: resting quietly in bed, smiling. Wounds: C/D/I BLE dried areas with erythema, no warmth, drainage or edema. Left chest PPM dressing intact. Chest: no distress and CTA. Good air movement. Room air. Heart: RRR and no MRG. Abd: soft, bilateral low abdominal tenderness radiating to bilateral flanks, no hepatomegaly. Normoactive bowel sounds. Ext: no clubbing, cyanosis, or edema  Catheter Site: without erythema or tenderness  Neuro: Mental status intact. CN 2-12 intact and no focal sensory or motor deficits     Radiologic / Imaging / TESTING  10/8/21 XR Hip 2-3 VW W Pelvis Right:  Impression   1. Acute fracture of the right femoral neck with mild displacement. 2. Status post ORIF of the left femur.  Redemonstration of the previously   seen fracture of the proximal femur.  No significant callus formation is   present.      10/8/21 XR Chest Portable:  Impression   Mild cardiomegaly.  Mild-to-moderate congestion and/or infiltrates identified   in the lungs.      10/8/21 Limited Echo:  Summary   This is a limited echocardiogram.   Technically difficult study, due to body habitus.   Left ventricular systolic function is normal.   Ejection fraction is visually estimated at 50-55%.   Moderate mitral regurgitation.   Moderate tricuspid regurgitation; RVSP: 45 mmHg.   No evidence of any pericardial effusion.     10/9/21 VL Dup Lower Extremity Venous Bilateral  Impression   The exam is severely limited due to patient body habitus.  The distal femoral   veins popliteal veins and calf veins are not well visualized bilaterally.       No gross DVT is identified      10/10/21 XR Hip Right (2-3 Views)  Impression   Right hip replacement in normal alignment.       No acute interval fracture.       Immediate postoperative changes. 10/26/21 XR Chest Portable:  Impression   1. Left cardiac pacer device in place with no pneumothorax identified. 2. Mild left basilar atelectasis with trace left effusion not excluded.           Labs:    Recent Results (from the past 24 hour(s))   Renal Function Panel    Collection Time: 10/28/21  6:48 AM   Result Value Ref Range    Sodium 139 135 - 145 MMOL/L    Potassium 3.8 3.5 - 5.1 MMOL/L    Chloride 106 99 - 110 mMol/L    CO2 22 21 - 32 MMOL/L    Anion Gap 11 4 - 16    BUN 40 (H) 6 - 23 MG/DL    CREATININE 2.5 (H) 0.6 - 1.1 MG/DL    Glucose 77 70 - 99 MG/DL    Calcium 7.9 (L) 8.3 - 10.6 MG/DL    GFR Non- 19 (L) >60 mL/min/1.73m2    GFR  22 (L) >60 mL/min/1.73m2    Albumin 2.2 (L) 3.4 - 5.0 GM/DL    Phosphorus 2.3 (L) 2.5 - 4.9 MG/DL   C-Reactive Protein    Collection Time: 10/28/21  6:48 AM   Result Value Ref Range    CRP, High Sensitivity 162.3 mg/L   Procalcitonin    Collection Time: 10/28/21  6:48 AM   Result Value Ref Range    Procalcitonin 1.20    Brain Natriuretic Peptide    Collection Time: 10/28/21  6:48 AM   Result Value Ref Range    Pro-BNP 16,810 (H) <300 PG/ML     CULTURE results: Invalid input(s): BLOOD CULTURE,  URINE CULTURE, SURGICAL CULTURE    Diagnosis:  Patient Active Problem List   Diagnosis    Complicated UTI (urinary tract infection)    Chronic kidney disease (CKD) stage G4/A2, severely decreased glomerular filtration rate (GFR) between 15-29 mL/min/1.73 square meter and albuminuria creatinine ratio between  mg/g ([facility])    Ventral hernia without obstruction or gangrene    SVT (supraventricular tachycardia) ([facility])    CRF (chronic renal failure)    Hypothyroid    Chronic kidney disease, stage IV (severe) ([facility])    Gastroesophageal reflux disease    H/O ventral hernia    S/P ventral herniorrhaphy    S/P herniorrhaphy    Essential hypertension    History of ventral hernia repair    Morbid obesity with BMI of 45.0-49.9, adult ([facility])    Closed transcervical fracture of proximal femur, left, initial encounter [facility]    Displaced fracture of right femoral neck ([facility]    MRSA cellulitis    Traumatic closed fracture of neck of femur with minimal displacement, right, initial encounter ([facility]    Uncontrolled pain    Generalized weakness    Acute blood loss anemia    Paroxysmal atrial fibrillation ([facility])    Acute on chronic diastolic (congestive) heart failure ([facility])    Acute cystitis without hematuria    Enterococcus faecalis infection    Septic shock ([facility])    SSS (sick sinus syndrome) ([facility])    Pseudomonas aeruginosa infection       Active Problems  Active Problems:    Septic shock ([facility])    SSS (sick sinus syndrome) ([facility])  Resolved Problems:    * No resolved hospital problems. *    Electronically signed by: Electronically signed by Georgette Burns.  SHERYL Ward CNP on 10/28/2021 at 3:05 PM

## 2021-10-28 NOTE — PROGRESS NOTES
NPN, focus: discharge  D: patient has dc orders to SNF at [facility]: RN called report to Bertha at the facility. Simpson to remain per perfect serve message from Dr. Elda Elizabeth   R: Patient left on stretcher with [facility] for facility.

## 2021-10-28 NOTE — DISCHARGE SUMMARY
[facility] Physician Discharge Summary       [address] [place] [address] MD Radha  Milly [facility]  [phone]    In 1 month  Post Op Check    Maribel [place]  [address] [phone]            Activity level: as tolerated    Diet: ADULT DIET; Regular  ADULT ORAL NUTRITION SUPPLEMENT; Standard High Calorie/High Protein Oral Supplement  ADULT ORAL NUTRITION SUPPLEMENT; Breakfast, Lunch, Dinner; Standard High Calorie/High Protein Oral Supplement    Dispo:snf      Condition on discharge: stable    Patient ID:  Gatito Contreras  [id number]  10 y.o.  1940    Admit date: 10/22/2021    Discharge date and time:  10/28/2021  2:20 PM    Admission Diagnoses: Active Problems:    Septic shock (HCC)    SSS (sick sinus syndrome) (HCC)  Resolved Problems:    * No resolved hospital problems. *      Discharge Diagnoses: Active Problems:    Septic shock (HCC)    SSS (sick sinus syndrome) (HCC)  Resolved Problems:    * No resolved hospital problems. *      Consults:  IP CONSULT TO INFECTIOUS DISEASES  PHARMACY CONSULT FOR RENAL DOSING  PHARMACY CONSULT FOR RENAL DOSING  IP CONSULT TO GENERAL SURGERY  IP CONSULT TO NEPHROLOGY  IP CONSULT TO IV TEAM  IP CONSULT TO IV TEAM  IP CONSULT TO GI  IP CONSULT TO VASCULAR SURGERY  IP CONSULT TO IV TEAM      Hospital Course:   Patient Gatito Contreras is a [de-identified] y.o. presented with Septic shock ([facility] [address] [A41.9, ]  56-year-old female who was in the ARU after a fall leading to hip fracture. Found to have positive blood cultures for Pseudomonas attributed to CAUTI. This led to sepsis and metabolic encephalopathy. She was seen by ID. Started on meropenem and was subsequently switched to cefepime and is also on Diflucan. Plan to continue until 11/5. Has worsening of her kidney function. She refuses dialysis. Seen by nephrology. Improved with IV fluids. Had anemia. Seen by GI, will need eventual endoscopy. Hemoglobin has been stable. pafib with rvr: started on cardizem and digoxin. Poor candidate for [facility]. Noted to have sinus pauses, s/p pacemaker insertion 10/25  Discharge to snf    Discharge Exam:  General Appearance: alert and oriented to person, place and time and in no acute distress  Skin: warm and dry  Head: normocephalic and atraumatic  Eyes: pupils equal, round, and reactive to light, extraocular eye movements intact, conjunctivae normal  Neck: neck supple and non tender without mass   Pulmonary/Chest: clear to auscultation bilaterally- no wheezes, rales or rhonchi, normal air movement, no respiratory distress  Cardiovascular: normal rate, normal S1 and S2 and no carotid bruits  Abdomen: soft, non-tender, non-distended, normal bowel sounds, no masses or organomegaly  Extremities: no cyanosis, no clubbing and no edema  Neurologic: no cranial nerve deficit and speech normal    I/O last 3 completed shifts:  In: -   Out: 600 [Urine:600]  No intake/output data recorded. LABS:  Recent Labs     10/27/21  0840 10/28/21  0648    139   K 3.7 3.8    106   CO2 21 22   BUN 40* 40*   CREATININE 2.5* 2.5*   GLUCOSE 84 77   CALCIUM 7.6* 7.9*       Recent Labs     10/27/21  0840   WBC 14.0*   RBC 2.36*   HGB 7.2*   HCT 22.4*   MCV 94.9   MCH 30.5   MCHC 32.1   RDW 20.1*      MPV 9.9       No results for input(s): POCGLU in the last 72 hours. Imaging:  CT ABDOMEN PELVIS WO CONTRAST Additional Contrast? None    Result Date: 10/23/2021  EXAMINATION: CT OF THE ABDOMEN AND PELVIS WITHOUT CONTRAST 10/23/2021 3:15 pm TECHNIQUE: CT of the abdomen and pelvis was performed without the administration of intravenous contrast. Multiplanar reformatted images are provided for review. Dose modulation, iterative reconstruction, and/or weight based adjustment of the mA/kV was utilized to reduce the radiation dose to as low as reasonably achievable.  COMPARISON: 05/26/2018 HISTORY: ORDERING SYSTEM PROVIDED HISTORY: evaluate for pyelonephritis TECHNOLOGIST PROVIDED HISTORY: Reason for exam:->evaluate for pyelonephritis Additional Contrast?->None Reason for Exam: evaluate for pyelonephritis FINDINGS: Lower Chest: Mild dependent atelectasis bilaterally. Organs: Limited evaluation due lack of intravenous contrast.  Unenhanced liver is unremarkable. Layering radiopaque gallstones in the otherwise unremarkable gallbladder. No biliary ductal dilatation. Pancreas, spleen, and adrenal glands are unremarkable. There is severe right and moderate left renal parenchymal atrophy. No hydronephrosis or urinary tract calculus is evident. GI/Bowel: No evidence of bowel obstruction. Nonvisualized appendix. No pericecal inflammatory changes to suggest acute appendicitis. Pelvis: Urinary bladder is decompressed by a Simpson catheter. No pelvic mass is evident. Peritoneum/Retroperitoneum: No free air or free fluid. Normal abdominal aortic caliber with mild to moderate calcific atherosclerosis. Benign dystrophic calcifications in the lower abdominal CT the are unchanged. No pathologically enlarged lymph node is evident. Bones/Soft Tissues: New subacute to chronic incompletely healed L5 superior endplate fracture with M6-0 intradiscal gas extending into the superior endplate. Chronic L2 inferior endplate Schmorl's node is new since the prior study. There unchanged chronic mild compression deformities of the T11 and L1 vertebrae. Interval ventral midline hernia repair with no residual hernia identified. Nonspecific edema of the bilateral flank soft tissues. No fluid collection or soft tissue gas. Prior right hip arthroplasty and ORIF of the left femur. 1. No acute abdominopelvic abnormality. 2. New subacute to chronic incompletely healed L5 superior endplate fracture. 3. Bilateral renal atrophy. 4. Mild bibasilar atelectasis. 5. Cholelithiasis.      CT HEAD WO CONTRAST    Result Date: 10/23/2021  EXAMINATION: CT OF THE HEAD WITHOUT CONTRAST  10/23/2021 3:16 pm TECHNIQUE: CT of the head was performed without the administration of intravenous contrast. Dose modulation, iterative reconstruction, and/or weight based adjustment of the mA/kV was utilized to reduce the radiation dose to as low as reasonably achievable. COMPARISON: 09/30/2011 HISTORY: ORDERING SYSTEM PROVIDED HISTORY: Confusion TECHNOLOGIST PROVIDED HISTORY: Reason for exam:->Confusion Has a \"code stroke\" or \"stroke alert\" been called? ->No Reason for Exam: Confusion FINDINGS: BRAIN/VENTRICLES: There is no acute intracranial hemorrhage, mass effect or midline shift. No abnormal extra-axial fluid collection. The gray-white differentiation is maintained without evidence of an acute infarct. There is no evidence of hydrocephalus. Increased diffuse parenchymal volume loss with mild chronic white matter microvascular ischemic changes. New small focus of left cerebellar encephalomalacia. ORBITS: The visualized portion of the orbits demonstrate no acute abnormality. SINUSES: Paranasal sinuses are well aerated. Bilateral mastoid effusions are present. SOFT TISSUES/SKULL:  No acute abnormality of the visualized skull or soft tissues. 1. No acute intracranial abnormality. 2. Diffuse parenchymal volume loss and mild chronic white matter microvascular ischemic changes. 3. Small focus of left cerebellar encephalomalacia in keeping with sequela of a small ischemic infarct that has occurred since 09/30/2011. 4. Bilateral mastoid effusions. XR CHEST PORTABLE    Result Date: 10/23/2021  EXAMINATION: ONE XRAY VIEW OF THE CHEST 10/23/2021 5:44 am COMPARISON: 10/08/2021. HISTORY: ORDERING SYSTEM PROVIDED HISTORY: SOB TECHNOLOGIST PROVIDED HISTORY: Reason for exam:->SOB Reason for Exam: SOB Acuity: Unknown Type of Exam: Unknown FINDINGS: Study obtained with the patient in somewhat kyphotic position.  Interval placement of right-sided PICC line with tip appearing to project to the right atrium

## 2021-10-29 ENCOUNTER — CARE COORDINATION (OUTPATIENT)
Dept: CASE MANAGEMENT | Age: 81
End: 2021-10-29

## 2021-10-29 ENCOUNTER — HOSPITAL ENCOUNTER (OUTPATIENT)
Age: 81
Setting detail: SPECIMEN
Discharge: HOME | End: 2021-10-29

## 2021-10-29 LAB
ANION GAP SERPL CALCULATED.3IONS-SCNC: 10 MMOL/L (ref 4–16)
BUN BLDV-MCNC: 37 MG/DL (ref 6–23)
CALCIUM SERPL-MCNC: 7.6 MG/DL (ref 8.3–10.6)
CHLORIDE BLD-SCNC: 106 MMOL/L (ref 99–110)
CO2: 24 MMOL/L (ref 21–32)
CREAT SERPL-MCNC: 2.2 MG/DL (ref 0.6–1.1)
GFR AFRICAN AMERICAN: 26 ML/MIN/1.73M2
GFR NON-AFRICAN AMERICAN: 21 ML/MIN/1.73M2
GLUCOSE BLD-MCNC: 58 MG/DL (ref 70–99)
HCT VFR BLD CALC: 23.3 % (ref 37–47)
HEMOGLOBIN: 7.4 GM/DL (ref 12.5–16)
MCH RBC QN AUTO: 31 PG (ref 27–31)
MCHC RBC AUTO-ENTMCNC: 31.8 % (ref 32–36)
MCV RBC AUTO: 97.5 FL (ref 78–100)
PDW BLD-RTO: 21.7 % (ref 11.7–14.9)
PLATELET # BLD: 141 K/CU MM (ref 140–440)
PMV BLD AUTO: 10.4 FL (ref 7.5–11.1)
POTASSIUM SERPL-SCNC: 3.7 MMOL/L (ref 3.5–5.1)
RBC # BLD: 2.39 M/CU MM (ref 4.2–5.4)
SODIUM BLD-SCNC: 140 MMOL/L (ref 135–145)
WBC # BLD: 9.7 K/CU MM (ref 4–10.5)

## 2021-10-29 PROCEDURE — 80048 BASIC METABOLIC PNL TOTAL CA: CPT

## 2021-10-29 PROCEDURE — 85027 COMPLETE CBC AUTOMATED: CPT

## 2021-10-29 PROCEDURE — 36415 COLL VENOUS BLD VENIPUNCTURE: CPT

## 2021-10-29 PROCEDURE — 86480 TB TEST CELL IMMUN MEASURE: CPT

## 2021-11-01 ENCOUNTER — HOSPITAL ENCOUNTER (OUTPATIENT)
Age: 81
Setting detail: SPECIMEN
Discharge: HOME | End: 2021-11-01

## 2021-11-01 LAB
QUANTI TB1 MINUS NIL: 0 IU/ML (ref 0–0.34)
QUANTI TB2 MINUS NIL: 0 IU/ML (ref 0–0.34)
QUANTIFERON (R) TB GOLD (INCUBATED): NEGATIVE IU/ML
QUANTIFERON MITOGEN MINUS NIL: >10 IU/ML
QUANTIFERON NIL: 0.01 IU/ML

## 2021-11-02 ENCOUNTER — HOSPITAL ENCOUNTER (OUTPATIENT)
Age: 81
Setting detail: SPECIMEN
Discharge: HOME | End: 2021-11-02

## 2021-11-02 LAB
ANION GAP SERPL CALCULATED.3IONS-SCNC: 9 MMOL/L (ref 4–16)
BACTERIA: ABNORMAL /HPF
BILIRUBIN URINE: NEGATIVE MG/DL
BLOOD, URINE: ABNORMAL
BUN BLDV-MCNC: 26 MG/DL (ref 6–23)
C-REACTIVE PROTEIN, HIGH SENSITIVITY: 119.1 MG/L
CALCIUM SERPL-MCNC: 7.5 MG/DL (ref 8.3–10.6)
CHLORIDE BLD-SCNC: 107 MMOL/L (ref 99–110)
CLARITY: CLEAR
CO2: 23 MMOL/L (ref 21–32)
COLOR: YELLOW
CREAT SERPL-MCNC: 2 MG/DL (ref 0.6–1.1)
GFR AFRICAN AMERICAN: 29 ML/MIN/1.73M2
GFR NON-AFRICAN AMERICAN: 24 ML/MIN/1.73M2
GLUCOSE BLD-MCNC: 80 MG/DL (ref 70–99)
GLUCOSE, URINE: NEGATIVE MG/DL
HCT VFR BLD CALC: 22.1 % (ref 37–47)
HEMOGLOBIN: 6.9 GM/DL (ref 12.5–16)
HYALINE CASTS: 2 /LPF
KETONES, URINE: NEGATIVE MG/DL
LEUKOCYTE ESTERASE, URINE: NEGATIVE
MCH RBC QN AUTO: 30.8 PG (ref 27–31)
MCHC RBC AUTO-ENTMCNC: 31.2 % (ref 32–36)
MCV RBC AUTO: 98.7 FL (ref 78–100)
MUCUS: ABNORMAL HPF
NITRITE URINE, QUANTITATIVE: NEGATIVE
PDW BLD-RTO: 24.9 % (ref 11.7–14.9)
PH, URINE: 5 (ref 5–8)
PLATELET # BLD: 148 K/CU MM (ref 140–440)
PMV BLD AUTO: 10.4 FL (ref 7.5–11.1)
POTASSIUM SERPL-SCNC: 3.8 MMOL/L (ref 3.5–5.1)
PROTEIN UA: 30 MG/DL
RBC # BLD: 2.24 M/CU MM (ref 4.2–5.4)
RBC URINE: <1 /HPF (ref 0–6)
SODIUM BLD-SCNC: 139 MMOL/L (ref 135–145)
SPECIFIC GRAVITY UA: 1.02 (ref 1–1.03)
SQUAMOUS EPITHELIAL: 1 /HPF
TRANSITIONAL EPITHELIAL: <1 /HPF
TRICHOMONAS: ABNORMAL /HPF
UROBILINOGEN, URINE: NEGATIVE MG/DL (ref 0.2–1)
WBC # BLD: 8.5 K/CU MM (ref 4–10.5)
WBC UA: <1 /HPF (ref 0–5)

## 2021-11-02 PROCEDURE — 80048 BASIC METABOLIC PNL TOTAL CA: CPT

## 2021-11-02 PROCEDURE — 87086 URINE CULTURE/COLONY COUNT: CPT

## 2021-11-02 PROCEDURE — 36415 COLL VENOUS BLD VENIPUNCTURE: CPT

## 2021-11-02 PROCEDURE — 81001 URINALYSIS AUTO W/SCOPE: CPT

## 2021-11-02 PROCEDURE — 86850 RBC ANTIBODY SCREEN: CPT

## 2021-11-02 PROCEDURE — 86900 BLOOD TYPING SEROLOGIC ABO: CPT

## 2021-11-02 PROCEDURE — 86901 BLOOD TYPING SEROLOGIC RH(D): CPT

## 2021-11-02 PROCEDURE — 86140 C-REACTIVE PROTEIN: CPT

## 2021-11-02 PROCEDURE — 86922 COMPATIBILITY TEST ANTIGLOB: CPT

## 2021-11-02 PROCEDURE — 85027 COMPLETE CBC AUTOMATED: CPT

## 2021-11-03 ENCOUNTER — HOSPITAL ENCOUNTER (OUTPATIENT)
Dept: INFUSION THERAPY | Age: 81
Setting detail: INFUSION SERIES
Discharge: HOME | End: 2021-11-03
Payer: MEDICARE

## 2021-11-03 ENCOUNTER — HOSPITAL ENCOUNTER (EMERGENCY)
Age: 81
Discharge: HOME | End: 2021-11-04
Attending: EMERGENCY MEDICINE
Payer: MEDICARE

## 2021-11-03 VITALS
BODY MASS INDEX: 41.54 KG/M2 | TEMPERATURE: 98.2 F | OXYGEN SATURATION: 99 % | SYSTOLIC BLOOD PRESSURE: 118 MMHG | RESPIRATION RATE: 16 BRPM | HEART RATE: 62 BPM | DIASTOLIC BLOOD PRESSURE: 44 MMHG | HEIGHT: 61 IN | WEIGHT: 220 LBS

## 2021-11-03 VITALS
TEMPERATURE: 97.3 F | HEART RATE: 63 BPM | SYSTOLIC BLOOD PRESSURE: 102 MMHG | DIASTOLIC BLOOD PRESSURE: 46 MMHG | OXYGEN SATURATION: 98 % | RESPIRATION RATE: 16 BRPM

## 2021-11-03 DIAGNOSIS — D64.9 ANEMIA, UNSPECIFIED TYPE: Primary | ICD-10-CM

## 2021-11-03 DIAGNOSIS — Z78.9 DIFFICULT INTRAVENOUS ACCESS: Primary | ICD-10-CM

## 2021-11-03 LAB
ANISOCYTOSIS: ABNORMAL
BANDED NEUTROPHILS ABSOLUTE COUNT: 0.08 K/CU MM
BANDED NEUTROPHILS RELATIVE PERCENT: 1 % (ref 5–11)
CULTURE: NORMAL
DIFFERENTIAL TYPE: ABNORMAL
EOSINOPHILS ABSOLUTE: 1.4 K/CU MM
EOSINOPHILS RELATIVE PERCENT: 17 % (ref 0–3)
HCT VFR BLD CALC: 25.5 % (ref 37–47)
HEMOGLOBIN: 7.8 GM/DL (ref 12.5–16)
LYMPHOCYTES ABSOLUTE: 1 K/CU MM
LYMPHOCYTES RELATIVE PERCENT: 13 % (ref 24–44)
Lab: NORMAL
MCH RBC QN AUTO: 30.2 PG (ref 27–31)
MCHC RBC AUTO-ENTMCNC: 30.6 % (ref 32–36)
MCV RBC AUTO: 98.8 FL (ref 78–100)
MONOCYTES ABSOLUTE: 0.3 K/CU MM
MONOCYTES RELATIVE PERCENT: 4 % (ref 0–4)
PDW BLD-RTO: 24.8 % (ref 11.7–14.9)
PLATELET # BLD: 179 K/CU MM (ref 140–440)
PMV BLD AUTO: 9.5 FL (ref 7.5–11.1)
RBC # BLD: 2.58 M/CU MM (ref 4.2–5.4)
SEGMENTED NEUTROPHILS ABSOLUTE COUNT: 5.2 K/CU MM
SEGMENTED NEUTROPHILS RELATIVE PERCENT: 65 % (ref 36–66)
SPECIMEN: NORMAL
WBC # BLD: 8 K/CU MM (ref 4–10.5)

## 2021-11-03 PROCEDURE — 2580000003 HC RX 258: Performed by: INTERNAL MEDICINE

## 2021-11-03 PROCEDURE — 85027 COMPLETE CBC AUTOMATED: CPT

## 2021-11-03 PROCEDURE — 76937 US GUIDE VASCULAR ACCESS: CPT

## 2021-11-03 PROCEDURE — 99284 EMERGENCY DEPT VISIT MOD MDM: CPT

## 2021-11-03 PROCEDURE — 85007 BL SMEAR W/DIFF WBC COUNT: CPT

## 2021-11-03 PROCEDURE — 36430 TRANSFUSION BLD/BLD COMPNT: CPT

## 2021-11-03 PROCEDURE — 99211 OFF/OP EST MAY X REQ PHY/QHP: CPT

## 2021-11-03 PROCEDURE — C1751 CATH, INF, PER/CENT/MIDLINE: HCPCS

## 2021-11-03 PROCEDURE — 36569 INSJ PICC 5 YR+ W/O IMAGING: CPT

## 2021-11-03 PROCEDURE — P9016 RBC LEUKOCYTES REDUCED: HCPCS

## 2021-11-03 PROCEDURE — 36410 VNPNXR 3YR/> PHY/QHP DX/THER: CPT

## 2021-11-03 PROCEDURE — 96374 THER/PROPH/DIAG INJ IV PUSH: CPT

## 2021-11-03 RX ORDER — SODIUM CHLORIDE 0.9 % (FLUSH) 0.9 %
5-40 SYRINGE (ML) INJECTION PRN
Status: DISCONTINUED | OUTPATIENT
Start: 2021-11-03 | End: 2021-11-04 | Stop reason: HOSPADM

## 2021-11-03 RX ORDER — POTASSIUM CHLORIDE 750 MG/1
10 TABLET, FILM COATED, EXTENDED RELEASE ORAL ONCE
Status: DISCONTINUED | OUTPATIENT
Start: 2021-11-03 | End: 2021-11-04 | Stop reason: HOSPADM

## 2021-11-03 RX ORDER — SODIUM CHLORIDE 0.9 % (FLUSH) 0.9 %
5-40 SYRINGE (ML) INJECTION EVERY 12 HOURS SCHEDULED
Status: DISCONTINUED | OUTPATIENT
Start: 2021-11-03 | End: 2021-11-04 | Stop reason: HOSPADM

## 2021-11-03 RX ORDER — SODIUM CHLORIDE 9 MG/ML
25 INJECTION, SOLUTION INTRAVENOUS PRN
Status: DISCONTINUED | OUTPATIENT
Start: 2021-11-03 | End: 2021-11-04 | Stop reason: HOSPADM

## 2021-11-03 RX ORDER — LIDOCAINE HYDROCHLORIDE 10 MG/ML
5 INJECTION, SOLUTION EPIDURAL; INFILTRATION; INTRACAUDAL; PERINEURAL ONCE
Status: DISCONTINUED | OUTPATIENT
Start: 2021-11-03 | End: 2021-11-04 | Stop reason: HOSPADM

## 2021-11-03 RX ORDER — FUROSEMIDE 10 MG/ML
20 INJECTION INTRAMUSCULAR; INTRAVENOUS SEE ADMIN INSTRUCTIONS
Status: DISCONTINUED | OUTPATIENT
Start: 2021-11-03 | End: 2021-11-04 | Stop reason: HOSPADM

## 2021-11-03 RX ORDER — SODIUM CHLORIDE 9 MG/ML
INJECTION, SOLUTION INTRAVENOUS PRN
Status: COMPLETED | OUTPATIENT
Start: 2021-11-03 | End: 2021-11-03

## 2021-11-03 RX ADMIN — SODIUM CHLORIDE: 9 INJECTION, SOLUTION INTRAVENOUS at 09:14

## 2021-11-03 NOTE — DISCHARGE SUMMARY
Copies of AVS given to Med Trans. Patient discharged to [facility] per stretcher.

## 2021-11-03 NOTE — ED PROVIDER NOTES
Emergency Department Encounter    Patient: Sabino Mcmullen  MRN: [id number]  : 1940  Date of Evaluation: 11/3/2021  ED Provider:  Vidal Moritz, MD    Triage Chief Complaint:   No chief complaint on file. [place]:  Sabino Mcmullen is a [de-identified] y.o. female that presents from nursing facility for not having IV, apparently the patient is at a nursing facility has a history of anemia as well as admitted to the hospital recently for infection as an outpatient facility and was sent to the transfusion center for blood transfusion apparently [facility] through the transfusion he lost the IV, staff there could not get another one so she was sent to the ER, PICC nurses in the emergency department could not obtain a PICC line on midline or peripheral IV, she was set up for an outpatient tunnel catheter in 2 days and sent back to the nursing home the nursing home sent her back to the ER because they did not agree with that planthis is per report I was given. Patient does not have much of a complaint here.     ROS - see HPI, below listed is current ROS at time of my eval:  General:  No fevers, no chills  Eyes:  No recent vison changes, no discharge  ENT:  No sore throat, no nasal congestion  Cardiovascular:  No chest pain, no palpitations  Respiratory:  No shortness of breath, no cough  Gastrointestinal:  No pain, no nausea, no vomiting  Musculoskeletal:  No muscle pain, no joint pain  Neurologic:  No speech problems, no headache, no extremity weakness  Psychiatric:  No anxiety  Extremities:  no edema, no pain    Past Medical History:   Diagnosis Date    Acid reflux     'no recent issue\"    Arthritis     \"Left Index Finger\"    CHF (congestive heart failure) ([facility] [address]     per old chart hx of CHF with admission 2016 with pulmonary edema    Chronic kidney disease     Sees Dr. Faheem Nathan have one kidney, dr Oneida Garcia told me that in  I think\"    GERD (gastroesophageal reflux disease)     History of blood transfusion  Or 2012    No Reaction To Blood Transfusion Received    Hypertension     ( pt states she is not on any medication for blood pressure)    Hypomagnesemia     Hypothyroidism     MRSA (methicillin resistant staph aureus) culture positive 10/08/2021    Leg    Shortness of breath on exertion     SVT (supraventricular tachycardia) ([address]     per old chart hx of SVT with admission 2016 tx with Adenosine and per notes in 5/2018 hx of SVT- no cardiologist    Teeth missing     Upper And Lower    Wears glasses      Past Surgical History:   Procedure Laterality Date    ABDOMEN SURGERY      DENTAL SURGERY      Teeth Extracted In Past    ENDOSCOPY, COLON, DIAGNOSTIC  2011 Or 2012    EYE SURGERY  ? when    clyde cataract ext    FEMUR FRACTURE SURGERY Left 5/22/2021    FEMUR IM NAIL REGINALDO INSERTION performed by Erica Jose MD at [address] [address]    Abdominal Hernia Repair     HIP SURGERY Right 10/8/2021    RIGHT HIP HEMIARTHROPLASTY performed by Erica Jose MD at [facility] Right 10/10/2021    HIP HEMIARTHROPLASTY performed by Erica Jose MD at [facility]  05/27/2018    exp lap . converted to exp laporotomy with ventral hernia repair with mesh    OTHER SURGICAL HISTORY  71/52/4972    umbilical scar excision    PACEMAKER INSERTION N/A 10/25/2021    PACEMAKER INSERTION PERMANENT performed by Ananya Montilla MD at [facility]  09/16/2016    Robotic laparoscopic     Family History   Problem Relation Age of Onset    Cancer Father         Lung Cancer    Arthritis Mother     Cancer Brother         Skin Cancer    High Cholesterol Brother      Social History     Socioeconomic History    Marital status:       Spouse name: Not on file    Number of children: Not on file    Years of education: Not on file    Highest education level: Not on file   Occupational History    Not on file   Tobacco Use    Smoking status: Never Smoker    Smokeless tobacco: Never Used   Vaping Use    Vaping Use: Never used   Substance and Sexual Activity    Alcohol use: Not Currently    Drug use: No    Sexual activity: Not Currently   Other Topics Concern    Not on file   Social History Narrative    Not on file     Social Determinants of Health     Financial Resource Strain:     Difficulty of Paying Living Expenses:    Food Insecurity:     Worried About Running Out of Food in the Last Year:     [address] in the Last Year:    Transportation Needs:     Lack of Transportation (Medical):  Lack of Transportation (Non-Medical):    Physical Activity:     Days of Exercise per Week:     Minutes of Exercise per Session:    Stress:     Feeling of Stress :    Social Connections:     Frequency of Communication with Friends and Family:     Frequency of Social Gatherings with Friends and Family:     Attends Confucianist Services:     Active Member of Clubs or Organizations:     Attends Club or Organization Meetings:     Marital Status:    Intimate Partner Violence:     Fear of Current or Ex-Partner:     Emotionally Abused:     Physically Abused:     Sexually Abused:      No current facility-administered medications for this encounter. Current Outpatient Medications   Medication Sig Dispense Refill    calcium carbonate (TUMS) 500 MG chewable tablet Take 1 tablet by mouth 3 times daily as needed for Heartburn 30 tablet 0    fluconazole (DIFLUCAN) 200MG/100ML in 0.9 % sodium chloride IVPB Infuse 100 mLs intravenously every 24 hours for 8 days 4 mL 0    digoxin (LANOXIN) 125 MCG tablet Take 1 tablet by mouth every 48 hours 30 tablet 3    cefepime (MAXIPIME) 1 GM/50ML Infuse 50 mLs intravenously every 12 hours for 8 days 1000 mL 0    collagenase 250 UNIT/GM ointment Apply topically daily. 1 each 0    miconazole (MICOTIN) 2 % powder Apply topically 2 times daily.  45 g 1    furosemide (LASIX) 20 MG tablet Take 1 tablet by mouth daily Take 20 mg by mouth 2 times daily 180 tablet 1    pantoprazole (PROTONIX) 40 MG tablet Take 1 tablet by mouth every morning (before breakfast) 30 tablet 3    polyethylene glycol (GLYCOLAX) 17 g packet Take 17 g by mouth daily as needed for Constipation 527 g 1    sennosides-docusate sodium (SENOKOT-S) 8.6-50 MG tablet Take 1 tablet by mouth 2 times daily 14 tablet 0    allopurinol (ZYLOPRIM) 100 MG tablet Take 0.5 tablets by mouth daily 90 tablet 1    metoprolol tartrate (LOPRESSOR) 25 MG tablet Take 1 tablet by mouth 2 times daily 180 tablet 1    magnesium oxide (MAG-OX) 400 (241.3 Mg) MG TABS tablet TAKE ONE TABLET BY MOUTH TWICE A  tablet 1    levothyroxine (SYNTHROID) 50 MCG tablet Take 1 tablet by mouth Daily 90 tablet 1     Facility-Administered Medications Ordered in Other Encounters   Medication Dose Route Frequency Provider Last Rate Last Admin    furosemide (LASIX) injection 20 mg  20 mg IntraVENous See Admin Instructions Brooklyn Forbes MD        potassium chloride (KLOR-CON) extended release tablet 10 mEq  10 mEq Oral Once Brooklyn Forbes MD        lidocaine PF 1 % injection 5 mL  5 mL IntraDERmal Once Greg Marin MD        sodium chloride flush 0.9 % injection 5-40 mL  5-40 mL IntraVENous 2 times per day Prroyer Burt MD        sodium chloride flush 0.9 % injection 5-40 mL  5-40 mL IntraVENous PRN Greg Velez MD        0.9 % sodium chloride infusion  25 mL IntraVENous PRN Prroyer Burt MD         No Known Allergies    Nursing Notes Reviewed    Physical Exam:  Triage VS:    ED Triage Vitals [11/03/21 1403]   Enc Vitals Group      BP (!) 118/44      Pulse 62      Resp 16      Temp 98.2 °F (36.8 °C)      Temp Source Oral      SpO2 99 %      Weight 220 lb (99.8 kg)      Height 5' 1\" (1.549 m)      Head Circumference       Peak Flow       Pain Score       Pain Loc       Pain Edu? Excl. in [address]?         My pulse ox interpretation is  normal    General appearance: No acute distress. Eye:  Extraocular movements intact. Ears, nose, mouth and throat:  Oral mucosa moist   Neck:  Trachea midline. Extremity:  Normal ROM     Heart:  Regular  Perfusion:  intact  Respiratory:   Respirations nonlabored. Abdominal: Non distended. Neurological:  Alert and oriented    MDM:  Patient presented to the emergency department for patient did receive about half of her plate of blood, which wrecked hemoglobin here was 7.8 which is higher than recent she has been in the [facility] for many weeks now nephrology is at bedside is comfortable with her being discharged back to her living facility with the plan of an outpatient tunneled catheter on Friday I do not believe she needs to be admitted to the hospital for issues with outpatient access, urgent placement of access, patient is not septic here is not symptomatic vital signs are not unstable I spoke with nursing home physician we will discharge with plan to have tunneled catheter as an outpatient on Friday. Clinical Impression:  1. Anemia, unspecified type      Disposition referral (if applicable):  Maria Dolores Forbes MD  [address] [address]  [phone]          Disposition medications (if applicable):  New Prescriptions    No medications on file     ED Provider Disposition Time  DISPOSITION Decision To Discharge 11/03/2021 03:57:58 PM      Comment: Please note this report has been produced using speech recognition software and may contain errors related to that system including errors in grammar, punctuation, and spelling, as well as words and phrases that may be inappropriate. Efforts were made to edit the dictations.         Chase Madsen MD  11/03/21 2181

## 2021-11-03 NOTE — PROGRESS NOTES
Spoke with Efrain De Souza, RN at [facility] in regards to pt being sent to ER. She advised that Dr. Forbes was concerned about pts Hgb and not receiving all the blood that was ordered d/t lack of IV access. Spoke with Dr. Alfredo Mascorro and informed of the above.  Dr. Alfredo Mascorro and Dr. Juani Swain discussed situation and verbal orders received to draw a stat H/H.

## 2021-11-03 NOTE — ED NOTES
Spoke with Dr Luis Miguel Carrillo who states that the patients Hemoglobin is 7.8 GM/DL. The patient is cleared to return to the nursing home and is scheduled to receive a PICC line on Friday when IR can schedule.      Kimberly Dougherty RN  11/03/21 1300

## 2021-11-03 NOTE — ED NOTES
[id number] paged Dr Forbes. Antonia Anchors  11/03/21 [id number] Dr Forbes returned call.       Antonia Smith  11/03/21 9664

## 2021-11-03 NOTE — ED NOTES
Infusion team state that the patient needs a tunneled central line but can not be seen until possibly Friday in Interventional Radiology.       Vida Cooper, RN  11/03/21 2196 [facility] Aries, RN  11/03/21 3557

## 2021-11-03 NOTE — PROGRESS NOTES
MIDLINE LEAKING BLOOD FROM THE INSERTION SITE. BLOOD PAUSED, LINE FLUSHED EASILY WITHOUT BLOOD RETURN. NO OBVIOUS SWELLING BUT ARMS ARE LARGE. PT DENIES PAIN. ATTEMPTED PIV WITHOUT SUCCESS. CONSULTED PICC NURSE AND AGREE THAT MIDLINE IS MALFUNCTIONING OR THERE IS AN OCCLUSION. SPOKE DR. GILMORE AND ORDERS FOR PICC RECEIVED. AFTER MULTIPLE ATTEMPTS TO PLACE PICC/MIDLINE/PIV PICC NURSE EXHAUSTED ALL VESSELS. DR. Kevin Santos AWARE ORDER FOR IR TO PLACE A TUNNELED CATH RECEIVED. IR UNABLE TO PLACE CATH UNTIL Friday. DOCTOR AWARE THAT PT WILL NOT HAVE ANY IV ACCESS UNTIL Friday. CALLED RENETTA NURSE AT [facility], INFORMED OF THE ABOVE. SHE STATES THAT DR. BAH IS AT [facility] AND SHE WOULD INFORM HIM.  INFORMED HER THAT PT HAS AN APPT ON Friday AND WILL NEED TO BE HERE BY 10AM.

## 2021-11-03 NOTE — ED NOTES
The patient presents to the emergency department alert and oriented with a complaint of low hemoglobin per the transport team. The patient was at the infusion unit earlier and apparently her PICC line was not working or removed and her transfusion was not completed. The transport team transported to the [address] [place] [address] home and they refused to take her back. The patient denies any chest pain or shortness of breath. The patient placed on the monitor with vital signs taken. Assessment as follows; Skin is pale, warm and dry.       Dipesh Tesfaye RN  11/03/21 1928

## 2021-11-03 NOTE — CONSULTS
Patient arrived with Bard Midline in place from [facility]. Line would flush but then began to leak from insertion site. Able to visualize catheter tip in cephalic vessel but vessel non compressible above catheter tip. Midline removed at this time. PICC OK 's per Dr. Tong Hernandez, consent obtained from patient. PICC attempt in RUE failed, unable to advance guidewire past 6cm. Midline attempt in LUE Basilic vessel failed, also unable to advance guidewire. No other vessels available for potential PICC insertion as patient has a pacemaker inserted on 10/25. Dr. Tong Hernandez notified that patient will  Require IR to attempt a tunnelled catheter placement. DR. Tong Hernandez calling IR to see if they could get her in while she is here. Patient is very teary and emotional during IV attempts states \"Why can't they just let me go\". Awaiting callback from Dr. Tong Hernandez.

## 2021-11-03 NOTE — CARE COORDINATION
Patient identified as potential readmission. Last admission 10/22-10/28 for septic shock. Patient here today for low hemoglobin. Patient was seen today in the infusion unit to receive blood transfusion. At that time, transfusion was unable to be completed due to malfunction with midline. Patient was transported back to [facility] with tunneled cath placement scheduled for Friday. Per triage documentation, \"the transport team transported to the [address] [place] [address] home and they refused to take her back\". While in the emergency department patient hemoglobin 7.8. Readmission was avoided and patient was able to be discharged back to [facility] with appointment on Friday for tunneled cath placement.

## 2021-11-04 LAB
ABO/RH: NORMAL
ANTIBODY SCREEN: NEGATIVE
COMPONENT: NORMAL
CROSSMATCH RESULT: NORMAL
STATUS: NORMAL
TRANSFUSION STATUS: NORMAL
UNIT DIVISION: 0
UNIT NUMBER: NORMAL

## 2021-11-05 ENCOUNTER — HOSPITAL ENCOUNTER (OUTPATIENT)
Age: 81
Setting detail: SPECIMEN
Discharge: HOME | End: 2021-11-05

## 2021-11-05 ENCOUNTER — HOSPITAL ENCOUNTER (OUTPATIENT)
Dept: INTERVENTIONAL RADIOLOGY/VASCULAR | Age: 81
Discharge: HOME | End: 2021-11-05
Payer: COMMERCIAL

## 2021-11-05 VITALS
RESPIRATION RATE: 18 BRPM | HEIGHT: 61 IN | WEIGHT: 223 LBS | HEART RATE: 54 BPM | BODY MASS INDEX: 42.1 KG/M2 | OXYGEN SATURATION: 99 % | DIASTOLIC BLOOD PRESSURE: 50 MMHG | TEMPERATURE: 97.1 F | SYSTOLIC BLOOD PRESSURE: 103 MMHG

## 2021-11-05 DIAGNOSIS — B99.9 INFECTION: ICD-10-CM

## 2021-11-05 LAB
ALBUMIN SERPL-MCNC: 2.1 GM/DL (ref 3.4–5)
ALP BLD-CCNC: 98 IU/L (ref 40–128)
ALT SERPL-CCNC: <5 U/L (ref 10–40)
ANION GAP SERPL CALCULATED.3IONS-SCNC: 10 MMOL/L (ref 4–16)
APTT: 34.1 SECONDS (ref 25.1–37.1)
AST SERPL-CCNC: 18 IU/L (ref 15–37)
BILIRUB SERPL-MCNC: 0.3 MG/DL (ref 0–1)
BUN BLDV-MCNC: 22 MG/DL (ref 6–23)
CALCIUM SERPL-MCNC: 8 MG/DL (ref 8.3–10.6)
CHLORIDE BLD-SCNC: 107 MMOL/L (ref 99–110)
CO2: 23 MMOL/L (ref 21–32)
CREAT SERPL-MCNC: 2 MG/DL (ref 0.6–1.1)
ERYTHROCYTE SEDIMENTATION RATE: 41 MM/HR (ref 0–30)
GFR AFRICAN AMERICAN: 29 ML/MIN/1.73M2
GFR NON-AFRICAN AMERICAN: 24 ML/MIN/1.73M2
GLUCOSE BLD-MCNC: 75 MG/DL (ref 70–99)
HCT VFR BLD CALC: 24.2 % (ref 37–47)
HEMOGLOBIN: 7.5 GM/DL (ref 12.5–16)
INR BLD: 1 INDEX
MCH RBC QN AUTO: 31.1 PG (ref 27–31)
MCHC RBC AUTO-ENTMCNC: 31 % (ref 32–36)
MCV RBC AUTO: 100.4 FL (ref 78–100)
PDW BLD-RTO: 25.1 % (ref 11.7–14.9)
PLATELET # BLD: 164 K/CU MM (ref 140–440)
PMV BLD AUTO: 10.3 FL (ref 7.5–11.1)
POTASSIUM SERPL-SCNC: 4.3 MMOL/L (ref 3.5–5.1)
PROTHROMBIN TIME: 12.9 SECONDS (ref 11.7–14.5)
RBC # BLD: 2.41 M/CU MM (ref 4.2–5.4)
SARS-COV-2, NAAT: NOT DETECTED
SODIUM BLD-SCNC: 140 MMOL/L (ref 135–145)
SOURCE: NORMAL
TOTAL PROTEIN: 5.8 GM/DL (ref 6.4–8.2)
URIC ACID: 9.1 MG/DL (ref 2.6–6)
WBC # BLD: 6.8 K/CU MM (ref 4–10.5)

## 2021-11-05 PROCEDURE — 36415 COLL VENOUS BLD VENIPUNCTURE: CPT

## 2021-11-05 PROCEDURE — 36558 INSERT TUNNELED CV CATH: CPT

## 2021-11-05 PROCEDURE — 80053 COMPREHEN METABOLIC PANEL: CPT

## 2021-11-05 PROCEDURE — 87635 SARS-COV-2 COVID-19 AMP PRB: CPT

## 2021-11-05 PROCEDURE — 77001 FLUOROGUIDE FOR VEIN DEVICE: CPT

## 2021-11-05 PROCEDURE — 7100000011 HC PHASE II RECOVERY - ADDTL 15 MIN

## 2021-11-05 PROCEDURE — 85610 PROTHROMBIN TIME: CPT

## 2021-11-05 PROCEDURE — 85730 THROMBOPLASTIN TIME PARTIAL: CPT

## 2021-11-05 PROCEDURE — 76937 US GUIDE VASCULAR ACCESS: CPT

## 2021-11-05 PROCEDURE — 7100000010 HC PHASE II RECOVERY - FIRST 15 MIN

## 2021-11-05 PROCEDURE — 85652 RBC SED RATE AUTOMATED: CPT

## 2021-11-05 PROCEDURE — 85027 COMPLETE CBC AUTOMATED: CPT

## 2021-11-05 PROCEDURE — 84550 ASSAY OF BLOOD/URIC ACID: CPT

## 2021-11-05 RX ORDER — ACETAMINOPHEN 500 MG
500 TABLET ORAL EVERY 6 HOURS PRN
COMMUNITY

## 2021-11-05 ASSESSMENT — PAIN - FUNCTIONAL ASSESSMENT: PAIN_FUNCTIONAL_ASSESSMENT: 0-10

## 2021-11-05 ASSESSMENT — PAIN SCALES - GENERAL: PAINLEVEL_OUTOF10: 0

## 2021-11-05 ASSESSMENT — PAIN DESCRIPTION - DESCRIPTORS: DESCRIPTORS: ACHING

## 2021-11-05 NOTE — PROGRESS NOTES
1419- pt returned to [facility] rm 16, respirations even and unlabored, VSS, pt alert, able to make needs known  1424- med trans called to  pt and return to [facility] 26- discharge instructions reviewed w/ pt and pt nurse Yarelis Ronquillo LPN at [facility] pt up via stretcher

## 2021-11-05 NOTE — PROGRESS NOTES
PROCEDURE PERFORMED: Tunneled PICC    PRIMARY INDICATION FOR PROCEDURE: Infection    PT TRANSPORTED FROM:  [facility]                         TO THE IR ROOM: IR large room        PT IN THE ROOM AT WHAT TIME: 1320                            INFORMED CONSENT:  PT ALERT & ORIENTED and verbalizes understanding of procedure. Pt signed consent with Dr. Ester Farr prior to procedure. Consent placed in chart. DEE SCORE PRE PROCEDURE: 9    NURSING ASSESSMENT: Pt appears to be weak and frail. Dressing to left chest from pacemaker     TIME OUT COMPLETE: [address]  Pt transferred to the table and positioned for comfort. Warm blankets given. Pt placed on Cardiac Monitor. Pt in the supine position.  CHLORHEXADINE scrubbed and draped in a sterile fashion by Kamron Richter RN    PAIN/LOCAL ANESTHESIA/SEDATION MANAGEMENT:  Lidocaine was used to numb the skin and surrounding tissues    INTRAOPERATIVE:    ACCESS TIME: 1400  US/FLUORO: 3 images  WIRE USED: J wire  SHEATH USED:   CATHETER USED: catheter was  cut at 22cm  FINAL IMAGE TAKEN TO CONFIRM PLACEMENT OF: Fluoro was  used  CONTRAST/CC:   FLUID RETURN: yes    STERILE DRESSINGS:  Biopatch and tegaderm applied by Sandra RENTERIA    SPECIMENS: None were collected    EBL: less than 1cc    FOLLOW- UP X-RAY: Placement verified by Fluoro    COMPLICATIONS: Pt tolerated procedure well without any complications    STAFF PRESENT DURING PROCEDURE:  Leona RN, Bronwyn RN, Marylu RENTERIA    DEE SCORE POST PROCEDURE: 9    REPORT CALLED TO: Report called to Yesy Deluca RN in [facility]    PT LEFT ROOM AT WHAT TIME: 4692

## 2021-11-05 NOTE — DISCHARGE SUMMARY
Discharge Summary    Name:  Rajiv Blevins /Age/Sex: 1940  ([de-identified] y.o. female)   MRN & CSN:  [id number] & [id number] Admission Date/Time: 10/8/2021  2:35 AM   Attending:  No att. providers found Discharging Physician: Navid Pruitt MD     Hospital Course:   Rosalva ornelas [de-identified] y.o. with PMH of hypertension, hypothyroidism, HFpEF, SVT, CKD stage IV, morbid obesity and GERD who is grieving for loss of her  and was admitted with mechanical fall complicated by right hip fracture. Orthopedics was consulted and patient was scheduled for right hip hemiarthroplasty which was slightly delayed due to bilateral lower extremity cellulitis caused by MRSA. Antibiotics was initiated for the lower extremity cellulitis and patient was also evaluated preoperatively by the cardiology team and the nephrologist.  She was taken to the OR and had right hemiarthroplasty on 10/10/2021. Post op, patient was evaluated by the PT and OT and was recommended to discharge to SNF but she declined. Case management was involved and assisted in safe discharge of this patient to ARU. Of note is that she did have acute on chronic diastolic CHF for which she was briefly diuresed with improvement in her volume status. She will follow up with orthopedics, cardiology, nephrology and PCP as outpatient.        The patient expressed appropriate understanding of and agreement with the discharge recommendations, medications, and plan.      Consults this admission:  None     Discharge Instruction:   Follow up appointments:   Primary care physician:  within 2 weeks  Orthopedics: Follow-up as soon as possible. Cardiology: Follow-up as soon as possible. Nephrology: Follow-up in 2 weeks.     Diet:  cardiac diet   Activity: Per PT and OT recommendation  Disposition: Discharged to:   []? Home, []?C, []?SNF, [x]? Acute Rehab, []? Hospice   Condition on discharge: Stable     Discharge Medications:                    Mayra Shoulder Home Medication Instructions MARIA ELENA:     Printed on:10/11/21 1923   Medication Information                                       allopurinol (ZYLOPRIM) 100 MG tablet  Take 0.5 tablets by mouth daily                      furosemide (LASIX) 20 MG tablet  Take 1 tablet by mouth 2 times daily Take 20 mg by mouth 2 times daily                      levothyroxine (SYNTHROID) 50 MCG tablet  Take 1 tablet by mouth Daily                      linezolid (ZYVOX) 600 MG tablet  Take 1 tablet by mouth every 12 hours for 16 doses                      magnesium oxide (MAG-OX) 400 (241.3 Mg) MG TABS tablet  TAKE ONE TABLET BY MOUTH TWICE A DAY                      metoprolol tartrate (LOPRESSOR) 25 MG tablet  Take 1 tablet by mouth 2 times daily                      nystatin (MYCOSTATIN) 221551 UNIT/GM powder  Apply 3 times daily x 10 to 14 days                      oxyCODONE (ROXICODONE) 5 MG immediate release tablet  Take 1 tablet by mouth every 4 hours as needed for Pain for up to 3 days.                      polyethylene glycol (GLYCOLAX) 17 g packet  Take 17 g by mouth daily as needed for Constipation                      sennosides-docusate sodium (SENOKOT-S) 8.6-50 MG tablet  Take 1 tablet by mouth 2 times daily                            Objective Findings at Discharge:   LMP 01/23/1995            PHYSICAL EXAM   GEN    Awake female, lying in bed in no apparent distress. Appears given age. EYES   No scleral erythema, discharge, or conjunctivitis. HENT  Mucous membranes are moist. No evidence of thrush. NECK  Supple, no apparent thyromegaly or masses. RESP  Clear to auscultation, no wheezes, rales or rhonchi.  Symmetric chest movement while on oxygen by nasal cannula. CARDIO/VASC           S1/S2 auscultated. Regular rate without appreciable murmurs, rubs, or gallops. No JVD or carotid bruits. Peripheral pulses equal bilaterally and palpable.  Bilateral peripheral edema.   [facility] is soft without significant tenderness, masses, or guarding. Bowel sounds are normoactive.        No costovertebral angle tenderness. HEME/LYMPH            No palpable cervical lymphadenopathy and no hepatosplenomegaly. No petechiae or ecchymoses. MSK    Bilateral shins cellulitic changes improving, right hip dressing with a VAC  SKIN    Normal coloration, warm, dry. NEURO           Cranial nerves appear grossly intact, normal speech, no lateralizing weakness. PSYCH            Awake, alert, oriented x 4.  Affect appropriate.     BMP/CBC        Recent Labs     10/09/21  0255 10/10/21  0315 10/11/21  0348    139 139   K 4.9 4.8 5.0    107 106   CO2 20* 20* 21   BUN 45* 47* 50*   CREATININE 5.0* 5.0* 4.8*   WBC 7.0  --  8.7   HCT 33.8*  --  31.4*   *  --  157         IMAGING:  EXAMINATION:   TWO XRAY VIEWS OF THE RIGHT HIP       10/10/2021 10:58 am       COMPARISON:   10/08/2021       HISTORY:   ORDERING SYSTEM PROVIDED HISTORY: Post-op evaluation   TECHNOLOGIST PROVIDED HISTORY:   Of operative side while in recovery room.    Reason for exam:->Post-op evaluation   Reason for Exam: Post-op evaluation       FINDINGS:   A right hip prosthesis projects in normal alignment.  There is no acute   fracture or dislocation.  Multiple cutaneous staples are present, along with   a drain and adjacent soft tissue air.       An intramedullary nail in the left femur is partially visualized.  Alignment   of the proximal left femur is anatomical.           Impression   Right hip replacement in normal alignment.       No acute interval fracture.       Immediate postoperative changes.         EXAMINATION:   ONE XRAY VIEW OF THE CHEST       10/8/2021 9:47 am       COMPARISON:   May 21, 2021       HISTORY:   ORDERING SYSTEM PROVIDED HISTORY: sob-high BNP   TECHNOLOGIST PROVIDED HISTORY:   Reason for exam:->sob-high BNP   Reason for Exam: sob-high BNP       FINDINGS:   Mild cardiomegaly.  Mild-to-moderate diffuse interstitial thickening noted in   the lungs suspicious for infiltrates versus congestion.  No pneumothorax.       Significant osteopenic changes and degenerative changes noted in the bony   structures. .           Impression   Mild cardiomegaly.  Mild-to-moderate congestion and/or infiltrates identified   in the lungs.            Discharge Time of 37 minutes

## 2021-11-09 ENCOUNTER — HOSPITAL ENCOUNTER (OUTPATIENT)
Age: 81
Setting detail: SPECIMEN
Discharge: HOME | End: 2021-11-09

## 2021-11-09 LAB
ANION GAP SERPL CALCULATED.3IONS-SCNC: 8 MMOL/L (ref 4–16)
BUN BLDV-MCNC: 21 MG/DL (ref 6–23)
C-REACTIVE PROTEIN, HIGH SENSITIVITY: 83.3 MG/L
CALCIUM SERPL-MCNC: 8.1 MG/DL (ref 8.3–10.6)
CHLORIDE BLD-SCNC: 110 MMOL/L (ref 99–110)
CO2: 22 MMOL/L (ref 21–32)
CREAT SERPL-MCNC: 2.1 MG/DL (ref 0.6–1.1)
ERYTHROCYTE SEDIMENTATION RATE: 36 MM/HR (ref 0–30)
GFR AFRICAN AMERICAN: 27 ML/MIN/1.73M2
GFR NON-AFRICAN AMERICAN: 23 ML/MIN/1.73M2
GLUCOSE BLD-MCNC: 69 MG/DL (ref 70–99)
HCT VFR BLD CALC: 24.9 % (ref 37–47)
HEMOGLOBIN: 7.6 GM/DL (ref 12.5–16)
MCH RBC QN AUTO: 30.9 PG (ref 27–31)
MCHC RBC AUTO-ENTMCNC: 30.5 % (ref 32–36)
MCV RBC AUTO: 101.2 FL (ref 78–100)
PDW BLD-RTO: 24.7 % (ref 11.7–14.9)
PLATELET # BLD: 131 K/CU MM (ref 140–440)
PMV BLD AUTO: 10.1 FL (ref 7.5–11.1)
POTASSIUM SERPL-SCNC: 4.1 MMOL/L (ref 3.5–5.1)
RBC # BLD: 2.46 M/CU MM (ref 4.2–5.4)
SODIUM BLD-SCNC: 140 MMOL/L (ref 135–145)
WBC # BLD: 6.3 K/CU MM (ref 4–10.5)

## 2021-11-09 PROCEDURE — 82040 ASSAY OF SERUM ALBUMIN: CPT

## 2021-11-09 PROCEDURE — 82247 BILIRUBIN TOTAL: CPT

## 2021-11-09 PROCEDURE — 80048 BASIC METABOLIC PNL TOTAL CA: CPT

## 2021-11-09 PROCEDURE — 84450 TRANSFERASE (AST) (SGOT): CPT

## 2021-11-09 PROCEDURE — 86140 C-REACTIVE PROTEIN: CPT

## 2021-11-09 PROCEDURE — 84460 ALANINE AMINO (ALT) (SGPT): CPT

## 2021-11-09 PROCEDURE — 85027 COMPLETE CBC AUTOMATED: CPT

## 2021-11-09 PROCEDURE — 36415 COLL VENOUS BLD VENIPUNCTURE: CPT

## 2021-11-09 PROCEDURE — 84155 ASSAY OF PROTEIN SERUM: CPT

## 2021-11-09 PROCEDURE — 84075 ASSAY ALKALINE PHOSPHATASE: CPT

## 2021-11-09 PROCEDURE — 85652 RBC SED RATE AUTOMATED: CPT

## 2021-11-10 LAB
ALBUMIN SERPL-MCNC: 2 GM/DL (ref 3.4–5)
ALP BLD-CCNC: 105 IU/L (ref 40–128)
ALT SERPL-CCNC: <5 U/L (ref 10–40)
AST SERPL-CCNC: 19 IU/L (ref 15–37)
BILIRUB SERPL-MCNC: 0.4 MG/DL (ref 0–1)
TOTAL PROTEIN: 5.9 GM/DL (ref 6.4–8.2)

## 2021-11-11 ENCOUNTER — HOSPITAL ENCOUNTER (OUTPATIENT)
Dept: ULTRASOUND IMAGING | Age: 81
Discharge: HOME | End: 2021-11-11
Payer: COMMERCIAL

## 2021-11-11 DIAGNOSIS — Z96.641 STATUS POST RIGHT HIP REPLACEMENT: ICD-10-CM

## 2021-11-11 PROCEDURE — 76882 US LMTD JT/FCL EVL NVASC XTR: CPT

## 2021-11-16 ENCOUNTER — HOSPITAL ENCOUNTER (OUTPATIENT)
Age: 81
Setting detail: SPECIMEN
Discharge: HOME | End: 2021-11-16

## 2021-11-16 LAB
ANION GAP SERPL CALCULATED.3IONS-SCNC: 11 MMOL/L (ref 4–16)
BUN BLDV-MCNC: 24 MG/DL (ref 6–23)
CALCIUM SERPL-MCNC: 8.2 MG/DL (ref 8.3–10.6)
CHLORIDE BLD-SCNC: 110 MMOL/L (ref 99–110)
CO2: 20 MMOL/L (ref 21–32)
CREAT SERPL-MCNC: 3.1 MG/DL (ref 0.6–1.1)
GFR AFRICAN AMERICAN: 17 ML/MIN/1.73M2
GFR NON-AFRICAN AMERICAN: 14 ML/MIN/1.73M2
GLUCOSE BLD-MCNC: 65 MG/DL (ref 70–99)
HCT VFR BLD CALC: 25.1 % (ref 37–47)
HEMOGLOBIN: 7.7 GM/DL (ref 12.5–16)
MCH RBC QN AUTO: 31.7 PG (ref 27–31)
MCHC RBC AUTO-ENTMCNC: 30.7 % (ref 32–36)
MCV RBC AUTO: 103.3 FL (ref 78–100)
PDW BLD-RTO: 24.6 % (ref 11.7–14.9)
PLATELET # BLD: 115 K/CU MM (ref 140–440)
PMV BLD AUTO: 10.4 FL (ref 7.5–11.1)
POTASSIUM SERPL-SCNC: 4.2 MMOL/L (ref 3.5–5.1)
RBC # BLD: 2.43 M/CU MM (ref 4.2–5.4)
SODIUM BLD-SCNC: 141 MMOL/L (ref 135–145)
WBC # BLD: 8.1 K/CU MM (ref 4–10.5)

## 2021-11-16 PROCEDURE — 36415 COLL VENOUS BLD VENIPUNCTURE: CPT

## 2021-11-16 PROCEDURE — 80048 BASIC METABOLIC PNL TOTAL CA: CPT

## 2021-11-16 PROCEDURE — 85027 COMPLETE CBC AUTOMATED: CPT

## 2021-11-16 NOTE — DISCHARGE SUMMARY
Patient Name: Sriram Gutierrez  Patient :  1940  Patient MRN:   [id number]      Admission Date:  10/11/2021  Discharge Date:   10/22/2021. Admitting diagnosis: Right femoral neck fracture ([facility] 8.11)     Comorbid diagnoses impacting rehabilitation: Uncontrolled pain, generalized weakness, gait disturbance, acute blood loss anemia, lower limb MRSA cellulitis, essential hypertension, paroxysmal atrial fibrillation, chronic kidney disease stage IV, acute on chronic diastolic congestive heart failure, morbid obesity (BMI 45.2), GERD, history of left hip fracture in May of 2021.     Discharging diagnosis: Right femoral neck fracture ( [facility] 8.11)     Comorbid diagnoses impacting rehabilitation: Uncontrolled pain, generalized weakness, gait disturbance, acute blood loss anemia, lower limb MRSA cellulitis, essential hypertension, paroxysmal atrial fibrillation, chronic kidney disease stage IV, acute on chronic diastolic congestive heart failure, morbid obesity (BMI 45.2), GERD, history of left hip fracture in May of 2542.       Complication: Sepsis due to pyelonephritis    Consultants: Infectious disease service, cardiology    History of present illness: Patient is an [de-identified] right-hand-dominant female who was attempting to ambulate in her home without her walker in the evening of 2021. She describes a loss of balance with fall onto her right side injuring her right hip. Emergency services were summoned and she was brought to our ED. She denied any precipitating palpitations, dizziness or seizure-like activity before her fall. In our ED she was mildly hypotensive with a right femoral neck fracture and significant lower limb edema with active cellulitis with venous stasis ulcerations. Her creatinine was elevated at 5.3 and she is in atrial fibrillation. She was placed on antibiotics for her lower limb cellulitis and orthopedics was consulted.   Cardiology and nephrology provided preoperative clearance. On 10/10/2021 Dr. Jose R Maldonado performed a right hemiarthroplasty by way of a posterior approach. He allowed weightbearing as tolerated while following posterior hip precautions. The patient struggled with generalized weakness, uncontrolled pain and mild dyspnea. The patient made modest functional gains through the course of her initial inpatient rehabilitation stay. On this day of discharge, she developed sudden hypotension with tachycardia and fever. Consultants agreed that she was too medically unstable to remain on the inpatient rehabilitation unit. She was transferred to a monitored area for sepsis due to probable pyelonephritis. Prior (baseline) level of function: Independent.     Current level of function:         Current  IRF-SARAH and Goals:   Occupational Therapy:    Short term goals  Time Frame for Short term goals: STGs=LTGs :   Long term goals  Time Frame for Long term goals : 14-17 days or until d/c  Long term goal 1: Pt will complete grooming tasks Ind seated  Long term goal 2: Pt will complete total body bathing c min A using AE PRN  Long term goal 3: Pt will complete UB dressing c setup  Long term goal 4: Pt will complete LB dressing c mod A using AE  Long term goal 5: Pt will doff/don footwear c min A using AE  Long term goals 6: Pt will complete toileting c max A  Long term goal 7: Pt will complete functional transfers (bed, chair, toilet, shower) c DME PRN and min A  Long term goal 8: Pt will perform therex/therax to facilitate increased strength/endurance/ax tolerance (c emphasis on dynamic standing balance/tolerance > 5 mins, and BUE endurance) c SBA :                                       Eating: Eating  Assistance Needed: Independent  Comment: able to open packages/containers  CARE Score: 6  Discharge Goal: Independent       Oral Hygiene: Oral Hygiene  Assistance Needed: Independent  Comment: MOD I completed seated sink-side  CARE Score: 6  Discharge Goal: Independent    UB/LB Bathing: Shower/Bathe Self  Assistance Needed: Partial/moderate assistance  Comment: Mod A required for LB washing, pt completed UB washing with increased time. Pt emotional demo crying without tears throughout activity  CARE Score: 3  Discharge Goal: Partial/moderate assistance    UB Dressing: Upper Body Dressing  Assistance Needed: Setup or clean-up assistance  Comment: Pt able to don/doff long sleeve in seated position  CARE Score: 5  Discharge Goal: Supervision or touching assistance         LB Dressing: Lower Body Dressing  Assistance Needed: Dependent  Comment: Total A to thread BLE and don over hips, pt c/o pain and weakness during activity. CARE Score: 1  Discharge Goal: Partial/moderate assistance    Donning and [place] Footwear: Putting On/Taking Off Footwear  Assistance Needed: Partial/moderate assistance  Comment: Min A c use of sock-aid, pt able to doff socks c use of reacher. CARE Score: 3  Discharge Goal: Partial/moderate assistance      Toileting: Toileting Hygiene  Assistance Needed: Dependent  Comment: Total A for andrew-hygiene and CM. Reason if not Attempted: Not attempted due to medical condition or safety concerns  CARE Score: 1  Discharge Goal: Substantial/maximal assistance      Toilet Transfers: Toilet Transfer  Assistance Needed: Substantial/maximal assistance  Comment: Max A c use of abdullahi stedy; partial sit<>stands Mod A  Reason if not Attempted: Not attempted due to medical condition or safety concerns  CARE Score: 2  Discharge Goal: Partial/moderate assistance    Physical Therapy:   Short term goals  Time Frame for Short term goals: 12-14 tx days:  Short term goal 1: Pt will complete rolling L/R and sit->sup using leg  to assist RLE and using bed features with Min A following posterior hip precautions on RLE.   Short term goal 2: Pt will complete sup->sit using bed features and leg  to assist RLE with SBA-Sup following posterior hip precautions on RLE.  Short term goal 3: Pt will complete OOB transfers using RW with Min A following posterior hip precautions. Short term goal 4: Pt will ambulate 50 ft with turns over level surface and 10 ft of unevem surface using RW with CGA. Short term goal 5: Pt will ascend/descend 2\" curb step using RW with Min A. Short term goal 6: Pt will complete object retrieval from the floor in standing with 1UE support on RW and using reacher with CGA. Short term goal 7: pt will complete 150 ft of w/c propulsion using manual w/c at mod ind            Bed Mobility:   Sit to Lying  Assistance Needed: Dependent  Comment: Total A on 10/19/2021  CARE Score: 1  Discharge Goal: Partial/moderate assistance  Roll Left and Right  Assistance Needed: Substantial/maximal assistance  Comment:  Mod A to R, Max A to L on 10/185/2021  Reason if not Attempted: Not attempted due to medical condition or safety concerns  CARE Score: 2  Discharge Goal: Partial/moderate assistance  Lying to Sitting on Side of Bed  Assistance Needed: Dependent  Comment: Total A on 10/19/2021; pt refused to attempt yesterday  CARE Score: 1  Discharge Goal: Supervision or touching assistance    Transfers:    Sit to Stand  Assistance Needed: Partial/moderate assistance  Comment: Min A from EOB using RW on 10/19/2021  CARE Score: 3  Discharge Goal: Partial/moderate assistance  Chair/Bed-to-Chair Transfer  Assistance Needed: Dependent  Comment: Total A using Jalila Goes on 10/18/2021; refused/unable on succeeding days this week  CARE Score: 1  Discharge Goal: Partial/moderate assistance  Toilet Transfer  Assistance Needed: Dependent  CARE Score: 1  Car Transfer  Reason if not Attempted: Not attempted due to medical condition or safety concerns  CARE Score: 88  Discharge Goal: Partial/moderate assistance    Ambulation:    Walking Ability  Does the Patient Walk?: Yes     Walk 10 Feet  Comment: 88 (per team huddle)  Reason if not Attempted: Not attempted due to medical condition or safety concerns  CARE Score: 88  Discharge Goal: Supervision or touching assistance     Walk 50 Feet with Two Turns  Comment: 88 (per team huddle)  Reason if not Attempted: Not attempted due to medical condition or safety concerns  CARE Score: 88  Discharge Goal: Supervision or touching assistance     Walk 150 Feet  Comment: 88 (per team huddle)  Reason if not Attempted: Not applicable  CARE Score: 9  Discharge Goal: Not Applicable     Walking 10 Feet on Uneven Surfaces  Comment: 88 (per team huddle)  Reason if not Attempted: Not attempted due to medical condition or safety concerns  CARE Score: 88  Discharge Goal: Partial/moderate assistance     1 Step (Curb)  Comment: 9 (per team huddle)  Reason if not Attempted: Not attempted due to medical condition or safety concerns  CARE Score: 88  Discharge Goal: Partial/moderate assistance     4 Steps  Comment: 9 (per team huddle)  Reason if not Attempted: Not applicable  CARE Score: 9  Discharge Goal: Not Applicable     12 Steps  Comment: 9 (per team huddle)  Reason if not Attempted: Not applicable  CARE Score: 9  Discharge Goal: Not Applicable       Wheelchair:  w/c Ability: Wheelchair Ability  Uses a Wheelchair and/or Scooter?: Yes  Wheel 50 Feet with Two Turns  Assistance Needed: Partial/moderate assistance (min A for completion of last 5-10 ft 2/2 onset of fatigue)  Comment: per verbal report of OT, pt was only able to propel 45 ft with Min A for turns as observed during co-tx on 10/13/2021  CARE Score: 3  Wheel 150 Feet  Assistance Needed: Substantial/maximal assistance  Comment: per verbal report of OT, pt was only able to propel 45 ft with Min A as observed during co-tx on 10/13/2021  CARE Score: 2  Discharge Goal: Independent          Balance:        Object: Picking Up Object  Comment: pt with poor standing tolerance and required BUE support on RW to maintain static standing  Reason if not Attempted: Not attempted due to medical condition or safety concerns  CARE Score: 88  Discharge Goal: Supervision or touching assistance    I      Exam:    Blood pressure (!) 109/55, pulse 141, temperature 99.6 °F (37.6 °C), temperature source Rectal, resp. rate 20, height 5' 0.98\" (1.549 m), weight 231 lb 7.7 oz (105 kg), last menstrual period 01/23/1995, SpO2 96 %, not currently breastfeeding. General: Lying back in bed. Eyes closed. Very passive in conversation. Inconsistent with following commands. HEENT: Symmetric facial expression. No JVD or adenopathy. MMM. Pulmonary: Unlabored shallow respirations with no wheezes or rales. Cardiac: Occasional premature beat with a controlled rate. Abdomen: Patient's abdomen is soft and nondistended. Bowel sounds were present throughout. There was no rebound, guarding or masses noted. Upper extremities: No new bruising or swelling. Generalized weakness. Lower extremities: Incision is pink with no significant drainage. Skin edges are approximated. Thigh is swollen as before tender. Lower limb rash seems drier. Sitting balance was poor. Standing balance was poor.     Lab Results   Component Value Date    WBC 6.3 11/09/2021    HGB 7.6 (L) 11/09/2021    HCT 24.9 (L) 11/09/2021    .2 (H) 11/09/2021     (L) 11/09/2021     Lab Results   Component Value Date    INR 1.00 11/05/2021    INR 0.94 10/08/2021    INR 1.06 05/21/2021    PROTIME 12.9 11/05/2021    PROTIME 12.1 10/08/2021    PROTIME 12.8 05/21/2021     Lab Results   Component Value Date    CREATININE 2.1 (H) 11/09/2021    BUN 21 11/09/2021     11/09/2021    K 4.1 11/09/2021     11/09/2021    CO2 22 11/09/2021     Lab Results   Component Value Date    ALT <5 (L) 11/09/2021    AST 19 11/09/2021    ALKPHOS 105 11/09/2021    BILITOT 0.4 11/09/2021       After she developed acute hypotension, low-grade fever and tachycardia, infectious disease and cardiology consults recommended transfer to a monitored area the acute

## 2021-11-18 ENCOUNTER — TELEPHONE (OUTPATIENT)
Dept: INFECTIOUS DISEASES | Age: 81
End: 2021-11-18

## 2021-11-18 NOTE — TELEPHONE ENCOUNTER
Called from Gonzalo Beyer states pt is finished with ABX. Can the PICC line be pulled? Advised to maintain the line until she hears back. Please advise.

## 2021-11-19 ENCOUNTER — HOSPITAL ENCOUNTER (OUTPATIENT)
Age: 81
Setting detail: SPECIMEN
Discharge: HOME | End: 2021-11-19

## 2021-11-19 LAB
ANION GAP SERPL CALCULATED.3IONS-SCNC: 11 MMOL/L (ref 4–16)
BUN BLDV-MCNC: 37 MG/DL (ref 6–23)
CALCIUM SERPL-MCNC: 7.9 MG/DL (ref 8.3–10.6)
CHLORIDE BLD-SCNC: 108 MMOL/L (ref 99–110)
CO2: 20 MMOL/L (ref 21–32)
CREAT SERPL-MCNC: 5.2 MG/DL (ref 0.6–1.1)
GFR AFRICAN AMERICAN: 10 ML/MIN/1.73M2
GFR NON-AFRICAN AMERICAN: 8 ML/MIN/1.73M2
GLUCOSE BLD-MCNC: 49 MG/DL (ref 70–99)
POTASSIUM SERPL-SCNC: 4.2 MMOL/L (ref 3.5–5.1)
SODIUM BLD-SCNC: 139 MMOL/L (ref 135–145)

## 2021-11-19 PROCEDURE — 80048 BASIC METABOLIC PNL TOTAL CA: CPT

## 2021-11-19 PROCEDURE — 36415 COLL VENOUS BLD VENIPUNCTURE: CPT

## 2021-11-23 ENCOUNTER — HOSPITAL ENCOUNTER (OUTPATIENT)
Age: 81
Setting detail: SPECIMEN
Discharge: HOME | End: 2021-11-23

## 2021-11-23 LAB
ALBUMIN SERPL-MCNC: 2.2 GM/DL (ref 3.4–5)
ALP BLD-CCNC: 136 IU/L (ref 40–128)
ALT SERPL-CCNC: 10 U/L (ref 10–40)
ANION GAP SERPL CALCULATED.3IONS-SCNC: 13 MMOL/L (ref 4–16)
AST SERPL-CCNC: 31 IU/L (ref 15–37)
BILIRUB SERPL-MCNC: 0.3 MG/DL (ref 0–1)
BUN BLDV-MCNC: 47 MG/DL (ref 6–23)
C-REACTIVE PROTEIN, HIGH SENSITIVITY: 55.2 MG/L
CALCIUM SERPL-MCNC: 8 MG/DL (ref 8.3–10.6)
CHLORIDE BLD-SCNC: 104 MMOL/L (ref 99–110)
CO2: 17 MMOL/L (ref 21–32)
CREAT SERPL-MCNC: 5.7 MG/DL (ref 0.6–1.1)
ERYTHROCYTE SEDIMENTATION RATE: 19 MM/HR (ref 0–30)
GFR AFRICAN AMERICAN: 9 ML/MIN/1.73M2
GFR NON-AFRICAN AMERICAN: 7 ML/MIN/1.73M2
GLUCOSE BLD-MCNC: 43 MG/DL (ref 70–99)
HCT VFR BLD CALC: 23.6 % (ref 37–47)
HEMOGLOBIN: 7.6 GM/DL (ref 12.5–16)
MCH RBC QN AUTO: 31.4 PG (ref 27–31)
MCHC RBC AUTO-ENTMCNC: 32.2 % (ref 32–36)
MCV RBC AUTO: 97.5 FL (ref 78–100)
PDW BLD-RTO: 23.6 % (ref 11.7–14.9)
PLATELET # BLD: 109 K/CU MM (ref 140–440)
PMV BLD AUTO: 10.5 FL (ref 7.5–11.1)
POTASSIUM SERPL-SCNC: 4.3 MMOL/L (ref 3.5–5.1)
RBC # BLD: 2.42 M/CU MM (ref 4.2–5.4)
SODIUM BLD-SCNC: 134 MMOL/L (ref 135–145)
TOTAL PROTEIN: 6.1 GM/DL (ref 6.4–8.2)
WBC # BLD: 5.6 K/CU MM (ref 4–10.5)

## 2021-11-23 PROCEDURE — 36415 COLL VENOUS BLD VENIPUNCTURE: CPT

## 2021-11-23 PROCEDURE — 80048 BASIC METABOLIC PNL TOTAL CA: CPT

## 2021-11-23 PROCEDURE — 80053 COMPREHEN METABOLIC PANEL: CPT

## 2021-11-23 PROCEDURE — 85652 RBC SED RATE AUTOMATED: CPT

## 2021-11-23 PROCEDURE — 85027 COMPLETE CBC AUTOMATED: CPT

## 2021-11-23 PROCEDURE — 86140 C-REACTIVE PROTEIN: CPT

## 2021-11-29 ENCOUNTER — TELEPHONE (OUTPATIENT)
Dept: FAMILY MEDICINE CLINIC | Age: 81
End: 2021-11-29

## 2021-11-29 NOTE — TELEPHONE ENCOUNTER
----- Message from Jeff Trevino sent at 11/26/2021  3:22 PM EST -----  Subject: Message to Provider    QUESTIONS  Information for Provider? Patient's niece called in and said the patient   has passed away. Date of Death 11/26/21. Please follow up with reyes at   [phone]   ---------------------------------------------------------------------------  --------------  [address]  What is the best way for the office to contact you? OK to leave message on   voicemail  Preferred Call Back Phone Number? [phone]  ---------------------------------------------------------------------------  --------------  SCRIPT ANSWERS  Relationship to Patient? Third Party  Representative Name?  Saurabh Malave

## 2021-12-07 ENCOUNTER — CARE COORDINATION (OUTPATIENT)
Dept: CARE COORDINATION | Age: 81
End: 2021-12-07

## 2021-12-07 NOTE — CARE COORDINATION
Per [employer] Email:    - Facility Discharging :  [facility]  - Discharge Date : 2021  - Discharge Disposition: pt         Thank you,            Hui Longoria       RN, BSN, Skilled Inpatient Care Coordinator     M: [phone]

## 2022-12-15 NOTE — ED NOTES
Reading Providers    Read Date Phone Pager   Kan RENTERIA Fri May 21, 2021  4:41 [phone]    Radiation Dose Estimates    No radiation information found for this patient   Narrative   EXAMINATION:   4 XRAY VIEWS OF THE RIGHT FEMUR; 5 XRAY VIEWS OF THE LEFT FEMUR; 2 XRAY VIEWS   OF THE LEFT TIBIA AND FIBULA; ONE XRAY VIEW OF THE PELVIS       5/21/2021 4:25 pm       COMPARISON:   None.       HISTORY:   ORDERING SYSTEM PROVIDED HISTORY: injury   TECHNOLOGIST PROVIDED HISTORY:   Reason for exam:->injury   Reason for Exam: pain   Acuity: Acute   Type of Exam: Initial   Mechanism of Injury: fall   Relevant Medical/Surgical History: none       FINDINGS:   Limited visualization of the left hemipelvis shows no evidence of fracture. There is an oblique fracture extending through the intertrochanteric portion   of the proximal left femur into the proximal diaphysis.  The fracture is   moderately displaced.  Femoral head remains located within the acetabulum   with degenerative change at the hip joint.  The distal shaft of the left   femur is intact.       Mild degenerative change in the right hip joint.  The right femur is intact. There is chronic degenerative change in the bilateral knees. Tricompartmental joint space narrowing and osteophyte formation is noted. The left tibia and fibula are intact with no acute abnormality.           Impression   1. Displaced fracture through the proximal left femur.    2. No additional fractures are identified.  Degenerative changes in the   bilateral hips and knees.              Al Burt RN  05/21/21 1566 Never smoker

## 2023-05-22 NOTE — ANESTHESIA PRE PROCEDURE
Admitted with fatigue and concern for PNA on CXR. Notably, she has a chronic R pleural effusion with atelectasis. Recent imaging reviewed. Will continue abx and empirically treat x5d course. Notably has been hypoxic in the recent history, on 2L NC PRN and at night per outpatient PCP    - continue cefpodoxime and azithro on dc     Department of Anesthesiology  Preprocedure Note       Name:  Ron Alaniz   Age:  [de-identified] y.o.  :  1940                                          MRN:  [id number]         Date:  10/25/2021      Surgeon: Francia Talamantes):  Peg Leary MD    Procedure: Procedure(s):  PACEMAKER INSERTION PERMANENT    Medications prior to admission:   Prior to Admission medications    Medication Sig Start Date End Date Taking?  Authorizing Provider   polyethylene glycol (GLYCOLAX) 17 g packet Take 17 g by mouth daily as needed for Constipation 10/11/21 11/10/21  Suzette Thompson MD   sennosides-docusate sodium (SENOKOT-S) 8.6-50 MG tablet Take 1 tablet by mouth 2 times daily 10/11/21   Suzette Thompson MD   allopurinol (ZYLOPRIM) 100 MG tablet Take 0.5 tablets by mouth daily 21   Violetta Hawley MD   nystatin (MYCOSTATIN) 726740 UNIT/GM powder Apply 3 times daily x 10 to 14 days 21   Rachael Turner PA-C   metoprolol tartrate (LOPRESSOR) 25 MG tablet Take 1 tablet by mouth 2 times daily 9/21/21 10/21/21  Violetta Hawley MD   magnesium oxide (MAG-OX) 400 (241.3 Mg) MG TABS tablet TAKE ONE TABLET BY MOUTH TWICE A DAY 9/21/21 3/1/22  Violetta Hawley MD   levothyroxine (SYNTHROID) 50 MCG tablet Take 1 tablet by mouth Daily 9/21/21 10/21/21  Violetta Hawley MD   furosemide (LASIX) 20 MG tablet Take 1 tablet by mouth 2 times daily Take 20 mg by mouth 2 times daily 21   Violetta Hawley MD       Current medications:    Current Facility-Administered Medications   Medication Dose Route Frequency Provider Last Rate Last Admin    digoxin (LANOXIN) tablet 125 mcg  125 mcg Oral Q48H Cookie Jones MD   125 mcg at 10/23/21 1920    0.9 % sodium chloride infusion  25 mL IntraVENous PRN C Milinda Boxer, MD        enoxaparin (LOVENOX) injection 30 mg  30 mg SubCUTAneous Daily C Milinda Boxer, MD   30 mg at 10/24/21 0908    magnesium hydroxide (MILK OF MAGNESIA) 400 MG/5ML suspension 30 mL  30 mL Oral Daily PRN Merly Jay MD        sennosides-docusate sodium (SENOKOT-S) 8.6-50 MG tablet 1 tablet  1 tablet Oral BID PRN SKYE Mehta MD        acetaminophen (TYLENOL) tablet 1,000 mg  1,000 mg Oral 4x Daily  SKYE Mehta MD   1,000 mg at 10/24/21 2023    bisacodyl (DULCOLAX) suppository 10 mg  10 mg Rectal Daily PRN SKYE Mehta MD        polyethylene glycol [facility]) packet 17 g  17 g Oral Daily PRN SKYE Mehta MD        0.9 % sodium chloride infusion  25 mL IntraVENous PRN SKYE Mehta MD        allopurinol (ZYLOPRIM) tablet 50 mg  50 mg Oral Daily SKYE Mehta MD   50 mg at 10/24/21 0908    calcium carbonate (TUMS) chewable tablet 500 mg  500 mg Oral TID PRN SKYE Mehta MD        collagenase ointment   Topical Daily SKYE Mehta MD   Given at 10/23/21 1105    levothyroxine (SYNTHROID) tablet 50 mcg  50 mcg Oral Daily SKYE Mehta MD   50 mcg at 10/25/21 5209    loperamide (IMODIUM) capsule 2 mg  2 mg Oral 4x Daily PRN SKYE Mehta MD        metoprolol tartrate (LOPRESSOR) tablet 25 mg  25 mg Oral BID SKYE Mehta MD   25 mg at 10/24/21 0914    miconazole (MICOTIN) 2 % powder   Topical BID SKYE Mehta MD   Given at 10/24/21 1644    ondansetron (ZOFRAN-ODT) disintegrating tablet 4 mg  4 mg Oral Q6H PRN SKYE Mehta MD        pantoprazole (PROTONIX) tablet 40 mg  40 mg Oral QAM AC SKYE Mehta MD   40 mg at 10/25/21 0645    sodium chloride flush 0.9 % injection 10 mL  10 mL IntraVENous 2 times per day Merly Jay MD   10 mL at 10/24/21 2028    sodium chloride flush 0.9 % injection 10 mL  10 mL IntraVENous PRN SKYE Mehta MD        midodrine (PROAMATINE) tablet 10 mg  10 mg Oral TID JESÚS Mora MD   10 mg at 10/24/21 2000    0.9 % sodium chloride infusion   IntraVENous PRN Fernando Null MD        meropenem (MERREM) 500 mg IVPB (mini-bag)  500 mg IntraVENous Q12H Fernando Null MD   Stopped at 10/25/21 0045    sodium bicarbonate 75 mEq in sodium chloride 0.45 % 1,000 mL infusion   IntraVENous Continuous Oliverio Canela MD 75 mL/hr at 10/24/21 2007 New Bag at 10/24/21 2007    fluconazole (DIFLUCAN) 200 mg IVPB  200 mg IntraVENous Q24H Shakila Soliman  mL/hr at 10/24/21 1939 200 mg at 10/24/21 1939       Allergies:  No Known Allergies    Problem List:    Patient Active Problem List   Diagnosis Code    Chronic kidney disease (CKD) stage G4/A2, severely decreased glomerular filtration rate (GFR) between 15-29 mL/min/1.73 square meter and albuminuria creatinine ratio between  mg/g ([facility]) N18.4    Ventral hernia without obstruction or gangrene K43.9    SVT (supraventricular tachycardia) ([facility]) I47.1    CRF (chronic renal failure) N18.9    Hypothyroid E03.9    Chronic kidney disease, stage IV (severe) ([facility]) N18.4    Gastroesophageal reflux disease K21.9    H/O ventral hernia Z87.19    S/P ventral herniorrhaphy Z98.890, Z87.19    S/P herniorrhaphy Z98.890, Z87.19    Essential hypertension I10    History of ventral hernia repair Z98.890, Z87.19    Morbid obesity with BMI of 45.0-49.9, adult ([facility] E66.01, Z68.42    Closed transcervical fracture of proximal femur, left, initial encounter ([facility] N91.240Q    Displaced fracture of right femoral neck ([facility] S72.001A    MRSA cellulitis L03.90, B95.62    Traumatic closed fracture of neck of femur with minimal displacement, right, initial encounter ([facility] S72.001A    Uncontrolled pain R52    Generalized weakness R53.1    Acute blood loss anemia D62    Paroxysmal atrial fibrillation ([facility]) I48.0    Acute on chronic diastolic (congestive) heart failure ([facility]) I50.33    Acute cystitis without hematuria N30.00    Enterococcus faecalis infection B95.2    Septic shock ([facility]) A41.9, R65.21       Past Medical History:        Diagnosis Date    Acid reflux     'no recent issue\"    Arthritis     \"Left Index Finger\"    CHF (congestive heart failure) ([facility]     per old chart hx of CHF with admission 12/2016 with pulmonary edema    Chronic kidney disease     Sees Dr. Faheem Nathan have one kidney, dr Oneida Garcia told me that in 2011 I think\"    GERD (gastroesophageal reflux disease)     History of blood transfusion 2011 Or 2012    No Reaction To Blood Transfusion Received    Hypertension     ( pt states she is not on any medication for blood pressure)    Hypomagnesemia     Hypothyroidism     MRSA (methicillin resistant staph aureus) culture positive 10/08/2021    Leg    Shortness of breath on exertion     SVT (supraventricular tachycardia) ([address]     per old chart hx of SVT with admission 2016 tx with Adenosine and per notes in 5/2018 hx of SVT- no cardiologist    Teeth missing     Upper And Lower    Wears glasses        Past Surgical History:        Procedure Laterality Date    ABDOMEN SURGERY      DENTAL SURGERY      Teeth Extracted In Past    ENDOSCOPY, COLON, DIAGNOSTIC  2011 Or 2012    EYE SURGERY  ? when    clyde cataract ext    FEMUR FRACTURE SURGERY Left 5/22/2021    FEMUR IM NAIL REGINALDO INSERTION performed by Lottie Mason MD at [address] [place] [address]    Abdominal Hernia Repair     HIP SURGERY Right 10/8/2021    RIGHT HIP HEMIARTHROPLASTY performed by Lottie Mason MD at [facility] Right 10/10/2021    HIP HEMIARTHROPLASTY performed by Lottie Mason MD at [address] [place] [address]  05/27/2018    exp lap . converted to exp laporotomy with ventral hernia repair with mesh    OTHER SURGICAL HISTORY  52/64/1009    umbilical scar excision    VENTRAL HERNIA REPAIR  09/16/2016    Robotic laparoscopic       Social History:    Social History     Tobacco Use    Smoking status: Never Smoker    Smokeless tobacco: Never Used   Substance Use Topics    Alcohol use: Not Currently                                Counseling given: Not Answered      Vital Signs (Current):   Vitals:    10/24/21 1630 10/24/21 2000 10/25/21 0400 10/25/21 0600 BP: (!) 118/36 (!) 120/41     Pulse: 59 55 82    Resp: 13 17 16    Temp: 36.7 °C (98 °F) 36.7 °C (98.1 °F) 36.7 °C (98 °F)    TempSrc: Oral Oral     SpO2: 97% 99% 98%    Weight:    230 lb (104.3 kg)                                              BP Readings from Last 3 Encounters:   10/24/21 (!) 120/41   10/22/21 (!) 109/55   10/11/21 103/64       NPO Status:                                                                                 BMI:   Wt Readings from Last 3 Encounters:   10/25/21 230 lb (104.3 kg)   10/22/21 231 lb 7.7 oz (105 kg)   10/11/21 235 lb 7.2 oz (106.8 kg)     Body mass index is 43.48 kg/m². CBC:   Lab Results   Component Value Date    WBC 9.8 10/25/2021    RBC 2.35 10/25/2021    HGB 7.1 10/25/2021    HCT 22.9 10/25/2021    MCV 97.4 10/25/2021    RDW 20.2 10/25/2021     10/25/2021       CMP:   Lab Results   Component Value Date     10/24/2021    K 4.2 10/24/2021     10/24/2021    CO2 20 10/24/2021    BUN 56 10/24/2021    CREATININE 2.6 10/24/2021    GFRAA 21 10/24/2021    LABGLOM 18 10/24/2021    GLUCOSE 75 10/24/2021    PROT 6.5 10/11/2021    PROT 5.5 03/24/2012    CALCIUM 7.8 10/24/2021    BILITOT 0.4 10/11/2021    ALKPHOS 74 10/11/2021    AST 35 10/11/2021    ALT 7 10/11/2021       POC Tests: No results for input(s): POCGLU, POCNA, POCK, POCCL, POCBUN, POCHEMO, POCHCT in the last 72 hours.     Coags:   Lab Results   Component Value Date    PROTIME 12.1 10/08/2021    PROTIME 14.5 03/24/2012    INR 0.94 10/08/2021    APTT 30.5 10/08/2021       HCG (If Applicable): No results found for: PREGTESTUR, PREGSERUM, HCG, HCGQUANT     ABGs:   Lab Results   Component Value Date    PO2ART 75 08/14/2011    BEART 1 08/14/2011        Type & Screen (If Applicable):  No results found for: LABABO, LABRH    Drug/Infectious Status (If Applicable):  No results found for: HIV, HEPCAB    COVID-19 Screening (If Applicable):   Lab Results   Component Value Date    COVID19 NOT DETECTED 10/08/2021 Anesthesia Evaluation  Patient summary reviewed  Airway: Mallampati: III  TM distance: >3 FB   Neck ROM: limited  Mouth opening: > = 3 FB Dental:          Pulmonary:normal exam                               Cardiovascular:    (+) hypertension:, dysrhythmias: atrial fibrillation and SVT, CHF:,           Rate: normal                 ROS comment: atrial fibrillation with RVR patient was on Cardizem drip up to the 23rd  Which was discontinued currently in sinus bradycardia and episode of 3.8-second pause patient very likely has sick sinus syndrome and will need a pacemaker    Echo 10/8/2021:  Summary   This is a limited echocardiogram.   Technically difficult study, due to body habitus. Left ventricular systolic function is normal.   Ejection fraction is visually estimated at 50-55%. Moderate mitral regurgitation. Moderate tricuspid regurgitation; RVSP: 45 mmHg. No evidence of any pericardial effusion. Neuro/Psych:   Negative Neuro/Psych ROS              GI/Hepatic/Renal:   (+) GERD:, renal disease: CRI, morbid obesity          Endo/Other:    (+) hypothyroidism, blood dyscrasia: anemia:., .                 Abdominal:             Vascular: negative vascular ROS. Other Findings:           Anesthesia Plan      MAC     ASA 4       Induction: intravenous. Anesthetic plan and risks discussed with patient. Use of blood products discussed with patient whom. Plan discussed with attending. SHERYL Ferguson - SONU   10/25/2021       Pre Anesthesia Assessment complete.  Chart reviewed on 10/25/2021   COVID pending

## (undated) DEVICE — DRESSING TRNSPAR W5XL4.5IN FLM SHT SEMIPERMEABLE WIND

## (undated) DEVICE — DUAL CUT SAGITTAL BLADE

## (undated) DEVICE — Device: Brand: POWER-FLO®

## (undated) DEVICE — KIT BNE CEM PREP FEM QUIK-USE 1 PER CA

## (undated) DEVICE — GOWN,ECLIPSE,POLYRNF,BRTHSLV,L,30/CS: Brand: MEDLINE

## (undated) DEVICE — CHLORAPREP 26ML ORANGE

## (undated) DEVICE — PACK PROCEDURE SURG TOT HIP LF

## (undated) DEVICE — DRAPE,UTILITY,XL,4/PK,STERILE: Brand: MEDLINE

## (undated) DEVICE — DECANTER FLD 9IN ST BG FOR ASEP TRNSF OF FLD

## (undated) DEVICE — TOWEL,OR,DSP,ST,BLUE,STD,6/PK,12PK/CS: Brand: MEDLINE

## (undated) DEVICE — COVER,TABLE,44X90,STERILE: Brand: MEDLINE

## (undated) DEVICE — BANDAGE COMPR W6INXL5YD SELF ADH COHESIVE CO FLX

## (undated) DEVICE — COVER,MAYO STAND,STERILE: Brand: MEDLINE

## (undated) DEVICE — SHEET,T,THYROID,STERILE: Brand: MEDLINE

## (undated) DEVICE — INTENDED FOR TISSUE SEPARATION, AND OTHER PROCEDURES THAT REQUIRE A SHARP SURGICAL BLADE TO PUNCTURE OR CUT.: Brand: BARD-PARKER ® STAINLESS STEEL BLADES

## (undated) DEVICE — CONVERTORS STOCKINETTE: Brand: CONVERTORS

## (undated) DEVICE — SUTURE ABSORBABLE BRAIDED 2-0 CT-1 27 IN UD VICRYL J259H

## (undated) DEVICE — SUTURE ETHLN SZ 3-0 L30IN NONABSORBABLE BLK FS-1 L24MM 3/8 669H

## (undated) DEVICE — ZINACTIVE USE 2539609 APPLICATOR MEDICATED 10.5 CC SOLUTION HI LT ORNG CHLORAPREP

## (undated) DEVICE — STAPLER SKIN L39MM DIA0.53MM CRWN 5.7MM S STL FIX HD PROX

## (undated) DEVICE — ELECTRODE BLDE L6.5IN CAUT EXT DISP

## (undated) DEVICE — Device

## (undated) DEVICE — ADHESIVE SKIN CLSR 0.7ML TOP DERMBND ADV

## (undated) DEVICE — Z DISCONTINUED USE 2744636  DRESSING AQUACEL 14 IN ALG W3.5XL14IN POLYUR FLM CVR W/ HYDRCOLL

## (undated) DEVICE — SUTURE PERMAHAND SZ 2-0 L18IN NONABSORBABLE BLK L26MM SH C012D

## (undated) DEVICE — GLOVE SURG SZ 7 CRM LTX FREE POLYISOPRENE POLYMER BEAD ANTI

## (undated) DEVICE — THREE QUARTER SHEET: Brand: CONVERTORS

## (undated) DEVICE — T4 HOOD

## (undated) DEVICE — MARKER SURG SKIN UTIL REGULAR/FINE 2 TIP W/ RUL AND 9 LBL

## (undated) DEVICE — GLOVE ORANGE PI 8   MSG9080

## (undated) DEVICE — IMPLANTABLE DEVICE: Type: IMPLANTABLE DEVICE | Site: HIP | Status: NON-FUNCTIONAL

## (undated) DEVICE — HOOD, PEEL-AWAY: Brand: FLYTE

## (undated) DEVICE — ROD RMR L950MM DIA3MM W/ STR BALL TIP

## (undated) DEVICE — NEEDLE HYPO 23GA L1.5IN TURQ POLYPR HUB S STL THN WALL IM

## (undated) DEVICE — HEWSON SUTURE RETRIEVER: Brand: HEWSON SUTURE RETRIEVER

## (undated) DEVICE — SUTURE PDS II SZ 0 L60IN ABSRB VLT L48MM CTX 1/2 CIR Z990G

## (undated) DEVICE — COUNTER NDL 30 COUNT FOAM STRP SGL MAG

## (undated) DEVICE — SYRINGE IRRIG 60ML SFT PLIABLE BLB EZ TO GRP 1 HND USE W/

## (undated) DEVICE — 3M™ IOBAN™ 2 ANTIMICROBIAL INCISE DRAPE 6650EZ: Brand: IOBAN™ 2

## (undated) DEVICE — SUTURE N ABSRB MONOFILAMENT 2-0 38 IN 39 MM BLU FORC FIBER 3910900022

## (undated) DEVICE — GLOVE SURG SZ 65 CRM LTX FREE POLYISOPRENE POLYMER BEAD ANTI

## (undated) DEVICE — CALIBRATED DRILL BIT , AO: Brand: AXSOS

## (undated) DEVICE — DRAPE SHEET ULTRAGARD: Brand: MEDLINE

## (undated) DEVICE — GAUZE,SPONGE,4"X4",16PLY,XRAY,STRL,LF: Brand: MEDLINE

## (undated) DEVICE — DRAPE,U/ SHT,SPLIT,PLAS,STERIL: Brand: MEDLINE

## (undated) DEVICE — SPONGE LAP W18XL18IN WHT COT 4 PLY FLD STRUNG RADPQ DISP ST

## (undated) DEVICE — YANKAUER,FLEXIBLE HANDLE,REGLR CAPACITY: Brand: MEDLINE INDUSTRIES, INC.

## (undated) DEVICE — SUTURE PDS II SZ 1 L96IN ABSRB VLT TP-1 L65MM 1/2 CIR Z880G

## (undated) DEVICE — TUBING, SUCTION, 9/32" X 10', STRAIGHT: Brand: MEDLINE

## (undated) DEVICE — FAN SPRAY KIT: Brand: PULSAVAC®

## (undated) DEVICE — GLOVE SURG SZ 65 L12IN FNGR THK79MIL GRN LTX FREE

## (undated) DEVICE — SUTURE VCRL SZ 4-0 L27IN ABSRB VLT L26MM SH 1/2 CIR J315H

## (undated) DEVICE — PENCIL ES CRD L10FT HND SWCHING ROCK SWCH W/ EDGE COAT BLDE

## (undated) DEVICE — DUAL SPIKE Y-CONNECTOR SET, PACKAGED: Brand: PULSAVAC®

## (undated) DEVICE — C-ARM: Brand: UNBRANDED

## (undated) DEVICE — AMD ANTIMICROBIAL NON-ADHERENT PAD,0.2% POLYHEXAMETHYLENE BIGUANIDE HCI (PHMB): Brand: TELFA

## (undated) DEVICE — ROD RMR L950MM DIA2.5MM W/ EXTN BALL TIP

## (undated) DEVICE — SOLUTION IV IRRIG WATER 1000ML POUR BRL 2F7114

## (undated) DEVICE — SUTURE VCRL SZ 0 L27IN ABSRB UD L36MM CT-1 1/2 CIR J260H

## (undated) DEVICE — ELECTRODE ES AD CRDLSS PT RET REM POLYHESIVE

## (undated) DEVICE — DRAPE,HIP,W/POUCHES,STERILE: Brand: MEDLINE